# Patient Record
Sex: FEMALE | Race: WHITE | NOT HISPANIC OR LATINO | Employment: OTHER | URBAN - METROPOLITAN AREA
[De-identification: names, ages, dates, MRNs, and addresses within clinical notes are randomized per-mention and may not be internally consistent; named-entity substitution may affect disease eponyms.]

---

## 2017-01-05 ENCOUNTER — OFFICE VISIT - HEALTHEAST (OUTPATIENT)
Dept: INTERNAL MEDICINE | Facility: CLINIC | Age: 82
End: 2017-01-05

## 2017-01-05 DIAGNOSIS — G20.A1 PARKINSON DISEASE (H): ICD-10-CM
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
## 2017-02-02 ENCOUNTER — RECORDS - HEALTHEAST (OUTPATIENT)
Dept: ADMINISTRATIVE | Facility: OTHER | Age: 82
End: 2017-02-02

## 2017-03-22 ENCOUNTER — RECORDS - HEALTHEAST (OUTPATIENT)
Dept: ADMINISTRATIVE | Facility: OTHER | Age: 82
End: 2017-03-22

## 2017-03-29 ENCOUNTER — HOSPITAL ENCOUNTER (OUTPATIENT)
Dept: MAMMOGRAPHY | Facility: CLINIC | Age: 82
Discharge: HOME | End: 2017-03-29
Attending: INTERNAL MEDICINE

## 2017-03-29 DIAGNOSIS — Z12.31 VISIT FOR SCREENING MAMMOGRAM: ICD-10-CM

## 2017-06-09 ENCOUNTER — COMMUNICATION - HEALTHEAST (OUTPATIENT)
Dept: SCHEDULING | Facility: CLINIC | Age: 82
End: 2017-06-09

## 2017-07-24 ENCOUNTER — RECORDS - HEALTHEAST (OUTPATIENT)
Dept: ADMINISTRATIVE | Facility: OTHER | Age: 82
End: 2017-07-24

## 2017-07-24 ENCOUNTER — TRANSFERRED RECORDS (OUTPATIENT)
Dept: HEALTH INFORMATION MANAGEMENT | Facility: CLINIC | Age: 82
End: 2017-07-24

## 2017-08-07 ENCOUNTER — COMMUNICATION - HEALTHEAST (OUTPATIENT)
Dept: INTERNAL MEDICINE | Facility: CLINIC | Age: 82
End: 2017-08-07

## 2017-08-07 DIAGNOSIS — M85.88 OSTEOPENIA OF SPINE: ICD-10-CM

## 2017-08-07 DIAGNOSIS — M85.80 OSTEOPENIA: ICD-10-CM

## 2017-08-10 ENCOUNTER — RECORDS - HEALTHEAST (OUTPATIENT)
Dept: BONE DENSITY | Facility: CLINIC | Age: 82
End: 2017-08-10

## 2017-08-10 ENCOUNTER — RECORDS - HEALTHEAST (OUTPATIENT)
Dept: ADMINISTRATIVE | Facility: OTHER | Age: 82
End: 2017-08-10

## 2017-08-10 DIAGNOSIS — M85.88 OTHER SPECIFIED DISORDERS OF BONE DENSITY AND STRUCTURE, OTHER SITE: ICD-10-CM

## 2017-08-10 DIAGNOSIS — M85.80 OTHER SPECIFIED DISORDERS OF BONE DENSITY AND STRUCTURE, UNSPECIFIED SITE: ICD-10-CM

## 2017-08-15 ENCOUNTER — COMMUNICATION - HEALTHEAST (OUTPATIENT)
Dept: INTERNAL MEDICINE | Facility: CLINIC | Age: 82
End: 2017-08-15

## 2017-10-11 ENCOUNTER — RECORDS - HEALTHEAST (OUTPATIENT)
Dept: ADMINISTRATIVE | Facility: OTHER | Age: 82
End: 2017-10-11

## 2017-11-07 ENCOUNTER — OFFICE VISIT - HEALTHEAST (OUTPATIENT)
Dept: INTERNAL MEDICINE | Facility: CLINIC | Age: 82
End: 2017-11-07

## 2017-11-07 DIAGNOSIS — E78.5 HYPERLIPIDEMIA: ICD-10-CM

## 2017-11-27 ENCOUNTER — RECORDS - HEALTHEAST (OUTPATIENT)
Dept: ADMINISTRATIVE | Facility: OTHER | Age: 82
End: 2017-11-27

## 2018-01-17 ENCOUNTER — OFFICE VISIT - HEALTHEAST (OUTPATIENT)
Dept: INTERNAL MEDICINE | Facility: CLINIC | Age: 83
End: 2018-01-17

## 2018-01-17 DIAGNOSIS — R10.30 GROIN PAIN: ICD-10-CM

## 2018-01-17 DIAGNOSIS — Q18.1 CYST ON EAR: ICD-10-CM

## 2018-03-07 ENCOUNTER — RECORDS - HEALTHEAST (OUTPATIENT)
Dept: ADMINISTRATIVE | Facility: OTHER | Age: 83
End: 2018-03-07

## 2018-06-11 ENCOUNTER — RECORDS - HEALTHEAST (OUTPATIENT)
Dept: ADMINISTRATIVE | Facility: OTHER | Age: 83
End: 2018-06-11

## 2018-06-25 ENCOUNTER — RECORDS - HEALTHEAST (OUTPATIENT)
Dept: ADMINISTRATIVE | Facility: OTHER | Age: 83
End: 2018-06-25

## 2018-07-02 ENCOUNTER — OFFICE VISIT - HEALTHEAST (OUTPATIENT)
Dept: FAMILY MEDICINE | Facility: CLINIC | Age: 83
End: 2018-07-02

## 2018-07-02 DIAGNOSIS — Z48.02 ENCOUNTER FOR STAPLE REMOVAL: ICD-10-CM

## 2018-08-08 ENCOUNTER — TRANSFERRED RECORDS (OUTPATIENT)
Dept: HEALTH INFORMATION MANAGEMENT | Facility: CLINIC | Age: 83
End: 2018-08-08

## 2018-08-08 ENCOUNTER — RECORDS - HEALTHEAST (OUTPATIENT)
Dept: ADMINISTRATIVE | Facility: OTHER | Age: 83
End: 2018-08-08

## 2018-08-10 ENCOUNTER — RECORDS - HEALTHEAST (OUTPATIENT)
Dept: ADMINISTRATIVE | Facility: OTHER | Age: 83
End: 2018-08-10

## 2018-08-20 ENCOUNTER — OFFICE VISIT - HEALTHEAST (OUTPATIENT)
Dept: FAMILY MEDICINE | Facility: CLINIC | Age: 83
End: 2018-08-20

## 2018-08-20 DIAGNOSIS — M25.862 CYST OF LEFT KNEE JOINT: ICD-10-CM

## 2018-08-20 DIAGNOSIS — H61.22 IMPACTED CERUMEN OF LEFT EAR: ICD-10-CM

## 2018-09-24 ENCOUNTER — RECORDS - HEALTHEAST (OUTPATIENT)
Dept: ADMINISTRATIVE | Facility: OTHER | Age: 83
End: 2018-09-24

## 2018-10-10 ENCOUNTER — OFFICE VISIT - HEALTHEAST (OUTPATIENT)
Dept: INTERNAL MEDICINE | Facility: CLINIC | Age: 83
End: 2018-10-10

## 2018-10-10 DIAGNOSIS — M94.9 DISORDER OF BONE AND CARTILAGE: ICD-10-CM

## 2018-10-10 DIAGNOSIS — M89.9 DISORDER OF BONE AND CARTILAGE: ICD-10-CM

## 2018-10-10 DIAGNOSIS — Z00.00 HEALTHCARE MAINTENANCE: ICD-10-CM

## 2018-10-10 DIAGNOSIS — E78.2 MIXED HYPERLIPIDEMIA: ICD-10-CM

## 2018-10-10 DIAGNOSIS — N81.10 PROLAPSE OF VAGINAL WALL: ICD-10-CM

## 2018-10-10 DIAGNOSIS — G20.A1 PARKINSON DISEASE (H): ICD-10-CM

## 2018-10-10 DIAGNOSIS — Z00.00 ROUTINE GENERAL MEDICAL EXAMINATION AT A HEALTH CARE FACILITY: ICD-10-CM

## 2018-10-10 DIAGNOSIS — K63.5 COLON POLYP: ICD-10-CM

## 2018-10-10 DIAGNOSIS — N81.10 VAGINAL WALL PROLAPSE: ICD-10-CM

## 2018-10-10 DIAGNOSIS — I51.7 CARDIOMEGALY: ICD-10-CM

## 2018-10-10 LAB
ALBUMIN SERPL-MCNC: 3.8 G/DL (ref 3.5–5)
ALBUMIN UR-MCNC: NEGATIVE MG/DL
ALP SERPL-CCNC: 46 U/L (ref 45–120)
ALT SERPL W P-5'-P-CCNC: <9 U/L (ref 0–45)
ANION GAP SERPL CALCULATED.3IONS-SCNC: 8 MMOL/L (ref 5–18)
APPEARANCE UR: CLEAR
AST SERPL W P-5'-P-CCNC: 15 U/L (ref 0–40)
BACTERIA #/AREA URNS HPF: ABNORMAL HPF
BILIRUB SERPL-MCNC: 0.4 MG/DL (ref 0–1)
BILIRUB UR QL STRIP: NEGATIVE
BUN SERPL-MCNC: 36 MG/DL (ref 8–28)
CALCIUM SERPL-MCNC: 9.8 MG/DL (ref 8.5–10.5)
CHLORIDE BLD-SCNC: 103 MMOL/L (ref 98–107)
CHOLEST SERPL-MCNC: 170 MG/DL
CO2 SERPL-SCNC: 29 MMOL/L (ref 22–31)
COLOR UR AUTO: YELLOW
CREAT SERPL-MCNC: 0.8 MG/DL (ref 0.6–1.1)
ERYTHROCYTE [DISTWIDTH] IN BLOOD BY AUTOMATED COUNT: 11.8 % (ref 11–14.5)
FASTING STATUS PATIENT QL REPORTED: NORMAL
GFR SERPL CREATININE-BSD FRML MDRD: >60 ML/MIN/1.73M2
GLUCOSE BLD-MCNC: 90 MG/DL (ref 70–125)
GLUCOSE UR STRIP-MCNC: NEGATIVE MG/DL
HCT VFR BLD AUTO: 35.4 % (ref 35–47)
HDLC SERPL-MCNC: 53 MG/DL
HGB BLD-MCNC: 11.8 G/DL (ref 12–16)
HGB UR QL STRIP: NEGATIVE
KETONES UR STRIP-MCNC: NEGATIVE MG/DL
LDLC SERPL CALC-MCNC: 97 MG/DL
LEUKOCYTE ESTERASE UR QL STRIP: ABNORMAL
MCH RBC QN AUTO: 31.4 PG (ref 27–34)
MCHC RBC AUTO-ENTMCNC: 33.4 G/DL (ref 32–36)
MCV RBC AUTO: 94 FL (ref 80–100)
NITRATE UR QL: POSITIVE
PH UR STRIP: 5 [PH] (ref 5–8)
PLATELET # BLD AUTO: 262 THOU/UL (ref 140–440)
PMV BLD AUTO: 7.8 FL (ref 7–10)
POTASSIUM BLD-SCNC: 4.4 MMOL/L (ref 3.5–5)
PROT SERPL-MCNC: 6.5 G/DL (ref 6–8)
RBC # BLD AUTO: 3.77 MILL/UL (ref 3.8–5.4)
RBC #/AREA URNS AUTO: ABNORMAL HPF
SODIUM SERPL-SCNC: 140 MMOL/L (ref 136–145)
SP GR UR STRIP: 1.02 (ref 1–1.03)
SQUAMOUS #/AREA URNS AUTO: ABNORMAL LPF
TRIGL SERPL-MCNC: 101 MG/DL
UROBILINOGEN UR STRIP-ACNC: ABNORMAL
WBC #/AREA URNS AUTO: ABNORMAL HPF
WBC: 7.5 THOU/UL (ref 4–11)

## 2018-10-12 ENCOUNTER — COMMUNICATION - HEALTHEAST (OUTPATIENT)
Dept: INTERNAL MEDICINE | Facility: CLINIC | Age: 83
End: 2018-10-12

## 2018-10-12 ENCOUNTER — AMBULATORY - HEALTHEAST (OUTPATIENT)
Dept: INTERNAL MEDICINE | Facility: CLINIC | Age: 83
End: 2018-10-12

## 2018-10-12 LAB
25(OH)D3 SERPL-MCNC: 38.8 NG/ML (ref 30–80)
25(OH)D3 SERPL-MCNC: 38.8 NG/ML (ref 30–80)
BACTERIA SPEC CULT: ABNORMAL

## 2018-11-08 ENCOUNTER — RECORDS - HEALTHEAST (OUTPATIENT)
Dept: ADMINISTRATIVE | Facility: OTHER | Age: 83
End: 2018-11-08

## 2018-12-14 ENCOUNTER — COMMUNICATION - HEALTHEAST (OUTPATIENT)
Dept: INTERNAL MEDICINE | Facility: CLINIC | Age: 83
End: 2018-12-14

## 2018-12-14 DIAGNOSIS — E78.5 HYPERLIPIDEMIA: ICD-10-CM

## 2018-12-18 ENCOUNTER — RECORDS - HEALTHEAST (OUTPATIENT)
Dept: ADMINISTRATIVE | Facility: OTHER | Age: 83
End: 2018-12-18

## 2018-12-31 NOTE — TELEPHONE ENCOUNTER
FUTURE VISIT INFORMATION      FUTURE VISIT INFORMATION:    Date: 1/17/19    Time: 11.10a    [place] [facility] [place]:    Referring provider:  Dr Hannah Harmon (Physical Therapist)    Referring providers clinic:  [facility])    Reason for visit/diagnosis  Parkinson's    RECORDS REQUESTED FROM:       Clinic name Comments Records Status Imaging Status   [facility] ([facility] Dr. Harmon Received    [facility]ena Adame  Received Requested   [facility] Dr. Tarun Her  MRI C Spine 8/17/15  MRA Head/Neck 8/17/15  MRI Head 7/17/15 Received PACS                         12/31/18: External referral from Dr. Harmon at [facility]. Sent request for records and imaging.    [facility]. Sent requests for records and imaging at each location.

## 2019-01-02 NOTE — TELEPHONE ENCOUNTER
Records received from [facility]. Imaging reports included, imaging was not.     Imaging was done at [facility].

## 2019-01-08 NOTE — PROGRESS NOTES
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 Carmen Luu  Qualifies for and will benefit from the following product: walker due to significant impairment in gait that is able to improve his mobility and reduce the risk of falling. Their mobility issue can be resolved with the use of this medical device.     Ed Mendoza MD  2019          Ed Mendoza MD  _____________________________________________________________________  PATIENT: Carmen Luu  86 year old female   : 1932  RAMOS: 2019    Consult requested by - inserted.         Outside records reviewed and revealed  - inserted.       History obtained from patient      History of Present Illness  85 yo woman with parkinsonism diagnosed .     Backward falls - began before she was diagnosed.   Fell in bedroom.   Diagnosed in   Not sensitive to light.     Neck fracture  - 2018  2 weeks before that fell and tore her right rotator cuff     She can walk 2 or 3 miles - and can walk a straight line.   She has balance issues.   [facility]  She has done big and loud.   She has done this initially  Seen by Dr. Her of [facility]  She has done pool therapy and then land therapy.   She had done boxing and had done title boxing and did not do the boxing at the other place due to the cement floors  Had been doing silver sneakers 3/week  She lives by herself and her daughter is nearby  She is in a twin home - single level    Wears glasses  Had cataract surgery  Has prisms for her double vision  No choking but rare.   She does not have constipation.   Bladder - has a pesary   Has nocturia  3/night  Cognitive abilities are pretty good  Motivation has been good  She has been feeling handicapped   She denies depression or anxious  She does not have apathy  Denies headaches  No cancer  No lung problems  No heart problems  But has low bood pressure except may get light headed or weak in the summer when exercising.   Cholesterol on medication =-   No thyroid or diabetes  No anemia  Infections - denies  Hearing - is okay  Smell perception - is good  She snores per her report  Denies rbd  No recent brain mri  Has not had a neuropsychological evaluation.     She has not used a u-step walker  She has some freezing of gait.   She has to talk to herself when she is moving.     Fall backwards or to the left.   She thinks the sinemet has helped.   She first had problems with the right hand when playing piano.       Mariel daughter      Answers for HPI/ROS submitted by the patient on 1/17/2019   General Symptoms: No  Skin Symptoms: No  HENT Symptoms: No  EYE SYMPTOMS: No  HEART SYMPTOMS: No  LUNG SYMPTOMS: Yes  INTESTINAL SYMPTOMS: No  URINARY SYMPTOMS: No  GYNECOLOGIC SYMPTOMS: No  BREAST SYMPTOMS: No  SKELETAL SYMPTOMS: Yes  BLOOD SYMPTOMS: No  NERVOUS SYSTEM SYMPTOMS: Yes  MENTAL HEALTH SYMPTOMS: No  Cough: No  Sputum or phlegm: No  Coughing up blood: No  Difficulty breating or shortness of breath: No  Snoring: Yes  Difficulty breathing on exertion: No  Nighttime Cough: No  Difficulty breathing when lying flat: Yes  Back pain: Yes  Muscle aches: No  Neck pain: Yes  Swollen joints: No  Joint pain: No  Bone pain: No  Muscle cramps: Yes  Muscle weakness: Yes  Joint stiffness: Yes  Bone fracture: No  Trouble with coordination: Yes  Dizziness or trouble with balance: Yes  Fainting or black-out spells: No  Memory loss: No  Headache: No  Seizures: No  Speech problems: No  Tingling: No  Tremor: No  Weakness: Yes  Difficulty walking: Yes  Paralysis: No  Numbness: No      Medications     8am 2pm 8pm     Aspirin 81         Aspirin-calcium carbonate 81-77         Calcium carbonate 500mg        Calcium carbonate vit D        Calcium carbonate vit D        Carbidopa/levodopa Sinemet 25/100 1.5 1.5 1.5     Cholecalciferol vitamin D3 1000        Docosahexanoic acid 1gm        Fish oil-omega 3 fatty acids 1000mg        Hydrocodone acetaminophen norco        Ibuprofen advil/motrin 200mg        Ibuprofen advil/motrin 400mg        Omega-3 acid ethyl esters lovaza 1g        Pravastatin pravachol 10mg        Vitamin D3 cholecalciferol 2000 units.                                                                                                                               14 Review of systems  are negative except for   Patient Active Problem List   Diagnosis     Parkinson's disease (H)        Allergies   Allergen Reactions     Acetaminophen-Codeine Other (See Comments)     Codeine Other (See Comments)     Morphine Other (See Comments)     Made me feel really wierd     Penicillins      Tongue and throat tingling     No past surgical history on file.  No past medical history on file.  Social History     Socioeconomic History     Marital status:      Spouse name: Not on file     Number of children: Not on file     Years of education: Not on file     Highest education level: Not on file   Social Needs     Financial resource strain: Not on file     Food insecurity - worry: Not on file     Food insecurity - inability: Not on file     Transportation needs - medical: Not on file     Transportation needs - non-medical: Not on file   Occupational History     Not on file   Tobacco Use     Smoking status: Not on file   Substance and Sexual Activity     Alcohol use: Not on file     Drug use: Not on file     Sexual activity: Not on file   Other Topics Concern     Not on file   Social History Narrative    . lives in [place].     Anisha Little daughter     No family history on file.  Current Outpatient Medications   Medication Sig Dispense Refill      carbidopa-levodopa (SINEMET)  MG tablet Take 1.5 tablets by mouth 1.5 tabs by mouth 3/day @@ 8am, 2pm and 8pm       Examination  B/P: Data Unavailable, T: Data Unavailable, P: Data Unavailable, R: Data Unavailable 0 lbs 0 oz  There were no vitals taken for this visit., There is no height or weight on file to calculate BMI.    Vitals signs were added and reviewed if not above. Please refer to the chart from this visit.    General examination: well developed, nourished and normal affect  Carotid: No bruits. Chest CTA, Heart regular without gallops or murmurs. Abdomen soft nontender, no masses, bowel sounds intact. Periphery: normal pulses without edema. No skin lesions. MENTAL STATUS:  Alert, oriented x3.  Speech fluent with normal naming, repetition, comprehension.  Good right-left orientation, Can remember 2/3 objects. 5/5 on spelling world backwards. CRANIAL NERVES:  Disks flat. Pupils are equal, round, reactive to light.  Normal vascularity and fields. Extraocular movements - slight changes vertically. She has a startled facial appearance.  Facial sensation and movement normal.  Hearing intact. Palate moves symmetrically.  Tongue midline.  Sternocleidomastoid and trapezius strength intact.  Neck strength was normal.  NEUROLOGIC:  Tone: slight increase tone and moderate bradykinesias on the right and less on the left. Motor in upper and lower extremities. 5/5.  Reflexes 2/4.  Toe signs downgoing.  Good finger-nose-finger, fine finger movement, heel-shin maneuver, sensation to light touch, position sense and vibration and temperature was normal. Gait - very unsteady widened with good arm swing. Romberg and postural stability impaired - falls if not caught Tremor  - absent      Patient Demographics  - 86 y.o. Female; born Jun. 19, 1932     Patient Address Communication Language Race / Ethnicity   [address] [place] [address] [phone] (Mobile)  [phone] (Home)  [email or web address]  "(Preferred) Unknown / Unknown     Source Comments  - [facility]    You are receiving this document as you are listed as the primary care provider,follow-up provider, or the patient has been referred to you for consultation.This is in compliance with the Medicare and Medicaid EHR Incentive Program,which states \"Providers who transition their patient to another setting of careor provider of care or refers their patient to another provider of care shouldprovide summary care record for each transition of care or referral.\"    Allergies  Reconcile with Patient's Chart    Active Allergy Reactions Severity Noted Date Comments   Acetaminophen-Codeine Other, see comments   12/02/2010     Codeine Other, see comments   12/02/2010     Morphine Other, see comments   04/11/2016 Made me feel really wierd     Penicillins     02/02/2017 Tongue and throat tingling       Medications  Reconcile with Patient's Chart    Medication Sig Dispensed Refills Start Date End Date Status   pravastatin (PRAVACHOL) 10 MG tablet   Take 10 mg by mouth daily at bedtime.   0     Active   carbidopa-levodopa (SINEMET)  MG tablet   Take 1.5 Tabs by mouth three times a day. Takes at 8a-2p-8p   0     Active   calcium carbonate-vitamin D 600-400 MG-UNIT tablet   Take 1 Tab by mouth two times a day.   0     Active   omega-3 fatty acids (MAXEPA,FISHOIL) 1000 MG capsule   Take 1,200 g by mouth daily.   0     Active   aspirin 81 MG tablet   Take 81 mg by mouth daily.   0     Active   fluorometholone (FML) 0.1 % eye drop suspension    Indications: Closed nondisplaced fracture of second cervical vertebra, unspecified fracture morphology, initial encounter ([facility]) Place 1 Drop into eye(s).   0 01/19/2018   Active   HYDROcodone-acetaminophen (NORCO) 5-325 MG tablet    Indications: Closed nondisplaced fracture of second cervical vertebra, unspecified fracture morphology, initial encounter ([facility]) Take 1-2 Tabs by mouth.   0 06/25/2018   Active   DOCOSAHEXAENOIC " "ACID OR    Indications: Closed nondisplaced fracture of second cervical vertebra, unspecified fracture morphology, initial encounter ([facility]) Take 2 g by mouth.   0     Active   Cholecalciferol (VITAMIN D3) 1000 units    Indications: Closed nondisplaced fracture of second cervical vertebra, unspecified fracture morphology, initial encounter ([facility]) Take 1 Cap by mouth.   0 01/02/2012   Active   ibuprofen (MOTRIN) 200 MG tablet    Indications: Closed nondisplaced fracture of second cervical vertebra, unspecified fracture morphology, initial encounter ([facility]) Take 200-400 mg by mouth.   0     Active     Active Problems  Reconcile with Patient's Chart    Problem Noted Date   Parkinson's disease 02/02/2017     Encounters  - from Last 3 Months    Date Type Specialty Care Team Description   12/17/2018 Ancillary Procedure Radiology Neymar Pham MD   Closed displaced fracture of second cervical vertebra, unspecified fracture morphology, initial encounter ([facility]);   History of recent fall   12/17/2018 Telephone Neurosurgery Neymar Pham MD   QUESTIONS, GENERAL   12/10/2018 Telephone Neurosurgery Neymar Pham MD   Physical Therapy     Social History      Tobacco Use Types Packs/Day Years Used Date   Never Smoker           Smokeless Tobacco: Never Used           Sex Assigned at Birth Date Recorded   Not on file       Job Start Date Occupation Industry   Not on file Not on file Not on file     Travel History Travel Start Travel End   No recent travel history available.       Last Filed Vital Signs      Vital Sign Reading Time Taken   Blood Pressure 135/71 10/03/2018 11:01 AM CDT   Pulse 77 10/03/2018 11:01 AM CDT   Temperature 36.2  C (97.2  F) 10/03/2018 11:01 AM CDT   Respiratory Rate 16 10/03/2018 11:01 AM CDT   Oxygen Saturation - -   Inhaled Oxygen Concentration - -   Weight 55.8 kg (123 lb) 08/10/2018 3:27 PM CDT   Height 162.6 cm (5' 4\") 08/10/2018 3:27 PM CDT   Body Mass Index 21.11 08/10/2018 3:27 PM CDT     Plan of " Treatment      Health Maintenance Due Date Last Done Comments   Medicare Annual Wellness Visit 06/19/1932       Advanced Directive 06/19/1997       Dexa 06/19/1997       Zoster (2 of 3) 06/06/2014 04/11/2014     Influenza (#1) 09/01/2018 01/08/2013, 12/02/2010, 11/14/2007     DTaP/Tdap/Td (2 - Td) 02/25/2026 02/25/2016, 12/01/2009     Pneumococcal Completed 11/18/2016, 04/01/2003     HepA Aged Out   No longer eligible based on patient's age to complete this topic   HepB Aged Out   No longer eligible based on patient's age to complete this topic   Hib Aged Out   No longer eligible based on patient's age to complete this topic   MCV4 Aged Out   No longer eligible based on patient's age to complete this topic     Procedures  - from Last 3 Months    Procedure Name Priority Date/Time Associated Diagnosis Comments   XR CERVICAL SPINE AP/LAT UPRIGHT Routine 12/17/2018 3:07 PM CST Closed displaced fracture of second cervical vertebra, unspecified fracture morphology, initial encounter ([facility])    History of recent fall   Results for this procedure are in the results section.       Results  - from Last 3 Months      XR Cervical Spine AP/Lat Upright (12/17/2018 3:07 PM CST)  XR Cervical Spine AP/Lat Upright (12/17/2018 3:07 PM CST)   Narrative Performed At   [facility] RADIOLOGY        EXAM: XR CERVICAL SPINE AP/LAT UPRIGHT    LOCATION: [facility]    DATE/TIME: 12/17/2018 3:07 PM        INDICATION: Past c2 fracture; recent fall    COMPARISON: Cervical spine CT 10/3/2018        FINDINGS: Patient's head is slightly tilted to the right. Mild straightening of the normal cervical lordosis. 1 mm degenerative spondylolisthesis of C7 on T1. Vertebral body heights are maintained. Moderate intervertebral disc height loss and endplate spurring of C5-C6 and C6-C7. Mild intervertebral disc height loss in the rest of the cervical spine. Redemonstration of the fracture involving the base of the dens. The extension into the left lateral  mass of C2 is better seen on the previous cervical spine CT. No prevertebral soft tissue edema. No suspicious lesions in the lung apices.        RADIOLOGY       XR Cervical Spine AP/Lat Upright (12/17/2018 3:07 PM CST)   Procedure Note   Interface, In Rad Results - 12/17/2018 6:31 PM CST    [facility]    EXAM: XR CERVICAL SPINE AP/LAT UPRIGHT  LOCATION: [facility]  DATE/TIME: 12/17/2018 3:07 PM    INDICATION: Past c2 fracture; recent fall  COMPARISON: Cervical spine CT 10/3/2018    FINDINGS: Patient's head is slightly tilted to the right. Mild straightening of the normal cervical lordosis. 1 mm degenerative spondylolisthesis of C7 on T1. Vertebral body heights are maintained. Moderate intervertebral disc height loss and endplate spurring of C5-C6 and C6-C7. Mild intervertebral disc height loss in the rest of the cervical spine. Redemonstration of the fracture involving the base of the dens. The extension into the left lateral mass of C2 is better seen on the previous cervical spine CT. No prevertebral soft tissue edema. No suspicious lesions in the lung apices.         XR Cervical Spine AP/Lat Upright (12/17/2018 3:07 PM CST)   Performing Organization Address City/State/Zipcode Phone Number    RADIOLOGY              Maida Abad MD - 02/02/2017 1:25 PM CST    To manage constipation:  - Try to increase your daily fluids. Work toward 64 ounces/day 8-eight ounce glasses)  - Increase fiber by eating whole grains, fruits and vegetables  - Prunes, prune juice or yogurt may help  - Exercise daily  - Follow recommendations as discussed. Refer to constipation handout provided today.    Refer to handout provided regarding adding foods or supplements for a healthy brain.  - Add fruits and vegetables that are bright and dark in color. They are high in antioxidants and are healthy for your brain.  - Try to eat fish high in omega-3 fatty acids. such as salmon, tuna, herring 2-4 times weekly. Talk to your  primary doctor about adding fish oil supplement.     Follow and refer to recommendations as discussed for taking pills on time.   - Use a system to ensure you take your pills on time.  - Make sure to take your PD pills with you when you leave home.  -There are applications for smart-phones and tablets that can assist with med reminders, such as MediSafe, that allows you to program times and different reminders.    Please call the [facility] nurse line for any questions, concerns or updates, [phone].  Darlene Mathur RN    Thank you for your interest in enrolling in [facility]. Please follow the instructions below to securely access your online medical record. [facility] allows you to send messages to your doctor, view your test results, renew your prescriptions, schedule appointments, and more.    How Do I Sign Up?  1. In your Internet browser, go to [email or web address]  2. Click on the Enter activation code link under the New User? section. You will see the Activate your account! page.  3. Enter your activation code exactly as it appears below. You will not need to use this code after you ve completed the sign-up process. If you do not sign up before the expiration date, you must request a new code.  Activation Code: [email or web address]  Expires: 3/4/2017 2:05 PM    4. Enter your last name and date of birth (mm/dd/yyyy) as indicated, then click Continue. You will be taken to the Let's set up your account page.   5. Create a username. This will be your [facility] login ID and cannot be changed, so think of one that is secure and easy to remember.  6. Create a password. You can change your password at any time.  7. Enter your e-mail address. You will receive e-mail notification when new information is available in [facility].   8. Select your Security Questions and enter your answers. These can be used at a later time if you forget your password.   9. Check the box to accept the terms and  conditions. Click Create your account. You can now view your medical record.    Additional Information  If you have questions, you can call [phone] to talk to our [employer] staff. Remember, [employer] is NOT to be used for urgent needs. For medical emergencies, dial 911.    Move sinemet to a 5 hour interval, at something like 8-1-6    Pay attention to what you are doing before you fall!    Return in 4-6 months      Electronically Signed by Maida Abad MD on 02/02/2017 1:25 PM CST       Progress Notes  - in this encounter  Table of Contents for Progress Notes   Darlene Mathur RN - 02/02/2017 2:02 PM CST   Maida Abad MD - 02/02/2017 7:02 AM CST      Darlene Mathur RN - 02/02/2017 2:02 PM CST  Patient, son and MT seen for a 50 minute nurse assessment as part of the all day team assessment clinic at the [facility].   Nursing assessment and education completed regarding knowledge of Parkinson's disease, medications, bowel management, and nutrition.     Patient has a good understanding of Parkinson's disease and treatment options. Discussed the role of dopamine in movement disorders and medication strategies to replace dopamine. Patient has a reliable system for achieving pills on time. Rationale for pills on time and strategies to achieve on time pills discussed. She has an alarm that will go off when it is time to take her meds    Discussion regarding pill timing away from foods, potential for protein interfering with levodopa absorption. Discussed avoidance of protein-containing foods at same time as carbidopa/levodopa. Talked about taking meds 1/2 hour before meals.     Patient reports no concerns regarding constipation. Discussed optimal bowel management. Dietary and medication recommendations provided.     Patient reports maintaining a healthy diet. Education regarding foods high in antioxidants, and the potential value of foods high in omega-3 fatty acids or supplements discussed.  "    Patient reports no problems with achieving a good nights sleep. Pill and non-pill sleep strategies discussed.    Problems with non-motor symptoms of low blood pressure and skin discussed.     Resources provided regarding concerns discussed above, including constipation, pills on time, and National Parkinson Foundation manual \"What You and Your Family Should Know,\" \"Medications,\" and an \"Aware in Care\" kit.    Patient/family concerns to be addressed by physician added to problem list.     See patient instructions in Plan of Care for specific patient instructions.     Patient/family exhibits adequate understanding of instructions.    Supervising physician in building: MD Darlene Selby, RN      Electronically Signed by Darlene Mathur, RN on 02/02/2017 2:02 PM CST    Back to top of Progress Notes  Maida Abad MD - 02/02/2017 7:02 AM CST  Formatting of this note might be different from the original.  [facility] Initial Consult    [facility]  [address] [place] [address] [phone]  Fax [phone]    Chief Complaint  Chief Complaint   Patient presents with     MEDICATION CHECK   carb/levo dosing and timing     History of Present Illness  This pt is seen for a second opinion about the management of Parkinson's disease. SHe presented in 2015 with a tendency to fall backwards, and a general stiffness and slowing of movement, and was started on levodopa by her PCP, which was dramatically helpful. She saw a neurologist, who reviewed the brain MRI (showed some lacunar infarcts on both sides), MRA (unremarkable) and Cspine MRI (foraminal narrowing at multiple levels, but no cord compression). Sinemet 25/100 was increased to 1.5 pills tid at 8-2-8, which was not as incrementally helpful. She is usually up from 8am to midnight. She has fallen 15 times in the last year. She occasionally uses a walking stick, but usually not. She is more " "likely to fall if she is trying to multitask, or occasionally if she is just standing there she will find herself suddenly on the floor. She lives independently in a twin home.     Previous evaluations and treatments  See above  Past Medical History  No past medical history on file.    Social History  Social History     Social History     Marital Status: Single   Spouse Name: N/A     Number of Children: N/A     Years of Education: N/A     Occupational History     Not on file.     Social History Main Topics     Smoking status: Never Smoker     Smokeless tobacco: Not on file     Alcohol Use: Not on file     Drug Use: Not on file     Sexual Activity: Not on file     Other Topics Concern     Not on file     Social History Narrative     No narrative on file     Family History  No family history on file.    Medications  No outpatient prescriptions prior to visit.     No facility-administered medications prior to visit.     Allergies  Allergies   Allergen Reactions     Penicillins   Tongue and throat tingling     Review of systems  Frequent voiding, falls and near falls, muscle aches and cramps    Physical Examination  /64 mmHg  Pulse 80  Ht 1.66 m (5' 5.35\")  Wt 59.603 kg (131 lb 6.4 oz)  BMI 21.63 kg/m2  UPDRS ADL score: 11  General: she appears younger than her stated age, chatty, looks generally healthy  Cardiac: The heart showed a regular rhythm without murmurs, rubs, or gallops.   Carotids: There were no carotid bruits  Abdomen: was benign and nontender  Extremities: There was no peripheral edema. There was no atrophy of the distal muscles. Pulses were intact and the extremities were warm.  Neurological:  The mental status showed normal attention, concentration, memory, fund of knowledge, and language.  MMSE/MOCA: 23/30  BDI: 2  The cranial nerve exam showed:   vision was acceptable, attending to both visual fields well.  eye movements were full horizontally and vertically, without nystagmus  facial " movements, including CN 5 and 7-innervated, were symmetric and normal  hearing was grossly normal  tongue movements were normal, and there was no dysarthria  sternocleidomastoid and trapezius movements were normal  pupils were symmetric and reactive to light  Motor exam: normal strength, bulk, and tone proximally and distally in all four extremities.  Sensory exam was intact to vibration at the ankles.   Reflexes were symmetric and 2+, including biceps, brachioradialis, knee jerks, and ankle jerks.  Tremor, coordination, gait: there is no tremor, no dyskinesia. Her speech and affect are normal  She gestures more with the L hand than the R, but then moves both equally when doing fine finger movements or rapid alternating hand movements  Heel taps are slightly low on both sides  Tone is mildly increased throughout  She rises from the chair without using her hands, but wobbled and nearly fell backwards     Impression  Parkinsonism, apparently modestly levodopa responsive. She has the atypical feature of early falls, but does seem to have had a dramatic improvement with levodopa, suggesting that the diagnosis of Parkinson's disease is correct. We reviewed the idea that levodopa does not improve balance, other than possibly indirectly by improving mobility. So the most important thing that she could do is acquire insight as to what kind of activities are likely to lead to falls. We reviewed a number of possibilities. She should take her sinemet at 5 hour intervals, as she has a little wearing off towards the end of a dose. She will see PT again. I think she enjoyed meeting PT, OT, speech, and music therapy, nursing and  as part of her Team Assessment clinic, so that she was able to learn a little more about her disease. I will see her back in the summer.     Plan  See above    Total time 45 minutes, counseling time 30 minutes, about the items mentioned in the Impression    Send copy to   Dr. Bertrand  Markus, [address] [facility], [id number], [place] [address]        Electronically Signed by Maida Abad MD on 02/02/2017 7:02 AM CST          Allergies      Active Allergy Reactions Severity Noted Date Comments   Penicillins Paresthesias   01/02/2012 Tongue and throat tingling    Tingling on lips       Current Medications      Prescription Sig. Disp. Refills Start Date End Date Status   pravastatin (PRAVACHOL) 10 mg tablet   Take 1 tablet by mouth at bedtime.   0 01/02/2012   Active   calcium carbonate-vit D3, 600 mg-400 units, (CALCIUM-VITAMIN D) tablet   Take 1 tablet by mouth 2 times daily with meals.   0 01/02/2012   Active   omega-3 fatty acids-vitamin E (FISH OIL) 1,000 mg Cap   Take by mouth.   0 01/02/2012   Active   cholecalciferol (VITAMIN D) 1,000 unit capsule   Take 1 capsule by mouth once daily.   0 01/02/2012   Active   carbidopa-levodopa,  mg, (SINEMET )  mg tablet       2 11/24/2015   Active   pravastatin (PRAVACHOL) 10 mg tablet   Take 10 mg by mouth.         Active   Cholecalciferol, Vitamin D3, 2,000 unit tablet   Take 2,000 Units by mouth.         Active   carbidopa-levodopa,  mg, (SINEMET )  mg tablet   Take by mouth.         Active   calcium carbonate-vit D3, 600 mg-400 units, (CALTRATE PLUS 600 MG-400 UNIT TABLET) tablet   Take by mouth.         Active   omega-3 acid ethyl esters (LOVAZA;OMACOR) 1 gram capsule   Take 1,200 g by mouth.         Active   ibuprofen (ADVIL; MOTRIN) 200 mg tablet   Take 200-400 mg by mouth.         Active   aspirin-calcium carbonate 81 mg-300 mg calcium(777 mg) tab   Take 81 mg by mouth.         Active   NORCO per tablet    Indications: Closed nondisplaced fracture of second cervical vertebra, unspecified fracture morphology, initial encounter (HC) Take 1-2 tablets by mouth every 4 hours if needed for Pain Max acetaminophen dose: 4000 mg in 24 hrs. 10 tablet   0 06/25/2018   Active   cycloSPORINE (RESTASIS MULTIDOSE)  0.05 % drop    Indications: Keratoconjunctivitis sicca of both eyes (HC) Place 1 Drop into both eyes 2 times daily. 3 Bottle   3 10/08/2018 01/08/2019 Discontinued     Active Problems      Problem Noted Date   Posterior capsular opacification 01/02/2012     Encounters  - from Last 3 Months    Date Type Specialty Care Team Description   01/08/2019 Office Visit Optometry/Ophthalmology Ptia White, OD   Eye Exam ([facility])   12/31/2018 Hospital Encounter [facility], Hannah Adorno, PT       12/28/2018 Hospital Encounter [facility], NP    Theresa Cabrera, PTA       12/20/2018 Hospital Encounter [facility]on[facility], Hannah Adorno, PT       12/13/2018 Hospital Encounter [facility]son[facility], Hannah Adorno, PT       12/11/2018 Hospital Encounter [facility], NP    Juhi Siddiqi, PT       12/05/2018 Hospital Encounter [facility], Tayler Mars, PT       12/03/2018 Hospital Encounter [facility]on[facility], Hannah Adorno, PT       11/30/2018 Hospital Encounter [facility] RolandsonLata, Tayler Mars, PT       11/12/2018 Hospital Encounter [facility], Hannah Adorno, PT       11/09/2018 Hospital Encounter [facility]sonLata, Hannah Adorno, PT   Canceled (Late Cancellation)   11/07/2018 Hospital Encounter [facility]sonLata, Tayler Mars, PT       11/05/2018 Hospital Encounter [facility] RolandsonLata, Hannah Adorno, PT       10/31/2018 Hospital Encounter [facility] RolandsonLata, Tayler Mars, PT       10/29/2018 Hospital Encounter [facility]sonLata, Hannah Adorno, PT       10/25/2018 Hospital Encounter [facility] Lata Carmen, Hannah Adorno, PT        10/19/2018 Hospital Encounter [facility] Lata Carmen, NP    Tayler Winters, PT         Surgical History      Surgery Date Laterality Comments   CATARACT REMOVAL    Dr. Zarina CURRY AND BSO         right knee surgery         left ankle surgery         HYSTERECTOMY           Medical History      Medical History Date Comments   high cholesterol       Parkinson's disease (HC)         Family History      Medical History Relation Name Comments   Dementia Father       Heart Disease Mother       Stroke Mother         Relation Name Status Comments   Father        Mother          Social History      Tobacco Use Types Packs/Day Years Used Date   Never Smoker           Smokeless Tobacco: Never Used           Alcohol Use Drinks/Week oz/Week Comments   No 0 Standard drinks or equivalent   0.0        Sex Assigned at Birth Date Recorded   Not on file           Patient Demographics  - 86 y.o. Female; born 1932     Patient Address Communication Language Race / Ethnicity   [address] [place] [address] [phone] (Mobile)  [phone] (Home)  [email or web address] English - Written (Preferred) White / Not  or      Allergies      Active Allergy Reactions Severity Noted Date Comments   Acetaminophen-Codeine     2010     Codeine     2010     Morphine Other (See Comments)   2016 Made me feel really wierd     Penicillins     2010       Medications      Medication Sig Dispensed Refills Start Date End Date Status   OMEGA-3/DHA/EPA/FISH OIL (FISH OIL-OMEGA-3 FATTY ACIDS) 300-1,000 mg capsule   Take 2 g by mouth daily.   0     Active   CALCIUM CARBONATE (CALCIUM 500 ORAL)   Take by mouth.   0     Active   ibuprofen (ADVIL,MOTRIN) 400 MG tablet   Take 400 mg by mouth every 6 (six) hours as needed for pain.   0     Active   cholecalciferol, vitamin D3, (VITAMIN D3) 2,000 unit Tab   Take by mouth.   0     Active   aspirin 81 MG EC tablet   Take 81 mg by  mouth daily.   0     Active   carbidopa-levodopa (SINEMET)  mg per tablet   Take 1 tablet by mouth 3 (three) times a day.    0     Active   pravastatin (PRAVACHOL) 10 MG tablet    Indications: Hyperlipidemia Take 1 tablet (10 mg total) by mouth daily. 90 tablet   3 12/16/2018 12/16/2019 Active     Active Problems      Problem Noted Date   Parkinson disease 01/20/2016   Vaginal wall prolapse 08/18/2015   Hemorrhoids     Overview:     Created by Conversion    Replacement Utility updated for latest IMO load     Venous Insufficiency     Overview:     Created by Conversion    Replacement Utility updated for latest IMO load     Cystocele     Overview:     Created by Conversion    Replacement Utility updated for latest IMO load     Mixed hyperlipidemia     Overview:     Created by Conversion       Appendiceal Mucocele     Overview:     Created by Conversion       Osteopenia     Overview:     Created by Conversion    Replacement Utility updated for latest IMO load     Left Ventricular Hypertrophy     Overview:     Created by Conversion         Resolved Problems      Problem Noted Date Resolved Date   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/2015   Overview:     Created by Conversion       Cyst On The Right Ovary   07/17/2015   Overview:     Created by Conversion       Menopause Has Occurred   04/10/2015   Overview:     Created by Conversion    Replacement Utility updated for latest IMO load     Cervicalgia   01/20/2016   Overview:     Created by Conversion       Walk Is Wobbly Or Unsteady (Ataxia)   01/20/2016   Overview:     Created by Conversion         Immunizations  Reconcile with Patient's Chart    Name Dates Previously Given Next Due   DTaP, historic 11/01/2009     Influenza, Seasonal, Inj PF IIV3 01/08/2013, 01/08/2013     Influenza, inj, historic,unspecified 12/02/2010, 11/14/2007     Pneumo Conj 13-V (2010&after) 11/18/2016     Pneumo Polysac 23-V 04/01/2003     Td,adult,historic,unspecified 12/01/2009     Tdap  "02/25/2016     ZOSTER, LIVE 04/11/2014       Surgical History      Surgery Date Laterality Comments   HI TOTAL ABDOM HYSTERECTOMY     Description: Hysterectomy; Proc Date: 01/01/1988; Comments: couldn't find her ovaries   HYSTERECTOMY 1982       BREAST CYST ASPIRATION           Medical History      Medical History Date Comments   Parkinson disease (H) 1/20/2016     Breast cyst         Family History      Medical History Relation Name Comments   Breast cancer Maternal Aunt         Relation Name Status Comments   Maternal Aunt           Social History      Tobacco Use Types Packs/Day Years Used Date   Never Smoker           Smokeless Tobacco: Never Used           Tobacco Cessation: Counseling Given: No     Sex Assigned at Birth Date Recorded   Not on file       Job Start Date Occupation Industry   Not on file Not on file Not on file     Travel History Travel Start Travel End   No recent travel history available.       Obstetrics History      Grav Para Term Pre Abrt (TAB) (SAB) (Ect) Mult Lvng Comments   4 4 4                     Date Out GA Labor/2nd Wt Sex Deliv Anes Pre Estela A1 A5 Name Loc Clin     Term                               Term                               Term                               Term                               Last Filed Vital Signs      Vital Sign Reading Time Taken   Blood Pressure 122/60 10/10/2018 2:10 PM CDT   Pulse 75 10/10/2018 2:10 PM CDT   Temperature 36.7  C (98.1  F) 08/20/2018 2:29 PM CDT   Respiratory Rate 14 08/20/2018 2:29 PM CDT   Oxygen Saturation 96% 10/10/2018 2:10 PM CDT   Inhaled Oxygen Concentration - -   Weight 58.1 kg (128 lb) 10/10/2018 2:10 PM CDT   Height 165.1 cm (5' 5\") 01/01/2017 9:24 AM CST   Body Mass Index 21.3 10/10/2018 2:10 PM CDT     Plan of Treatment      Health Maintenance Due Date Last Done Comments   ZOSTER VACCINES (2 of 3) 06/06/2014 04/11/2014     INFLUENZA VACCINE RULE BASED (#1) 08/01/2018 11/18/2016 (Declined), 01/08/2013, 12/02/2010, Additional " history exists     DXA SCAN 08/10/2019 08/10/2017     FALL RISK ASSESSMENT 10/10/2019 10/10/2018, 01/17/2018, 11/18/2016, Additional history exists     ADVANCE DIRECTIVES DISCUSSED WITH PATIENT 10/10/2023 10/10/2018, 01/20/2016     TD 18+ HE 02/25/2026 02/25/2016, 12/01/2009     PNEUMOCOCCAL POLYSACCHARIDE VACCINE AGE 65 AND OVER Completed 04/01/2003     PNEUMOCOCCAL CONJUGATE VACCINE FOR ADULTS (PCV13 OR PREVNAR) Completed 11/18/2016, 11/18/2016       Procedures  - from Last 3 Months    Procedure Name Priority Date/Time Associated Diagnosis Comments   PROCEDURE NOTE EXTERNAL   01/11/2019         Results  - from Last 3 Months      PROCEDURE NOTE EXTERNAL (01/11/2019)  PROCEDURE NOTE EXTERNAL (01/11/2019)   Narrative Performed At   This result has an attachment that is not available.                Advance Directives      Patient has advance care planning documents, and health care agents on file. For more information, please contact:    [facility]    [address] [place] [phone]        Healthcare Agents on File  Healthcare Agents on File   Name Relationship Healthcare Agent Relationship Phone   Anisha Wiggins Child Healthcare agent [phone]   MeenakshiMariel cavazos Child Healthcare agent [phone]     Images  Document Information    Primary Care Provider Other Service Providers Document Coverage Dates   Jerzy Aparicio MD (Jul. 15, 2015 - Present)  [phone] (Work)  [phone] (Fax)  [address] [place] [address]   Jun. 19, 1932 - Jan. 17, 2019      Organization   [facility]  [address] [place] [phone]

## 2019-01-11 ENCOUNTER — RECORDS - HEALTHEAST (OUTPATIENT)
Dept: ADMINISTRATIVE | Facility: OTHER | Age: 84
End: 2019-01-11

## 2019-01-13 PROBLEM — G20.A1 PARKINSON'S DISEASE (H): Status: ACTIVE | Noted: 2017-02-02

## 2019-01-13 RX ORDER — CARBIDOPA AND LEVODOPA 25; 100 MG/1; MG/1
1.5 TABLET ORAL
COMMUNITY
End: 2019-01-17

## 2019-01-14 PROBLEM — M89.9 DISORDER OF BONE AND CARTILAGE: Status: ACTIVE | Noted: 2019-01-14

## 2019-01-14 PROBLEM — I51.7 LEFT VENTRICULAR HYPERTROPHY: Status: ACTIVE | Noted: 2019-01-14

## 2019-01-14 PROBLEM — I87.2 VENOUS INSUFFICIENCY: Status: ACTIVE | Noted: 2019-01-14

## 2019-01-14 PROBLEM — E78.2 MIXED HYPERLIPIDEMIA: Status: ACTIVE | Noted: 2019-01-14

## 2019-01-14 PROBLEM — K64.9 HEMORRHOIDS: Status: ACTIVE | Noted: 2019-01-14

## 2019-01-14 PROBLEM — M94.9 DISORDER OF BONE AND CARTILAGE: Status: ACTIVE | Noted: 2019-01-14

## 2019-01-14 PROBLEM — K38.9 OTHER AND UNSPECIFIED DISEASES OF APPENDIX: Status: ACTIVE | Noted: 2019-01-14

## 2019-01-14 RX ORDER — IBUPROFEN 200 MG
200-400 TABLET ORAL
COMMUNITY
End: 2019-01-17

## 2019-01-14 RX ORDER — HYDROCODONE BITARTRATE AND ACETAMINOPHEN 5; 325 MG/1; MG/1
1-2 TABLET ORAL
COMMUNITY
Start: 2018-06-25 | End: 2019-01-17

## 2019-01-14 RX ORDER — CHLORAL HYDRATE 500 MG
CAPSULE ORAL
COMMUNITY
Start: 2012-01-02 | End: 2019-01-17

## 2019-01-14 RX ORDER — OMEGA-3-ACID ETHYL ESTERS 1 G/1
1200 CAPSULE, LIQUID FILLED ORAL
COMMUNITY
End: 2019-01-17

## 2019-01-14 RX ORDER — LYSINE HCL 500 MG
1 TABLET ORAL
COMMUNITY
Start: 2012-01-02 | End: 2019-01-17

## 2019-01-14 RX ORDER — CHOLECALCIFEROL (VITAMIN D3) 50 MCG
2000 TABLET ORAL
COMMUNITY
End: 2019-01-17

## 2019-01-14 RX ORDER — PRAVASTATIN SODIUM 10 MG
10 TABLET ORAL
COMMUNITY
Start: 2012-01-02 | End: 2019-01-17

## 2019-01-14 RX ORDER — IBUPROFEN 400 MG/1
400 TABLET, FILM COATED ORAL
COMMUNITY
End: 2019-01-17

## 2019-01-14 RX ORDER — LYSINE HCL 500 MG
TABLET ORAL
COMMUNITY
End: 2019-01-17

## 2019-01-14 RX ORDER — ASPIRIN 81 MG/1
81 TABLET ORAL
COMMUNITY
End: 2019-01-17

## 2019-01-14 RX ORDER — IBUPROFEN 200 MG
CAPSULE ORAL
COMMUNITY
End: 2019-01-17

## 2019-01-14 SDOH — HEALTH STABILITY: MENTAL HEALTH: HOW OFTEN DO YOU HAVE A DRINK CONTAINING ALCOHOL?: NEVER

## 2019-01-17 ENCOUNTER — RECORDS - HEALTHEAST (OUTPATIENT)
Dept: ADMINISTRATIVE | Facility: OTHER | Age: 84
End: 2019-01-17

## 2019-01-17 ENCOUNTER — OFFICE VISIT (OUTPATIENT)
Dept: NEUROLOGY | Facility: CLINIC | Age: 84
End: 2019-01-17
Payer: MEDICARE

## 2019-01-17 ENCOUNTER — PRE VISIT (OUTPATIENT)
Dept: NEUROLOGY | Facility: CLINIC | Age: 84
End: 2019-01-17

## 2019-01-17 VITALS
SYSTOLIC BLOOD PRESSURE: 124 MMHG | BODY MASS INDEX: 20.91 KG/M2 | DIASTOLIC BLOOD PRESSURE: 68 MMHG | HEIGHT: 65 IN | HEART RATE: 79 BPM | OXYGEN SATURATION: 98 % | WEIGHT: 125.5 LBS

## 2019-01-17 DIAGNOSIS — G20.C ATYPICAL PARKINSONISM (H): ICD-10-CM

## 2019-01-17 DIAGNOSIS — G20.A1 PARKINSON'S DISEASE (H): Primary | ICD-10-CM

## 2019-01-17 RX ORDER — CHLORAL HYDRATE 500 MG
1 CAPSULE ORAL DAILY
COMMUNITY
Start: 2019-01-17 | End: 2019-11-22

## 2019-01-17 RX ORDER — CHOLECALCIFEROL (VITAMIN D3) 50 MCG
2000 TABLET ORAL DAILY
COMMUNITY
Start: 2019-01-17 | End: 2019-04-16

## 2019-01-17 RX ORDER — CARBIDOPA AND LEVODOPA 25; 100 MG/1; MG/1
TABLET ORAL
COMMUNITY
Start: 2019-01-17 | End: 2019-11-22

## 2019-01-17 RX ORDER — PRAVASTATIN SODIUM 10 MG
10 TABLET ORAL DAILY
Status: ON HOLD | COMMUNITY
Start: 2019-01-17 | End: 2019-10-15

## 2019-01-17 ASSESSMENT — ENCOUNTER SYMPTOMS
COUGH DISTURBING SLEEP: 0
SPEECH CHANGE: 0
COUGH: 0
LOSS OF CONSCIOUSNESS: 0
NUMBNESS: 0
SNORES LOUDLY: 1
STIFFNESS: 1
HEMOPTYSIS: 0
POSTURAL DYSPNEA: 1
PARALYSIS: 0
SPUTUM PRODUCTION: 0
HEADACHES: 0
ARTHRALGIAS: 0
DISTURBANCES IN COORDINATION: 1
DYSPNEA ON EXERTION: 0
MYALGIAS: 0
MUSCLE CRAMPS: 1
WEAKNESS: 1
SHORTNESS OF BREATH: 0
JOINT SWELLING: 0
TREMORS: 0
MUSCLE WEAKNESS: 1
BACK PAIN: 1
DIZZINESS: 1
SEIZURES: 0
TINGLING: 0
MEMORY LOSS: 0
NECK PAIN: 1

## 2019-01-17 ASSESSMENT — PAIN SCALES - GENERAL: PAINLEVEL: MILD PAIN (2)

## 2019-01-17 ASSESSMENT — MIFFLIN-ST. JEOR: SCORE: 1002.2

## 2019-01-17 NOTE — Clinical Note
"1/17/2019       RE: Carmen Luu  [address] [facility] [address]     Dear Colleague,    Thank you for referring your patient, Carmen Luu, to the [facility] NEUROLOGY at [facility]. Please see a copy of my visit note below.          Patient Demographics  - 86 y.o. Female; born Jun. 19, 1932     Patient Address Communication Language Race / Ethnicity   [address] [place] [address] [phone] (Mobile)  [phone] (Home)  [email or web address] English (Preferred) Unknown / Unknown     Source Comments  - [facility]    You are receiving this document as you are listed as the primary care provider,follow-up provider, or the patient has been referred to you for consultation.This is in compliance with the Medicare and Medicaid EHR Incentive Program,which states \"Providers who transition their patient to another setting of careor provider of care or refers their patient to another provider of care shouldprovide summary care record for each transition of care or referral.\"    Allergies  Reconcile with Patient's Chart    Active Allergy Reactions Severity Noted Date Comments   Acetaminophen-Codeine Other, see comments   12/02/2010     Codeine Other, see comments   12/02/2010     Morphine Other, see comments   04/11/2016 Made me feel really wierd     Penicillins     02/02/2017 Tongue and throat tingling       Medications  Reconcile with Patient's Chart    Medication Sig Dispensed Refills Start Date End Date Status   pravastatin (PRAVACHOL) 10 MG tablet   Take 10 mg by mouth daily at bedtime.   0     Active   carbidopa-levodopa (SINEMET)  MG tablet   Take 1.5 Tabs by mouth three times a day. Takes at 8a-2p-8p   0     Active   calcium carbonate-vitamin D 600-400 MG-UNIT tablet   Take 1 Tab by mouth two times a day.   0     Active   omega-3 fatty acids (MAXEPA,FISHOIL) 1000 MG capsule   Take 1,200 g by mouth daily.   0     Active   aspirin 81 MG " tablet   Take 81 mg by mouth daily.   0     Active   fluorometholone (FML) 0.1 % eye drop suspension    Indications: Closed nondisplaced fracture of second cervical vertebra, unspecified fracture morphology, initial encounter (HRC) Place 1 Drop into eye(s).   0 01/19/2018   Active   HYDROcodone-acetaminophen (NORCO) 5-325 MG tablet    Indications: Closed nondisplaced fracture of second cervical vertebra, unspecified fracture morphology, initial encounter (HRC) Take 1-2 Tabs by mouth.   0 06/25/2018   Active   DOCOSAHEXAENOIC ACID OR    Indications: Closed nondisplaced fracture of second cervical vertebra, unspecified fracture morphology, initial encounter (HRC) Take 2 g by mouth.   0     Active   Cholecalciferol (VITAMIN D3) 1000 units    Indications: Closed nondisplaced fracture of second cervical vertebra, unspecified fracture morphology, initial encounter (HRC) Take 1 Cap by mouth.   0 01/02/2012   Active   ibuprofen (MOTRIN) 200 MG tablet    Indications: Closed nondisplaced fracture of second cervical vertebra, unspecified fracture morphology, initial encounter (HRC) Take 200-400 mg by mouth.   0     Active     Active Problems  Reconcile with Patient's Chart    Problem Noted Date   Parkinson's disease 02/02/2017     Encounters  - from Last 3 Months    Date Type Specialty Care Team Description   12/17/2018 Ancillary Procedure Radiology Neymar Pham MD   Closed displaced fracture of second cervical vertebra, unspecified fracture morphology, initial encounter (HRC);   History of recent fall   12/17/2018 Telephone Neurosurgery Neymar Pham MD   QUESTIONS, GENERAL   12/10/2018 Telephone Neurosurgery Neymar Pham MD   Physical Therapy     Social History      Tobacco Use Types Packs/Day Years Used Date   Never Smoker           Smokeless Tobacco: Never Used           Sex Assigned at Birth Date Recorded   Not on file       Job Start Date Occupation Industry   Not on file Not on file Not on file     Travel  "History Travel Start Travel End   No recent travel history available.       Last Filed Vital Signs      Vital Sign Reading Time Taken   Blood Pressure 135/71 10/03/2018 11:01 AM CDT   Pulse 77 10/03/2018 11:01 AM CDT   Temperature 36.2  C (97.2  F) 10/03/2018 11:01 AM CDT   Respiratory Rate 16 10/03/2018 11:01 AM CDT   Oxygen Saturation - -   Inhaled Oxygen Concentration - -   Weight 55.8 kg (123 lb) 08/10/2018 3:27 PM CDT   Height 162.6 cm (5' 4\") 08/10/2018 3:27 PM CDT   Body Mass Index 21.11 08/10/2018 3:27 PM CDT     Plan of Treatment      Health Maintenance Due Date Last Done Comments   Medicare Annual Wellness Visit 06/19/1932       Advanced Directive 06/19/1997       Dexa 06/19/1997       Zoster (2 of 3) 06/06/2014 04/11/2014     Influenza (#1) 09/01/2018 01/08/2013, 12/02/2010, 11/14/2007     DTaP/Tdap/Td (2 - Td) 02/25/2026 02/25/2016, 12/01/2009     Pneumococcal Completed 11/18/2016, 04/01/2003     HepA Aged Out   No longer eligible based on patient's age to complete this topic   HepB Aged Out   No longer eligible based on patient's age to complete this topic   Hib Aged Out   No longer eligible based on patient's age to complete this topic   MCV4 Aged Out   No longer eligible based on patient's age to complete this topic     Procedures  - from Last 3 Months    Procedure Name Priority Date/Time Associated Diagnosis Comments   XR CERVICAL SPINE AP/LAT UPRIGHT Routine 12/17/2018 3:07 PM CST Closed displaced fracture of second cervical vertebra, unspecified fracture morphology, initial encounter (HRC)    History of recent fall   Results for this procedure are in the results section.       Results  - from Last 3 Months      XR Cervical Spine AP/Lat Upright (12/17/2018 3:07 PM CST)  XR Cervical Spine AP/Lat Upright (12/17/2018 3:07 PM CST)   Narrative Performed At   [facility] RADIOLOGY        EXAM: XR CERVICAL SPINE AP/LAT UPRIGHT    LOCATION: [facility]    DATE/TIME: 12/17/2018 3:07 PM        INDICATION: " Past c2 fracture; recent fall    COMPARISON: Cervical spine CT 10/3/2018        FINDINGS: Patient's head is slightly tilted to the right. Mild straightening of the normal cervical lordosis. 1 mm degenerative spondylolisthesis of C7 on T1. Vertebral body heights are maintained. Moderate intervertebral disc height loss and endplate spurring of C5-C6 and C6-C7. Mild intervertebral disc height loss in the rest of the cervical spine. Redemonstration of the fracture involving the base of the dens. The extension into the left lateral mass of C2 is better seen on the previous cervical spine CT. No prevertebral soft tissue edema. No suspicious lesions in the lung apices.        RADIOLOGY       XR Cervical Spine AP/Lat Upright (12/17/2018 3:07 PM CST)   Procedure Note   Interface, In Rad Results - 12/17/2018 6:31 PM CST     [facility]    EXAM: XR CERVICAL SPINE AP/LAT UPRIGHT  LOCATION: [facility]  DATE/TIME: 12/17/2018 3:07 PM    INDICATION: Past c2 fracture; recent fall  COMPARISON: Cervical spine CT 10/3/2018    FINDINGS: Patient's head is slightly tilted to the right. Mild straightening of the normal cervical lordosis. 1 mm degenerative spondylolisthesis of C7 on T1. Vertebral body heights are maintained. Moderate intervertebral disc height loss and endplate spurring of C5-C6 and C6-C7. Mild intervertebral disc height loss in the rest of the cervical spine. Redemonstration of the fracture involving the base of the dens. The extension into the left lateral mass of C2 is better seen on the previous cervical spine CT. No prevertebral soft tissue edema. No suspicious lesions in the lung apices.         XR Cervical Spine AP/Lat Upright (12/17/2018 3:07 PM CST)   Performing Organization Address City/State/Zipcode Phone Number    RADIOLOGY                    Summary and Recommendations:         Ed Mendoza MD  _____________________________________________________________________  PATIENT: Carmen Luis  Inocencio  86 year old female   : 1932  RAMOS: 2019    Consult requested by ***        Outside records reviewed and revealed ***      History obtained from patient      History of Present Illness  *** yo         Medications                                                                                                                                                                                                                  14 Review of systems  are negative except for   Patient Active Problem List   Diagnosis     Parkinson's disease (H)        Allergies   Allergen Reactions     Acetaminophen-Codeine Other (See Comments)     Codeine Other (See Comments)     Morphine Other (See Comments)     Made me feel really wierd     Penicillins      Tongue and throat tingling     No past surgical history on file.  No past medical history on file.  Social History     Socioeconomic History     Marital status:      Spouse name: Not on file     Number of children: Not on file     Years of education: Not on file     Highest education level: Not on file   Social Needs     Financial resource strain: Not on file     Food insecurity - worry: Not on file     Food insecurity - inability: Not on file     Transportation needs - medical: Not on file     Transportation needs - non-medical: Not on file   Occupational History     Not on file   Tobacco Use     Smoking status: Not on file   Substance and Sexual Activity     Alcohol use: Not on file     Drug use: Not on file     Sexual activity: Not on file   Other Topics Concern     Not on file   Social History Narrative    . lives in [place].     Anisha Little daughter     No family history on file.  Current Outpatient Medications   Medication Sig Dispense Refill     carbidopa-levodopa (SINEMET)  MG tablet Take 1.5 tablets by mouth 1.5 tabs by mouth 3/day @@ 8am, 2pm and 8pm       Examination  B/P: Data Unavailable, T: Data Unavailable, P: Data Unavailable,  "R: Data Unavailable 0 lbs 0 oz  There were no vitals taken for this visit., There is no height or weight on file to calculate BMI.    Vitals signs were added and reviewed if not above. Please refer to the chart from this visit.    General examination: well developed, nourished and normal affect  Carotid: No bruits. Chest CTA, Heart regular without gallops or murmurs. Abdomen soft nontender, no masses, bowel sounds intact. Periphery: normal pulses without edema. No skin lesions. MENTAL STATUS:  Alert, oriented x3.  Speech fluent with normal naming, repetition, comprehension.  Good right-left orientation, Can remember ***/3 objects.   CRANIAL NERVES:  Disks flat. Pupils are equal, round, reactive to light.  Normal vascularity and fields. Extraocular movements full.  Facial sensation and movement normal.  Hearing intact. Palate moves symmetrically.  Tongue midline.  Sternocleidomastoid and trapezius strength intact.  Neck strength was normal.  NEUROLOGIC:  Tone: ***. Motor in upper and lower extremities. 5/5.  Reflexes 2/4.  Toe signs downgoing.  Good finger-nose-finger, fine finger movement, heel-shin maneuver, sensation to light touch, position sense and vibration and temperature was normal. Gait normal except for ***. Romberg and postural stability *** Tremor ***            Patient Demographics  - 86 y.o. Female; born Jun. 19, 1932     Patient Address Communication Language Race / Ethnicity   [address] [place] [address] [phone] (Mobile)  [phone] (Home)  [email or web address] English (Preferred) Unknown / Unknown     Source Comments  - [facility]    You are receiving this document as you are listed as the primary care provider,follow-up provider, or the patient has been referred to you for consultation.This is in compliance with the Medicare and Medicaid EHR Incentive Program,which states \"Providers who transition their patient to another setting of careor provider of care or refers their " "patient to another provider of care shouldprovide summary care record for each transition of care or referral.\"    Allergies  Reconcile with Patient's Chart    Active Allergy Reactions Severity Noted Date Comments   Acetaminophen-Codeine Other, see comments   12/02/2010     Codeine Other, see comments   12/02/2010     Morphine Other, see comments   04/11/2016 Made me feel really wierd     Penicillins     02/02/2017 Tongue and throat tingling       Medications  Reconcile with Patient's Chart    Medication Sig Dispensed Refills Start Date End Date Status   pravastatin (PRAVACHOL) 10 MG tablet   Take 10 mg by mouth daily at bedtime.   0     Active   carbidopa-levodopa (SINEMET)  MG tablet   Take 1.5 Tabs by mouth three times a day. Takes at 8a-2p-8p   0     Active   calcium carbonate-vitamin D 600-400 MG-UNIT tablet   Take 1 Tab by mouth two times a day.   0     Active   omega-3 fatty acids (MAXEPA,FISHOIL) 1000 MG capsule   Take 1,200 g by mouth daily.   0     Active   aspirin 81 MG tablet   Take 81 mg by mouth daily.   0     Active   fluorometholone (FML) 0.1 % eye drop suspension    Indications: Closed nondisplaced fracture of second cervical vertebra, unspecified fracture morphology, initial encounter (HRC) Place 1 Drop into eye(s).   0 01/19/2018   Active   HYDROcodone-acetaminophen (NORCO) 5-325 MG tablet    Indications: Closed nondisplaced fracture of second cervical vertebra, unspecified fracture morphology, initial encounter (HRC) Take 1-2 Tabs by mouth.   0 06/25/2018   Active   DOCOSAHEXAENOIC ACID OR    Indications: Closed nondisplaced fracture of second cervical vertebra, unspecified fracture morphology, initial encounter (HRC) Take 2 g by mouth.   0     Active   Cholecalciferol (VITAMIN D3) 1000 units    Indications: Closed nondisplaced fracture of second cervical vertebra, unspecified fracture morphology, initial encounter (HRC) Take 1 Cap by mouth.   0 01/02/2012   Active   ibuprofen (MOTRIN) 200 " "MG tablet    Indications: Closed nondisplaced fracture of second cervical vertebra, unspecified fracture morphology, initial encounter (HRC) Take 200-400 mg by mouth.   0     Active     Active Problems  Reconcile with Patient's Chart    Problem Noted Date   Parkinson's disease 02/02/2017     Encounters  - from Last 3 Months    Date Type Specialty Care Team Description   12/17/2018 Ancillary Procedure Radiology Neymar Pham MD   Closed displaced fracture of second cervical vertebra, unspecified fracture morphology, initial encounter (HRC);   History of recent fall   12/17/2018 Telephone Neurosurgery Neymar Pham MD   QUESTIONS, GENERAL   12/10/2018 Telephone Neurosurgery Neymar Pham MD   Physical Therapy     Social History      Tobacco Use Types Packs/Day Years Used Date   Never Smoker           Smokeless Tobacco: Never Used           Sex Assigned at Birth Date Recorded   Not on file       Job Start Date Occupation Industry   Not on file Not on file Not on file     Travel History Travel Start Travel End   No recent travel history available.       Last Filed Vital Signs      Vital Sign Reading Time Taken   Blood Pressure 135/71 10/03/2018 11:01 AM CDT   Pulse 77 10/03/2018 11:01 AM CDT   Temperature 36.2  C (97.2  F) 10/03/2018 11:01 AM CDT   Respiratory Rate 16 10/03/2018 11:01 AM CDT   Oxygen Saturation - -   Inhaled Oxygen Concentration - -   Weight 55.8 kg (123 lb) 08/10/2018 3:27 PM CDT   Height 162.6 cm (5' 4\") 08/10/2018 3:27 PM CDT   Body Mass Index 21.11 08/10/2018 3:27 PM CDT     Plan of Treatment      Health Maintenance Due Date Last Done Comments   Medicare Annual Wellness Visit 06[birth year]19/1932       Advanced Directive 06/19/1997       Dexa 06/19/1997       Zoster (2 of 3) 06/06/2014 04/11/2014     Influenza (#1) 09/01/2018 01/08/2013, 12/02/2010, 11/14/2007     DTaP/Tdap/Td (2 - Td) 02/25/2026 02/25/2016, 12/01/2009     Pneumococcal Completed 11/18/2016, 04/01/2003     HepA Aged Out   No longer " eligible based on patient's age to complete this topic   HepB Aged Out   No longer eligible based on patient's age to complete this topic   Hib Aged Out   No longer eligible based on patient's age to complete this topic   MCV4 Aged Out   No longer eligible based on patient's age to complete this topic     Procedures  - from Last 3 Months    Procedure Name Priority Date/Time Associated Diagnosis Comments   XR CERVICAL SPINE AP/LAT UPRIGHT Routine 12/17/2018 3:07 PM CST Closed displaced fracture of second cervical vertebra, unspecified fracture morphology, initial encounter (HRC)    History of recent fall   Results for this procedure are in the results section.       Results  - from Last 3 Months      XR Cervical Spine AP/Lat Upright (12/17/2018 3:07 PM CST)  XR Cervical Spine AP/Lat Upright (12/17/2018 3:07 PM CST)   Narrative Performed At   [facility] RADIOLOGY        EXAM: XR CERVICAL SPINE AP/LAT UPRIGHT    LOCATION: [facility]    DATE/TIME: 12/17/2018 3:07 PM        INDICATION: Past c2 fracture; recent fall    COMPARISON: Cervical spine CT 10/3/2018        FINDINGS: Patient's head is slightly tilted to the right. Mild straightening of the normal cervical lordosis. 1 mm degenerative spondylolisthesis of C7 on T1. Vertebral body heights are maintained. Moderate intervertebral disc height loss and endplate spurring of C5-C6 and C6-C7. Mild intervertebral disc height loss in the rest of the cervical spine. Redemonstration of the fracture involving the base of the dens. The extension into the left lateral mass of C2 is better seen on the previous cervical spine CT. No prevertebral soft tissue edema. No suspicious lesions in the lung apices.        RADIOLOGY       XR Cervical Spine AP/Lat Upright (12/17/2018 3:07 PM CST)   Procedure Note   Interface, In Rad Results - 12/17/2018 6:31 PM CST    [facility] RADIOLOGY    EXAM: XR CERVICAL SPINE AP/LAT UPRIGHT  LOCATION: [facility]  DATE/TIME: 12/17/2018 3:07  PM    INDICATION: Past c2 fracture; recent fall  COMPARISON: Cervical spine CT 10/3/2018    FINDINGS: Patient's head is slightly tilted to the right. Mild straightening of the normal cervical lordosis. 1 mm degenerative spondylolisthesis of C7 on T1. Vertebral body heights are maintained. Moderate intervertebral disc height loss and endplate spurring of C5-C6 and C6-C7. Mild intervertebral disc height loss in the rest of the cervical spine. Redemonstration of the fracture involving the base of the dens. The extension into the left lateral mass of C2 is better seen on the previous cervical spine CT. No prevertebral soft tissue edema. No suspicious lesions in the lung apices.         XR Cervical Spine AP/Lat Upright (12/17/2018 3:07 PM CST)   Performing Organization Address City/State/Zipcode Phone Number    RADIOLOGY                  Again, thank you for allowing me to participate in the care of your patient.      Sincerely,    Ed Mendoza MD

## 2019-01-17 NOTE — LETTER
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 Carmen Luu  Qualifies for and will benefit from the following product: walker due to significant impairment in gait that is able to improve his mobility and reduce the risk of falling. Their mobility issue can be resolved with the use of this medical device.     Ed Mendoza MD  2019          Ed Mendoza MD  _____________________________________________________________________  PATIENT: Carmen Luu  86 year old female   : 1932  RAMOS: 2019    Consult requested by - inserted.         Outside records reviewed and revealed  - inserted.       History obtained from patient      History of Present Illness  87 yo woman with parkinsonism diagnosed .     Backward falls - began before she was diagnosed.   Fell in bedroom.   Diagnosed in   Not sensitive to light.     Neck fracture  - 2018  2 weeks before that fell and tore her right rotator cuff     She can walk 2 or 3 miles - and can walk a straight line.   She has balance issues.   [facility]  She has done big and loud.   She has done this initially  Seen by Dr. Her [facility] [place] [facility]  She has done pool therapy and then land therapy.   She had done boxing and had done title boxing and did not do the boxing at the other place due to the cement floors  Had been doing silver sneakers 3/week  She lives by herself and her daughter is nearby  She is in a twin home - single level    Wears glasses  Had cataract surgery  Has prisms for her double vision  No  choking but rare.   She does not have constipation.   Bladder - has a pesary   Has nocturia 3/night  Cognitive abilities are pretty good  Motivation has been good  She has been feeling handicapped   She denies depression or anxious  She does not have apathy  Denies headaches  No cancer  No lung problems  No heart problems  But has low bood pressure except may get light headed or weak in the summer when exercising.   Cholesterol on medication =-   No thyroid or diabetes  No anemia  Infections - denies  Hearing - is okay  Smell perception - is good  She snores per her report  Denies rbd  No recent brain mri  Has not had a neuropsychological evaluation.     She has not used a u-step walker  She has some freezing of gait.   She has to talk to herself when she is moving.     Fall backwards or to the left.   She thinks the sinemet has helped.   She first had problems with the right hand when playing piano.       Mariel daughter      Answers for HPI/ROS submitted by the patient on 1/17/2019   General Symptoms: No  Skin Symptoms: No  HENT Symptoms: No  EYE SYMPTOMS: No  HEART SYMPTOMS: No  LUNG SYMPTOMS: Yes  INTESTINAL SYMPTOMS: No  URINARY SYMPTOMS: No  GYNECOLOGIC SYMPTOMS: No  BREAST SYMPTOMS: No  SKELETAL SYMPTOMS: Yes  BLOOD SYMPTOMS: No  NERVOUS SYSTEM SYMPTOMS: Yes  MENTAL HEALTH SYMPTOMS: No  Cough: No  Sputum or phlegm: No  Coughing up blood: No  Difficulty breating or shortness of breath: No  Snoring: Yes  Difficulty breathing on exertion: No  Nighttime Cough: No  Difficulty breathing when lying flat: Yes  Back pain: Yes  Muscle aches: No  Neck pain: Yes  Swollen joints: No  Joint pain: No  Bone pain: No  Muscle cramps: Yes  Muscle weakness: Yes  Joint stiffness: Yes  Bone fracture: No  Trouble with coordination: Yes  Dizziness or trouble with balance: Yes  Fainting or black-out spells: No  Memory loss: No  Headache: No  Seizures: No  Speech problems: No  Tingling: No  Tremor: No  Weakness: Yes  Difficulty  walking: Yes  Paralysis: No  Numbness: No      Medications     8am 2pm 8pm     Aspirin 81        Aspirin-calcium carbonate 81-77         Calcium carbonate 500mg        Calcium carbonate vit D        Calcium carbonate vit D        Carbidopa/levodopa Sinemet 25/100 1.5 1.5 1.5     Cholecalciferol vitamin D3 1000        Docosahexanoic acid 1gm        Fish oil-omega 3 fatty acids 1000mg        Hydrocodone acetaminophen norco        Ibuprofen advil/motrin 200mg        Ibuprofen advil/motrin 400mg        Omega-3 acid ethyl esters lovaza 1g        Pravastatin pravachol 10mg        Vitamin D3 cholecalciferol 2000 units.                                                                                                                               14 Review of systems  are negative except for   Patient Active Problem List   Diagnosis     Parkinson's disease (H)        Allergies   Allergen Reactions     Acetaminophen-Codeine Other (See Comments)     Codeine Other (See Comments)     Morphine Other (See Comments)     Made me feel really wierd     Penicillins      Tongue and throat tingling     No past surgical history on file.  No past medical history on file.  Social History     Socioeconomic History     Marital status:      Spouse name: Not on file     Number of children: Not on file     Years of education: Not on file     Highest education level: Not on file   Social Needs     Financial resource strain: Not on file     Food insecurity - worry: Not on file     Food insecurity - inability: Not on file     Transportation needs - medical: Not on file     Transportation needs - non-medical: Not on file   Occupational History     Not on file   Tobacco Use     Smoking status: Not on file   Substance and Sexual Activity     Alcohol use: Not on file     Drug use: Not on file     Sexual activity: Not on file   Other Topics Concern     Not on file   Social History Narrative    . lives in [place].     Anisha Little daughter      No family history on file.  Current Outpatient Medications   Medication Sig Dispense Refill     carbidopa-levodopa (SINEMET)  MG tablet Take 1.5 tablets by mouth 1.5 tabs by mouth 3/day @@ 8am, 2pm and 8pm       Examination  B/P: Data Unavailable, T: Data Unavailable, P: Data Unavailable, R: Data Unavailable 0 lbs 0 oz  There were no vitals taken for this visit., There is no height or weight on file to calculate BMI.    Vitals signs were added and reviewed if not above. Please refer to the chart from this visit.    General examination: well developed, nourished and normal affect  Carotid: No bruits. Chest CTA, Heart regular without gallops or murmurs. Abdomen soft nontender, no masses, bowel sounds intact. Periphery: normal pulses without edema. No skin lesions. MENTAL STATUS:  Alert, oriented x3.  Speech fluent with normal naming, repetition, comprehension.  Good right-left orientation, Can remember 2/3 objects. 5/5 on spelling world backwards. CRANIAL NERVES:  Disks flat. Pupils are equal, round, reactive to light.  Normal vascularity and fields. Extraocular movements - slight changes vertically. She has a startled facial appearance.  Facial sensation and movement normal.  Hearing intact. Palate moves symmetrically.  Tongue midline.  Sternocleidomastoid and trapezius strength intact.  Neck strength was normal.  NEUROLOGIC:  Tone: slight increase tone and moderate bradykinesias on the right and less on the left. Motor in upper and lower extremities. 5/5.  Reflexes 2/4.  Toe signs downgoing.  Good finger-nose-finger, fine finger movement, heel-shin maneuver, sensation to light touch, position sense and vibration and temperature was normal. Gait - very unsteady widened with good arm swing. Romberg and postural stability impaired - falls if not caught Tremor  - absent      Patient Demographics  - 86 y.o. Female; born Jun. 19, 1932     Patient Address Communication Language Race / Ethnicity   [address]  "[place] [address] [phone] (Mobile)  [phone] (Home)  [email or web address] English (Preferred) Unknown / Unknown     Source Comments  - [facility]    You are receiving this document as you are listed as the primary care provider,follow-up provider, or the patient has been referred to you for consultation.This is in compliance with the Medicare and Medicaid EHR Incentive Program,which states \"Providers who transition their patient to another setting of careor provider of care or refers their patient to another provider of care shouldprovide summary care record for each transition of care or referral.\"    Allergies  Reconcile with Patient's Chart    Active Allergy Reactions Severity Noted Date Comments   Acetaminophen-Codeine Other, see comments   12/02/2010     Codeine Other, see comments   12/02/2010     Morphine Other, see comments   04/11/2016 Made me feel really wierd     Penicillins     02/02/2017 Tongue and throat tingling       Medications  Reconcile with Patient's Chart    Medication Sig Dispensed Refills Start Date End Date Status   pravastatin (PRAVACHOL) 10 MG tablet   Take 10 mg by mouth daily at bedtime.   0     Active   carbidopa-levodopa (SINEMET)  MG tablet   Take 1.5 Tabs by mouth three times a day. Takes at 8a-2p-8p   0     Active   calcium carbonate-vitamin D 600-400 MG-UNIT tablet   Take 1 Tab by mouth two times a day.   0     Active   omega-3 fatty acids (MAXEPA,FISHOIL) 1000 MG capsule   Take 1,200 g by mouth daily.   0     Active   aspirin 81 MG tablet   Take 81 mg by mouth daily.   0     Active   fluorometholone (FML) 0.1 % eye drop suspension    Indications: Closed nondisplaced fracture of second cervical vertebra, unspecified fracture morphology, initial encounter ([facility]) Place 1 Drop into eye(s).   0 01/19/2018   Active   HYDROcodone-acetaminophen (NORCO) 5-325 MG tablet    Indications: Closed nondisplaced fracture of second cervical vertebra, unspecified fracture " morphology, initial encounter ([facility]) Take 1-2 Tabs by mouth.   0 06/25/2018   Active   DOCOSAHEXAENOIC ACID OR    Indications: Closed nondisplaced fracture of second cervical vertebra, unspecified fracture morphology, initial encounter ([facility]) Take 2 g by mouth.   0     Active   Cholecalciferol (VITAMIN D3) 1000 units    Indications: Closed nondisplaced fracture of second cervical vertebra, unspecified fracture morphology, initial encounter ([facility]) Take 1 Cap by mouth.   0 01/02/2012   Active   ibuprofen (MOTRIN) 200 MG tablet    Indications: Closed nondisplaced fracture of second cervical vertebra, unspecified fracture morphology, initial encounter ([facility]) Take 200-400 mg by mouth.   0     Active     Active Problems  Reconcile with Patient's Chart    Problem Noted Date   Parkinson's disease 02/02/2017     Encounters  - from Last 3 Months    Date Type Specialty Care Team Description   12/17/2018 Ancillary Procedure Radiology Neymar Pham MD   Closed displaced fracture of second cervical vertebra, unspecified fracture morphology, initial encounter ([facility]);   History of recent fall   12/17/2018 Telephone Neurosurgery Neymar Pham MD   QUESTIONS, GENERAL   12/10/2018 Telephone Neurosurgery Neymar Pham MD   Physical Therapy     Social History      Tobacco Use Types Packs/Day Years Used Date   Never Smoker           Smokeless Tobacco: Never Used           Sex Assigned at Birth Date Recorded   Not on file       Job Start Date Occupation Industry   Not on file Not on file Not on file     Travel History Travel Start Travel End   No recent travel history available.       Last Filed Vital Signs      Vital Sign Reading Time Taken   Blood Pressure 135/71 10/03/2018 11:01 AM CDT   Pulse 77 10/03/2018 11:01 AM CDT   Temperature 36.2  C (97.2  F) 10/03/2018 11:01 AM CDT   Respiratory Rate 16 10/03/2018 11:01 AM CDT   Oxygen Saturation - -   Inhaled Oxygen Concentration - -   Weight 55.8 kg (123 lb) 08/10/2018 3:27 PM CDT  "  Height 162.6 cm (5' 4\") 08/10/2018 3:27 PM CDT   Body Mass Index 21.11 08/10/2018 3:27 PM CDT     Plan of Treatment      Health Maintenance Due Date Last Done Comments   Medicare Annual Wellness Visit 06/19/1932       Advanced Directive 06/19/1997       Dexa 06/19/1997       Zoster (2 of 3) 06/06/2014 04/11/2014     Influenza (#1) 09/01/2018 01/08/2013, 12/02/2010, 11/14/2007     DTaP/Tdap/Td (2 - Td) 02/25/2026 02/25/2016, 12/01/2009     Pneumococcal Completed 11/18/2016, 04/01/2003     HepA Aged Out   No longer eligible based on patient's age to complete this topic   HepB Aged Out   No longer eligible based on patient's age to complete this topic   Hib Aged Out   No longer eligible based on patient's age to complete this topic   MCV4 Aged Out   No longer eligible based on patient's age to complete this topic     Procedures  - from Last 3 Months    Procedure Name Priority Date/Time Associated Diagnosis Comments   XR CERVICAL SPINE AP/LAT UPRIGHT Routine 12/17/2018 3:07 PM CST Closed displaced fracture of second cervical vertebra, unspecified fracture morphology, initial encounter ([facility])    History of recent fall   Results for this procedure are in the results section.       Results  - from Last 3 Months      XR Cervical Spine AP/Lat Upright (12/17/2018 3:07 PM CST)  XR Cervical Spine AP/Lat Upright (12/17/2018 3:07 PM CST)   Narrative Performed At   [facility] RADIOLOGY        EXAM: XR CERVICAL SPINE AP/LAT UPRIGHT    LOCATION: [facility]    DATE/TIME: 12/17/2018 3:07 PM        INDICATION: Past c2 fracture; recent fall    COMPARISON: Cervical spine CT 10/3/2018        FINDINGS: Patient's head is slightly tilted to the right. Mild straightening of the normal cervical lordosis. 1 mm degenerative spondylolisthesis of C7 on T1. Vertebral body heights are maintained. Moderate intervertebral disc height loss and endplate spurring of C5-C6 and C6-C7. Mild intervertebral disc height loss in the rest of the cervical " spine. Redemonstration of the fracture involving the base of the dens. The extension into the left lateral mass of C2 is better seen on the previous cervical spine CT. No prevertebral soft tissue edema. No suspicious lesions in the lung apices.        RADIOLOGY       XR Cervical Spine AP/Lat Upright (12/17/2018 3:07 PM CST)   Procedure Note   Interface, In Rad Results - 12/17/2018 6:31 PM CST    [facility] RADIOLOGY    EXAM: XR CERVICAL SPINE AP/LAT UPRIGHT  LOCATION: [facility]  DATE/TIME: 12/17/2018 3:07 PM    INDICATION: Past c2 fracture; recent fall  COMPARISON: Cervical spine CT 10/3/2018    FINDINGS: Patient's head is slightly tilted to the right. Mild straightening of the normal cervical lordosis. 1 mm degenerative spondylolisthesis of C7 on T1. Vertebral body heights are maintained. Moderate intervertebral disc height loss and endplate spurring of C5-C6 and C6-C7. Mild intervertebral disc height loss in the rest of the cervical spine. Redemonstration of the fracture involving the base of the dens. The extension into the left lateral mass of C2 is better seen on the previous cervical spine CT. No prevertebral soft tissue edema. No suspicious lesions in the lung apices.         XR Cervical Spine AP/Lat Upright (12/17/2018 3:07 PM CST)   Performing Organization Address City/State/Zipcode Phone Number    RADIOLOGY              Maida Abad MD - 02/02/2017 1:25 PM CST    To manage constipation:  - Try to increase your daily fluids. Work toward 64 ounces/day 8-eight ounce glasses)  - Increase fiber by eating whole grains, fruits and vegetables  - Prunes, prune juice or yogurt may help  - Exercise daily  - Follow recommendations as discussed. Refer to constipation handout provided today.    Refer to handout provided regarding adding foods or supplements for a healthy brain.  - Add fruits and vegetables that are bright and dark in color. They are high in antioxidants and are healthy for your brain.  -  Try to eat fish high in omega-3 fatty acids. such as salmon, tuna, herring 2-4 times weekly. Talk to your primary doctor about adding fish oil supplement.     Follow and refer to recommendations as discussed for taking pills on time.   - Use a system to ensure you take your pills on time.  - Make sure to take your PD pills with you when you leave home.  -There are applications for smart-phones and tablets that can assist with med reminders, such as MediSafe, that allows you to program times and different reminders.    Please call the [facility] nurse line for any questions, concerns or updates, [phone].  Darlene Mathur RN    Thank you for your interest in enrolling in [facility]. Please follow the instructions below to securely access your online medical record. [facility] allows you to send messages to your doctor, view your test results, renew your prescriptions, schedule appointments, and more.    How Do I Sign Up?  1. In your Internet browser, go to [email or web address]  2. Click on the Enter activation code link under the New User? section. You will see the Activate your account! page.  3. Enter your activation code exactly as it appears below. You will not need to use this code after you ve completed the sign-up process. If you do not sign up before the expiration date, you must request a new code.  Activation Code: [email or web address]  Expires: 3/4/2017 2:05 PM    4. Enter your last name and date of birth (mm/dd/yyyy) as indicated, then click Continue. You will be taken to the Let's set up your account page.   5. Create a username. This will be your [facility] login ID and cannot be changed, so think of one that is secure and easy to remember.  6. Create a password. You can change your password at any time.  7. Enter your e-mail address. You will receive e-mail notification when new information is available in [employer].   8. Select your Security Questions and enter your answers. These can  be used at a later time if you forget your password.   9. Check the box to accept the terms and conditions. Click Create your account. You can now view your medical record.    Additional Information  If you have questions, you can call [phone] to talk to our MyChart staff. Remember, [facility] is NOT to be used for urgent needs. For medical emergencies, dial 911.    Move sinemet to a 5 hour interval, at something like 8-1-6    Pay attention to what you are doing before you fall!    Return in 4-6 months      Electronically Signed by Maida Abad MD on 02/02/2017 1:25 PM CST       Progress Notes  - in this encounter  Table of Contents for Progress Notes   Darlene Mathur RN - 02/02/2017 2:02 PM CST   Maida Abad MD - 02/02/2017 7:02 AM CST      Darlene Mathur RN - 02/02/2017 2:02 PM CST  Patient, son and MT seen for a 50 minute nurse assessment as part of the all day team assessment clinic at the [facility]'s [place].   Nursing assessment and education completed regarding knowledge of Parkinson's disease, medications, bowel management, and nutrition.     Patient has a good understanding of Parkinson's disease and treatment options. Discussed the role of dopamine in movement disorders and medication strategies to replace dopamine. Patient has a reliable system for achieving pills on time. Rationale for pills on time and strategies to achieve on time pills discussed. She has an alarm that will go off when it is time to take her meds    Discussion regarding pill timing away from foods, potential for protein interfering with levodopa absorption. Discussed avoidance of protein-containing foods at same time as carbidopa/levodopa. Talked about taking meds 1/2 hour before meals.     Patient reports no concerns regarding constipation. Discussed optimal bowel management. Dietary and medication recommendations provided.     Patient reports maintaining a healthy diet. Education regarding foods high in  "antioxidants, and the potential value of foods high in omega-3 fatty acids or supplements discussed.     Patient reports no problems with achieving a good nights sleep. Pill and non-pill sleep strategies discussed.    Problems with non-motor symptoms of low blood pressure and skin discussed.     Resources provided regarding concerns discussed above, including constipation, pills on time, and National Parkinson Foundation manual \"What You and Your Family Should Know,\" \"Medications,\" and an \"Aware in Care\" kit.    Patient/family concerns to be addressed by physician added to problem list.     See patient instructions in Plan of Care for specific patient instructions.     Patient/family exhibits adequate understanding of instructions.    Supervising physician in building: MD Darlene Selby, RN      Electronically Signed by Darlene Mathur RN on 02/02/2017 2:02 PM CST    Back to top of Progress Notes  Maida Abad MD - 02/02/2017 7:02 AM CST  Formatting of this note might be different from the original.  [facility] Initial Consult    [facility]  [address] [place] [id number] [phone]  Fax [phone]    Chief Complaint  Chief Complaint   Patient presents with     MEDICATION CHECK   carb/levo dosing and timing     History of Present Illness  This pt is seen for a second opinion about the management of Parkinson's disease. SHe presented in 2015 with a tendency to fall backwards, and a general stiffness and slowing of movement, and was started on levodopa by her PCP, which was dramatically helpful. She saw a neurologist, who reviewed the brain MRI (showed some lacunar infarcts on both sides), MRA (unremarkable) and Cspine MRI (foraminal narrowing at multiple levels, but no cord compression). Sinemet 25/100 was increased to 1.5 pills tid at 8-2-8, which was not as incrementally helpful. She is usually up from 8am to midnight. She has " "fallen 15 times in the last year. She occasionally uses a walking stick, but usually not. She is more likely to fall if she is trying to multitask, or occasionally if she is just standing there she will find herself suddenly on the floor. She lives independently in a twin home.     Previous evaluations and treatments  See above  Past Medical History  No past medical history on file.    Social History  Social History     Social History     Marital Status: Single   Spouse Name: N/A     Number of Children: N/A     Years of Education: N/A     Occupational History     Not on file.     Social History Main Topics     Smoking status: Never Smoker     Smokeless tobacco: Not on file     Alcohol Use: Not on file     Drug Use: Not on file     Sexual Activity: Not on file     Other Topics Concern     Not on file     Social History Narrative     No narrative on file     Family History  No family history on file.    Medications  No outpatient prescriptions prior to visit.     No facility-administered medications prior to visit.     Allergies  Allergies   Allergen Reactions     Penicillins   Tongue and throat tingling     Review of systems  Frequent voiding, falls and near falls, muscle aches and cramps    Physical Examination  /64 mmHg  Pulse 80  Ht 1.66 m (5' 5.35\")  Wt 59.603 kg (131 lb 6.4 oz)  BMI 21.63 kg/m2  UPDRS ADL score: 11  General: she appears younger than her stated age, chatty, looks generally healthy  Cardiac: The heart showed a regular rhythm without murmurs, rubs, or gallops.   Carotids: There were no carotid bruits  Abdomen: was benign and nontender  Extremities: There was no peripheral edema. There was no atrophy of the distal muscles. Pulses were intact and the extremities were warm.  Neurological:  The mental status showed normal attention, concentration, memory, fund of knowledge, and language.  MMSE/MOCA: 23/30  BDI: 2  The cranial nerve exam showed:   vision was acceptable, attending to both " visual fields well.  eye movements were full horizontally and vertically, without nystagmus  facial movements, including CN 5 and 7-innervated, were symmetric and normal  hearing was grossly normal  tongue movements were normal, and there was no dysarthria  sternocleidomastoid and trapezius movements were normal  pupils were symmetric and reactive to light  Motor exam: normal strength, bulk, and tone proximally and distally in all four extremities.  Sensory exam was intact to vibration at the ankles.   Reflexes were symmetric and 2+, including biceps, brachioradialis, knee jerks, and ankle jerks.  Tremor, coordination, gait: there is no tremor, no dyskinesia. Her speech and affect are normal  She gestures more with the L hand than the R, but then moves both equally when doing fine finger movements or rapid alternating hand movements  Heel taps are slightly low on both sides  Tone is mildly increased throughout  She rises from the chair without using her hands, but wobbled and nearly fell backwards     Impression  Parkinsonism, apparently modestly levodopa responsive. She has the atypical feature of early falls, but does seem to have had a dramatic improvement with levodopa, suggesting that the diagnosis of Parkinson's disease is correct. We reviewed the idea that levodopa does not improve balance, other than possibly indirectly by improving mobility. So the most important thing that she could do is acquire insight as to what kind of activities are likely to lead to falls. We reviewed a number of possibilities. She should take her sinemet at 5 hour intervals, as she has a little wearing off towards the end of a dose. She will see PT again. I think she enjoyed meeting PT, OT, speech, and music therapy, nursing and  as part of her Team Assessment clinic, so that she was able to learn a little more about her disease. I will see her back in the summer.     Plan  See above    Total time 45 minutes,  counseling time 30 minutes, about the items mentioned in the Impression    Send copy to   Dr. Jerzy Aparicio, [address], [address], [place] [address]        Electronically Signed by Maida Abad MD on 02/02/2017 7:02 AM CST          Allergies      Active Allergy Reactions Severity Noted Date Comments   Penicillins Paresthesias   01/02/2012 Tongue and throat tingling    Tingling on lips       Current Medications      Prescription Sig. Disp. Refills Start Date End Date Status   pravastatin (PRAVACHOL) 10 mg tablet   Take 1 tablet by mouth at bedtime.   0 01/02/2012   Active   calcium carbonate-vit D3, 600 mg-400 units, (CALCIUM-VITAMIN D) tablet   Take 1 tablet by mouth 2 times daily with meals.   0 01/02/2012   Active   omega-3 fatty acids-vitamin E (FISH OIL) 1,000 mg Cap   Take by mouth.   0 01/02/2012   Active   cholecalciferol (VITAMIN D) 1,000 unit capsule   Take 1 capsule by mouth once daily.   0 01/02/2012   Active   carbidopa-levodopa,  mg, (SINEMET )  mg tablet       2 11/24/2015   Active   pravastatin (PRAVACHOL) 10 mg tablet   Take 10 mg by mouth.         Active   Cholecalciferol, Vitamin D3, 2,000 unit tablet   Take 2,000 Units by mouth.         Active   carbidopa-levodopa,  mg, (SINEMET )  mg tablet   Take by mouth.         Active   calcium carbonate-vit D3, 600 mg-400 units, (CALTRATE PLUS 600 MG-400 UNIT TABLET) tablet   Take by mouth.         Active   omega-3 acid ethyl esters (LOVAZA;OMACOR) 1 gram capsule   Take 1,200 g by mouth.         Active   ibuprofen (ADVIL; MOTRIN) 200 mg tablet   Take 200-400 mg by mouth.         Active   aspirin-calcium carbonate 81 mg-300 mg calcium(777 mg) tab   Take 81 mg by mouth.         Active   NORCO per tablet    Indications: Closed nondisplaced fracture of second cervical vertebra, unspecified fracture morphology, initial encounter (HC) Take 1-2 tablets by mouth every 4 hours if needed for Pain Max  acetaminophen dose: 4000 mg in 24 hrs. 10 tablet   0 06/25/2018   Active   cycloSPORINE (RESTASIS MULTIDOSE) 0.05 % drop    Indications: Keratoconjunctivitis sicca of both eyes (HC) Place 1 Drop into both eyes 2 times daily. 3 Bottle   3 10/08/2018 01/08/2019 Discontinued     Active Problems      Problem Noted Date   Posterior capsular opacification 01/02/2012     Encounters  - from Last 3 Months    Date Type Specialty Care Team Description   01/08/2019 Office Visit Optometry/Ophthalmology Pita White, OD   Eye Exam (CEE)   12/31/2018 Hospital Encounter [facility] KermitLata, Hannah Adorno, PT       12/28/2018 Hospital Encounter [facility] KermitLata, NP    Theresa Cabrera, PTA       12/20/2018 Hospital Encounter [facility] RolblayneonLata, NP    Hannah Harmon, PT       12/13/2018 Hospital Encounter [facility] KermitLata, Hannah Adorno, PT       12/11/2018 Hospital Encounter [facility] RolandsonLata, NP    Juhi Siddiqi, PT       12/05/2018 Hospital Encounter [facility] KremitLata, Talyer Mars, PT       12/03/2018 Hospital Encounter [facility] AllisononLata, NP    Hannah Harmon, PT       11/30/2018 Hospital Encounter [facility] AllisononLata, Tayler Mars, PT       11/12/2018 Hospital Encounter [facility] RolblayneonLata, Hannah Adorno, PT       11/09/2018 Hospital Encounter [facility] RolandsonLata, Hannah Adorno, PT   Canceled (Late Cancellation)   11/07/2018 Hospital Encounter [facility] BouchrablayneonLata, Tayler Mars, PT       11/05/2018 Hospital Encounter [facility] RolandsonLata, Hannah Adorno, PT       10/31/2018 Hospital Encounter [facility] RolblayneonLata, Tayler Mars, PT       10/29/2018 Hospital Encounter [facility] Lata Carmen, Hannah Adorno, PT        10/25/2018 Hospital Encounter [facility] Lata Carmen, NP    Hannah Harmon PT       10/19/2018 Hospital Encounter [facility] Lata Carmen, NP    Tayler Winters, MURRAY         Surgical History      Surgery Date Laterality Comments   CATARACT REMOVAL    Dr. Zraina CURRY AND BSO         right knee surgery         left ankle surgery         HYSTERECTOMY           Medical History      Medical History Date Comments   high cholesterol       Parkinson's disease (HC)         Family History      Medical History Relation Name Comments   Dementia Father       Heart Disease Mother       Stroke Mother         Relation Name Status Comments   Father        Mother          Social History      Tobacco Use Types Packs/Day Years Used Date   Never Smoker           Smokeless Tobacco: Never Used           Alcohol Use Drinks/Week oz/Week Comments   No 0 Standard drinks or equivalent   0.0        Sex Assigned at Birth Date Recorded   Not on file           Patient Demographics  - 86 y.o. Female; born 1932     Patient Address Communication Language Race / Ethnicity   [address] [place] [address] [phone] (Mobile)  [phone] (Home)  [email or web address] English - Written (Preferred) White / Not  or      Allergies      Active Allergy Reactions Severity Noted Date Comments   Acetaminophen-Codeine     2010     Codeine     2010     Morphine Other (See Comments)   2016 Made me feel really wierd     Penicillins     2010       Medications      Medication Sig Dispensed Refills Start Date End Date Status   OMEGA-3/DHA/EPA/FISH OIL (FISH OIL-OMEGA-3 FATTY ACIDS) 300-1,000 mg capsule   Take 2 g by mouth daily.   0     Active   CALCIUM CARBONATE (CALCIUM 500 ORAL)   Take by mouth.   0     Active   ibuprofen (ADVIL,MOTRIN) 400 MG tablet   Take 400 mg by mouth every 6 (six) hours as needed for pain.   0     Active   cholecalciferol, vitamin D3,  (VITAMIN D3) 2,000 unit Tab   Take by mouth.   0     Active   aspirin 81 MG EC tablet   Take 81 mg by mouth daily.   0     Active   carbidopa-levodopa (SINEMET)  mg per tablet   Take 1 tablet by mouth 3 (three) times a day.    0     Active   pravastatin (PRAVACHOL) 10 MG tablet    Indications: Hyperlipidemia Take 1 tablet (10 mg total) by mouth daily. 90 tablet   3 12/16/2018 12/16/2019 Active     Active Problems      Problem Noted Date   Parkinson disease 01/20/2016   Vaginal wall prolapse 08/18/2015   Hemorrhoids     Overview:     Created by Conversion    Replacement Utility updated for latest IMO load     Venous Insufficiency     Overview:     Created by Conversion    Replacement Utility updated for latest IMO load     Cystocele     Overview:     Created by Conversion    Replacement Utility updated for latest IMO load     Mixed hyperlipidemia     Overview:     Created by Conversion       Appendiceal Mucocele     Overview:     Created by Conversion       Osteopenia     Overview:     Created by Conversion    Replacement Utility updated for latest IMO load     Left Ventricular Hypertrophy     Overview:     Created by Conversion         Resolved Problems      Problem Noted Date Resolved Date   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/2015   Overview:     Created by Conversion       Cyst On The Right Ovary   07/17/2015   Overview:     Created by Conversion       Menopause Has Occurred   04/10/2015   Overview:     Created by Conversion    Replacement Utility updated for latest IMO load     Cervicalgia   01/20/2016   Overview:     Created by Conversion       Walk Is Wobbly Or Unsteady (Ataxia)   01/20/2016   Overview:     Created by Conversion         Immunizations  Reconcile with Patient's Chart    Name Dates Previously Given Next Due   DTaP, historic 11/01/2009     Influenza, Seasonal, Inj PF IIV3 01/08/2013, 01/08/2013     Influenza, inj, historic,unspecified 12/02/2010, 11/14/2007     Pneumo Conj 13-V (2010&after)  "11/18/2016     Pneumo Polysac 23-V 04/01/2003     Td,adult,historic,unspecified 12/01/2009     Tdap 02/25/2016     ZOSTER, LIVE 04/11/2014       Surgical History      Surgery Date Laterality Comments   LA TOTAL ABDOM HYSTERECTOMY     Description: Hysterectomy; Proc Date: 01/01/1988; Comments: couldn't find her ovaries   HYSTERECTOMY 1982       BREAST CYST ASPIRATION           Medical History      Medical History Date Comments   Parkinson disease (H) 1/20/2016     Breast cyst         Family History      Medical History Relation Name Comments   Breast cancer Maternal Aunt         Relation Name Status Comments   Maternal Aunt           Social History      Tobacco Use Types Packs/Day Years Used Date   Never Smoker           Smokeless Tobacco: Never Used           Tobacco Cessation: Counseling Given: No     Sex Assigned at Birth Date Recorded   Not on file       Job Start Date Occupation Industry   Not on file Not on file Not on file     Travel History Travel Start Travel End   No recent travel history available.       Obstetrics History      Grav Para Term Pre Abrt (TAB) (SAB) (Ect) Mult Lvng Comments   4 4 4                     Date Out GA Labor/2nd Wt Sex Deliv Anes Pre Estela A1 A5 Name Loc Clin     Term                               Term                               Term                               Term                               Last Filed Vital Signs      Vital Sign Reading Time Taken   Blood Pressure 122/60 10/10/2018 2:10 PM CDT   Pulse 75 10/10/2018 2:10 PM CDT   Temperature 36.7  C (98.1  F) 08/20/2018 2:29 PM CDT   Respiratory Rate 14 08/20/2018 2:29 PM CDT   Oxygen Saturation 96% 10/10/2018 2:10 PM CDT   Inhaled Oxygen Concentration - -   Weight 58.1 kg (128 lb) 10/10/2018 2:10 PM CDT   Height 165.1 cm (5' 5\") 01/01/2017 9:24 AM CST   Body Mass Index 21.3 10/10/2018 2:10 PM CDT     Plan of Treatment      Health Maintenance Due Date Last Done Comments   ZOSTER VACCINES (2 of 3) 06/06/2014 04/11/2014   "   INFLUENZA VACCINE RULE BASED (#1) 08/01/2018 11/18/2016 (Declined), 01/08/2013, 12/02/2010, Additional history exists     DXA SCAN 08/10/2019 08/10/2017     FALL RISK ASSESSMENT 10/10/2019 10/10/2018, 01/17/2018, 11/18/2016, Additional history exists     ADVANCE DIRECTIVES DISCUSSED WITH PATIENT 10/10/2023 10/10/2018, 01/20/2016     TD 18+ HE 02/25/2026 02/25/2016, 12/01/2009     PNEUMOCOCCAL POLYSACCHARIDE VACCINE AGE 65 AND OVER Completed 04/01/2003     PNEUMOCOCCAL CONJUGATE VACCINE FOR ADULTS (PCV13 OR PREVNAR) Completed 11/18/2016, 11/18/2016       Procedures  - from Last 3 Months    Procedure Name Priority Date/Time Associated Diagnosis Comments   PROCEDURE NOTE EXTERNAL   01/11/2019         Results  - from Last 3 Months      PROCEDURE NOTE EXTERNAL (01/11/2019)  PROCEDURE NOTE EXTERNAL (01/11/2019)   Narrative Performed At   This result has an attachment that is not available.                Advance Directives      Patient has advance care planning documents, and health care agents on file. For more information, please contact:    [employer]    [address] [place] [phone]        Healthcare Agents on File  Healthcare Agents on File   Name Relationship Healthcare Agent Relationship Phone   Anisha Wiggins Child Healthcare agent [phone]   Mariel Arrieta Child Healthcare agent [phone]     Images  Document Information    Primary Care Provider Other Service Providers Document Coverage Dates   Jerzy Aparicio MD (Jul. 15, 2015 - Present)  [phone] (Work)  [phone] (Fax)  [address] [place] [address]   Jun. 19, 1932 - Jan. 17, 2019      Organization   [facility] [address] [address]   Ed Mendoza MD

## 2019-01-17 NOTE — NURSING NOTE
Chief Complaint   Patient presents with     Consult     [facility] NEW - PARKINSONS, BALANCE ISSUES       Antony Colon, EMT

## 2019-01-17 NOTE — PATIENT INSTRUCTIONS
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 Carmen Luu  Qualifies for and will benefit from the following product: walker due to significant impairment in gait that is able to improve his mobility and reduce the risk of falling. Their mobility issue can be resolved with the use of this medical device.     Ed Mendoza MD  January 17, 2019

## 2019-02-06 ENCOUNTER — HOSPITAL ENCOUNTER (OUTPATIENT)
Dept: PHYSICAL THERAPY | Facility: CLINIC | Age: 84
Setting detail: THERAPIES SERIES
End: 2019-02-06
Attending: PSYCHIATRY & NEUROLOGY
Payer: MEDICARE

## 2019-02-06 DIAGNOSIS — G20.C ATYPICAL PARKINSONISM (H): ICD-10-CM

## 2019-02-06 PROCEDURE — 97530 THERAPEUTIC ACTIVITIES: CPT | Mod: GP | Performed by: PHYSICAL THERAPIST

## 2019-02-06 PROCEDURE — 97116 GAIT TRAINING THERAPY: CPT | Mod: GP,XU | Performed by: PHYSICAL THERAPIST

## 2019-02-06 PROCEDURE — 97162 PT EVAL MOD COMPLEX 30 MIN: CPT | Mod: GP | Performed by: PHYSICAL THERAPIST

## 2019-02-06 NOTE — IP AVS SNAPSHOT
MRN:[id number]                      After Visit Summary   2/6/2019    [facility]    MRN: [id number]           Visit Information        Provider Department      2/6/2019  2:30 PM Kitty Chandler PT [facility] [place], Physical Therapy - Outpatient        Your next 10 appointments already scheduled    Feb 12, 2019 11:00 AM CST  MR BRAIN W/O CONTRAST with UUMR2  [facility] [place], MRI ([facility], [facility]) [address] [facility] [address] [phone]   How do I prepare for my exam? (Food and drink instructions) **If you will be receiving sedation or general anesthesia, please see special notes below.**  How do I prepare for my exam? (Other instructions) Take your medicines as usual, unless your doctor tells you not to. Please remove any body piercings and hair extensions before you arrive. Follow your doctor s orders. If you do not, we may have to postpone your exam. You may or may not receive IV contrast for this exam pending the discretion of the Radiologist.  You do not need to do anything special to prepare. **If you will be receiving sedation or general anesthesia, please see special notes below.**  What should I wear:  The MRI machine uses a strong magnet. Please wear clothes without metal (snaps, zippers). A sweatsuit works well, or we may give you a hospital gown.  How long does the exam take: Most tests take 30 to 60 minutes.  HOWEVER, IF YOUR DOCTOR PRESCRIBES ANESTHESIA please plan on spending four to five hours in the recovery room.  What should I bring: Bring a list of your current medicines to your exam (including vitamins, minerals and over-the-counter drugs). Also bring the results of similar scans you may have had.  Do I need a : **If you will be receiving sedation or general anesthesia, please see special notes below.**  What should I do after the exam? No Restrictions, You may resume normal activities.  What is  this test: MRI (magnetic resonance imaging) uses a strong magnet and radio waves to look inside the body. An MRA (magnetic resonance angiogram) does the same thing, but it lets us look at your blood vessels. A computer turns the radio waves into pictures showing cross sections of the body, much like slices of bread. This helps us see any problems more clearly.  Who should I call with questions: Please call the Imaging Department at your exam site with any questions. Directions, parking instructions, and other information is available on our website, [facility].  How do I prepare if I m having sedation or anesthesia? **IMPORTANT** THE INSTRUCTIONS BELOW ARE ONLY FOR THOSE PATIENTS WHO HAVE BEEN TOLD THEY WILL RECEIVE SEDATION OR GENERAL ANESTHESIA DURING THEIR MRI PROCEDURE:  IF YOU WILL RECEIVE SEDATION (take medicine to help you relax during your exam): You must get the medicine from your doctor before you arrive. Bring the medicine to the exam. Do not take it at home. Arrive one hour early. Bring someone who can take you home after the test. Your medicine will make you sleepy. After the exam, you may not drive, take a bus or take a taxi by yourself. No eating 8 hours before your exam. You may have clear liquids up until 4 hours before your exam. (Clear liquids include water, clear tea, black coffee and fruit juice without pulp.)  IF YOU WILL RECEIVE ANESTHESIA (be asleep for your exam): Arrive 1 1/2 hours early. Bring someone who can take you home after the test. You may not drive, take a bus or take a taxi by yourself. No eating 8 hours before your exam. You may have clear liquids up until 4 hours before your exam. (Clear liquids include water, clear tea, black coffee and fruit juice without pulp.) You will spend four to five hours in the recovery room.   Feb 12, 2019 12:30 PM CST  NEW NEURO with Jack Duke MD  Eye Clinic ([facility]) [address]  "[address] 9th Fl Clin [address]A  [facility] [address]  [phone]   Mar 15, 2019  9:30 AM CDT  (Arrive by 9:15 AM)  Neuropsych Eval with Charley Zepeda, PhD [facility] Neuropsychology ([facility]) [address] [place] [id number] [phone]   Mar 18, 2019 11:40 AM CDT  (Arrive by 11:25 AM)  New Movement Disorder with CRYSTAL Novoa [facility] Neurology ([facility]) [address] [id number] [phone]        Further instructions from your care team       2/6/19    Ask family to look with you at couch- would a piece of plywood under the cushions help?     OR \"bed risers\" under the legs of chair or couch. Look at your phone pics - 2 heights - you buy a set of 4.      We raised your walker one notch to its highest setting.  See if you like this.     See attached info on the UStep walker.  I will be in touch with Alvin from [employer] (in [place]) about this.     Come back to see me.    Also - for \"Sit to stand\" motion- lean forward.   For stand to sit motion- reverse, reach back, bend.  \"Nose over toes\".     Kitty Chandler PT  [email or web address]  [phone]    Kitty  :  [phone]    [employer] Information    [employer] gives you secure access to your electronic health record. If you see a primary care provider, you can also send messages to your care team and make appointments. If you have questions, please call your primary care clinic.  If you do not have a primary care provider, please call [phone] and they will assist you.       Care EveryWhere ID    This is your Care EveryWhere ID. This could be used by other organizations to access your [facility] medical records  [id number]       Equal Access to Services    RAEANN HAN AH: malachi Collier, blake alanisaan ah. So [facility] " [phone].    ATENCIÓN: Si habla español, tiene a emanuel disposición servicios gratuitos de asistencia lingüística. Llame al [phone].    We comply with applicable federal civil rights laws and [place] laws. We do not discriminate on the basis of race, color, national origin, age, disability, sex, sexual orientation, or gender identity.

## 2019-02-06 NOTE — DISCHARGE INSTRUCTIONS
"2/6/19    Ask family to look with you at couch- would a piece of plywood under the cushions help?     OR \"bed risers\" under the legs of chair or couch. Look at your phone pics - 2 heights - you buy a set of 4.      We raised your walker one notch to its highest setting.  See if you like this.     See attached info on the UStep walker.  I will be in touch with Alvin from [employer] (in [place]) about this.     Come back to see me.    Also - for \"Sit to stand\" motion- lean forward.   For stand to sit motion- reverse, reach back, bend.  \"Nose over toes\".     Kitty [email or web address]  [phone]    Kitty  :  [phone]  "

## 2019-02-09 ENCOUNTER — MYC MEDICAL ADVICE (OUTPATIENT)
Dept: NEUROLOGY | Facility: CLINIC | Age: 84
End: 2019-02-09

## 2019-02-12 NOTE — PROGRESS NOTES
19 1400   General Information   Start of Care Date 19   Referring Physician Ed Mendoza MD   Orders Evaluate and Treat as Indicated   Additional Orders Ustep walker   Order Date 19   Medical Diagnosis possible PD   Surgical/Medical history reviewed Yes   Pertinent history of current problem (include personal factors and/or comorbidities that impact the POC) i am falling backwards all the time.  broke neck  last year c2 to 7 /in brace, stable break,  got walker then.  grandson brought me and metro will pickme up.  i did pool PT , boxing but i still fall.  falls prev, i feel shaky.  i go to bed sometimes.  i use the walker.   i freeze.  dog .  i usually fall back.  have brain MRI.  i have a shower w/ grab bar/ stall.  i am home alone.  dtr lives 5 min away.  dtr cleans and i do too.  two dtrs help.  i go w/ them for groceries.  i make soup.  i am careful.  i use walker in the kitchen.     Pertinent Visual History  glasses   Prior level of functional mobility Ambulation   Ambulation w/ walker at times    Current Community Support Family/friend caregiver   Patient role/Employment history Retired   Living environment [place]   Current Assistive Devices Four Wheeled Walker   Patient/Family Goals Statement stop falling, walk better,safer   General Information Comments pt is here alone   Fall Risk Screen   Fall screen completed by PT   Have you fallen 2 or more times in the past year? Yes   Have you fallen and had an injury in the past year? Yes   Is patient a fall risk? Yes;Department fall risk interventions implemented   Pain   Patient currently in pain No   Vitals Signs   Blood Pressure 141/69   Vital Signs Comments 157/80 later in session.   Cognitive Status Examination   Orientation orientation to person, place and time   Level of Consciousness alert   Follows Commands and Answers Questions 100% of the time   Personal Safety and Judgment intact   Integumentary   Integumentary No  deficits were identified   Posture   Posture Forward head position   Range of Motion (ROM)   ROM Comment wfls   Strength   Strength Comments wfls   Bed Mobility   Bed Mobility Comments not tested   Transfer Skills   Transfer Comments poor sit/stand, tends to fall posteriorly   Gait Special Tests   Gait Special Tests 25 FOOT TIMED WALK   Gait Special Tests 25 Foot Timed Walk   Seconds 10   Steps 17 Steps   Comments w/ 4ww   Balance   Balance Comments cannot stand safely w/out AD   Sensory Examination   Sensory Perception no deficits were identified   Muscle Tone   Muscle Tone no deficits were identified   Planned Therapy Interventions   Planned Therapy Interventions balance training;gait training;neuromuscular re-education;strengthening;transfer training   Clinical Impression   Criteria for Skilled Therapeutic Interventions Met yes, treatment indicated   PT Diagnosis gait difficulty   Influenced by the following impairments imbalance, poor motor control   Functional limitations due to impairments falls. gait difficulty   Clinical Presentation Evolving/Changing   Clinical Presentation Rationale more falls, lives alone, age   Clinical Decision Making (Complexity) Moderate complexity   Therapy Frequency other (see comments)   Predicted Duration of Therapy Intervention (days/wks) up to 8x in 90 days   Risk & Benefits of therapy have been explained Yes   Patient, Family & other staff in agreement with plan of care Yes   Clinical Impression Comments 87 yo female w/ probably PD(?) and many falls - needs skilled PT -agree w/ UStep walker   Education Assessment   Preferred Learning Style Demonstration   Barriers to Learning Physical   GOALS   PT Eval Goals 1;2;3   Goal 1   Goal Identifier falls    Goal Description pt to verbalize 3+ falls prev ideas   Target Date 05/05/19   Goal 2   Goal Identifier floor transfer    Goal Description pt to do a floor tx using furniture indep   Target Date 05/05/19   Goal 3   Goal Identifier  gait   Goal Description pt to walk w/ UStep walker indep 750ft + for community distance safely   Target Date 05/05/19   Total Evaluation Time   PT Eval, Moderate Complexity Minutes (88331) 20

## 2019-02-12 NOTE — PROGRESS NOTES
[facility]        OUTPATIENT PHYSICAL THERAPY FUNCTIONAL EVALUATION  PLAN OF TREATMENT FOR OUTPATIENT REHABILITATION  (COMPLETE FOR INITIAL CLAIMS ONLY)  Patient's Last Name, First Name, M.I.  YOB: 1932  ALEXA Miller     Provider's Name   [facility]   Medical Record No.  [id number]     Start of Care Date:  02/06/19   Onset Date:   2/6/19   Type:     _X__PT   ____OT  ____SLP Medical Diagnosis:     Probable PD   PT Diagnosis:  gait difficulty Visits from SOC:  1                              __________________________________________________________________________________  Plan of Treatment/Functional Goals:  balance training, gait training, neuromuscular re-education, strengthening, transfer training        GOALS  falls   pt to verbalize 3+ falls prev ideas  05/05/19    floor transfer   pt to do a floor tx using furniture indep  05/05/19    gait  pt to walk w/ UStep walker indep 750ft + for community distance safely  05/05/19       Therapy Frequency:  other (see comments)   Predicted Duration of Therapy Intervention:  up to 8x in 90 days    Kitty Chandler, PT                                    I CERTIFY THE NEED FOR THESE SERVICES FURNISHED UNDER        THIS PLAN OF TREATMENT AND WHILE UNDER MY CARE     (Physician co-signature of this document indicates review and certification of the therapy plan).                Certification Date From:    2/6/19  Certification Date To:   5/5/19    Referring Provider:  Ed Medeiros MD    Initial Assessment  See Epic Evaluation- Start of Care Date: 02/06/19     Agree with plan  Ed medeiros md  February 12, 2019

## 2019-02-12 NOTE — ADDENDUM NOTE
Encounter addended by: Kitty Chandler, PT on: 2/12/2019 4:14 PM   Actions taken: Episode edited, Sign clinical note, Flowsheet accepted

## 2019-02-13 DIAGNOSIS — G20.C ATYPICAL PARKINSONISM (H): Primary | ICD-10-CM

## 2019-02-18 ENCOUNTER — TELEPHONE (OUTPATIENT)
Dept: NEUROLOGY | Facility: CLINIC | Age: 84
End: 2019-02-18

## 2019-02-18 NOTE — TELEPHONE ENCOUNTER
[facility] Call Center    Phone Message    May a detailed message be left on voicemail: yes    Reason for Call: Order(s): Other:   Reason for requested: MRI  Date needed: As soon as possible  Provider name: Dr. Mendoza    Patient called in with her daughter, they would like MRI orders sent to [facility]. They did not have any more contact information, just that they didn't want to come all the way to the [facility] for an MRI. Please call pt's daughter for more information/if orders are sent.      Action Taken: Message routed to:  [facility] Neurology

## 2019-02-19 ENCOUNTER — HOSPITAL ENCOUNTER (OUTPATIENT)
Dept: PHYSICAL THERAPY | Facility: CLINIC | Age: 84
Setting detail: THERAPIES SERIES
End: 2019-02-19
Attending: PSYCHIATRY & NEUROLOGY
Payer: MEDICARE

## 2019-02-19 PROCEDURE — 97530 THERAPEUTIC ACTIVITIES: CPT | Mod: GP | Performed by: PHYSICAL THERAPIST

## 2019-02-19 NOTE — DISCHARGE INSTRUCTIONS
"2/19/19    When looking at clothes - In walk in closet - put walker on side or behind you so you can SIT if needed to look at something.     Try your meds / timing so you take the last one is a little later.   Go to bed a little earlier.      PUT A 5# weight in walker basket for stability    Use yellow band on walker as a VISUAL so feet can \"cross\" it.     [email or web address] [email or web address].  "

## 2019-02-19 NOTE — IP AVS SNAPSHOT
MRN:[id number]                      After Visit Summary   2/19/2019    ALEXA Luis [facility]    MRN: [id number]           Visit Information        Provider Department      2/19/2019  2:00 PM Kitty Chandler, PT [facility], [place], Physical Therapy - Outpatient        Your next 10 appointments already scheduled    Mar 05, 2019 11:45 AM CST  Neuro Treatment with Kitty Chandler PT  [facility], [place], Physical Therapy - Outpatient ([facility]) [address], [address]  [place] [address]  [phone]   Mar 06, 2019  1:00 PM CST  (Arrive by 12:45 PM)  MR BRAIN W/O CONTRAST with UUMR1  [facility], [place], MRI ([facility]) [address] [address]  [phone]   How do I prepare for my exam? (Food and drink instructions) **If you will be receiving sedation or general anesthesia, please see special notes below.**  How do I prepare for my exam? (Other instructions) Take your medicines as usual, unless your doctor tells you not to. Please remove any body piercings and hair extensions before you arrive. Follow your doctor s orders. If you do not, we may have to postpone your exam. You may or may not receive IV contrast for this exam pending the discretion of the Radiologist.  You do not need to do anything special to prepare. **If you will be receiving sedation or general anesthesia, please see special notes below.**  What should I wear:  The MRI machine uses a strong magnet. Please wear clothes without metal (snaps, zippers). A sweatsuit works well, or we may give you a hospital gown.  How long does the exam take: Most tests take 30 to 60 minutes.  HOWEVER, IF YOUR DOCTOR PRESCRIBES ANESTHESIA please plan on spending four to five hours in the recovery room.  What should I bring: Bring a list of your current medicines to your exam (including vitamins, minerals and over-the-counter drugs). Also  bring the results of similar scans you may have had.  Do I need a : **If you will be receiving sedation or general anesthesia, please see special notes below.**  What should I do after the exam? No restrictions, you may resume normal activities.  What is this test: MRI (magnetic resonance imaging) uses a strong magnet and radio waves to look inside the body. An MRA (magnetic resonance angiogram) does the same thing, but it lets us look at your blood vessels. A computer turns the radio waves into pictures showing cross sections of the body, much like slices of bread. This helps us see any problems more clearly.  Who should I call with questions: Please call the Imaging Department at your exam site with any questions. Directions, parking instructions, and other information are available on our website, [employer]/imaging.  How do I prepare if I m having sedation or anesthesia? **IMPORTANT** THE INSTRUCTIONS BELOW ARE ONLY FOR THOSE PATIENTS WHO HAVE BEEN TOLD THEY WILL RECEIVE SEDATION OR GENERAL ANESTHESIA DURING THEIR MRI PROCEDURE:  IF YOU WILL RECEIVE SEDATION (take medicine to help you relax during your exam): You must get the medicine from your doctor before you arrive. Bring the medicine to the exam. Do not take it at home. Arrive one hour early. Bring someone who can take you home after the test. Your medicine will make you sleepy. After the exam, you may not drive, take a bus or take a taxi by yourself.  IF YOU WILL RECEIVE ANESTHESIA (be asleep for your exam): Arrive 1 1/2 hours early. Bring someone who can take you home after the test. You may not drive, take a bus or take a taxi by yourself. No eating 8 hours before your exam. You may have clear liquids up until 4 hours before your exam. (Clear liquids include water, clear tea, black coffee and fruit juice without pulp.) You will spend four to five hours in the recovery room.   Mar 08, 2019 10:30 AM AARON MAR with Jack Duek,  "MD  Eye Clinic ([facility]) [facility]  [address] [place] [address] SE  9th Fl Clin 9A  [facility] [address] [phone]   Mar 15, 2019  9:30 AM CDT  (Arrive by 9:15 AM)  Neuropsych Eval with Charley Zepeda, PhD [facility] Neuropsychology ([facility] [place]) [address]  [address] [facility] [id number] [phone]   Mar 22, 2019  3:45 PM CDT  (Arrive by 3:30 PM)  Return Movement Disorder with Ed Mendoza MD  [facility] Neurology ([facility]) [address]  [address]rd [facility] [id number] [phone]        Further instructions from your care team       2/19/19    When looking at clothes - In walk in closet - put walker on side or behind you so you can SIT if needed to look at something.     Try your meds / timing so you take the last one is a little later.   Go to bed a little earlier.      PUT A 5# weight in walker basket for stability    Use yellow band on walker as a VISUAL so feet can \"cross\" it.     Kitty  [email or web address].    [employer] Information    [employer] gives you secure access to your electronic health record. If you see a primary care provider, you can also send messages to your care team and make appointments. If you have questions, please call your primary care clinic.  If you do not have a primary care provider, please call [phone] and they will assist you.       Care EveryWhere ID    This is your Care EveryWhere ID. This could be used by other organizations to access your [facility] medical records  [id number]       Equal Access to Services    RAEANN HAN AH: Hadii joann Willingham, waaxda luqadaha, qaybta kaalmablake aleman. So [facility] [phone].    ATENCIÓN: Si habla español, tiene a emanuel disposición servicios gratuitos de asistencia lingüística. Naldo al [phone].    We comply with applicable federal civil rights laws and [place] " laws. We do not discriminate on the basis of race, color, national origin, age, disability, sex, sexual orientation, or gender identity.

## 2019-03-04 ENCOUNTER — RECORDS - HEALTHEAST (OUTPATIENT)
Dept: ADMINISTRATIVE | Facility: OTHER | Age: 84
End: 2019-03-04

## 2019-03-04 ENCOUNTER — TELEPHONE (OUTPATIENT)
Dept: NEUROLOGY | Facility: CLINIC | Age: 84
End: 2019-03-04

## 2019-03-04 NOTE — TELEPHONE ENCOUNTER
ALEXA [facility] Call Center    Phone Message    May a detailed message be left on voicemail: no    Reason for Call: Other: Pt's daughter says the MRI scheduled today at [facility] was unable to be completed due the heat malfunctioning at their radiology lab. The Pt would prefer to go to [facility] at fax # [phone]. Please call her back to confirm completion.     Action Taken: Message routed to:  [facility]

## 2019-03-04 NOTE — TELEPHONE ENCOUNTER
Faxed order along with demographic sheet to [employer] [phone] per patient's request.          Called patient's daughter, Anisha and let her know this was done. Gave her the number to call later in the day to schedule that [phone].

## 2019-03-06 ENCOUNTER — OFFICE VISIT - HEALTHEAST (OUTPATIENT)
Dept: INTERNAL MEDICINE | Facility: CLINIC | Age: 84
End: 2019-03-06

## 2019-03-06 ENCOUNTER — HOME CARE/HOSPICE - HEALTHEAST (OUTPATIENT)
Dept: HOME HEALTH SERVICES | Facility: HOME HEALTH | Age: 84
End: 2019-03-06

## 2019-03-06 DIAGNOSIS — R29.6 FALLS FREQUENTLY: ICD-10-CM

## 2019-03-06 DIAGNOSIS — S22.42XD CLOSED FRACTURE OF MULTIPLE RIBS OF LEFT SIDE WITH ROUTINE HEALING, SUBSEQUENT ENCOUNTER: ICD-10-CM

## 2019-03-07 ENCOUNTER — TRANSFERRED RECORDS (OUTPATIENT)
Dept: HEALTH INFORMATION MANAGEMENT | Facility: CLINIC | Age: 84
End: 2019-03-07

## 2019-03-08 ENCOUNTER — OFFICE VISIT (OUTPATIENT)
Dept: OPHTHALMOLOGY | Facility: CLINIC | Age: 84
End: 2019-03-08
Attending: OPHTHALMOLOGY
Payer: MEDICARE

## 2019-03-08 ENCOUNTER — DOCUMENTATION ONLY (OUTPATIENT)
Dept: NEUROLOGY | Facility: CLINIC | Age: 84
End: 2019-03-08

## 2019-03-08 ENCOUNTER — COMMUNICATION - HEALTHEAST (OUTPATIENT)
Dept: HOME HEALTH SERVICES | Facility: HOME HEALTH | Age: 84
End: 2019-03-08

## 2019-03-08 DIAGNOSIS — H50.05 ESOTROPIA, ALTERNATING: Primary | ICD-10-CM

## 2019-03-08 DIAGNOSIS — H53.10 SUBJECTIVE VISUAL DISTURBANCE: ICD-10-CM

## 2019-03-08 DIAGNOSIS — G20.A1 PARKINSON'S DISEASE (H): ICD-10-CM

## 2019-03-08 PROCEDURE — G0463 HOSPITAL OUTPT CLINIC VISIT: HCPCS | Mod: ZF | Performed by: TECHNICIAN/TECHNOLOGIST

## 2019-03-08 PROCEDURE — 92060 SENSORIMOTOR EXAMINATION: CPT | Mod: ZF | Performed by: OPHTHALMOLOGY

## 2019-03-08 ASSESSMENT — REFRACTION_WEARINGRX
OD_CYLINDER: +0.25
OD_SPHERE: -1.00
OS_ADD: +2.50
OS_AXIS: 085
OD_HPRISM: 2 BO
SPECS_TYPE: PAL
OD_ADD: +2.50
OS_CYLINDER: +1.00
OS_HPRISM: 2 BO
OD_AXIS: 125
OS_SPHERE: -1.50

## 2019-03-08 ASSESSMENT — EXTERNAL EXAM - RIGHT EYE: OD_EXAM: NORMAL

## 2019-03-08 ASSESSMENT — TONOMETRY
OD_IOP_MMHG: 10
IOP_METHOD: ICARE
OS_IOP_MMHG: 11

## 2019-03-08 ASSESSMENT — SLIT LAMP EXAM - LIDS
COMMENTS: 2+ BLEPHARITIS
COMMENTS: 2+ BLEPHARITIS

## 2019-03-08 ASSESSMENT — CUP TO DISC RATIO
OS_RATIO: 0.1
OD_RATIO: 0.2

## 2019-03-08 ASSESSMENT — VISUAL ACUITY
OD_CC: 20/20
OS_CC: 20/20
METHOD: SNELLEN - LINEAR

## 2019-03-08 ASSESSMENT — EXTERNAL EXAM - LEFT EYE: OS_EXAM: NORMAL

## 2019-03-08 NOTE — PROGRESS NOTES
1. 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  Exam was compatible with Parkinsonism including square wave jerks and mildly saccadic smooth pursuit and decreased blink rate.    2. Dry eye syndrome and blepharitis- discussed using a nighttime viscous gel in addition to warm compresses twice a day and artificial tears throughout the day.  Patient may consider referral to a dry eye specialist if she continues to be bothered.  She has multiple issues affecting her ocular surface including Parkinson's disease decreased blink, dry eye, and blepharitis.    3. 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  No change indicated.    4. 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  No exudative changes.      ALEXA Luu is a 86 year old female who presents for an ophthalmic evaluation. Referred by Dr. Mendoza for evaluation of signs of progressive supranuclear palsy.  Patient was diagnosed with Parkinson in August of 2015. She has been on Carbidopa-Levodopa. In December of 2015, patient noticed diplopia and was given prism glasses. Patient denies undergoing any imaging testing during the time of diplopia. She denies any history of face, head, or eye trauma. Patient has been wearing prism lenses that improve the double vision. Reports her vision has been overall stable. . Patient denies any changes to their vision, flashes, floaters, pain, redness, discharge, diplopia, or photophobia. Denies any episodes of oscillopsia. Denies any difficulty with eye movements but reports her Parkinson symptoms have been slowly worsening. She has a fall with a head injury in June of 2018 and had a fall with broken ribs one week ago.    Underwent MRI Brain 3/7/19    Past medical history: Diagnosed with Parkinson August of 2015.    Past ocular  history: Cataract extraction over 10 years ago; Diplopia (wearing 2 MINESH prisms both eyes)    Family history: Negative    Medications: Carbidopa-Levodopa, Pravastatin, Vitamins    On exam she is 20/20 in both eyes. Pupil exam is normal and without afferent pupillary defect. Extraocular moments showeed full motility and normal vertical saccades.  There were frequent square wave jerks and mildly saccadic horizontal smooth pursuit. She has 2 prism diopter esophoria in primary gaze wearing her prism glasses. Anterior segment exam is reveals a well centered intraocular lens. There is 1+ posterior capsular opacification in the right eye. 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    No signs of progressive supranuclear palsy on exam. Followup with Dr. Mendoza as scheduled. Follow up with me as needed.  Discussed dry eye symptoms and advised using gel drops and follow up with dry eye specialist.       Complete documentation of historical and exam elements from today's encounter can be found in the full encounter summary report (not reduplicated in this progress note).  I personally obtained the chief complaint(s) and history of present illness.  I confirmed and edited as necessary the review of systems, past medical/surgical history, family history, social history, and examination findings as documented by others; and I examined the patient myself.  I personally reviewed the relevant tests, images, and reports as documented above.  I formulated and edited as necessary the assessment and plan and discussed the findings and management plan with the patient and family.  I personally reviewed the ophthalmic test(s) associated with this encounter, agree with the interpretation(s) as documented by the resident/fellow, and have edited the corresponding report(s) as necessary.     MD Errol Read MD  PGY-3 Ophthalmology Resident  [phone]

## 2019-03-08 NOTE — LETTER
March 10, 2019    RE: ALEXA Luu  : 1932  MRN: [id number]    Dear Dr. Mendoza,    Thank you for referring your patient, ALEXA Luu, to my neuro-ophthalmology clinic recently.  After a thorough neuro-ophthalmic history and examination, I came to the following conclusions:      1. 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  Exam was compatible with Parkinsonism including square wave jerks and mildly saccadic smooth pursuit and decreased blink rate.    2. Dry eye syndrome and blepharitis- discussed using a nighttime viscous gel in addition to warm compresses twice a day and artificial tears throughout the day.  Patient may consider referral to a dry eye specialist if she continues to be bothered.  She has multiple issues affecting her ocular surface including Parkinson's disease decreased blink, dry eye, and blepharitis.    3. 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  No change indicated.    4. 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  No exudative changes.      ALEXA Luu is a 86 year old female who presents for an ophthalmic evaluation. Referred by Dr. Mendoza for evaluation of signs of progressive supranuclear palsy.  Patient was diagnosed with Parkinson in 2015. She has been on Carbidopa-Levodopa. In 2015, patient noticed diplopia and was given prism glasses. Patient denies undergoing any imaging testing during the time of diplopia. She denies any history of face, head, or eye trauma. Patient has been wearing prism lenses that improve the double vision. Reports her vision has been overall stable. . Patient denies any changes to their vision, flashes, floaters, pain, redness, discharge, diplopia, or photophobia. Denies any episodes of oscillopsia. Denies any  difficulty with eye movements but reports her Parkinson symptoms have been slowly worsening. She has a fall with a head injury in June of 2018 and had a fall with broken ribs one week ago.    Underwent MRI Brain 3/7/19    Past medical history: Diagnosed with Parkinson August of 2015.    Past ocular history: Cataract extraction over 10 years ago; Diplopia (wearing 2 MINESH prisms both eyes)    Family history: Negative    Medications: Carbidopa-Levodopa, Pravastatin, Vitamins    On exam she is 20/20 in both eyes. Pupil exam is normal and without afferent pupillary defect. Extraocular moments showeed full motility and normal vertical saccades.  There were frequent square wave jerks and mildly saccadic horizontal smooth pursuit. She has 2 prism diopter esophoria in primary gaze wearing her prism glasses. Anterior segment exam is reveals a well centered intraocular lens. There is 1+ posterior capsular opacification in the right eye. 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    No signs of progressive supranuclear palsy on exam. Followup with Dr. Mendoza as scheduled. Follow up with me as needed.  Discussed dry eye symptoms and advised using gel drops and follow up with dry eye specialist.      Again, thank you for trusting me with the care of your patient.  For further exam details, please feel free to contact our office for additional records.  If you wish to contact me regarding this patient please email me at [email or web address] or give my clinic a call to arrange a phone conversation.    Sincerely,    Jack Duke MD  , Neuro-Ophthalmology and Adult Strabismus Surgery  The Carla PARKER and Marisa Timmons Chair in Neuro-Ophthalmology  Department of Ophthalmology and Visual Neurosciences  [facility]    DX: Parkinson's disease, age related distance esotropia

## 2019-03-08 NOTE — NURSING NOTE
Chief Complaint(s) and History of Present Illness(es)     Diplopia Evaluation     In both eyes.  Characterized as horizontal.  Associated symptoms include imbalance and abnormal gait.  Negative for difficulty swallowing, blurred vision and abnormal speech.  Treatments tried include glasses (Prism. History of prism glasses for 2 years).  Response to treatment was issue resolved.              Comments     Parkinsons diagnosed August 2015.  +loss balance, recent fall was last night - did not hit head per patient.   Denies difficulty speaking or swallowing.   No tremors per patient.     ++dry eyes each eye. Patient wants some resolutions. Currently doing warm packs which helps minimally.     Haley Vela CO 3/8/2019 10:25 AM

## 2019-03-08 NOTE — PROGRESS NOTES
Received imaging report MR of the Brain [facility], 3/8/19 report was sent to be scanned 3/8/19    Sahra Bundy CMA

## 2019-03-09 ENCOUNTER — COMMUNICATION - HEALTHEAST (OUTPATIENT)
Dept: HOME HEALTH SERVICES | Facility: HOME HEALTH | Age: 84
End: 2019-03-09

## 2019-03-10 ENCOUNTER — RECORDS - HEALTHEAST (OUTPATIENT)
Dept: ADMINISTRATIVE | Facility: OTHER | Age: 84
End: 2019-03-10

## 2019-03-11 ENCOUNTER — RECORDS - HEALTHEAST (OUTPATIENT)
Dept: ADMINISTRATIVE | Facility: OTHER | Age: 84
End: 2019-03-11

## 2019-03-11 PROBLEM — R90.89 ABNORMAL BRAIN MRI: Status: ACTIVE | Noted: 2019-03-11
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
## 2019-03-11 PROBLEM — S22.43XA CLOSED FRACTURE OF MULTIPLE RIBS OF BOTH SIDES, INITIAL ENCOUNTER: Status: ACTIVE | Noted: 2019-02-27

## 2019-03-11 RX ORDER — POLYVINYL ALCOHOL 14 MG/ML
1 SOLUTION/ DROPS OPHTHALMIC
COMMUNITY
End: 2019-04-16

## 2019-03-11 RX ORDER — OXYCODONE HYDROCHLORIDE 5 MG/1
2.5 TABLET ORAL
COMMUNITY
Start: 2019-02-28 | End: 2019-04-16

## 2019-03-11 RX ORDER — ACETAMINOPHEN 500 MG
500-1000 TABLET ORAL 2 TIMES DAILY PRN
COMMUNITY
End: 2021-11-23

## 2019-03-11 RX ORDER — MULTIVIT WITH MINERALS/LUTEIN
TABLET ORAL
Status: ON HOLD | COMMUNITY
End: 2019-10-15

## 2019-03-11 RX ORDER — OXYCODONE HYDROCHLORIDE 5 MG/1
TABLET ORAL
Refills: 0 | COMMUNITY
Start: 2019-02-28 | End: 2019-04-16

## 2019-03-11 RX ORDER — POLYETHYLENE GLYCOL 3350 17 G/17G
17 POWDER, FOR SOLUTION ORAL
COMMUNITY
Start: 2019-02-28 | End: 2019-04-16

## 2019-03-11 RX ORDER — IBUPROFEN 200 MG
200 TABLET ORAL
Status: ON HOLD | COMMUNITY
End: 2019-10-15

## 2019-03-12 ENCOUNTER — HOME CARE/HOSPICE - HEALTHEAST (OUTPATIENT)
Dept: HOME HEALTH SERVICES | Facility: HOME HEALTH | Age: 84
End: 2019-03-12

## 2019-03-12 ENCOUNTER — COMMUNICATION - HEALTHEAST (OUTPATIENT)
Dept: HOME HEALTH SERVICES | Facility: HOME HEALTH | Age: 84
End: 2019-03-12

## 2019-03-13 ENCOUNTER — HOME CARE/HOSPICE - HEALTHEAST (OUTPATIENT)
Dept: HOME HEALTH SERVICES | Facility: HOME HEALTH | Age: 84
End: 2019-03-13

## 2019-03-13 ENCOUNTER — TELEPHONE (OUTPATIENT)
Dept: NEUROLOGY | Facility: CLINIC | Age: 84
End: 2019-03-13

## 2019-03-15 ENCOUNTER — OFFICE VISIT (OUTPATIENT)
Dept: NEUROPSYCHOLOGY | Facility: CLINIC | Age: 84
End: 2019-03-15
Payer: MEDICARE

## 2019-03-15 ENCOUNTER — RECORDS - HEALTHEAST (OUTPATIENT)
Dept: ADMINISTRATIVE | Facility: OTHER | Age: 84
End: 2019-03-15

## 2019-03-15 DIAGNOSIS — G20.C PARKINSONISM, UNSPECIFIED PARKINSONISM TYPE (H): Primary | ICD-10-CM

## 2019-03-15 DIAGNOSIS — F09 MENTAL DISORDER DUE TO GENERAL MEDICAL CONDITION: ICD-10-CM

## 2019-03-15 NOTE — NURSING NOTE
The patient was seen for neuropsychological evaluation at the request of Dr. Ed Mendoza, for the purposes of diagnostic clarification and treatment planning. 210 minutes of test administration and scoring were provided by this writer, aCmden Stokes. Please see Dr. Charley Zepeda's report for a full interpretation of the findings.

## 2019-03-22 NOTE — PROGRESS NOTES
Name: ALEXA Miller MRN: [id number]  : 1932  DANG: 3/15/2019  Staff: VANESSA Tech: JEAN CLAUDE Age: 86  Sex: Female Hand: Right   Educ: 16  Occupation: retired nurse    WAIS-IV     Raw SSa     Vocabulary  39 11     Block Design  16 8     Coding   42 12     Digit Span  29 14     Visual Puzzles  9 11    WRAT4   SS %ile Grade Equiv.     Word Reading  145 99.99 >12.9    WMS-Revised  Raw    MAS     Info & Orientation 13       LM I   22 12     LM II   13 10     VR I   15 6     VR II   14 10     VR Recognition  4    Derik AVLT   (30 item recognition)     Trial 1 2 3 4 5 B (6) 7              5 6 9 11 13 7 12       Raw MAS     30 minute recall   6 11     30 min Recognition  13 11       Intrusions   1     Learning Efficiency  113      % retention 46          MAS  8    DERIK-OSTERRIETH COMPLEX FIGURE    Raw    T %ile     Copy    31     >16    BOSTON NAMING TEST   Score: 54 MAS: 11  63%ile                    [ 54   w/o cues        5   w/phonemic cues]     COWAT (CFL)   Score: 51  MAS: 13  84%ile    SEMANTIC FLUENCY   Score: 39  MAS: 10  50%ile    CLOCK DRAWING     Command   2/3             Copy     3/3    TRAIL MAKING TEST    Raw         Err  MAS  %ile   A 44            0               10             50   B 110        1               10             50    STROOP   Raw + Judy  =   Total          MAS %ile    Word 98 +     --  = 98 12 75   Color  57 +     --= 57 11 63       Color/Word  33 +    -- = 33 14 91     FAUSTIN JUDGMENT OF LINE ORIENTATION Form H   Raw: 30  MAS: 17      DEMENTIA RATING SCALE - 2 Standard       Raw       MAS            Raw      MAS  Attention  36  12        Concept   37           11  Init/Psv  37  13  Memory   23       10  Construct 6  10   Total     139/144     13     GDS (30-item)  Raw:  2     MAS = [place] Older Adult Normative Study Age & Education Adj. Scaled Score

## 2019-03-22 NOTE — PROGRESS NOTES
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 Information was obtained via interview with the patient and review of her medical records. 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 The parkinsonism was diagnosed in 2015, after she noticed problems with her right hand when playing piano. She had also been falling backwards, which began before her diagnosis. She has had significant injuries from the falls, including a neck fracture in June 2018. Given questions regarding diagnosis, she was referred for neuropsychological evaluation by Ed Mendoza M.D., for further characterization any cognitive difficulties and to evaluate mood.    CLINICAL INTERVIEW FINDINGS    Upon interview, Ms. Janelle Luu stated that she understands that a diagnosis of PSP is being considered, because she has been falling backwards over the last 3   years. A few years ago, she noticed that the fingers on her right hand were not working correctly when she was playing the piano. Now, she moves slowly. She has not had any tremor. Balance continues to be poor.    Ms. Janelle Luu has not noticed any difficulty with cognition, including memory, word-finding, attention or concentration. She stated that she has always struggled with decision making, and she feels less organized because she cannot move as quickly due to her falls. She asked her children whether they have noticed problems with cognition, and they have not. She lives by herself in a one-level twin home with no steps. She  has been using a walker since June when she fell and broke her neck. She manages her own finances, apparently without difficulty. She manages her own medications, also without difficulty. She cannot always tell if she is late with her carbidopa-levodopa. Sometimes she has more freezing at night if she is late with her medications. She never takes an extra dose, as she is scared of dyskinesias. She stopped driving when she fractured her neck and started using a walker. Her family helps by taking her shopping and going out to eat. Her children do a lot of her cooking, but she also does some, apparently without difficulty. Her daughter visits daily and sweeps the floor and does other tasks around the house. She handles her personal cares independently.     Ms. Janelle Luu denied any history of psychiatric illness. She has not worked with a psychologist. She described her mood currently as good. She does not feel depressed or anxious. She is no more irritable than normal. She does not typically cry, although recently she was praying for her children and grandchildren, and realized that she had to  pray ahead  for them because she might not be there longer, and she became tearful. She denied suicidal ideation or any history of attempts to commit suicide. At night, she tries to sleep for 7 hours. She sometimes sleeps in her chair. She naps for 30 minutes, but not every day. Her appetite is lower than normal, and she has lost 7 pounds without trying. Her energy level depends on her sleep, and she tends to get tired. Her interest level is good if she slept well. She denied visual or auditory hallucinations. She denied alcohol, tobacco, or illicit drug use.     Ms. Janelle Luu completed a Bachelor s degree at the [employer]. She worked as a nurse in hospitals and school systems, retiring in 1994. She was  twice, after 20 and 21 years of marriage. She has four children, and is close with her family.      Ms. Janelle Luu denied any history of seizure, stroke, or head injury resulting in loss of consciousness. About two years ago, she reportedly had a concussion, after falling and hitting her head on cement. She got a stomach ache and drove herself to the emergency department. She denied unilateral weakness or numbness. Her right foot seems to not be working right, and it is hard to lift it. She has not been bothered by headaches. She has had ongoing pain in her groin, to the point that she does not always know if she can get up . She has tried injections and physical therapy, but nothing has been particularly helpful.     PAST MEDICAL HISTORY: Medical records indicate a history of hyperlipidemia, venous insufficiency, atypical parkinsonism.    CURRENT MEDICATIONS:  Include acetaminophen, calcium carb-cholecalciferol, calcium carbonate-vitamin D, carbidopa-levodopa (Sinemet), cholecalciferol, docosahexaenoic acid, fish oil-omega-3 fatty acids, ibuprofen, magnesium citrate, oxycodone, polyethylene glycol, polyvinyl alcohol, pravastatin, vitamin C, vitamin D3.     FAMILY MEDICAL HISTORY:  Significant for a mother with cerebrovascular disease, a father with dementia including paranoid behavior, paternal aunts and uncles with dementia, and two children with essential tremor. She noted that her first , their father, had essential tremor.     BEHAVIORAL OBSERVATIONS    During the evaluation, Ms. Janelle Luu was pleasant, cooperative, and seemed to understand the instructions. She was alert and oriented to person, place, and time. She was able to name the current , but stated that Costa was the president before him. No tremors were observed clinically. Gait was slow and unsteady, and she used a wheeled walker. Mood was euthymic. Speech was fluent, with normal articulation, volume, and rate. Spontaneous conversation was present and appropriate. Internal performance validity measures  fell within normal limits. The results are believed to accurately reflect her current level of functioning.     MEASURES ADMINISTERED    The following measures were administered by a trained psychometrist, under the direct supervision of a licensed psychologist:    Subtests of the Wechsler Adult Intelligence Scale - 4; Reading subtest of the Wide Range Achievement Test - 4; subtests of the Wechsler Memory Scale - Revised; Derik Auditory Verbal Learning Test; Derik-Osterrieth Complex Figure; [place] Naming Test; Controlled Oral Word Association Test; Semantic Fluency; Clock Drawing; Trail Making Test; Stroop; Nettles Judgment of Line Orientation; Dementia Rating Scale - 2 (DRS-2); Geriatric Depression Scale (GDS).    RESULTS AND INTERPRETATION    Overall intellectual functioning was estimated to fall in the above average range, somewhat below premorbid estimates of superior based on single word reading abilities. Performance on a screening measure of dementia was high average (DRS-2 Total Score = 139/144).    Confrontation naming was average for her age and level of education. Expressive vocabulary was average for her age. Letter fluency was high average for her age and level of education, and generative naming to category was average.     Attention span was above average for her age. Divided attention was average for her age and level of education. Performance on a measure of distractibility was above average. Psychomotor processing speed was high average.     Basic visual perception, including matching lines and angles, was above average for her age and level of education. Construction of a clock was notable for difficulty accurately spacing the numbers, but otherwise fell within normal limits. Construction of a complex design fell below expected, and was primarily notable in that she emmie over most of the lines several times. Assembly of visual material was low average. Visual problem solving was average.    Immediate  recall of verbal narrative material was high average, with average recall following a 30 minute delay. On a multiple trial list learning task, learning efficiency was high average, with average retention following a 30 minute delay. Immediate recall of visual material was mildly impaired, with average recall following a 30 minute delay.     On the GDS, a self-report questionnaire, Ms. Janelle Luu denied experiencing depressive symptomatology.     IMPRESSIONS AND RECOMMENDATIONS    Current results indicate performance that falls within normal limits across cognitive domains. She demonstrated a relative weakness in visual construction and assembly of visual material, but visual spatial abilities with no motor component reflected a significant strength. Learning and memory, language, and complex attentional processing fell in the average to above average range. She denied experiencing significant depressive symptomatology.     This pattern of performance does not reflect dementia at this time. Her relative weakness on some tasks of visual processing seems to reflect primarily difficulty with motor functioning/drawing, given her significant strength on visuospatial tasks with no motor component. Additionally, it is notable that there is no clear indication of executive dysfunction. She has at least a four year history of parkinsonism. This pattern of performance, with cognitive functioning falling almost entirely within normal limits, would be atypical for PSP, although PSP cannot be ruled out based on neuropsychological evaluation alone.     In terms of daily functioning, Ms. Janelle Luu is not experiencing cognitive difficulties that might interfere with her ability to actively participate in treatment, or to manage her instrumental activities of daily living independently. She stopped driving after fracturing her neck last year, in part because she now requires a walker which is difficult for her to manage on her  own. Her family is involved and has been able to assist. Results may serve as a baseline of her neurocognitive functioning, and the evaluation may be repeated in the future for comparison should a change in mental status occur.     Charley Zepeda, Ph.D., ABPP  Licensed Psychologist, LP [id number]  Board Certified in Clinical Neuropsychology    Time spent: 65 minutes neurobehavioral status exam including interview, clinical assessment by licensed and board-certified neuropsychologist (CPT 07902). 60 minutes (1 unit) neuropsychological testing evaluation by licensed and board-certified neuropsychologist, including integration of patient data, interpretation of standardized test results and clinical data, clinical decision-making, treatment planning, report, and interactive feedback to the patient, first hour (CPT 01060). 95 minutes (2 units) of neuropsychological testing evaluation by licensed and board-certified neuropsychologist, including integration of patient data, interpretation of standardized test results and clinical data, clinical decision-making, treatment planning, report, and interactive feedback to the patient, subsequent hours (CPT 00991). 30 minutes of neuropsychological test administration and scoring by technician, first 30 minutes (CPT 84393). 180 additional minutes (6 units) neuropsychological test administration and scoring by technician, subsequent 30 minutes (CPT 47248). ICD-10 Diagnoses: G20; F06.8.

## 2019-03-27 ENCOUNTER — RECORDS - HEALTHEAST (OUTPATIENT)
Dept: ADMINISTRATIVE | Facility: OTHER | Age: 84
End: 2019-03-27

## 2019-04-04 ENCOUNTER — HOSPITAL ENCOUNTER (OUTPATIENT)
Dept: PHYSICAL THERAPY | Facility: CLINIC | Age: 84
Setting detail: THERAPIES SERIES
End: 2019-04-04
Attending: PSYCHIATRY & NEUROLOGY
Payer: MEDICARE

## 2019-04-04 PROCEDURE — 97530 THERAPEUTIC ACTIVITIES: CPT | Mod: GP | Performed by: PHYSICAL THERAPIST

## 2019-04-04 NOTE — DISCHARGE INSTRUCTIONS
4/3/19    Falls prevention    1.  Ask YAZMIN to help you with the Tape on the floor - between the bathroom and the bedroom.  For visual input for walking to the bed.      2.  Count out loud or say Left Right OR click tongue when starting to freeze or when going into a small area.      3.  Try to sing marching music  76 Trombones - when walking and if you feel a freeze.      4.  Sway side to side if you freeze.     You are doing a lot of good things.  No throw rugs.  Using good shoes.     email me and let me know about the tape and if it works.    Take care  Kitty Chandler PT  [email or web address]  [phone]      [phone]

## 2019-04-04 NOTE — IP AVS SNAPSHOT
MRN:[id number]                      After Visit Summary   4/4/2019    ALEXA [facility]    MRN: [id number]           Visit Information        Provider Department      4/4/2019  3:30 PM Kitty Chandler, PT [facility], [place], Physical Therapy - Outpatient        Your next 10 appointments already scheduled    Apr 16, 2019  3:10 PM CDT  (Arrive by 2:55 PM)  Return Movement Disorder with Ed Mendoza MD  [facility] Neurology ([facility] [place]) [address] [place] [id number] [phone]        Further instructions from your care team       4/3/19    Falls prevention    1.  Ask YAZMIN to help you with the Tape on the floor - between the bathroom and the bedroom.  For visual input for walking to the bed.      2.  Count out loud or say Left Right OR click tongue when starting to freeze or when going into a small area.      3.  Try to sing marching music  76 Trombones - when walking and if you feel a freeze.      4.  Sway side to side if you freeze.     You are doing a lot of good things.  No throw rugs.  Using good shoes.     email me and let me know about the tape and if it works.    Take care  Kitty Chandler PT  [email or web address]  [phone]      [phone]    MyChart Information    [facility] gives you secure access to your electronic health record. If you see a primary care provider, you can also send messages to your care team and make appointments. If you have questions, please call your primary care clinic.  If you do not have a primary care provider, please call [phone] and they will assist you.       Care EveryWhere ID    This is your Care EveryWhere ID. This could be used by other organizations to access your [place] medical records  [id number]       Equal Access to Services    RAEANN HAN AH: Lionel Willingham, malachi alejandre, blake alanis. So [facility]  [phone].    ATENCIÓN: Si habla español, tiene a emanuel disposición servicios gratuitos de asistencia lingüística. Llame al [phone].    We comply with applicable federal civil rights laws and [place] laws. We do not discriminate on the basis of race, color, national origin, age, disability, sex, sexual orientation, or gender identity.

## 2019-04-10 NOTE — PROGRESS NOTES
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 Since last being seen she had an MRI which revealed midbrain atrophy. She had neuropsych testing which was normal. She had neuro-ophthalmology exam which revealed SWJs but no clear supranuclear palsy.    She started using a U Step. She was still falling frequently, but then over the past month hasn't had any falls. She has good and bad times but does have fluctuations clearly related to the timing of her levodopa. She also is having freezing. Put lines on the floor in her house and uses the laser on the U Step.        PD Medications                   8 130 7   Sinemet 25/100                                 1 1 1     Medications:  Current Outpatient Medications   Medication     acetaminophen (TYLENOL) 500 MG tablet     calcium carbonate 600 mg-vitamin D 400 units (CALTRATE) 600-400 MG-UNIT per tablet     carbidopa-levodopa (SINEMET)  MG tablet     cholecalciferol (VITAMIN D-3 SUPER STRENGTH) 2000 units tablet     fish oil-omega-3 fatty acids 1000 MG capsule     ibuprofen (ADVIL/MOTRIN) 200 MG tablet     Magnesium Citrate 200 MG TABS     order for DME     pravastatin (PRAVACHOL) 10 MG tablet     vitamin C (ASCORBIC ACID) 1000 MG TABS     No current facility-administered medications for this visit.        Past Medical History:   Diagnosis Date     Abnormal brain MRI 3/11/2019    [facility] Result Information This result has an attachment that is not available. 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 lives in [place].     Anisha Little daughter    Retired nurse.     Various hospital    Gyn, intensive care,     School system    [place], [place]    Moved here in      x 2    First    = was 43 yrs old and had melanoma    Second    had a stroke and  Was 87 yrs old.        ROS:  A complete ROS was otherwise noncontributory except as noted above in HPI    Exam:  /69   Pulse 89   Temp 97.8  F (36.6  C) (Oral)   Resp 15   Ht 1.638 m (5' 4.5\")   Wt 57.2 kg (126 lb)   SpO2 95%   BMI 21.29 kg/m    NAD  Breathing comfortably  No peripheral edema    Neurological Examination (R/L):  Mental Status: Awake, alert. Fluent. Affect normal.  Cranial Nerves: Versions full. Question slightly slow vertical saccades. No hypomimia. No hypophonia. No torticollis. Tongue protrudes midline  Motor: Slight right hand bradykinesia and bilateral toe tapping bradykinesia. Tone was normal. No resting tremor.   Coordination: Finger to nose without dysmetria.  Gait: Good stride length and narrow-based gait with U Step. Does almost fall back after rising from chair.     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 Seems to still have levodopa response and no diagnostic supranuclear ophthalmoplegia or cognitive findings however there is some midbrain atrophy. Will need to follow over time to get a more clear diagnosis. She is also being seen at [facility] to see if she would be a candidate for any research there.    Plan:   Follow-up 6 months    Skinny Sexton MD  Movement Disorders Fellow    Patient seen and examined by me today.   I agree with details above.   Ed Mendoza md  2019          Diagnosis/Summary/Recommendations:    PATIENT: ALEXA Luis [facility]  86 year old female     : 1932    RAMOS: 2019       Atypical parkinsonism     neuropsych results pending     Eye evaluation note below      1. 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  Exam was compatible with Parkinsonism including square wave jerks and mildly saccadic smooth pursuit and decreased blink rate.     2. Dry eye syndrome and blepharitis- discussed using a nighttime viscous gel in addition to warm compresses twice a day and artificial tears throughout the day.  Patient may consider referral to a dry eye specialist if she continues to be bothered.  She has multiple issues affecting her ocular surface including Parkinson's disease decreased blink, dry eye, and blepharitis.     3. 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  No change indicated.     4. 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  No exudative changes.        Medications     8am 2pm 8pm       Acetaminophen tylenol              Aspirin 81             Aspirin-calcium carbonate 81-77              Calcium carbonate 500mg             Calcium carbonate vit D             Calcium carbonate vit D             Carbidopa/levodopa Sinemet 25/100 1 1 1       Cholecalciferol vitamin D3 1000             Docosahexanoic acid 1gm             Fish oil-omega 3 fatty acids 1000mg             Hydrocodone acetaminophen norco             Ibuprofen advil/motrin 200mg             Ibuprofen advil/motrin 400mg             Magnesium citrate 200mg             Magnesium citrate             Omega-3 acid ethyl esters lovaza 1g             Oxycodone roxicodone 5mg             Oxycodone roxicodone             Polyethylene glycol miralax             Polyvinyl alcohol liquifilm tears 1.4% ophthalmic solution             Pravastatin pravachol 10mg             Vitamin C ascorbic acid 1000mg tabs             Vitamin D3 cholecalciferol 2000 units.                                                                      History obtained from  patient      Coding statement:   Duration of  Services: patient care and care coordination was 25 minutes  Greater than 50% of this visit was spent in counseling and coordination of care.     Ed Mendoza MD     ______________________________________    Last visit date and details:     Last visit date and details:       Atypical parkinsonism - most likely this is psp - progressive supranuclear palsy     PLAN  Brain mri to look for hummingbird   Neuro-ophthalmology consultation  Pt for possible USTEP walker  Continue sinemet  Neuropsychological evaluation     Return back to see Analisa Bolaños NP or myself     I hereby certify that Mr. Carmen Luu  Qualifies for and will benefit from the following product: walker due to significant impairment in gait that is able to improve his mobility and reduce the risk of falling. Their mobility issue can be resolved with the use of this medical device.      Ed Mendoza MD  January 17, 2019           ______________________________________      Patient was asked about 14 Review of systems including changes in vision (dry eyes, double vision), hearing, heart, lungs, musculoskeletal, depression, anxiety, snoring, RBD, insomnia, urinary frequency, urinary urgency, constipation, swallowing problems, hematological, ID, allergies, skin problems: seborrhea, endocrinological: thyroid, diabetes, cholesterol; balance, weight changes, and other neurological problems and these were not significant at this time except for   Patient Active Problem List   Diagnosis     Atypical Parkinsonism (H)     Disorder of bone and cartilage     Left ventricular hypertrophy     Hemorrhoids     Mixed hyperlipidemia     Other and unspecified diseases of appendix     Posterior capsular opacification     Prolapse of vaginal wall     Venous insufficiency     Abnormal brain MRI     Closed fracture of multiple ribs of both sides, initial encounter     Fall          Allergies   Allergen Reactions      "Acetaminophen-Codeine Other (See Comments) and Unknown     Codeine Other (See Comments)     Morphine Other (See Comments)     Made me feel really wierd     Penicillins Other (See Comments)     Tongue and throat tingling  Other reaction(s): Paresthesias  Tongue and throat tingling  Tingling on lips  Other reaction(s): Paresthesias  Tongue and throat tingling  Tingling on lips  Tongue and throat \"tingling\" when in 20s       Past Surgical History:   Procedure Laterality Date     ANKLE SURGERY Left     fracture     APPENDECTOMY       EYE SURGERY Bilateral     cataract surgery 2000     HYSTERECTOMY      tahbso     KNEE SURGERY Right     staph infection     TONSILLECTOMY       Past Medical History:   Diagnosis Date     Abnormal brain MRI 3/11/2019    [facility] Other Result Information This result has an attachment that is not available. 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     Social History     Socioeconomic History     Marital status:      Spouse name: Not on file     Number of children: Not on file     Years of education: Not on file     Highest education level: Not on file   Occupational History     Not on file   Social Needs     Financial resource strain: Not on file     Food insecurity:     Worry: Not on file     Inability: Not on file     Transportation needs:     Medical: Not on file     Non-medical: Not on file   Tobacco Use     Smoking status: Never Smoker     Smokeless tobacco: Never Used   Substance and Sexual Activity     Alcohol use: No     Frequency: Never     Drug use: Not on file     Sexual activity: Not on file   Lifestyle     Physical activity:     Days per week: Not on file     Minutes " "per session: Not on file     Stress: Not on file   Relationships     Social connections:     Talks on phone: Not on file     Gets together: Not on file     Attends Druze service: Not on file     Active member of club or organization: Not on file     Attends meetings of clubs or organizations: Not on file     Relationship status: Not on file     Intimate partner violence:     Fear of current or ex partner: Not on file     Emotionally abused: Not on file     Physically abused: Not on file     Forced sexual activity: Not on file   Other Topics Concern     Not on file   Social History Narrative    . lives in [place].     Anisha Little daughter    Retired nurse.     Various hospital    Gyn, intensive care,     School system    [place], [place]    Moved here in      x 2    First    = was 43 yrs old and had melanoma    Second    had a stroke and  Was 87 yrs old.        Drug and lactation database from the [place] National Library of Medicine:  http://toxnet.nlm.nih.gov/cgi-bin/sis/htmlgen?LACT      B/P: Data Unavailable, T: Data Unavailable, P: Data Unavailable, R: Data Unavailable 126 lbs 0 oz  Blood pressure 115/69, pulse 89, temperature 97.8  F (36.6  C), temperature source Oral, resp. rate 15, height 1.638 m (5' 4.5\"), weight 57.2 kg (126 lb), SpO2 95 %., Body mass index is 21.29 kg/m .  Medications and Vitals not listed above were documented in the cart and reviewed by me.     Current Outpatient Medications   Medication Sig Dispense Refill     acetaminophen (TYLENOL) 500 MG tablet Take 500 mg by mouth       calcium carbonate 600 mg-vitamin D 400 units (CALTRATE) 600-400 MG-UNIT per tablet        carbidopa-levodopa (SINEMET)  MG tablet 1 tabs by mouth 3/day @ 7am, 1pm and 7pm       cholecalciferol (VITAMIN D-3 SUPER STRENGTH) 2000 units tablet Take 1 tablet by mouth        fish oil-omega-3 fatty acids 1000 MG capsule Take 1 capsule (1 g) by mouth daily   "     ibuprofen (ADVIL/MOTRIN) 200 MG tablet Take 200 mg by mouth       Magnesium Citrate 200 MG TABS 200mg tab by mouth once or twice daily       order for DME Equipment being ordered:  UStep walker with laser and audio cueing 1 Device 1     pravastatin (PRAVACHOL) 10 MG tablet Take 1 tablet (10 mg) by mouth daily       vitamin C (ASCORBIC ACID) 1000 MG TABS            Ed Mendoza MD

## 2019-04-12 NOTE — PROGRESS NOTES
04/04/19 1500   Signing Clinician's Name / Credentials   Signing clinician's name / credentials Kitty Smithharesh PT   Session Number   Session Number 3 medicare/ bcbs   Goal 1   Goal Identifier falls    Goal Description pt to verbalize 3+ falls prev ideas   Target Date 05/05/19   Date Met 04/04/19   Goal 2   Goal Identifier floor transfer    Goal Description pt to do a floor tx using furniture indep   Target Date 05/05/19   Date Met 04/04/19   Goal 3   Goal Identifier gait   Goal Description pt to walk w/ UStep walker indep 750ft + for community distance safely   Target Date 05/05/19   Date Met 04/04/19   Subjective Report   Subjective Report Carmen here w/ her UStep and neck collar w/ her.  the home PT said i was going too fast.  this walker is better as it locks all the time when it is alone.  dr Mendoza is checking me for PSP.  eye doc said no.  had neuropsych.  am going to [place] may 16.     Therapeutic Activity   Therapeutic Activities: dynamic activities to improve functional performance minutes ([id number]) 48   Skilled Intervention education/practice for falls prevention/ floor tx and ways to get out of freeze   Patient Response omar well   Treatment Detail floor tx.  falls prevention - pt reports she slows down, she uses her walker more.  educ/practice re; how to get out of a freeze, incl humming marching music, practiced this w/ PT singing.  also used blue striped abel to 'step over'.  it worked well so educ pt to have dtr assist her in putting masking tape down on floor for step over mov't - she agrees.  falls prev ideas incl:  walk all the way to chair before sitting.  STS with cues/demo for lean forw, nose over toes movt.  omar well; issued these ideas in writing.  see pt instructions.    Progress above   Education   Learner Patient   Readiness Eager;Acceptance   Method Booklet/handout;Explanation;Demonstration   Response Verbalizes Understanding;Demonstrates Understanding   Education Comments pt instructions    Plan   Homework above   Updates to plan of care goals meet   Plan for next session d/c   Total Session Time   Timed Code Treatment Minutes 48   Total Treatment Time (sum of timed and untimed services) 48

## 2019-04-12 NOTE — ADDENDUM NOTE
Encounter addended by: Kitty Chandler, PT on: 4/12/2019 2:19 PM   Actions taken: Flowsheet data copied forward, Sign clinical note, Flowsheet accepted, Episode resolved

## 2019-04-15 ENCOUNTER — HOSPITAL ENCOUNTER (OUTPATIENT)
Dept: MAMMOGRAPHY | Facility: CLINIC | Age: 84
Discharge: HOME | End: 2019-04-15
Attending: INTERNAL MEDICINE

## 2019-04-15 DIAGNOSIS — Z12.31 VISIT FOR SCREENING MAMMOGRAM: ICD-10-CM

## 2019-04-16 ENCOUNTER — RECORDS - HEALTHEAST (OUTPATIENT)
Dept: ADMINISTRATIVE | Facility: OTHER | Age: 84
End: 2019-04-16

## 2019-04-16 ENCOUNTER — OFFICE VISIT (OUTPATIENT)
Dept: NEUROLOGY | Facility: CLINIC | Age: 84
End: 2019-04-16
Payer: MEDICARE

## 2019-04-16 VITALS
RESPIRATION RATE: 15 BRPM | WEIGHT: 126 LBS | HEART RATE: 89 BPM | SYSTOLIC BLOOD PRESSURE: 115 MMHG | DIASTOLIC BLOOD PRESSURE: 69 MMHG | BODY MASS INDEX: 20.99 KG/M2 | HEIGHT: 65 IN | TEMPERATURE: 97.8 F | OXYGEN SATURATION: 95 %

## 2019-04-16 DIAGNOSIS — G20.A1 PARKINSON'S DISEASE (H): Primary | ICD-10-CM

## 2019-04-16 ASSESSMENT — ENCOUNTER SYMPTOMS
HEMATURIA: 0
DIFFICULTY URINATING: 1
DYSURIA: 0
FLANK PAIN: 0

## 2019-04-16 ASSESSMENT — PAIN SCALES - GENERAL: PAINLEVEL: NO PAIN (0)

## 2019-04-16 ASSESSMENT — MIFFLIN-ST. JEOR: SCORE: 1004.47

## 2019-04-16 NOTE — NURSING NOTE
Chief Complaint   Patient presents with     Parkinson     [facility] RETURN MOVEMENT DISORDER- PARKINSON       Bernardo Aaron, EMT

## 2019-04-16 NOTE — Clinical Note
2019       RE: ALEXA Luu  [address] [facility] [address]     Dear Colleague,    Thank you for referring your patient, ALEXA Luu, to the [facility] NEUROLOGY at [facility]. Please see a copy of my visit note below.    Diagnosis/Summary/Recommendations:    PATIENT: ALEXA Luu  86 year old female     : 1932    RAMOS: April 10, 2019    ***    Medications                                                                                                                                                History obtained from patient      Coding statement:   Duration of  Services: patient care and care coordination was 25 minutes  Greater than 50% of this visit was spent in counseling and coordination of care.     Ed Mendoza MD     ______________________________________    Last visit date and details:      ***        ______________________________________      Patient was asked about 14 Review of systems including changes in vision (dry eyes, double vision), hearing, heart, lungs, musculoskeletal, depression, anxiety, snoring, RBD, insomnia, urinary frequency, urinary urgency, constipation, swallowing problems, hematological, ID, allergies, skin problems: seborrhea, endocrinological: thyroid, diabetes, cholesterol; balance, weight changes, and other neurological problems and these were not significant at this time except for   Patient Active Problem List   Diagnosis     Atypical Parkinsonism (H)     Disorder of bone and cartilage     Left ventricular hypertrophy     Hemorrhoids     Mixed hyperlipidemia     Other and unspecified diseases of appendix     Posterior capsular opacification     Prolapse of vaginal wall     Venous insufficiency     Abnormal brain MRI     Closed fracture of multiple ribs of both sides, initial encounter     Fall          Allergies   Allergen Reactions     Acetaminophen-Codeine Other (See Comments) and Unknown      "Codeine Other (See Comments)     Morphine Other (See Comments)     Made me feel really wierd     Penicillins Other (See Comments)     Tongue and throat tingling  Other reaction(s): Paresthesias  Tongue and throat tingling  Tingling on lips  Other reaction(s): Paresthesias  Tongue and throat tingling  Tingling on lips  Tongue and throat \"tingling\" when in 20s       Past Surgical History:   Procedure Laterality Date     ANKLE SURGERY Left     fracture     APPENDECTOMY       EYE SURGERY Bilateral     cataract surgery 2000     HYSTERECTOMY      tahbso     KNEE SURGERY Right     staph infection     TONSILLECTOMY       Past Medical History:   Diagnosis Date     Abnormal brain MRI 3/11/2019    MR HEAD BRAIN WO3/8/2019 [employer] [facility] Other Result Information This result has an attachment that is not available. 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     Social History     Socioeconomic History     Marital status:      Spouse name: Not on file     Number of children: Not on file     Years of education: Not on file     Highest education level: Not on file   Occupational History     Not on file   Social Needs     Financial resource strain: Not on file     Food insecurity:     Worry: Not on file     Inability: Not on file     Transportation needs:     Medical: Not on file     Non-medical: Not on file   Tobacco Use     Smoking status: Never Smoker     Smokeless tobacco: Never Used   Substance and Sexual Activity     Alcohol use: No     Frequency: Never     Drug use: Not on file     Sexual activity: Not on file   Lifestyle     Physical activity:     Days per week: Not on file     Minutes per session: Not on file     Stress: Not on file "   Relationships     Social connections:     Talks on phone: Not on file     Gets together: Not on file     Attends Mandaen service: Not on file     Active member of club or organization: Not on file     Attends meetings of clubs or organizations: Not on file     Relationship status: Not on file     Intimate partner violence:     Fear of current or ex partner: Not on file     Emotionally abused: Not on file     Physically abused: Not on file     Forced sexual activity: Not on file   Other Topics Concern     Not on file   Social History Narrative    . lives in [place].     Anisha Little daughter    Retired nurse.     Various hospital    Gyn, intensive care,     School system    [place], [place]    Moved here in      x 2    First    = was 43 yrs old and had melanoma    Second    had a stroke and  Was 87 yrs old.        Drug and lactation database from the [place] National Library of Medicine:  http://toxnet.nlm.nih.gov/cgi-bin/sis/htmlgen?LACT      B/P: Data Unavailable, T: Data Unavailable, P: Data Unavailable, R: Data Unavailable 0 lbs 0 oz  There were no vitals taken for this visit., There is no height or weight on file to calculate BMI.  Medications and Vitals not listed above were documented in the cart and reviewed by me.     Current Outpatient Medications   Medication Sig Dispense Refill     acetaminophen (TYLENOL) 500 MG tablet Take 500 mg by mouth       Calcium Carb-Cholecalciferol (CALCIUM-VITAMIN D) 500-400 MG-UNIT TABS 1 tab by mouth twice daily       calcium carbonate 600 mg-vitamin D 400 units (CALTRATE) 600-400 MG-UNIT per tablet        calcium carbonate-vitamin D (OSCAL W/D) 500-200 MG-UNIT tablet Take 1 tablet by mouth       carbidopa-levodopa (SINEMET)  MG tablet 1 tabs by mouth 3/day @ 8am, 2pm and 8pm       cholecalciferol (VITAMIN D-3 SUPER STRENGTH) 2000 units tablet Take 1 tablet by mouth       DOCOSAHEXAENOIC ACID PO Take 1 g by mouth        fish oil-omega-3 fatty acids 1000 MG capsule Take 1 capsule (1 g) by mouth daily       ibuprofen (ADVIL/MOTRIN) 200 MG tablet Take 200 mg by mouth       Magnesium Citrate 200 MG TABS 200mg tab by mouth once or twice daily       MAGNESIUM CITRATE PO Take 1 tablet by mouth       order for DME Equipment being ordered:  UStep walker with laser and audio cueing 1 Device 1     oxyCODONE (ROXICODONE) 5 MG tablet Take 2.5 mg by mouth       oxyCODONE (ROXICODONE) 5 MG tablet TK SS T PO EVERY 3 HOURS AS NEEDED FOR PAIN  0     polyvinyl alcohol (LIQUIFILM TEARS) 1.4 % ophthalmic solution 1 drop       pravastatin (PRAVACHOL) 10 MG tablet Take 1 tablet (10 mg) by mouth daily       vitamin C (ASCORBIC ACID) 1000 MG TABS        vitamin D3 (CHOLECALCIFEROL) 2000 units tablet Take 1 tablet by mouth daily           Ed Mendoza MD    Again, thank you for allowing me to participate in the care of your patient.      Sincerely,    Ed Mendoza MD

## 2019-04-16 NOTE — LETTER
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 Since last being seen she had an MRI which revealed midbrain atrophy. She had neuropsych testing which was normal. She had neuro-ophthalmology exam which revealed SWJs but no clear supranuclear palsy.    She started using a U Step. She was still falling frequently, but then over the past month hasn't had any falls. She has good and bad times but does have fluctuations clearly related to the timing of her levodopa. She also is having freezing. Put lines on the floor in her house and uses the laser on the U Step.        PD Medications                   8 130 7   Sinemet 25/100                                 1 1 1     Medications:  Current Outpatient Medications   Medication     acetaminophen (TYLENOL) 500 MG tablet     calcium carbonate 600 mg-vitamin D 400 units (CALTRATE) 600-400 MG-UNIT per tablet     carbidopa-levodopa (SINEMET)  MG tablet     cholecalciferol (VITAMIN D-3 SUPER STRENGTH) 2000 units tablet     fish oil-omega-3 fatty acids 1000 MG capsule     ibuprofen (ADVIL/MOTRIN) 200 MG tablet     Magnesium Citrate 200 MG TABS     order for DME     pravastatin (PRAVACHOL) 10 MG tablet     vitamin C (ASCORBIC ACID) 1000 MG TABS     No current facility-administered medications for this visit.        Past Medical History:   Diagnosis Date     Abnormal brain MRI 3/11/2019    [facility] Other Result Information This result has an attachment that is not available. Result Narrative [facility] RADIOLOGY  EXAM: MR HEAD BRAIN WO LOCATION: [facility] DATE/TIME: 3/7/2019 5:18 PM  INDICATION: Atypical Parkinsonism (hc) COMPARISON: CT 06/25/2018 TECHNIQUE: Routine multiplanar  "multisequence head MRI without intravenou     Atypical Parkinsonism (H) 2017     Social History     Social History Narrative    . lives in [place].     Anisha Little daughter    Retired nurse.     Various hospital    Gyn, intensive care,     School system    [place], [place]    Moved here in      x 2    First    = was 43 yrs old and had melanoma    Second   2013 had a stroke and  Was 87 yrs old.        ROS:  A complete ROS was otherwise noncontributory except as noted above in HPI    Exam:  /69   Pulse 89   Temp 97.8  F (36.6  C) (Oral)   Resp 15   Ht 1.638 m (5' 4.5\")   Wt 57.2 kg (126 lb)   SpO2 95%   BMI 21.29 kg/m     NAD  Breathing comfortably  No peripheral edema    Neurological Examination (R/L):  Mental Status: Awake, alert. Fluent. Affect normal.  Cranial Nerves: Versions full. Question slightly slow vertical saccades. No hypomimia. No hypophonia. No torticollis. Tongue protrudes midline  Motor: Slight right hand bradykinesia and bilateral toe tapping bradykinesia. Tone was normal. No resting tremor.   Coordination: Finger to nose without dysmetria.  Gait: Good stride length and narrow-based gait with U Step. Does almost fall back after rising from chair.     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 Seems to still have levodopa response and no diagnostic supranuclear ophthalmoplegia or cognitive findings however there is some midbrain atrophy. Will need to follow over time to get a more clear diagnosis. She is also being seen at [facility] to see if she would be a candidate for any research there.    Plan:   Follow-up 6 months    Skinny Sexton MD  Movement Disorders Fellow    Patient seen and examined by me today.   I agree with details above.   Ed Mendoza md  2019          Diagnosis/Summary/Recommendations:    PATIENT: ALEXA Luis [facility]  86 year old female     : 1932    RAMOS: 2019       Atypical " parkinsonism     neuropsych results pending     Eye evaluation note below      1. 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  Exam was compatible with Parkinsonism including square wave jerks and mildly saccadic smooth pursuit and decreased blink rate.     2. Dry eye syndrome and blepharitis- discussed using a nighttime viscous gel in addition to warm compresses twice a day and artificial tears throughout the day.  Patient may consider referral to a dry eye specialist if she continues to be bothered.  She has multiple issues affecting her ocular surface including Parkinson's disease decreased blink, dry eye, and blepharitis.     3. 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  No change indicated.     4. 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  No exudative changes.        Medications     8am 2pm 8pm       Acetaminophen tylenol              Aspirin 81             Aspirin-calcium carbonate 81-77              Calcium carbonate 500mg             Calcium carbonate vit D             Calcium carbonate vit D             Carbidopa/levodopa Sinemet 25/100 1 1 1       Cholecalciferol vitamin D3 1000             Docosahexanoic acid 1gm             Fish oil-omega 3 fatty acids 1000mg             Hydrocodone acetaminophen norco             Ibuprofen advil/motrin 200mg             Ibuprofen advil/motrin 400mg             Magnesium citrate 200mg             Magnesium citrate             Omega-3 acid ethyl esters lovaza 1g             Oxycodone roxicodone 5mg             Oxycodone roxicodone             Polyethylene glycol miralax             Polyvinyl alcohol liquifilm tears 1.4% ophthalmic solution             Pravastatin pravachol 10mg             Vitamin C ascorbic acid 1000mg tabs             Vitamin D3 cholecalciferol 2000 units.                                                                       History obtained from patient      Coding statement:   Duration of  Services: patient care and care coordination was 25 minutes  Greater than 50% of this visit was spent in counseling and coordination of care.     Ed Mendoza MD     ______________________________________    Last visit date and details:     Last visit date and details:       Atypical parkinsonism - most likely this is psp - progressive supranuclear palsy     PLAN  Brain mri to look for hummingbird   Neuro-ophthalmology consultation  Pt for possible USTEP walker  Continue sinemet  Neuropsychological evaluation     Return back to see Analisa Bolaños NP or myself     I hereby certify that Mr. Carmen Luu  Qualifies for and will benefit from the following product: walker due to significant impairment in gait that is able to improve his mobility and reduce the risk of falling. Their mobility issue can be resolved with the use of this medical device.      Ed Mendoza MD  January 17, 2019           ______________________________________      Patient was asked about 14 Review of systems including changes in vision (dry eyes, double vision), hearing, heart, lungs, musculoskeletal, depression, anxiety, snoring, RBD, insomnia, urinary frequency, urinary urgency, constipation, swallowing problems, hematological, ID, allergies, skin problems: seborrhea, endocrinological: thyroid, diabetes, cholesterol; balance, weight changes, and other neurological problems and these were not significant at this time except for   Patient Active Problem List   Diagnosis     Atypical Parkinsonism (H)     Disorder of bone and cartilage     Left ventricular hypertrophy     Hemorrhoids     Mixed hyperlipidemia     Other and unspecified diseases of appendix     Posterior capsular opacification     Prolapse of vaginal wall     Venous insufficiency     Abnormal brain MRI     Closed fracture of multiple ribs of  "both sides, initial encounter     Fall          Allergies   Allergen Reactions     Acetaminophen-Codeine Other (See Comments) and Unknown     Codeine Other (See Comments)     Morphine Other (See Comments)     Made me feel really wierd     Penicillins Other (See Comments)     Tongue and throat tingling  Other reaction(s): Paresthesias  Tongue and throat tingling  Tingling on lips  Other reaction(s): Paresthesias  Tongue and throat tingling  Tingling on lips  Tongue and throat \"tingling\" when in 20s       Past Surgical History:   Procedure Laterality Date     ANKLE SURGERY Left     fracture     APPENDECTOMY       EYE SURGERY Bilateral     cataract surgery 2000     HYSTERECTOMY      tahbso     KNEE SURGERY Right     staph infection     TONSILLECTOMY       Past Medical History:   Diagnosis Date     Abnormal brain MRI 3/11/2019    MR HEAD BRAIN WO3/8/2019 [facility] Other Result Information This result has an attachment that is not available. Result Narrative [facility] RADIOLOGY  EXAM: MR HEAD BRAIN WO LOCATION: [facility] DATE/TIME: 3/7/2019 5:18 PM  INDICATION: Atypical Parkinsonism (hc) COMPARISON: CT 06/25/2018 TECHNIQUE: Routine multiplanar multisequence head MRI without intravenou     Atypical Parkinsonism (H) 2/2/2017     Social History     Socioeconomic History     Marital status:      Spouse name: Not on file     Number of children: Not on file     Years of education: Not on file     Highest education level: Not on file   Occupational History     Not on file   Social Needs     Financial resource strain: Not on file     Food insecurity:     Worry: Not on file     Inability: Not on file     Transportation needs:     Medical: Not on file     Non-medical: Not on file   Tobacco Use     Smoking status: Never Smoker     Smokeless tobacco: Never Used   Substance and Sexual Activity     Alcohol use: No     Frequency: Never     Drug use: Not on file     Sexual activity: Not on " "file   Lifestyle     Physical activity:     Days per week: Not on file     Minutes per session: Not on file     Stress: Not on file   Relationships     Social connections:     Talks on phone: Not on file     Gets together: Not on file     Attends [employer] service: Not on file     Active member of club or organization: Not on file     Attends meetings of clubs or organizations: Not on file     Relationship status: Not on file     Intimate partner violence:     Fear of current or ex partner: Not on file     Emotionally abused: Not on file     Physically abused: Not on file     Forced sexual activity: Not on file   Other Topics Concern     Not on file   Social History Narrative    . lives in [place].     Anisha iLttle daughter    Retired nurse.     Various hospital    Gyn, intensive care,     School system    [place], [place]    Moved here in      x 2    First    = was 43 yrs old and had melanoma    Second    had a stroke and  Was 87 yrs old.        Drug and lactation database from the [place] [employer]:  http://toxnet.nlm.nih.gov/cgi-bin/sis/htmlgen?LACT      B/P: Data Unavailable, T: Data Unavailable, P: Data Unavailable, R: Data Unavailable 126 lbs 0 oz  Blood pressure 115/69, pulse 89, temperature 97.8  F (36.6  C), temperature source Oral, resp. rate 15, height 1.638 m (5' 4.5\"), weight 57.2 kg (126 lb), SpO2 95 %., Body mass index is 21.29 kg/m .  Medications and Vitals not listed above were documented in the cart and reviewed by me.     Current Outpatient Medications   Medication Sig Dispense Refill     acetaminophen (TYLENOL) 500 MG tablet Take 500 mg by mouth       calcium carbonate 600 mg-vitamin D 400 units (CALTRATE) 600-400 MG-UNIT per tablet        carbidopa-levodopa (SINEMET)  MG tablet 1 tabs by mouth 3/day @ 7am, 1pm and 7pm       cholecalciferol (VITAMIN D-3 SUPER STRENGTH) 2000 units tablet Take 1 tablet by mouth        " fish oil-omega-3 fatty acids 1000 MG capsule Take 1 capsule (1 g) by mouth daily       ibuprofen (ADVIL/MOTRIN) 200 MG tablet Take 200 mg by mouth       Magnesium Citrate 200 MG TABS 200mg tab by mouth once or twice daily       order for DME Equipment being ordered:  UStep walker with laser and audio cueing 1 Device 1     pravastatin (PRAVACHOL) 10 MG tablet Take 1 tablet (10 mg) by mouth daily       vitamin C (ASCORBIC ACID) 1000 MG TABS            Ed Mendoza MD

## 2019-04-18 ENCOUNTER — RECORDS - HEALTHEAST (OUTPATIENT)
Dept: ADMINISTRATIVE | Facility: OTHER | Age: 84
End: 2019-04-18

## 2019-04-18 ENCOUNTER — COMMUNICATION - HEALTHEAST (OUTPATIENT)
Dept: FAMILY MEDICINE | Facility: CLINIC | Age: 84
End: 2019-04-18

## 2019-04-18 ENCOUNTER — HOSPITAL ENCOUNTER (OUTPATIENT)
Dept: CT IMAGING | Facility: CLINIC | Age: 84
Discharge: HOME | End: 2019-04-18
Attending: FAMILY MEDICINE

## 2019-04-18 ENCOUNTER — OFFICE VISIT - HEALTHEAST (OUTPATIENT)
Dept: FAMILY MEDICINE | Facility: CLINIC | Age: 84
End: 2019-04-18

## 2019-04-18 DIAGNOSIS — S22.32XA CLOSED FRACTURE OF ONE RIB OF LEFT SIDE, INITIAL ENCOUNTER: ICD-10-CM

## 2019-04-18 DIAGNOSIS — S22.42XD CLOSED FRACTURE OF MULTIPLE RIBS OF LEFT SIDE WITH ROUTINE HEALING, SUBSEQUENT ENCOUNTER: ICD-10-CM

## 2019-04-18 DIAGNOSIS — S29.9XXA INJURY OF CHEST WALL, INITIAL ENCOUNTER: ICD-10-CM

## 2019-04-18 DIAGNOSIS — R93.89 ABNORMAL CHEST X-RAY: ICD-10-CM

## 2019-04-18 LAB
CREAT BLD-MCNC: 0.9 MG/DL (ref 0.6–1.1)
GFR SERPL CREATININE-BSD FRML MDRD: 59 ML/MIN/1.73M2

## 2019-05-03 ENCOUNTER — RECORDS - HEALTHEAST (OUTPATIENT)
Dept: ADMINISTRATIVE | Facility: OTHER | Age: 84
End: 2019-05-03

## 2019-05-06 ENCOUNTER — RECORDS - HEALTHEAST (OUTPATIENT)
Dept: ADMINISTRATIVE | Facility: OTHER | Age: 84
End: 2019-05-06

## 2019-06-18 ENCOUNTER — RECORDS - HEALTHEAST (OUTPATIENT)
Dept: ADMINISTRATIVE | Facility: OTHER | Age: 84
End: 2019-06-18

## 2019-06-26 ENCOUNTER — RECORDS - HEALTHEAST (OUTPATIENT)
Dept: GENERAL RADIOLOGY | Facility: CLINIC | Age: 84
End: 2019-06-26

## 2019-06-26 ENCOUNTER — OFFICE VISIT - HEALTHEAST (OUTPATIENT)
Dept: INTERNAL MEDICINE | Facility: CLINIC | Age: 84
End: 2019-06-26

## 2019-06-26 DIAGNOSIS — J90 PLEURAL EFFUSION, NOT ELSEWHERE CLASSIFIED: ICD-10-CM

## 2019-06-26 DIAGNOSIS — J90 BILATERAL PLEURAL EFFUSION: ICD-10-CM

## 2019-06-28 ENCOUNTER — COMMUNICATION - HEALTHEAST (OUTPATIENT)
Dept: INTERNAL MEDICINE | Facility: CLINIC | Age: 84
End: 2019-06-28

## 2019-08-22 ENCOUNTER — HOSPITAL ENCOUNTER (OUTPATIENT)
Dept: CT IMAGING | Facility: CLINIC | Age: 84
Discharge: HOME | End: 2019-08-22
Attending: FAMILY MEDICINE

## 2019-08-22 ENCOUNTER — COMMUNICATION - HEALTHEAST (OUTPATIENT)
Dept: FAMILY MEDICINE | Facility: CLINIC | Age: 84
End: 2019-08-22

## 2019-08-22 ENCOUNTER — OFFICE VISIT - HEALTHEAST (OUTPATIENT)
Dept: FAMILY MEDICINE | Facility: CLINIC | Age: 84
End: 2019-08-22

## 2019-08-22 DIAGNOSIS — S09.90XA CLOSED HEAD INJURY, INITIAL ENCOUNTER: ICD-10-CM

## 2019-08-22 DIAGNOSIS — G20.A1 PARKINSON DISEASE (H): ICD-10-CM

## 2019-09-01 ENCOUNTER — OFFICE VISIT - HEALTHEAST (OUTPATIENT)
Dept: FAMILY MEDICINE | Facility: CLINIC | Age: 84
End: 2019-09-01
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
## 2019-09-01 DIAGNOSIS — S06.0X0A CONCUSSION WITHOUT LOSS OF CONSCIOUSNESS, INITIAL ENCOUNTER: ICD-10-CM

## 2019-09-01 DIAGNOSIS — G20.A1 PARKINSON DISEASE (H): ICD-10-CM

## 2019-09-01 DIAGNOSIS — S09.90XA CLOSED HEAD INJURY, INITIAL ENCOUNTER: ICD-10-CM

## 2019-09-06 ENCOUNTER — OFFICE VISIT - HEALTHEAST (OUTPATIENT)
Dept: INTERNAL MEDICINE | Facility: CLINIC | Age: 84
End: 2019-09-06

## 2019-09-06 DIAGNOSIS — H61.21 IMPACTED CERUMEN OF RIGHT EAR: ICD-10-CM

## 2019-09-06 DIAGNOSIS — R29.6 FALLS FREQUENTLY: ICD-10-CM

## 2019-09-09 ENCOUNTER — HOSPITAL ENCOUNTER (OUTPATIENT)
Dept: NEUROLOGY | Facility: CLINIC | Age: 84
Setting detail: THERAPIES SERIES
Discharge: STILL ADMITTED OR ADMINISTRATIVE | End: 2019-09-09

## 2019-09-09 ENCOUNTER — HOSPITAL ENCOUNTER (OUTPATIENT)
Dept: NEUROLOGY | Facility: CLINIC | Age: 84
Setting detail: THERAPIES SERIES
Discharge: STILL ADMITTED OR ADMINISTRATIVE | End: 2019-09-09
Attending: NURSE PRACTITIONER

## 2019-09-09 DIAGNOSIS — S22.43XA CLOSED FRACTURE OF MULTIPLE RIBS OF BOTH SIDES, INITIAL ENCOUNTER: ICD-10-CM

## 2019-09-09 DIAGNOSIS — G20.A1 PARKINSON DISEASE (H): ICD-10-CM

## 2019-09-09 DIAGNOSIS — Z79.899 MEDICATION MANAGEMENT: ICD-10-CM
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
## 2019-09-09 DIAGNOSIS — E55.9 VITAMIN D DEFICIENCY: ICD-10-CM

## 2019-09-09 LAB
ALBUMIN SERPL-MCNC: 3.9 G/DL (ref 3.5–5)
ALP SERPL-CCNC: 58 U/L (ref 45–120)
ALT SERPL W P-5'-P-CCNC: <9 U/L (ref 0–45)
ANION GAP SERPL CALCULATED.3IONS-SCNC: 7 MMOL/L (ref 5–18)
AST SERPL W P-5'-P-CCNC: 15 U/L (ref 0–40)
BASOPHILS # BLD AUTO: 0 THOU/UL (ref 0–0.2)
BASOPHILS NFR BLD AUTO: 0 % (ref 0–2)
BILIRUB SERPL-MCNC: 0.4 MG/DL (ref 0–1)
BUN SERPL-MCNC: 22 MG/DL (ref 8–28)
CALCIUM SERPL-MCNC: 9.7 MG/DL (ref 8.5–10.5)
CHLORIDE BLD-SCNC: 102 MMOL/L (ref 98–107)
CO2 SERPL-SCNC: 28 MMOL/L (ref 22–31)
CREAT SERPL-MCNC: 0.78 MG/DL (ref 0.6–1.1)
CRP SERPL HS-MCNC: 0.5 MG/L (ref 0–3)
EOSINOPHIL # BLD AUTO: 0.2 THOU/UL (ref 0–0.4)
EOSINOPHIL NFR BLD AUTO: 2 % (ref 0–6)
ERYTHROCYTE [DISTWIDTH] IN BLOOD BY AUTOMATED COUNT: 13.2 % (ref 11–14.5)
ERYTHROCYTE [SEDIMENTATION RATE] IN BLOOD BY WESTERGREN METHOD: 6 MM/HR (ref 0–20)
FOLATE SERPL-MCNC: 15.4 NG/ML
GFR SERPL CREATININE-BSD FRML MDRD: >60 ML/MIN/1.73M2
GLUCOSE BLD-MCNC: 85 MG/DL (ref 70–125)
HCT VFR BLD AUTO: 41.3 % (ref 35–47)
HGB BLD-MCNC: 13.1 G/DL (ref 12–16)
LYMPHOCYTES # BLD AUTO: 2.6 THOU/UL (ref 0.8–4.4)
LYMPHOCYTES NFR BLD AUTO: 29 % (ref 20–40)
MAGNESIUM SERPL-MCNC: 2.2 MG/DL (ref 1.8–2.6)
MCH RBC QN AUTO: 30.8 PG (ref 27–34)
MCHC RBC AUTO-ENTMCNC: 31.7 G/DL (ref 32–36)
MCV RBC AUTO: 97 FL (ref 80–100)
MONOCYTES # BLD AUTO: 0.5 THOU/UL (ref 0–0.9)
MONOCYTES NFR BLD AUTO: 6 % (ref 2–10)
NEUTROPHILS # BLD AUTO: 5.6 THOU/UL (ref 2–7.7)
NEUTROPHILS NFR BLD AUTO: 63 % (ref 50–70)
PLATELET # BLD AUTO: 302 THOU/UL (ref 140–440)
PMV BLD AUTO: 11.5 FL (ref 8.5–12.5)
POTASSIUM BLD-SCNC: 4.7 MMOL/L (ref 3.5–5)
PROT SERPL-MCNC: 6.9 G/DL (ref 6–8)
RBC # BLD AUTO: 4.26 MILL/UL (ref 3.8–5.4)
SODIUM SERPL-SCNC: 137 MMOL/L (ref 136–145)
VIT B12 SERPL-MCNC: 272 PG/ML (ref 213–816)
WBC: 8.9 THOU/UL (ref 4–11)

## 2019-09-10 LAB — 25(OH)D3 SERPL-MCNC: 35.5 NG/ML (ref 30–80)

## 2019-09-23 ENCOUNTER — HOSPITAL ENCOUNTER (OUTPATIENT)
Dept: OCCUPATIONAL THERAPY | Age: 84
Setting detail: THERAPIES SERIES
Discharge: STILL ADMITTED OR ADMINISTRATIVE | End: 2019-09-23
Attending: NURSE PRACTITIONER

## 2019-09-23 ENCOUNTER — HOSPITAL ENCOUNTER (OUTPATIENT)
Dept: PHYSICAL THERAPY | Age: 84
Setting detail: THERAPIES SERIES
Discharge: STILL ADMITTED OR ADMINISTRATIVE | End: 2019-09-23
Attending: NURSE PRACTITIONER

## 2019-09-23 ENCOUNTER — HOSPITAL ENCOUNTER (OUTPATIENT)
Dept: SPEECH THERAPY | Age: 84
Setting detail: THERAPIES SERIES
Discharge: STILL ADMITTED OR ADMINISTRATIVE | End: 2019-09-23
Attending: NURSE PRACTITIONER

## 2019-09-23 DIAGNOSIS — G20.A1 PARKINSON DISEASE (H): ICD-10-CM

## 2019-09-23 DIAGNOSIS — R26.81 GAIT INSTABILITY: ICD-10-CM

## 2019-09-23 DIAGNOSIS — R29.898 WEAKNESS OF BOTH LOWER EXTREMITIES: ICD-10-CM

## 2019-09-23 DIAGNOSIS — R49.9 VOICE QUALITY DISORDER: ICD-10-CM

## 2019-09-23 DIAGNOSIS — R27.9 LACK OF COORDINATION: ICD-10-CM

## 2019-09-23 DIAGNOSIS — R25.8 BRADYKINESIA: ICD-10-CM

## 2019-09-23 DIAGNOSIS — R29.3 POSTURAL INSTABILITY: ICD-10-CM

## 2019-09-23 DIAGNOSIS — R26.89 IMPAIRMENT OF BALANCE: ICD-10-CM

## 2019-09-23 DIAGNOSIS — R47.1 DYSARTHRIA: ICD-10-CM

## 2019-10-04 ENCOUNTER — HEALTH MAINTENANCE LETTER (OUTPATIENT)
Age: 84
End: 2019-10-04

## 2019-10-08 ENCOUNTER — SURGERY - HEALTHEAST (OUTPATIENT)
Dept: CARDIOLOGY | Facility: CLINIC | Age: 84
End: 2019-10-08

## 2019-10-08 ENCOUNTER — RECORDS - HEALTHEAST (OUTPATIENT)
Dept: ADMINISTRATIVE | Facility: OTHER | Age: 84
End: 2019-10-08

## 2019-10-08 ASSESSMENT — MIFFLIN-ST. JEOR: SCORE: 955.69

## 2019-10-09 ENCOUNTER — HOSPITAL ENCOUNTER (INPATIENT)
Facility: CLINIC | Age: 84
LOS: 7 days | Discharge: HOME HEALTH | DRG: 057 | End: 2019-10-16
Attending: PHYSICAL MEDICINE & REHABILITATION | Admitting: PHYSICAL MEDICINE & REHABILITATION
Payer: MEDICARE

## 2019-10-09 ENCOUNTER — RECORDS - HEALTHEAST (OUTPATIENT)
Dept: ADMINISTRATIVE | Facility: OTHER | Age: 84
End: 2019-10-09

## 2019-10-09 DIAGNOSIS — E78.5 HYPERLIPIDEMIA, UNSPECIFIED HYPERLIPIDEMIA TYPE: ICD-10-CM

## 2019-10-09 DIAGNOSIS — R29.6 FALLS FREQUENTLY: ICD-10-CM

## 2019-10-09 DIAGNOSIS — I63.441 CEREBROVASCULAR ACCIDENT (CVA) DUE TO EMBOLISM OF RIGHT CEREBELLAR ARTERY (H): Primary | ICD-10-CM

## 2019-10-09 PROBLEM — I63.9 STROKE (H): Status: ACTIVE | Noted: 2019-10-09

## 2019-10-09 PROCEDURE — 25000132 ZZH RX MED GY IP 250 OP 250 PS 637: Mod: GY | Performed by: NURSE PRACTITIONER

## 2019-10-09 PROCEDURE — 12800006 ZZH R&B REHAB

## 2019-10-09 RX ORDER — AMOXICILLIN 250 MG
1-2 CAPSULE ORAL 2 TIMES DAILY PRN
Status: DISCONTINUED | OUTPATIENT
Start: 2019-10-09 | End: 2019-10-16 | Stop reason: HOSPADM

## 2019-10-09 RX ORDER — POLYETHYLENE GLYCOL 3350 17 G/17G
17 POWDER, FOR SOLUTION ORAL DAILY PRN
Status: DISCONTINUED | OUTPATIENT
Start: 2019-10-09 | End: 2019-10-16 | Stop reason: HOSPADM

## 2019-10-09 RX ORDER — ASPIRIN 81 MG/1
81 TABLET ORAL DAILY
Status: DISCONTINUED | OUTPATIENT
Start: 2019-10-10 | End: 2019-10-16 | Stop reason: HOSPADM

## 2019-10-09 RX ORDER — PRAVASTATIN SODIUM 20 MG
20 TABLET ORAL DAILY
Status: DISCONTINUED | OUTPATIENT
Start: 2019-10-09 | End: 2019-10-16 | Stop reason: HOSPADM

## 2019-10-09 RX ORDER — CARBIDOPA AND LEVODOPA 25; 100 MG/1; MG/1
1 TABLET ORAL 3 TIMES DAILY
Status: DISCONTINUED | OUTPATIENT
Start: 2019-10-09 | End: 2019-10-16 | Stop reason: HOSPADM

## 2019-10-09 RX ORDER — CLOPIDOGREL BISULFATE 75 MG/1
75 TABLET ORAL DAILY
Status: DISCONTINUED | OUTPATIENT
Start: 2019-10-10 | End: 2019-10-16 | Stop reason: HOSPADM

## 2019-10-09 RX ORDER — ACETAMINOPHEN 500 MG
500 TABLET ORAL 2 TIMES DAILY
Status: DISCONTINUED | OUTPATIENT
Start: 2019-10-09 | End: 2019-10-16 | Stop reason: HOSPADM

## 2019-10-09 RX ADMIN — ACETAMINOPHEN 500 MG: 500 TABLET ORAL at 19:58

## 2019-10-09 RX ADMIN — PRAVASTATIN SODIUM 20 MG: 20 TABLET ORAL at 19:58

## 2019-10-09 RX ADMIN — CARBIDOPA AND LEVODOPA 1 TABLET: 25; 100 TABLET ORAL at 16:13

## 2019-10-09 RX ADMIN — CALCIUM CARBONATE 600 MG (1,500 MG)-VITAMIN D3 400 UNIT TABLET 1 TABLET: at 16:13

## 2019-10-09 ASSESSMENT — MIFFLIN-ST. JEOR
SCORE: 965.21
SCORE: 1011.94

## 2019-10-09 NOTE — PLAN OF CARE
Patient admitted to the ARU at about 1400, denies any pain, white board orientation completed, skin check completed, depression scale questions completed. Will continue POC

## 2019-10-09 NOTE — H&P
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 She presented to [facility] ED by personal vehicle with her daughter for evaluation of numbness and weakness of her LUE. She was found to have right precentral gyrus and right cerebellar infarct, outside TPA window on 10/5/19.    The patient reports sudden onset left arm tingling and numbness at 0630. She states her arm was \"flopping around\" and notes that she is having trouble \"finding things\" with her left hand. She states that she tried to rest her arm on the arm rest of her chair but notes that she didn't realize her left arm was already on the arm rest. She notes that she had difficulty finding her walker and states \"it felt like it [her left arm] was falling\". She describes her numbness \"as if it fell asleep\" and notes it radiates to her left shoulder but otherwise does not move. She states she took ibuprofen and acetaminophen at the time of onset. She notes that her numbness ebbs and flows but is otherwise constant. She reports, diagnosed with Parkinson's disease 2 years ago and often falls secondary to her Parkinson's symptoms. She states she fell 1 week ago and notes head trauma. She reports a history of neck injury. She denies any weakness, right arm pain, nausea, right leg pain, leg numbness, fever, chills, abdominal pain, chest pain, neck pain, shortness of breath, and any other concerns at this time      During his acute hospitalization, patient was seen and evaluated by  PT, OT, and SLP.  All specialties " collectively recommended that patient would benefit from ongoing therapies in the acute inpatient rehabilitation setting.       Functionally, the patient is using a FWW, ambulating 455 ft with CGA. transferring with SBA/CGA along with dressing and grooming.    Currently, the patient is medically appropriate and is assessed to have needs and will benefit from an inpatient acute rehabilitation comprehensive program working with PT, OT and SLP, and will also benefit from supervision and management of Rehab Nursing and Rehab MD.       PAST MEDICAL HISTORY  Past Medical History:   Diagnosis Date     Abnormal brain MRI 3/11/2019    [facility] Other Result Information This result has an attachment that is not available. 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  Tobacco use: never  Alcohol use: none  Illicit drug use: none      FAMILY HISTORY  Family History   Problem Relation Age of Onset     Cerebrovascular Disease Mother      Heart Disease Mother      Other - See Comments Mother         richie quezada      Dementia Father         10 yrs.     Other - See Comments Father         M[place]ian     Paranoid behavior Father      Other - See Comments Sister sean     Other - See Comments Daughter         kala     Other - See Comments Daughter         [place]     Other - See Comments Son         [place]     Other - See Comments Naveen [place], [place]     Dementia Paternal Aunt      Dementia Paternal Aunt      Dementia Paternal Uncle          PRIOR FUNCTIONAL HISTORY   She has been using a U-step walker in her house for the past year. Also uses a regular 4WW in the community. Mod I with mobility and ADLs but has had frequent falls recently. Manages her own meds and finances.     Her daughters have cameras in her house and also she has a life alert at home.      MEDICATIONS  Medications Prior to Admission   Medication Sig Dispense Refill Last Dose     carbidopa-levodopa (SINEMET)  MG tablet 1 tabs by mouth 3/day @ 7am, 1pm and 7pm   10/9/2019 at 1210     pravastatin (PRAVACHOL) 10 MG tablet Take 1 tablet (10 mg) by mouth daily   10/8/2019 at 0835     acetaminophen (TYLENOL) 500 MG tablet Take 500 mg by mouth   10/9/2019 at 0931     calcium carbonate 600 mg-vitamin D 400 units (CALTRATE) 600-400 MG-UNIT per tablet    Unknown at Unknown time     cholecalciferol (VITAMIN D-3 SUPER STRENGTH) 2000 units tablet Take 1 tablet by mouth    Unknown at Unknown time     fish oil-omega-3 fatty acids 1000 MG capsule Take 1 capsule (1 g) by mouth daily   Unknown at Unknown time     ibuprofen (ADVIL/MOTRIN) 200 MG tablet Take 200 mg by mouth   Unknown at Unknown time     Magnesium Citrate 200 MG TABS 200mg tab by mouth once or twice daily   Unknown at Unknown time     order for DME Equipment being ordered:  UStep walker with laser and audio cueing 1 Device 1 Unknown at Unknown time     vitamin C (ASCORBIC ACID) 1000 MG TABS    Unknown at Unknown time       ALLERGIES     Allergies  "  Allergen Reactions     Acetaminophen-Codeine Other (See Comments) and Unknown     Codeine Other (See Comments)     Morphine Other (See Comments)     Made me feel really wierd     Penicillins Other (See Comments)     Tongue and throat tingling  Other reaction(s): Paresthesias  Tongue and throat tingling  Tingling on lips  Other reaction(s): Paresthesias  Tongue and throat tingling  Tingling on lips  Tongue and throat \"tingling\" when in 20s           REVIEW OF SYSTEMS  A 10 point ROS was performed and negative unless otherwise noted in HPI.       PHYSICAL EXAM  VITAL SIGNS:  /61 (BP Location: Right arm)   Pulse 79   Temp 96.8  F (36  C) (Oral)   Resp 16   Ht 1.676 m (5' 6\")   Wt 56 kg (123 lb 8 oz)   SpO2 95%   BMI 19.93 kg/m    BMI:  Estimated body mass index is 19.93 kg/m  as calculated from the following:    Height as of this encounter: 1.676 m (5' 6\").    Weight as of this encounter: 56 kg (123 lb 8 oz).     General: NAD, pleasant and cooperative   HEENT: ATNC, oropharynx clear with MMM, EOMI, PERRL    Pulmonary: clear breath sounds b/l, no rales/wheezing    Cardiovascular: RRR, no murmur , implanted loop recorder in place  Abdominal: soft, non-tender, non-distended active bowel sounds x4  Extremities: warm, well perfused, no edema in bilateral lower extremities, no tenderness in calves -Hammer toes b/l chronic. L elbow with some fluid collection at olecranon   MSK/neuro:   Mental Status:  alert and oriented x3    Cranial Nerves: grossly normal   1. 2nd CN: Pupils equal, round, reactive to light and accomodation. and visual fields intact to confrontation.   2. 3rd,4th,6th CN:  EOMI, appropriate pupillary responses  3. 5th CN: facial sensation intact   4. 7th CN: face symmetrical   5. 8th CN: functional hearing bilaterally  6. 9th, 10th CN: palate elevates symmetrically   7. 11th CN: sternocleidomastoids and trapezii strong   8. 12th CN: tongue midline and without fasciculations     Sensory: " diminished to LT at LUE   Strength:     SF  EF  EE  WE  G  I  HF  KE  DF  EHL  PF   R  4+/5 4+/5 4+/5 4+/5 4+/5 4+/5 4+/5 4+/5 4+/5 4+/5 4+/5  L  4/5 4/5 5/5 5/5 4/5 4/5 4/5 4/5  4/5 4/5 4/5   Reflexes: Present and symmetrical    Babinski reflex: downgoing bilaterally    Abnormal movements: None    Coordination: No dysmetria on finger to nose b/l slow on the left side    Speech: dysphonic - clear, articulating needs   Cognition: following commands and answering questions appropritely         LABS  Pertinent labs reviewed      ASSESSMENT/PLAN:  ALEXA Luu is a 87 year old rigth hand dominant female with a pertinent history of parkinson's disease and frequent falls, left ventricular hypertrophy, dyslipidemia, HTN and chronic double vision who was admitted to [facility] on 10/5 with 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     Admission to acute inpatient rehab 10/9/2019.    Impairment group code: 01.1      1. PT, OT and SLP 60 minutes of each on a daily basis, in addition to rehab nursing and close management of physiatrist.      2. Impairment of ADL's:  OT for 60 min daily to work on upper and lower body self care, dressing, toileting, bathing, energy conservation techniques with use of ADs as needed.     3. Impairment of mobility:   PT for 60 min daily to work on balance, gait exercises, strengthening, and safety.     4. Impairment of cognition/language/swallow:   SLP for 60 min daily for cognitive evaluation and treatment strategies for higher level cognitive deficits and memory impairment. On regular diet.     5. Rehab RN to administer medication, patient education on medication taking, VS monitoring, bowel regimen, and patient education.      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 TTE didn't show any source. . HgbA1c 5.8%  --status post insertion of loop recorder 10/8. Per chart  "review, \"-She was not good candidate for anticoagulation due to underlying Parkinson disease and if A. fib is detected on loop recorder and she requires anticoagulation, we can consider watchman device in that scenario.\"   --dual antiplatelet therapy with aspirin and Plavix for 21 days. After 21 days we can switch her to aspirin monotherapy additionally  --continue pravastatin.      H/o Parkinson's disease  Possible Progressive supranuclear palsy (PSP)   She was seen and evaluated by several providers and neurologists; most recently saw Dr. Mendoza.   --continue Sinemet 25/100 mg 1 tablet 3 times daily      -H/o frequent falls- serious injury including neck fracture, rib fracture, shoulder fracture.   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 Images were reviewed by neurosurgery team after admission and no new recommendations.        Hyperlipidemia,   - continue  pravastatin (outpatient medication regimen)    Essential hypertension: per EMR but BP has been well controlled off any medications.       Malnutrition, protein/calorie,  Reports reduced appetite, weight loss, and muscle wasting. On regular diet. Will consult dietitian.       H/o chronic urinary incontinence   Was followed at [facility] urology. Pessary was recommended which was helpful. Will check PVRs      Chronic diplopia: well corrected with prism glasses. Was seen by ophthalmology years ago; knows that she needs a f/u.        1. Adjustment to disability:  Clinical psychology to eval and treat  2. FEN: Regular with thin liquid  3. Bowel: PRN miralax  4. Bladder: PVR, per unit protocol  5. DVT Prophylaxis: mechanical, [id number] Ft CGA, FWW. Negative US for DVT in LLE 10/6  6. GI Prophylaxis: PRN senokot   7. Code: DNI/DNR confrimed  8. Disposition: home  9. ELOS:  6 days.  10. Rehab prognosis:  good  11. Follow up Appointments on " Discharge:  --implantable loop recorder: return to device clinic in 1 week for removal of sutures ( 10/15/19 )   Patient Instructions  -You will receive a device identification (ID) card in the mail from the device  within 6 weeks to replace the temporary ID card you were given in the hospital.     You may travel by any mode of transportation; just show your device ID card.      For any surgery, let your doctor know you have an implantable loop recorder.     Your device is MRI safe      Please call the Device Clinic after each time you use your Patient Symptom Activator.     Device Clinic Contact Information  Device Nurses: [phone], Option #3.  Device Remote Specialists: [phone], Option #2. For questions about your Remote Transmission or Transmission Schedule  Device Schedulers: [phone], Option #1    -f/u with cardiology as outpatient.    -neurologist at [facility]    Indira Hendrickson, [facility]  Physical Medicine & Rehabilitation          Physician Attestation   I, Alisia Escamilla, saw and evaluated ALEXA Luu as part of a shared visit.  I have reviewed and discussed with the advanced practice provider their history, physical and plan.    I personally reviewed the vital signs, medications, labs and imaging.    My key history or physical exam findings:   Key management decisions made by me:   Post Admission Physician Evaluation:    I have compared Carmen Luu's condition on admission to acute rehabilitation to that outlined in the preadmission screen. History and physical exam performed by me. 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    Comorbid medical conditions being managed: HTN, HLD, h/o Parkinson Disease with frequent falls prior  to admission      Prior functional level: mod I with U-step Walker at home and a 4WW in the community.     Present function: ambulating with her walker and SBA to CGA.     Anticipated rehabilitation course: improvement to prior level of function. However, she might be safer with WC based mobility given her frequent falls prior to admission.     Will benefit from intensive rehabilitation includin minutes each of PT, OT and SLP  Rehabilitation nursing  Close management by physiatry    Prognosis: good medical and rehab prognosis. Very high risk for falls    Estimated length of stay: 6 days      Alisia [facility]  Date of Service (when I saw the patient): 10/09/19

## 2019-10-09 NOTE — PHARMACY-MEDICATION REGIMEN REVIEW
Pharmacy Medication Regimen Review  ALEXA Luis [facility] is a 87 year old female who is currently in the Acute Rehab Unit.    Assessment: All medications have an appropriate indications, durations and no unnecessary use was found.    Plan:   Continue current medication regimen.    Attending provider will be sent this note for review.  If there are any emergent issues noted above, pharmacist will contact provider directly by phone.      Pharmacy will periodically review the resident's medication regimen for any PRN medications not administered in > 72 hours and discontinue them. The pharmacist will discuss gradual dose reductions of psychopharmacologic medications with interdisciplinary team on a regular basis.    Please contact pharmacy if the above does not answer specific medication questions/concerns.    Background:  A pharmacist has reviewed all medications and pertinent medical history today.  Medications were reviewed for appropriate use and any irregularities found are listed with recommendations.    Colleen Crum, Pharm.D.    Current Facility-Administered Medications:      acetaminophen (TYLENOL) tablet 500 mg, 500 mg, Oral, BID, Gbarbea, Demenia B, CNP     [START ON 10/10/2019] aspirin EC tablet 81 mg, 81 mg, Oral, Daily, Gbarbea, Demenia B, CNP     calcium carbonate 600 mg-vitamin D 400 units (CALTRATE) per tablet 1 tablet, 1 tablet, Oral, Daily, Gbarbea, Demenia B, CNP     carbidopa-levodopa (SINEMET)  MG per tablet 1 tablet, 1 tablet, Oral, TID, Gbarbea, Demenia B, CNP     [START ON 10/10/2019] clopidogrel (PLAVIX) tablet 75 mg, 75 mg, Oral, Daily, Gbarbea, Demenia B, CNP     polyethylene glycol (MIRALAX/GLYCOLAX) Packet 17 g, 17 g, Oral, Daily PRN, Gbarbea, Demenia B, CNP     pravastatin (PRAVACHOL) tablet 20 mg, 20 mg, Oral, Daily, Gbarbea, Demenia B, CNP     senna-docusate (SENOKOT-S/PERICOLACE) 8.6-50 MG per tablet 1-2 tablet, 1-2 tablet, Oral, BID PRN, Gbarbea, Demenia B, CNP

## 2019-10-09 NOTE — LETTER
To whom it may concern,      I am writing this letter on behalf of Carmen Luu who I have been looking after in the [place] acute inpatient rehabilitation unit.  She unfortunately has a neurological disorder which has lead to multiple and frequent falls.  She has had major injuries as a result of these falls, and therefore it is my recommendation that she not ambulate or transfer independently.  It would be my opinion that it is medically necessary that she have close assistance at all times while performing any activities of daily living including toilet transfers, bathing, or any other activity which would require any standing, walking, or transferring for safety.        Sincerely,           Homero Saldivar MD  10/14/2019

## 2019-10-10 PROCEDURE — 97535 SELF CARE MNGMENT TRAINING: CPT | Mod: GO | Performed by: OCCUPATIONAL THERAPIST

## 2019-10-10 PROCEDURE — 97166 OT EVAL MOD COMPLEX 45 MIN: CPT | Mod: GO | Performed by: OCCUPATIONAL THERAPIST

## 2019-10-10 PROCEDURE — 12800006 ZZH R&B REHAB

## 2019-10-10 PROCEDURE — 97116 GAIT TRAINING THERAPY: CPT | Mod: GP | Performed by: STUDENT IN AN ORGANIZED HEALTH CARE EDUCATION/TRAINING PROGRAM

## 2019-10-10 PROCEDURE — 97112 NEUROMUSCULAR REEDUCATION: CPT | Mod: GO | Performed by: OCCUPATIONAL THERAPIST

## 2019-10-10 PROCEDURE — 97530 THERAPEUTIC ACTIVITIES: CPT | Mod: GP | Performed by: STUDENT IN AN ORGANIZED HEALTH CARE EDUCATION/TRAINING PROGRAM

## 2019-10-10 PROCEDURE — 92523 SPEECH SOUND LANG COMPREHEN: CPT | Mod: GN | Performed by: SPEECH-LANGUAGE PATHOLOGIST

## 2019-10-10 PROCEDURE — 97162 PT EVAL MOD COMPLEX 30 MIN: CPT | Mod: GP | Performed by: STUDENT IN AN ORGANIZED HEALTH CARE EDUCATION/TRAINING PROGRAM

## 2019-10-10 PROCEDURE — 97127 ZZHC SP THERAPEUTIC INTERVENTION W/FOCUS ON COGNITIVE FUNCTION,EA 15 MIN: CPT | Mod: GN | Performed by: SPEECH-LANGUAGE PATHOLOGIST

## 2019-10-10 PROCEDURE — 25000132 ZZH RX MED GY IP 250 OP 250 PS 637: Mod: GY | Performed by: NURSE PRACTITIONER

## 2019-10-10 RX ORDER — PANTOPRAZOLE SODIUM 40 MG/1
40 TABLET, DELAYED RELEASE ORAL
Status: DISCONTINUED | OUTPATIENT
Start: 2019-10-11 | End: 2019-10-16 | Stop reason: HOSPADM

## 2019-10-10 RX ADMIN — PRAVASTATIN SODIUM 20 MG: 20 TABLET ORAL at 20:19

## 2019-10-10 RX ADMIN — CARBIDOPA AND LEVODOPA 1 TABLET: 25; 100 TABLET ORAL at 07:06

## 2019-10-10 RX ADMIN — ACETAMINOPHEN 500 MG: 500 TABLET ORAL at 20:19

## 2019-10-10 RX ADMIN — CARBIDOPA AND LEVODOPA 1 TABLET: 25; 100 TABLET ORAL at 11:38

## 2019-10-10 RX ADMIN — CLOPIDOGREL BISULFATE 75 MG: 75 TABLET, FILM COATED ORAL at 07:52

## 2019-10-10 RX ADMIN — CALCIUM CARBONATE 600 MG (1,500 MG)-VITAMIN D3 400 UNIT TABLET 1 TABLET: at 07:52

## 2019-10-10 RX ADMIN — ASPIRIN 81 MG: 81 TABLET ORAL at 07:52

## 2019-10-10 RX ADMIN — ACETAMINOPHEN 500 MG: 500 TABLET ORAL at 07:52

## 2019-10-10 RX ADMIN — CARBIDOPA AND LEVODOPA 1 TABLET: 25; 100 TABLET ORAL at 15:57

## 2019-10-10 ASSESSMENT — ACTIVITIES OF DAILY LIVING (ADL): PREVIOUS_RESPONSIBILITIES: MEAL PREP;HOUSEKEEPING;LAUNDRY;MEDICATION MANAGEMENT;FINANCES

## 2019-10-10 NOTE — PLAN OF CARE
Denies pain, continent  of bladder on the toilet, SBA with romi-cares, need close CGA with clothing management, particular with timing of sinemet administration, had stroke class today, will continue POC

## 2019-10-10 NOTE — CONSULTS
"Social Work: Initial Assessment with Discharge Plan     10/10/19 1400   Living Arrangements   Lives With alone   Living Arrangements   (Twin Home. Ramp to enter, everything on 1 level. )   Primary Caregiver(s)   (Daughter, Sheila. Lives 5 min away, visits everyday.Planning to stay with patient first couple weeks post ARU.)   Able to Return to Prior Arrangements yes   Living Arrangement Comments   (Pt hoping to start using wheelchair in home for safety.)   Discharge Needs Assessment   Transportation Anticipated family or friend will provide     Patient Name: ALEXA Luis [facility]  : 1932  Age: 87 year old  MRN: [id number]  Completed assessment with: Patient  Admitted to ARU: 10/09/2019    Presenting Information   Date of SW assessment: October 10, 2019  Health Care Directive: Declined completing  Primary Health Care Agent: Patient  Secondary Health Care Agent: Defer to [facility] policy.  Living Situation: Lives alone in twin home in [place], [place]. Ramp to enter home, no stairs within home, everything on 1 level. Was using U step walker around home, states she will now use wheelchair due to severe falls (currently does not have wheelchair). Bedroom is next to bathroom (walk in shower with grab bar, 2 grab bars next to toilet). 2 daughters, Sheila and Mariel, able to provide care when patient returns home, but patient plans to move into a Senior Independent Living Apartment in [place] during spring time.   Previous Functional Status: Used U step walker in house, 4ww in community. Independently manages medications and finances.   DME available: See therapy notes  Patient and family understanding of hospitalization: \"Get stronger and improve my balance.\"  Cultural/Language/Spiritual Considerations: English speaking.      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 Son-in-law, Rex, is a  and has been helpful for patient in coping with Parkinson's diagnosis. Attends Parkinson's Support Group in [place] on weekly basis.  Services Needed/Recommended:Patient aware of health psychology and spiritual health, but states she has good support from her family. Patient planning to continue attending Parkinson's Support Group.    Support System  Marital Status: .   Family support: 4 children - Mariel Sosa, Ramo and Josias. Daughter, Sheila, lives 5 minutes from patient and visits everyday in the morning and night. Will stay with patient for first couple weeks post ARU to assist with adjustment back home. Daughter, Mariel,lives in [place] and also involved. Sons, Ramo and Josias, live in [place]. 11 grandchildren whom patient is very close to.     Community Resources  Current in home services: Life Alert Button and cameras in home installed by children, grab bars. Daughters currently getting measurements of home for wheelchair.    Current Community Resources: [employer] set up for transportation. Prior to hospitalization, patient was doing OT, PT and SLP on a weekly basis at [facility] at [facility] in [place]. Patient would like to continue doing therapy at [facility] with transportation from her daughter, Sheila.    Previous services: See above.    Financial/Employment/Education  Employment Status: Retired school nurse and health teacher  Income Source: [employer], Social Security Income ($1,300), $750/month from teaching.  Education: 5 year nursing degree, 1 year teaching/education vocational program.  Financial Concerns:  None reported  Insurance: Medicare and BCBS Supplement      Discharge Plan   Patient and family discharge goal:  Return home with recommended services.  Provided Education on discharge plan: YES  Patient agreeable to discharge plan:  YES  Provided education and attained signature for Medicare IM and IRF Patient Rights and Privacy Information provided to patient : Yes  Provided patient with [place] [facility] Resources: Yes  Barriers to discharge: Medical clearance, therapy goals met, safe discharge plan.    Discharge Recommendations   Disposition: Home  Transportation Needs: Family to provide  Name of Transportation Company and Phone: NA    Additional comments   SW introduced self to patient and role of SW in ARU. Patient has good support system (daughter, Sheila, lives 5 minutes away, already visits patient everyday and able to stay with patient post ARU for a couple weeks to assist in transition home). Patient's family currently getting measurements of patient's home for wheelchair. Patient scored 15/15 on BIMS assessment. SW will continue to follow and assist as needed.     CHIKIS Fisher,[facility]  TCU    Phone: [phone]  Pager: [phone]

## 2019-10-10 NOTE — PROGRESS NOTES
OT abdirashid was completed and treatment initiated. Spoke to pt this morning after viewing videos on her Ipad of two of her falls, about becoming w/c or scooter based when leaving ARU. Pt is SBA for STS transfers and uses U Step for functional transfers, dressing, showering/bathing, and toileting at baseline with mod IND. Currently, pt requires CGA with toilet transfers with U Step walker or from W/C level. Pt requires CGA with LBD tasks with U step walker.  Anticipating pt will be discharging home with assist vs home with home therapy.  Anticipating 6-7 day stay for pt to become mod I W/C based.   Anticipating pt will require shower/tub bench, w/c, and potentially dressing equipment upon return home. Pt will needed assistance with IADLs d/t high falls risk.    Alarms on, do not leave pt alone on toilet at this time. Provide CGA with all mobility.   Pt is a very high falls risk!

## 2019-10-10 NOTE — PLAN OF CARE
Pt is alert and oriented x4. Very pleasant and cooperative. VSS  Denies pain, chest pain or SOB.   C/o left arm numbness.   Has impaired balance and scissor gait, needing verbal cues, CGA, gait belt and 4WW for functional mobility in room and hallway.   Supervision for grooming and toileting tasks.  Continent of bowel and bladder. LBM x2 today, no issues per pt.  PVR 17cc and 20cc.  Dressing CDI to incision site of implanted loop recorder.   Pt reports having falls almost every week.   Bed alarm on for safety concern. Pt used call light appropriately.

## 2019-10-10 NOTE — PROGRESS NOTES
Health Care Directive given to HUC. Copy in pt paper chart and will be sent to be scanned into pt EMR.     Please see SW consult from today with SW assessment.     Planing for discharge home with [facility] services. SW will provide resources to pt and pt dtr RE: ILF/custodial options and assistance. Will follow up.     DERIK Nowak, [facility] Acute Rehab Unit   Phone: [phone]  I   Pager: [phone]

## 2019-10-10 NOTE — PLAN OF CARE
Orders received, chart reviewed, eval completed and treatment initiated.  PLOF pt was mod I using U-step walker in home, 4WW in community though with extensive h/o falls with resulting significant injuries. Videos of falls indicate pt lacks balance reaction. Pt is open to being wc based for mobility. Currently is moving at CLOSE SBA level for transfers and gait using U-step walker. Nsg to please continue walking with patient at CLOSE SBA-CGA level as her falls can come suddenly and hard.  Anticipate 6 days to meet POC goals, f/u with OP PT as planned, likely increased support in home.

## 2019-10-10 NOTE — PROGRESS NOTES
"  [facility]   Acute Rehabilitation Unit  Daily progress note    INTERVAL HISTORY  Meeting patient for the first time today, notes reviewed.  The patient was admitted to the acute rehab unit yesterday and says her first night was good and was able to sleep well.  She denies any chest pain, shortness of breath, nausea, problems with bowel or bladder, and appetite has been good.  From a stroke standpoint the strength is quite good, however continues to have some numbness in the left side and decreased coordination and control.  She has a history of frequent falls, and we explored this a little bit further.  She tells me she tends to fall about 3 times per week and has had some significant injuries in the past including broken vertebral bones.  Thankfully has not had any catastrophic injury such as spinal cord or brain injuries, however she is at high risk.  She showed me some videos from her home surveillance cameras of her falls and they are quite significant.  She tends to ambulate and can suddenly fall without tripping or any preceding event other than freezing.  In one video she fell directly posteriorly and another video fell directly to the side and she is unable to cushion herself.  She has her phone on her at all times as well as life alert.  This has been a long-standing issue which will not be \"cured\" during her short stay here at rehab.  It would be difficult to say what her final recommendation for ambulation is, however given the videos and the chronicity it would seem to make the most sense to have her at a wheelchair based level if alone for safety, and ambulating with assistance only.        MEDICATIONS  Scheduled:    acetaminophen  500 mg Oral BID     aspirin  81 mg Oral Daily     calcium carbonate 600 mg-vitamin D 400 units  1 tablet Oral Daily     carbidopa-levodopa  1 tablet Oral TID     clopidogrel  75 mg Oral Daily     pravastatin  20 mg Oral Daily    " "    PRN:  polyethylene glycol, senna-docusate       PHYSICAL EXAM  Patient Vitals for the past 24 hrs:   BP Temp Temp src Pulse Resp SpO2 Height Weight   10/10/19 0912 129/72 -- -- 84 -- -- -- --   10/10/19 0749 (!) 153/83 96.2  F (35.7  C) Oral 82 19 96 % -- --   10/10/19 0018 (!) 143/73 96  F (35.6  C) Oral 72 20 95 % -- --   10/09/19 2017 129/61 96.8  F (36  C) Oral 79 16 95 % -- --   10/09/19 1520 123/65 97.3  F (36.3  C) Oral 72 16 97 % -- --   10/09/19 1351 120/60 95.8  F (35.4  C) Oral 82 14 97 % 1.676 m (5' 6\") 56 kg (123 lb 8 oz)       GEN: NAD, pleasant and cooperative  HEENT: NC/AT.  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      Admission to acute inpatient rehab 10/9/2019.    Impairment group code: 01.1        1. PT, OT and SLP 60 minutes of each on a daily basis, in addition to rehab nursing and close management of physiatrist.       2. Impairment of ADL's:  OT for 60 min daily to work on upper and lower body self care, dressing, toileting, bathing, energy conservation techniques with use of ADs as needed.      3. Impairment of mobility:   PT for 60 min daily to work on balance, gait exercises, strengthening, and safety.      4. Impairment of cognition/language/swallow:   SLP for 60 min daily for cognitive evaluation and treatment strategies for higher level cognitive deficits and memory impairment. On regular diet.      5. Rehab RN to administer " "medication, patient education on medication taking, VS monitoring, bowel regimen, and patient education.        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 TTE didn't show any source. . HgbA1c 5.8%  --status post insertion of loop recorder 10/8. Per chart review, \"-She was not good candidate for anticoagulation due to underlying Parkinson disease and if A. fib is detected on loop recorder and she requires anticoagulation, we can consider watchman device in that scenario.\"   --dual antiplatelet therapy with aspirin and Plavix for 21 days. After 21 days we can switch her to aspirin monotherapy additionally  --continue pravastatin.      H/o Parkinson's disease  Possible Progressive supranuclear palsy (PSP)   She was seen and evaluated by several providers and neurologists; most recently saw Dr. Mendoza.   --continue Sinemet 25/100 mg 1 tablet 3 times daily        -H/o frequent falls- serious injury including neck fracture, rib fracture, shoulder fracture.   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 Images were reviewed by neurosurgery team after admission and no new recommendations.         Hyperlipidemia,   - continue  pravastatin (outpatient medication regimen)    Essential hypertension: per EMR but BP has been well controlled off any medications.        Malnutrition, protein/calorie,  Reports reduced appetite, weight loss, and muscle wasting. On regular diet. Will consult dietitian.         H/o chronic urinary incontinence   Was followed at [facility] urology. Pessary was recommended which was helpful. 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 Was seen by ophthalmology years ago; knows that she needs a f/u.         1. Adjustment to disability:  Clinical psychology to eval and treat  2. FEN: " Regular with thin liquid  3. Bowel: PRN miralax  4. Bladder: PVR, per unit protocol  5. DVT Prophylaxis: mechanical, ambulation  6. GI Prophylaxis: Add protonix daily given DAPT  7. Code: DNI/DNR confrimed  8. Disposition: home  9. ELOS:  6 days.  10. Rehab prognosis:  good  11. Follow up Appointments on Discharge:  --implantable loop recorder: return to device clinic in 1 week for removal of sutures ( 10/15/19 )   Patient Instructions  -You will receive a device identification (ID) card in the mail from the device  within 6 weeks to replace the temporary ID card you were given in the hospital.     You may travel by any mode of transportation; just show your device ID card.      For any surgery, let your doctor know you have an implantable loop recorder.     Your device is MRI safe      Please call the Device Clinic after each time you use your Patient Symptom Activator.     Device Clinic Contact Information  Device Nurses: [phone], Option #3.  Device Remote Specialists: [phone], Option #2. For questions about your Remote Transmission or Transmission Schedule  Device Schedulers: [phone], Option #1     -f/u with cardiology as outpatient.     -neurologist at [facility]      Homero Saldivar MD  Department of Rehabilitation Medicine  Pager: [phone]    Time Spent on this Encounter   I, Homero Saldivar, spent a total of 35 minutes face-to-face or managing the care of ALEXA Luu. Over 50% of my time on the unit was spent counseling the patient and coordinating care including formal team rounds. See note for details.

## 2019-10-10 NOTE — PROGRESS NOTES
10/10/19 1500   General Information, SLP   Type of Evaluation Speech and Language;Cognitive-Linguistic   Type of Visit Initial   Start of Care Date 10/10/19   Onset of Illness/Injury or Date of Surgery - Date 10/05/19   Referring Physician Dr Francine MD   Patient/Family Goals Statement not stated   Pertinent History of Current Problem ALEXA Luu is a 87 year old right hand dominant female with a pertinent history of parkinson's disease and frequent falls, left ventricular hypertrophy, dyslipidemia, HTN and chronic double vision which is corrected with prism eyeglasses. She presented to [facility] ED by personal vehicle with her daughter for evaluation of numbness and weakness of her LUE. She was found to have right precentral gyrus and right cerebellar infarct, outside TPA window on 10/5/19.   Precautions/Limitations fall precautions   General Observations pleasant and cooperated   Oral Motor Sensory Function   Completed on Swallow Evaluation   (toelrates a regular diet)   Speech   Deficits in Phonation Hoarse quality   Speech Comments mild baseline hoarseness   Language: Auditory Comprehension (understanding of spoken language)   Tests were administered at the following levels Complex (vocation/community/social activities)   Paragraph; Discourse Comprehension Test (out of 8 total; less than 7 is below mean) 6   Functional Assessment Scale (Auditory Comprehension) Minimal Impairment   Comments (Auditory Comprehension) minmal impairement for more complex level info   Language: Verbal Expression (use of spoken language to express information)   Tests were administered at the following levels Complex (vocation/community/social activities)   Define Words; [place] Test for Differential Diagnosis Of Aphasia (out of 10 total) 10   Conversation; [place] Diagnostic Aphasia Exam rating (out of 5 total) 5   Functional Assessment Scale (Verbal Expression) No Impairment   Reading Comprehension (understanding of  written language)   Tests were administered at the following levels Complex (vocation/community/social activities)   Sentences and Paragraphs; [place] Diagnostic Aphasia Exam (out of 10 total) 10   Functional Assessment Scale (Reading Comprehension) No Impairment   Written Expression (use of writing to express information)   Tests were administered at the following levels Moderate (routine daily activities)   Generate Sentences; [place] Test for Differential Diagnosis Of Aphasia (out of 6 total) 6   Functional Assessment Scale (Written Expression) Minimal Impairment   Comments (Written Expression) some mild decreases in legibility with printing but sentences compelte   Pragmatics (the social or functional use of a language)   Deficits noted in Nonverbal None   Deficits noted in Conversational Skills None   Deficits noted in the Use of Linguistic Context None   Deficits noted in the Organization of Narrative; RICE None   Functional Assessment Scale  (Pragmatics) No Impairment   Cognitive Status Examination   Attention intact  (no errors sustained, alternating; 1 error flexible)   Behavioral Observations WFL   Orientation intact  (5/5 general orientation)   Visual Impairments Include visual acuity  (wears prism glasses for double vision)   Short Term Memory unable to assess  (2/3 delayed recall; 12/15 paragraph recall)   Long Term Memory intact   Reasoning impaired  (3/4 verbal p.s; 5/6 verbal reason; 1/2 numerics; 10/10 seque)   Executive Function Deficits Noted mental flexibility   Additional cognitive-linguistic evaluation indicated  recommend   Standardized cognitive-linguistic assessment completed to be completed during future session   Cognitive Status Exam Comments Pt with minimal to mild deficits in ST recall for lengthier infor or when there is a distraction or delay, min-mild deficits with higher level more complex reasoning and mental flexibility.; all levels of attention however are intact   General  Therapy Interventions   Planned Therapy Interventions Cognitive Treatment   Cognitive treatment Internal memory strategy training;External memory strategy training   Clinical Impression, SLP Eval   Criteria for Skilled Therapeutic Interventions Met Yes;Treatment indicated   SLP Diagnosis Minimal to mild cognitive impairements   Influenced by the following factors/impairments baseline level of functioning   Functional limitations due to impairments LUE weakness and numbness   Rehab Potential Good, to achieve stated therapy goals   Therapy Frequency Daily   Predicted Duration of Therapy Intervention (days/wks) 2 weeks   Anticipated Discharge Disposition Home with Outpatient Therapy   Risks and Benefits of Treatment have been explained. Yes   Patient, Family & other staff in agreement with plan of care Yes   Clinical Impression Comments Completed formal speech-langauge eval and informal cognitive assessment. Pt's langauge skills are WFL for pt's needs- pt did have some minimal difficulty with lengthier more complex auditory comprehension task but appeared to be related more to memory difficulty. Pt also has a baseline mild gravelly, hoarse vocal quality. With cognition: all levels of attention are intact. Pt demosntrates mild impairements in ST recall and with higher level compelx reasoning and problem solving. Pt's current level of cogniton appears to be below baseline and pt will benefit from skilled SLP intervention to address functional cognition for improved safety and independence in ADL's   Total Evaluation Time      Total Evaluation Time (Minutes) 60

## 2019-10-10 NOTE — PLAN OF CARE
Completed formal speech-langauge eval and informal cognitive assessment. Pt's langauge skills are WFL for pt's needs- pt did have some minimal difficulty with lengthier more complex auditory comprehension task but appeared to be related more to memory difficulty. Pt also has a baseline mild gravelly, hoarse vocal quality. With cognition: all levels of attention are intact. Pt demosntrates mild impairements in ST recall and with higher level compelx reasoning and problem solving. Pt's current level of cogniton appears to be below baseline and pt will benefit from skilled SLP intervention to address functional cognition for improved safety and independence in ADL's

## 2019-10-10 NOTE — H&P
Post Admission Physician Evaluation:    I have compared Carmen Luu's condition on admission to acute rehabilitation to that outlined in the preadmission screen. History and physical exam performed by me. 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    Comorbid medical conditions being managed: HTN, HLD, h/o Parkinson Disease with frequent falls prior to admission      Prior functional level: mod I with U-step Walker at home and a 4WW in the community.     Present function: ambulating with her walker and SBA to CGA.     Anticipated rehabilitation course: improvement to prior level of function. However, she might be safer with WC based mobility given her frequent falls prior to admission.     Will benefit from intensive rehabilitation includin minutes each of PT, OT and SLP  Rehabilitation nursing  Close management by physiatry    Prognosis: good medical and rehab prognosis. Very high risk for falls    Estimated length of stay: 6 days    Alisai Escamilla MD  Physical Medicine & Rehabilitation

## 2019-10-10 NOTE — PROGRESS NOTES
10/10/19 1016   Quick Adds   Type of Visit Initial Occupational Therapy Evaluation   Living Environment   Lives With alone   Living Arrangements house   Home Accessibility other (see comments)  (ramp to enter home from garage and front door )   Transportation Anticipated family or friend will provide   Living Environment Comment OT: Pt lives in a twin home in [place] with a ramp to enter home as pt uses U Step walker around her home. Pt reporting no stairs within the home and everything is on one level. Pt reporting consistent falls within the home due to Parkinson's. Pt shared video of two falls within home during session on Ipad. Pt reports her bathroom is right next to bedroom and that she has a walk in shower with grab bars in shower. Pt reports she also has grab bars next to toilet. Pt has raised toilet in bathroom. Pt has a queen sized bed and gets out of the bed on the right side.    Self-Care   Usual Activity Tolerance good   Current Activity Tolerance good   Regular Exercise Yes   Activity/Exercise Type strength training;other (see comments)   Exercise Amount/Frequency 1 hr   Equipment Currently Used at Home grab bar, toilet;grab bar, tub/shower;raised toilet;tub bench;walker, rolling   Activity/Exercise/Self-Care Comment OT: Pt was very active at [facility] 3x/week prior to admission completing [employer] classes Sai [employer], pool, and strength training.    Functional Level   Ambulation 1-->assistive equipment   Transferring 1-->assistive equipment   Toileting 1-->assistive equipment   Bathing 1-->assistive equipment   Dressing 1-->assistive equipment   Eating 0-->independent   Communication 0-->understands/communicates without difficulty   Swallowing 0-->swallows foods/liquids without difficulty   Cognition 0 - no cognition issues reported   Fall history within last six months yes   Number of times patient has fallen within last six months 24  (once a week for past 6 months )   Which of the above  "functional risks had a recent onset or change? ambulation;transferring;toileting;bathing;dressing;fall history   Prior Functional Level Comment OT: Pt was mod IND with ADLs/IADLs and functional mobility. pt uses U-step walker in home, 4WW in community   General Information   Onset of Illness/Injury or Date of Surgery - Date 10/05/19   Referring Physician Dr. Escamilla   Patient/Family Goals Statement Pt reports \"I would like to be more balanced at home\".    Additional Occupational Profile Info/Pertinent History of Current Problem Per pt report, \"87 year old right hand dominant female with a pertinent history of parkinson's disease and frequent falls, left ventricular hypertrophy, dyslipidemia, HTN and chronic double vision which is corrected with prism eyeglasses. She presented to [facility] ED by personal vehicle with her daughter for evaluation of numbness and weakness of her LUE. 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 She states her arm was \"flopping around\" and notes that she is having trouble \"finding things\" with her left hand. She states that she tried to rest her arm on the arm rest of her chair but notes that she didn't realize her left arm was already on the arm rest. She notes that she had difficulty finding her walker and states \"it felt like it her left arm was falling\". She describes her numbness \"as if it fell asleep\" and notes it radiates to her left shoulder but otherwise does not move. She states she took ibuprofen and acetaminophen at the time of onset. She notes that her numbness ebbs and flows but is otherwise constant. She reports, diagnosed with Parkinson's disease 2 years ago and often falls secondary to her Parkinson's symptoms. She states she fell 1 week ago and notes head trauma. She reports a history of neck injury. She denies any weakness, right arm " pain, nausea, right leg pain, leg numbness, fever, chills, abdominal pain, chest pain, neck pain, shortness of breath, and any other concerns at this time. During his acute hospitalization, patient was seen and evaluated by  PT, OT, and SLP.  All specialties collectively recommended that patient would benefit from ongoing therapies in the acute inpatient rehabilitation setting.   Precautions/Limitations fall precautions   Weight-Bearing Status - LUE full weight-bearing   Weight-Bearing Status - RUE full weight-bearing   Weight-Bearing Status - LLE full weight-bearing   Weight-Bearing Status - RLE full weight-bearing   Heart Disease Risk Factors Family history;Age   General Observations OT: pt is positive for ulnar nerve testing completed at lateral epicondyle; positive for drop arm test for R shoulder    General Info Comments OT: pt very alert and pleasant, shared video on Ipad of recent falls with writer and helper. Pt reports that she experienced a R RTC tear about 30 years ago that was not repaired.    Cognitive Status Examination   Orientation orientation to person, place and time   Level of Consciousness alert   Follows Commands (Cognition) WFL   Memory intact   Attention No deficits were identified   Organization/Problem Solving No deficits were identified   Executive Function No deficits were identified   Visual Perception   Visual Perception Wears glasses   Visual Perception Comments OT: pt has previous hx of diplopia and wears prism glasses. Visual tracking and peripheral vision is intact.    Sensory Examination   Sensory Comments OT: Light touch is intact for BUE's.    Pain Assessment   Patient Currently in Pain No   Integumentary/Edema   Integumentary/Edema Comments OT: no edema noted in BLE's    Posture   Posture forward head position;kyphosis   Range of Motion (ROM)   ROM Comment OT: R shoulder previous RTC tear, see above notes for details. R sh flexion WFL; sh abd approx 80 degrees. R elbow  flexion/ext, sup/pro, wrist flex/ext, and thumb opposition/grasp WFL. L sh flexion/abd, elbow flexion/ext, sup/pro, wrist flex/ext, and thumb opposition/grasp WFL.    Strength   Strength Comments OT: R sh flexion/abd 3+/5; elbow flexion/ext, wrist flexion/ext and hand grasp 4+/5. L sh flexion/extension, elbow flexion/extension, wrist flexion/extension, and hand grasp 4+/5.    Hand Strength   Left hand  (pounds) 29 pounds   Right hand  (pounds) 41 pounds   Hand Strength Comments OT: noted decreased strength in L hand vs R. Pt reports she has noticed a decrease in her hand strength when completing functional tasks such as opening jars, dressing, etc.    Muscle Tone Assessment   Muscle Tone Comments OT: no noted tone in BUE's.    Coordination   Fine Motor Coordination 38 seconds    Left hand, nine hole peg test (seconds) 43 seconds   Coordination Comments OT: pt reports that her FMC has decreased within the past couple years; she reports she has had difficulty zippering zippers and buttoning buttons on her clothing.    ARC Assessment Only   Acute Rehab FIM See FIM scores for Mobility/ADL Assessment   Instrumental Activities of Daily Living (IADL)   Previous Responsibilities meal prep;housekeeping;laundry;medication management;finances   IADL Comments OT: Daughter assists with grocery shopping. Pt stopped driving 1 year ago. Has a service to help with yardwork.    Activities of Daily Living Analysis   Impairments Contributing to Impaired Activities of Daily Living balance impaired;coordination impaired;fear and anxiety;motor control impaired;pain;postural control impaired;ROM decreased;sensation decreased;strength decreased   General Therapy Interventions   Planned Therapy Interventions ADL retraining;IADL retraining;balance training;bed mobility training;cognition;fine motor coordination training;groups;motor coordination training;neuromuscular re-education;ROM;strengthening;transfer training;visual  perception;home program guidelines;progressive activity/exercise;risk factor education   Clinical Impression   Criteria for Skilled Therapeutic Interventions Met yes, treatment indicated   OT Diagnosis OT: Decreased safety and independence with ADL and IADLs.    Influenced by the following impairments OT: fall risk, decreased strength, decreased sensation, decreased balance, decreased coordination   Assessment of Occupational Performance 3-5 Performance Deficits   Identified Performance Deficits OT: Performance deficits include: dressing, toileting, G/H tasks, functional transfers, showering/bathing   Clinical Decision Making (Complexity) Moderate complexity   Therapy Frequency Daily   Predicted Duration of Therapy Intervention (days/wks) 6-7 days    Anticipated Equipment Needs at Discharge bathing equipment;dressing equipment;shower chair;wheelchair;wheelchair cushion   Anticipated Discharge Disposition Home with Assist;Home with Home Therapy   Risks and Benefits of Treatment have been explained. Yes   Patient, Family & other staff in agreement with plan of care Yes   Clinical Impression Comments OT:Pt would benefit from skilled OT services d/t recent decline in safety and IND with ADLs/IADLs.    Total Evaluation Time   Total Evaluation Time (Minutes) 35

## 2019-10-11 PROCEDURE — 97110 THERAPEUTIC EXERCISES: CPT | Mod: GP | Performed by: REHABILITATION PRACTITIONER

## 2019-10-11 PROCEDURE — 97127 ZZHC SP THERAPEUTIC INTERVENTION W/FOCUS ON COGNITIVE FUNCTION,EA 15 MIN: CPT | Mod: GN

## 2019-10-11 PROCEDURE — 25000132 ZZH RX MED GY IP 250 OP 250 PS 637: Mod: GY | Performed by: NURSE PRACTITIONER

## 2019-10-11 PROCEDURE — 97116 GAIT TRAINING THERAPY: CPT | Mod: GP | Performed by: REHABILITATION PRACTITIONER

## 2019-10-11 PROCEDURE — 97530 THERAPEUTIC ACTIVITIES: CPT | Mod: GP

## 2019-10-11 PROCEDURE — 25000132 ZZH RX MED GY IP 250 OP 250 PS 637: Mod: GY | Performed by: PHYSICAL MEDICINE & REHABILITATION

## 2019-10-11 PROCEDURE — 97535 SELF CARE MNGMENT TRAINING: CPT | Mod: GO

## 2019-10-11 PROCEDURE — 97530 THERAPEUTIC ACTIVITIES: CPT | Mod: GP | Performed by: REHABILITATION PRACTITIONER

## 2019-10-11 PROCEDURE — 12800006 ZZH R&B REHAB

## 2019-10-11 PROCEDURE — 97127 ZZHC SP THERAPEUTIC INTERVENTION W/FOCUS ON COGNITIVE FUNCTION,EA 15 MIN: CPT | Mod: GN | Performed by: SPEECH-LANGUAGE PATHOLOGIST

## 2019-10-11 RX ADMIN — CARBIDOPA AND LEVODOPA 1 TABLET: 25; 100 TABLET ORAL at 16:02

## 2019-10-11 RX ADMIN — CARBIDOPA AND LEVODOPA 1 TABLET: 25; 100 TABLET ORAL at 06:33

## 2019-10-11 RX ADMIN — PRAVASTATIN SODIUM 20 MG: 20 TABLET ORAL at 19:52

## 2019-10-11 RX ADMIN — CARBIDOPA AND LEVODOPA 1 TABLET: 25; 100 TABLET ORAL at 11:39

## 2019-10-11 RX ADMIN — ACETAMINOPHEN 500 MG: 500 TABLET ORAL at 19:52

## 2019-10-11 RX ADMIN — CALCIUM CARBONATE 600 MG (1,500 MG)-VITAMIN D3 400 UNIT TABLET 1 TABLET: at 09:04

## 2019-10-11 RX ADMIN — CLOPIDOGREL BISULFATE 75 MG: 75 TABLET, FILM COATED ORAL at 09:04

## 2019-10-11 RX ADMIN — ACETAMINOPHEN 500 MG: 500 TABLET ORAL at 09:03

## 2019-10-11 RX ADMIN — ASPIRIN 81 MG: 81 TABLET ORAL at 09:04

## 2019-10-11 RX ADMIN — PANTOPRAZOLE SODIUM 40 MG: 40 TABLET, DELAYED RELEASE ORAL at 06:33

## 2019-10-11 NOTE — PLAN OF CARE
Pt alert and oriented, cooperative with calling for help before moving. Left elbow pain and numbness continue- memory foam brace applied by orthotics today. Pt knows how to apply and can use PRN. Denies pain. Will continue to monitor.

## 2019-10-11 NOTE — PROGRESS NOTES
OT: Pt completed full shower during OT session completing functional transfers w/c based at this time due to pt's anticipated method of mobility when return to home and high fall risk.     Pt is SBA for G/H tasks while seated EOS in w/c. Pt is SBA for UB dressing while seated on shower bench. Pt is CGA for LB dressing, shower transfer, toilet hygiene/toilet transfer, and functional transfers. Pt is CLOSE SBA for showering/bathing and was able to wash/rinse/dry 10/10 body parts while seated on shower bench and using grab bars for assistance as needed. Due to pt's high fall risk, continue to use CGA when performing ADL's/functional transfers.

## 2019-10-11 NOTE — PLAN OF CARE
Discharge Planner PT   Patient plan for discharge: discharge to home in senior living facility on 10/15 - w/c based.  Current status:working on education and safety strategies for w/c based mobility. Pt has extensive fall history (~70 falls at high velocity) and safest discharge plan is for her to remain w/c mobile. Had previously believed she could use a power scooter that her family member was no longer using - but this is not functioning at this time. Family working on solution. Renting a manual chair for safe transition home, but will need follow-up with [facility] in [place] after discharge if she chooses to pursue power w/c for long term.         Entered by: Sylvia Bonds 10/11/2019 4:12 PM

## 2019-10-11 NOTE — PROGRESS NOTES
10/11/19 1634   Signing Clinician's Name / Credentials   Signing clinician's name / credentials Sumit Cr MS CCC SLP   Quick Adds   Rehab Discipline SLP   Additional Documentation   SLP Plan SLP: Discharge from speech therapy   Total Session Time   Total Session Time (minutes) 35 minutes   SLP - Acute Rehab Center Time   Individual Time (minutes) - enter zero if not applicable - SLP 35   Group Time (minutes) - enter zero if not applicable  - SLP 0   Concurrent Time (minutes) - enter zero if not applicable  - SLP 0   Co-Treatment Time (minutes) - enter zero if not applicable  - SLP 0   ARC Total Session Time (minutes) - SLP 35   Problem Solving   Problem Solving Comment Analysis-synthesis subtest of Kvng-Darshan completed with a standard score of 109 which correlates to the 73rd percentile.

## 2019-10-11 NOTE — PROGRESS NOTES
Met with pt to talk about discharge plans and resources. Dicussed plans to discharge home on Tuesday 10/15 with HHC. Pt expressed that she understands that 'ongoing therapy will help from her stroke but that therapist can't do anything to help with balance and falls'. Pt stated that her family has installed cameras and pt wears a life alert.     Pt reported that she feels that she may not be homebound and would like to see how the weekend goes and find out if HHC or outpt is more appropriate. SWer in agreement. Pt has had HHC in the past and does prefer HHC if able. Discussed HHC options, pt agreeable to [facility]. Will follow up with therapy and assist with coordinating.     Discussed resources for future planning. Provided and discussed resources for senior linkage line, a place for mom and care patrol. Pt expressed appreciation. Pt expressed interest in house keeping resource. Will plan on providing pt with resource when following up on final recs and plans on Monday. Pt aware. Pt denied additional needs at this time. SWer will follow.     DERIK Nowak, [facility] Acute Rehab Unit   Phone: [phone]  I   Pager: [phone]

## 2019-10-11 NOTE — PLAN OF CARE
FOCUS/GOAL  Bowel management, Bladder management, and Safety management    ASSESSMENT, INTERVENTIONS AND CONTINUING PLAN FOR GOAL:  Pt denies pain. VSS. Pt reports left arm is strong but still numb.   Used call light appropriately, provided close CGA and walker for mobility in room due to impaired gait and high fall risk.   Chest incision had scant amount of serosanguinous drainage, no s/s of infection, new dressing applied. Educated pt how to change dressing and pt verbalized understanding.  Continent of bowel and bladder, used toilet, needing assist to manage pants. LBM today.   Safety alarms on always and pt used call light appropriately.

## 2019-10-11 NOTE — PROGRESS NOTES
"Rehabilitation Medicine Wheelchair Face to Face     Diagnosis: Progressive supranuclear palsy vs. Parkinson's disease  Currently has limited mobility due to Neurological disease, poor balance, motor freezing  Current transfer status: With walker  Distance patient currently able to walk: 400 ft, but has frequent and sudden high impact falls.  Therapy team has ruled out the following less costly options:   Cane due to frequent falls   Walker due to frequent calls  Patient will use wheelchair to complete Mobility Related ADL's in the home including toileting, dressing, self cares, meal prep  Without a wheelchair patient will be unable to safely move about their home.  This equipment will allow them to be out of bed, participate in home activities with their family, and it will be part of a fall prevention plan for safe mobility.   Patient's home will accommodate use of wheelchair.  Patient has a family member willing and able to assist as necessary with wheelchair.    Arm and leg strength:               SF        EF        EE       WE      G         I           HF       KE       DF       EHL     PF   R          4+/5     4+/5     4+/5     4+/5     4+/5     4+/5     4+/5     4+/5     4+/5     4+/5     4+/5  L          4/5       4/5       5/5       5/5       4/5       4/5       4/5       4/5       4/5       4/5       4/5    Gait/balance/coordination:Poor balance    Length of need: 99    Current height:5' 6\"  Current weight:123 lbs 8 oz  : 1932          Toni Saldivar MD on 10/11/2019 at 3:56 PM  NPI: [id number]    10 minutes spent face to face      "

## 2019-10-11 NOTE — PLAN OF CARE
FOCUS/GOAL  Medical management    ASSESSMENT, INTERVENTIONS AND CONTINUING PLAN FOR GOAL:  Pt is alert and oriented. No complaints of pain. CG assist of 1 with transfers, ambulation, and toileting. Pt called 2x for assistance to bathroom and voied 2x. Cannot leave patient on toilet alone due to impulsivity. Pt slept well overnight. Pt wants to be involved in sinemet scheduling and when she gets it. Passed info on to next RN. Will continue to monitor and support POC.

## 2019-10-11 NOTE — PROGRESS NOTES
S: Order received to fit patient with elbow pad as ordered by Shanell Guzman.  O/G: protection of elbow  A:  Patient was fit with a stefan and cesar Talley padded elbow .  The fit of the elbow pad is adequate.  P: contact orthotics if any issues arise  OLEGARIO Monreal.

## 2019-10-11 NOTE — PROGRESS NOTES
"  [facility]   Acute Rehabilitation Unit  Daily progress note    INTERVAL HISTORY  ALEXA Luu, no acute event over night was seen and examined at bedside sitting up in the chair. Discussed the rational for Protonix.  She denies SOB, H/A, chest pain, or other pain.      Functionally, she is CGA  for LB and SBA for UB. She uses a  u-step laser walker with close SBA. Also, close SBA with showering/bathing. She is walking up to 30'x 2 and up to 80'x 1 with Drt giving assist.  Per SLP, Carmen is WNL on the cognitive test, her raw score of 15 and rank 99th percentile on the new learning and ST recall., no has ahoears gravelly voice- but  This is baseline.      MEDICATIONS  Scheduled meds    acetaminophen  500 mg Oral BID     aspirin  81 mg Oral Daily     calcium carbonate 600 mg-vitamin D 400 units  1 tablet Oral Daily     carbidopa-levodopa  1 tablet Oral TID     clopidogrel  75 mg Oral Daily     pantoprazole  40 mg Oral QAM AC     pravastatin  20 mg Oral Daily       PRN meds:  polyethylene glycol, senna-docusate      PHYSICAL EXAM  /72 (BP Location: Right arm)   Pulse 76   Temp 95.5  F (35.3  C) (Oral)   Resp 16   Ht 1.676 m (5' 6\")   Wt 56 kg (123 lb 8 oz)   SpO2 95%   BMI 19.93 kg/m    Gen: NAD  HEENT: ATNC, bilateral eyes unable to look completely upwards   Cardio: Has implanted oop recorder, S1 and S2 present, no murmur  Pulm: non-labored, breath sounds clear in all lobes  Abd: bowel sounds active x4, non-tender  Ext: warm, no edema, no calf tenderness B/I  Neuro/MSK: Alert and following commands    LABS  prior lab reviewed, no new lab today    ASSESSMENT AND PLAN    Ms. Carmen Luu,  87 year old rigth hand dominant female with a pertinent history of parkinson's disease and frequent falls, left ventricular hypertrophy, dyslipidemia, HTN, and chronic double vision who was admitted to [facility] on 10/5/19 with acute right precentral gyrus " "and right cerebellar infarcts.      Admission to acute inpatient rehab 10/9/2019.    Impairment group code: 01.1        1. PT, OT and SLP 60 minutes of each on a daily basis, in addition to rehab nursing and close management of physiatrist.       2. Impairment of ADL's:  OT for 60 min daily to work on upper and lower body self care, dressing, toileting, bathing, energy conservation techniques with use of ADs as needed.      3. Impairment of mobility:   PT for 60 min daily to work on balance, gait exercises, strengthening, and safety.      4. Impairment of cognition/language/swallow:   SLP for 60 min daily for cognitive evaluation and treatment strategies for higher level cognitive deficits and memory impairment. On regular diet.      5. Rehab RN to administer medication, patient education on medication taking, VS monitoring, bowel regimen, and patient education.        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 TTE didn't show any source. . HgbA1c 5.8%  --status post insertion of loop recorder 10/8. Per chart review, \"-She was not good candidate for anticoagulation due to underlying Parkinson disease and if A. fib is detected on loop recorder and she requires anticoagulation, we can consider watchman device in that scenario.\"   --dual antiplatelet therapy with aspirin and Plavix for 21 days. After 21 days we can switch her to aspirin monotherapy additionally  --continue pravastatin.    H/o Parkinson's disease  Possible Progressive supranuclear palsy (PSP)   She was seen and evaluated by several providers and neurologists; most recently saw Dr. Mendoza.   --continue Sinemet 25/100 mg 1 tablet 3 times daily      -H/o frequent falls- serious injury including neck fracture, rib fracture, shoulder fracture.   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 Images were reviewed by neurosurgery team after admission and no new recommendations.   -U-step laser walker      Hyperlipidemia,   - continue  pravastatin (outpatient medication regimen)    Essential hypertension: per EMR but BP has been well controlled off any medications.      Malnutrition, protein/calorie,  Reports reduced appetite, weight loss, and muscle wasting. On regular diet. Will consult dietitian.       H/o chronic urinary incontinence   Was followed at [facility] urology. Pessary was recommended and currently using which was helpful.  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 Was seen by ophthalmology years ago; knows that she needs a f/u.       1. Adjustment to disability:  Clinical psychology to eval and treat  2. FEN: Regular with thin liquid  3. Bowel: PRN miralax  4. Bladder: PVR, per unit protocol  5. DVT Prophylaxis: mechanical, ambulation  6. GI Prophylaxis: Add protonix daily given DAPT  7. Code: DNI/DNR confrimed  8. Disposition: home  9. ELOS:  6 days.  10. Rehab prognosis:  good  11. Follow up Appointments on Discharge:  --implantable loop recorder: return to device clinic in 1 week for removal of sutures ( 10/15/19 )   Patient Instructions  -You will receive a device identification (ID) card in the mail from the device  within 6 weeks to replace the temporary ID card you were given in the hospital.     You may travel by any mode of transportation; just show your device ID card.      For any surgery, let your doctor know you have an implantable loop recorder.     Your device is MRI safe      Please call the Device Clinic after each time you use your Patient Symptom Activator.     Device Clinic Contact Information  Device Nurses: [phone], Option #3.  Device Remote Specialists: [phone], Option #2. For questions about your Remote Transmission or Transmission Schedule  Device Schedulers:  [phone], Option #1     -f/u with cardiology as outpatient.     -[facility]rologist at [facility]  -follow with [facility] as recommanded    Indira Hendrickson, ZACH  Physical Medicine & Rehabilitation

## 2019-10-11 NOTE — PLAN OF CARE
Speech Language Therapy Discharge Summary    Reason for therapy discharge:    All goals and outcomes met, no further needs identified.    Progress towards therapy goal(s). See goals on Care Plan in [facility] electronic health record for goal details.  Goals met    Therapy recommendation(s):    No further therapy is recommended.  Several formal cognitive assessments completed with all scores being within functional limits.  Patient and family both feel that cognitively she is at or near her baseline level of function.

## 2019-10-11 NOTE — PLAN OF CARE
PT: up in chair at start of PT session. Willing to work with PT needing min to mod A for all standing activities due to LOB. Pt demo stand pivo transfer x 10 with LOB 8-10 trail with some trails having multi mild LOB. Pt demo standing balance and stability tasks needing CGA to min for all. Pt demo amb with walker up to 30'x 2 and up to 80'x 1 with Drt giving assist. Pt demo one LOB needing increased support. Pt ed on  with WW. Pt demo multi times of stepping outside of walkers LATOYA or leaving it behind.

## 2019-10-11 NOTE — PROGRESS NOTES
10/11/19 0925   Signing Clinician's Name / Credentials   Signing clinician's name / credentials Radha Rice MS/CCC-SLP   Quick Adds   Rehab Discipline SLP   Additional Documentation   SLP Plan SLP: pt scored very high on complex memory test- WJ-R 99th percentile - she also scored WNL on neuropsych testing for memory and all cognitive domains when assessed last March- this SLP read report- so will d/c memory goal; assess higher level reasoning- WJ-R- if testing out high then likely d/c from sptx; pt does have ahoears gravelly voice- but is baseline for pt   Total Session Time   Total Session Time (minutes) 30 minutes   ARC or TCU Only   What unit is patient on? Acute Rehab   SLP - Acute Rehab Center Time   Individual Time (minutes) - enter zero if not applicable - SLP 30  (30 cognitive skills)   Group Time (minutes) - enter zero if not applicable  - SLP 0   Concurrent Time (minutes) - enter zero if not applicable  - SLP 0   Co-Treatment Time (minutes) - enter zero if not applicable  - SLP 0   ARC Total Session Time (minutes) - SLP 30   Comprehension   Functional Performance Complete independence comprehending complex/abstract ideas   Comprehension Comment Following complex directions without difficulty; processing time improved overall   Expression   Functional Performance Complete independence expressing complex/abstract ideas   Expression Comment expresses self independently; hoarse gravelly voice is baseline   Social Interaction   Functional Performance Complete independence-socially appropriate in all situations   Memory   Functional Performance Recalls all aspects of daily routine, remembers people and requests   Memory Comment Compelted complex assessment of new learning and ST recall: WJ-R Visual auditory learning- pt scored a raw score of 15 and a percentile rank of 99th- this is a very high score on this test. Pt and family are not noticing any changes in cognition since stroke. Also [facility]-- per this eval- all domains of cognition were testing out WNL or within average range. No further tx for memory- will d/c this goal

## 2019-10-12 PROCEDURE — 25000132 ZZH RX MED GY IP 250 OP 250 PS 637: Mod: GY | Performed by: PHYSICAL MEDICINE & REHABILITATION

## 2019-10-12 PROCEDURE — 25000132 ZZH RX MED GY IP 250 OP 250 PS 637: Mod: GY | Performed by: NURSE PRACTITIONER

## 2019-10-12 PROCEDURE — 12800006 ZZH R&B REHAB

## 2019-10-12 PROCEDURE — 97110 THERAPEUTIC EXERCISES: CPT | Mod: GP

## 2019-10-12 PROCEDURE — 97150 GROUP THERAPEUTIC PROCEDURES: CPT | Mod: GP

## 2019-10-12 PROCEDURE — 97535 SELF CARE MNGMENT TRAINING: CPT | Mod: GO

## 2019-10-12 PROCEDURE — 97530 THERAPEUTIC ACTIVITIES: CPT | Mod: GP

## 2019-10-12 RX ADMIN — CARBIDOPA AND LEVODOPA 1 TABLET: 25; 100 TABLET ORAL at 11:27

## 2019-10-12 RX ADMIN — PANTOPRAZOLE SODIUM 40 MG: 40 TABLET, DELAYED RELEASE ORAL at 06:41

## 2019-10-12 RX ADMIN — CALCIUM CARBONATE 600 MG (1,500 MG)-VITAMIN D3 400 UNIT TABLET 1 TABLET: at 07:59

## 2019-10-12 RX ADMIN — PRAVASTATIN SODIUM 20 MG: 20 TABLET ORAL at 19:27

## 2019-10-12 RX ADMIN — CARBIDOPA AND LEVODOPA 1 TABLET: 25; 100 TABLET ORAL at 16:01

## 2019-10-12 RX ADMIN — CARBIDOPA AND LEVODOPA 1 TABLET: 25; 100 TABLET ORAL at 06:41

## 2019-10-12 RX ADMIN — ACETAMINOPHEN 500 MG: 500 TABLET ORAL at 19:27

## 2019-10-12 RX ADMIN — ASPIRIN 81 MG: 81 TABLET ORAL at 07:59

## 2019-10-12 RX ADMIN — CLOPIDOGREL BISULFATE 75 MG: 75 TABLET, FILM COATED ORAL at 07:58

## 2019-10-12 RX ADMIN — ACETAMINOPHEN 500 MG: 500 TABLET ORAL at 07:58

## 2019-10-12 NOTE — PLAN OF CARE
FOCUS/GOAL  Medication management and Safety management    ASSESSMENT, INTERVENTIONS AND CONTINUING PLAN FOR GOAL:  Pt is alert and oriented x4. VSS. Denied pain. Reported numbness on LUE.Continent of bowel and bladder, no BM this shift. Pt uses call light to make needs known. Bed alarm on for safety and call light within reach. Will continue to monitor.

## 2019-10-12 NOTE — PLAN OF CARE
Pt attended Falls Prevention class today with group of 4 patients.  Pt selected for class due to documented gait deficit and falls risk.  Class includes education in falls risks, how to decrease that risk through behavior and home modifications and energy conservation; and instruction in available equipment designed to increase home safety. Pt was able to verbalize understanding of materials and participated appropriately in the discussion and problem-solving segments of the class.

## 2019-10-12 NOTE — PLAN OF CARE
FOCUS/GOAL  Bowel management, Bladder management, and Pain management    ASSESSMENT, INTERVENTIONS AND CONTINUING PLAN FOR GOAL:  Pt slept well on the overnight, using call light appropriately. Pt is alert and orient x4, CGA with walker and gait belt. Continent of bowel and bladder. Continue with plan of care.

## 2019-10-12 NOTE — PROGRESS NOTES
Patient is A&Ox4, able to make needs known. Denies pain. Good PO appetite. Continent of B&B. Continue with POC.

## 2019-10-13 PROCEDURE — 97116 GAIT TRAINING THERAPY: CPT | Mod: GP

## 2019-10-13 PROCEDURE — 25000132 ZZH RX MED GY IP 250 OP 250 PS 637: Mod: GY | Performed by: NURSE PRACTITIONER

## 2019-10-13 PROCEDURE — 97530 THERAPEUTIC ACTIVITIES: CPT | Mod: GP

## 2019-10-13 PROCEDURE — 12800006 ZZH R&B REHAB

## 2019-10-13 PROCEDURE — 25000132 ZZH RX MED GY IP 250 OP 250 PS 637: Mod: GY | Performed by: PHYSICAL MEDICINE & REHABILITATION

## 2019-10-13 PROCEDURE — 97535 SELF CARE MNGMENT TRAINING: CPT | Mod: GO

## 2019-10-13 PROCEDURE — 97110 THERAPEUTIC EXERCISES: CPT | Mod: GP

## 2019-10-13 RX ADMIN — CALCIUM CARBONATE 600 MG (1,500 MG)-VITAMIN D3 400 UNIT TABLET 1 TABLET: at 08:21

## 2019-10-13 RX ADMIN — CARBIDOPA AND LEVODOPA 1 TABLET: 25; 100 TABLET ORAL at 16:08

## 2019-10-13 RX ADMIN — ACETAMINOPHEN 500 MG: 500 TABLET ORAL at 08:21

## 2019-10-13 RX ADMIN — CARBIDOPA AND LEVODOPA 1 TABLET: 25; 100 TABLET ORAL at 06:59

## 2019-10-13 RX ADMIN — ACETAMINOPHEN 500 MG: 500 TABLET ORAL at 19:33

## 2019-10-13 RX ADMIN — CLOPIDOGREL BISULFATE 75 MG: 75 TABLET, FILM COATED ORAL at 08:21

## 2019-10-13 RX ADMIN — PRAVASTATIN SODIUM 20 MG: 20 TABLET ORAL at 19:33

## 2019-10-13 RX ADMIN — CARBIDOPA AND LEVODOPA 1 TABLET: 25; 100 TABLET ORAL at 11:21

## 2019-10-13 RX ADMIN — PANTOPRAZOLE SODIUM 40 MG: 40 TABLET, DELAYED RELEASE ORAL at 06:59

## 2019-10-13 RX ADMIN — ASPIRIN 81 MG: 81 TABLET ORAL at 08:21

## 2019-10-13 NOTE — PLAN OF CARE
AxOx4, calls appropriately, able to make needs known. C/o pain in L calf muscle, applied aromatherapy oil with massage, reports relief. Declined pain med. L chest dressing CDI. Up with CGA, walker, gait belt x2 to bathroom. Continent of urine this shift. LBM 10/12. Bed alarm on for safety overnight. Denies CP, SOB, N/T, nausea. Continue POC.

## 2019-10-13 NOTE — PLAN OF CARE
PT: Session focused on transfer drills in which pt continues to require verbal cues for safety during w/c based transfers, extended discharge date to Thursday in order to increase safety and consistency with transfers, pt in agreement with this. Cont with POC.

## 2019-10-13 NOTE — PROGRESS NOTES
"PM&R PROGRESS NOTE     Patient Active Problem List   Diagnosis     Atypical Parkinsonism (H)     Disorder of bone and cartilage     Left ventricular hypertrophy     Hemorrhoids     Mixed hyperlipidemia     Other and unspecified diseases of appendix     Posterior capsular opacification     Prolapse of vaginal wall     Venous insufficiency     Abnormal brain MRI     Closed fracture of multiple ribs of both sides, initial encounter     Fall     Stroke (H)       CLEMENTINA Luu is a 87 year old admitted to the ARU on 10/9/2019    She has a history of history of parkinson's disease and frequent falls, left ventricular hypertrophy, dyslipidemia, HTN, and chronic double vision who was admitted to [facility] on 10/5/19 with 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      From the functional perspective,     Medically, she has a history of Parkinson's disease, seen by Dr. karen mar at baseline.  Continue Sinemet at the current dose.  She also has a history of neck fracture rib fracture and shoulder fraction is currently in a cervical collar.  She is currently undergoing remote cardiac monitoring to rule out atrial fibrillation.  Extensive education was provided regarding this.    Orders Placed This Encounter      Combination Diet Regular Diet Adult      /71 (BP Location: Right arm)   Pulse 79   Temp 95.8  F (35.4  C) (Oral)   Resp 16   Ht 1.676 m (5' 6\")   Wt 56 kg (123 lb 8 oz)   SpO2 96%   BMI 19.93 kg/m    Physical exam    Patient is lying in bed comfortable in no acute distress   HEENT NC AT PRTL EOM good   Neck supple  Heart S1S2  Lungs CTA  Abdomen  benign BS positive NT NR   LE no edema     Generalized atrophy    Current Facility-Administered Medications   Medication     - Medication Assessment Program - Rehab Services     acetaminophen (TYLENOL) tablet 500 mg     aspirin EC tablet 81 mg     calcium carbonate 600 mg-vitamin D 400 units (CALTRATE) per tablet 1 tablet     " carbidopa-levodopa (SINEMET)  MG per tablet 1 tablet     clopidogrel (PLAVIX) tablet 75 mg     pantoprazole (PROTONIX) EC tablet 40 mg     polyethylene glycol (MIRALAX/GLYCOLAX) Packet 17 g     pravastatin (PRAVACHOL) tablet 20 mg     senna-docusate (SENOKOT-S/PERICOLACE) 8.6-50 MG per tablet 1-2 tablet        Labs:  No results found for: WBC, HGB, HCT, PLT, NA, POTASSIUM, CHLORIDE, CO2, BUN, CR, GLC, SED, DD, DIMER, NTBNPI, NTBNP, TROPONIN, TROPI, TROPR, TROPN, AST, ALT, GGT, ALKPHOS, BILITOTAL, BILIDIRECT, TONIO, INR    Attestation:  This patient has been seen and evaluated by me, Angie Garcia MD.    Total time: 25 Minutes more than 50% in Care coordination / counseling time  Angie Garcia MD

## 2019-10-13 NOTE — PLAN OF CARE
FOCUS/GOAL  Medication management and Safety management    ASSESSMENT, INTERVENTIONS AND CONTINUING PLAN FOR GOAL:  Pt is alert and oriented. VSS. Denied pain. Left chest incision C/D/I, dressing changed. Continent of bowel and bladder, LBM 10/12. CGA/walker. Pt had mild nosebleed today before HS, but it stopped with applying pressure for a few minutes, will continue to monitor. Pt calls appropriately for assistance, bed alarm on for safety.

## 2019-10-13 NOTE — PLAN OF CARE
OT: simple meal prep making toast in kitchen, simulating laundry tasks completed. safe functional standing at kitchen counter tops with WC behind practiced x3, putting clothes in and out of washer, transporting laundry basket to bed to fold clothes as she will at home simulated. Required SBA for all IADLs tasks for safety cues

## 2019-10-13 NOTE — PLAN OF CARE
Patient is Alert and Oriented x4, denies pain. VSS. SBA with walker. Continent of B&B. Patient had nose bleed this AM, resolved with applied pressure. Offered vasaline to apply to nares. Declined but stated they would use at HS. Set up MAP, can start tomorrow. Continue with POC.

## 2019-10-14 PROCEDURE — 97530 THERAPEUTIC ACTIVITIES: CPT | Mod: GO

## 2019-10-14 PROCEDURE — 97530 THERAPEUTIC ACTIVITIES: CPT | Mod: GP

## 2019-10-14 PROCEDURE — 97110 THERAPEUTIC EXERCISES: CPT | Mod: GP

## 2019-10-14 PROCEDURE — 97535 SELF CARE MNGMENT TRAINING: CPT | Mod: GO

## 2019-10-14 PROCEDURE — 12800006 ZZH R&B REHAB

## 2019-10-14 PROCEDURE — 25000132 ZZH RX MED GY IP 250 OP 250 PS 637: Mod: GY | Performed by: NURSE PRACTITIONER

## 2019-10-14 PROCEDURE — 25000132 ZZH RX MED GY IP 250 OP 250 PS 637: Mod: GY | Performed by: PHYSICAL MEDICINE & REHABILITATION

## 2019-10-14 RX ADMIN — ASPIRIN 81 MG: 81 TABLET ORAL at 08:58

## 2019-10-14 RX ADMIN — PRAVASTATIN SODIUM 20 MG: 20 TABLET ORAL at 20:31

## 2019-10-14 RX ADMIN — CALCIUM CARBONATE 600 MG (1,500 MG)-VITAMIN D3 400 UNIT TABLET 1 TABLET: at 08:57

## 2019-10-14 RX ADMIN — CARBIDOPA AND LEVODOPA 1 TABLET: 25; 100 TABLET ORAL at 11:27

## 2019-10-14 RX ADMIN — CARBIDOPA AND LEVODOPA 1 TABLET: 25; 100 TABLET ORAL at 06:53

## 2019-10-14 RX ADMIN — ACETAMINOPHEN 500 MG: 500 TABLET ORAL at 08:57

## 2019-10-14 RX ADMIN — PANTOPRAZOLE SODIUM 40 MG: 40 TABLET, DELAYED RELEASE ORAL at 06:52

## 2019-10-14 RX ADMIN — CARBIDOPA AND LEVODOPA 1 TABLET: 25; 100 TABLET ORAL at 16:24

## 2019-10-14 RX ADMIN — ACETAMINOPHEN 500 MG: 500 TABLET ORAL at 20:31

## 2019-10-14 RX ADMIN — CLOPIDOGREL BISULFATE 75 MG: 75 TABLET, FILM COATED ORAL at 08:57

## 2019-10-14 NOTE — PROGRESS NOTES
Notified by OT that pt dtr Mariel at bedside this weekend, coordinating plans for pt to get on wait list for care home and requesting a letter of necessity for . Mariel number not listed in face sheet. Number located in Care Everywhere. Met with pt at bedside, received permission to contact Mariel.     Called Mariel at [phone]. Mariel emailed SWer the form that stated what kind of letter/information needed to submit. Information was forwarded to NP to complete. Mariel requesting that pt discharge with letter and that she is emailed a copy of the letter as well ([email or web address]). Will follow up with NP and email the letter. Mariel denied additional needs at this time.     Met with pt to follow up. Pt expressed appreciation. Provided pt with list of caregiver services (from previous discussion last week). Discussed C recommendations. Pt expressed understanding and agreement. Discussed HHC options, offered choice and pt gave SWer permission to send referral to [facility]. Referral sent, [facility] willing and able to accept. Pt sent up with [facility] RN, PT, OT and HHA. Pt denied additional needs at this time.     Will follow up on the letter and remain available if additional needs arise.     DERIK Nowak, [facility] Acute Rehab Unit   Phone: [phone]  I   Pager: [phone]

## 2019-10-14 NOTE — PLAN OF CARE
FOCUS/GOAL  Bowel management, Bladder management, Pain management, Skin integrity and Cognition/Memory/Judgment/Problem solving    ASSESSMENT, INTERVENTIONS AND CONTINUING PLAN FOR GOAL:  Pt is alert and oriented, but seemed to be confused about MAP program and was educated at 4am as patient had called for meds thinking that they could set their medications up for the remainder of the day. Pt denied pain, sob, or new changes in sensation, continent of bowel and bladder, no further care concerns at this time continue with POC.     Performed MAP well in am.

## 2019-10-14 NOTE — PLAN OF CARE
PT: Rental w/c order was faxed to [facility] on Friday 10/11 in preparation for expected Tuesday discharge. Discharge moved to Thursday 10/17, Perla called and updated to this change. Patient states her 's power scooter is now working, but educated to have HCPT assess safety with this.    If pt's long term goals are power mobility, will plan for OP OT followup at the [facility] for seating evaluation. Pt should remain w/c based as part of a comprehensive fall prevention and home safety plan.

## 2019-10-14 NOTE — PLAN OF CARE
FOCUS/GOAL  Medication management and Safety management    ASSESSMENT, INTERVENTIONS AND CONTINUING PLAN FOR GOAL:  Pt VSS.Denied pain or any discomfort. No nosebleed this shift, pt encouraged to apply Vaseline. Continent of bowel and bladder, LBM 10/12. She declined a shower this shift. Pt to start MAP tomorrow. Bed alarm on for safety, call light within reach, calls appropriately for assistance. Ok to continue with POC.

## 2019-10-14 NOTE — PROGRESS NOTES
"  [facility]   Acute Rehabilitation Unit  Daily progress note    INTERVAL HISTORY  Weekend notes reviewed.  No significant events and this morning Ms. Luis has no new concerns or complaints.  She is seen with daughter present.  She denies chest pain or shortness of breath.  Functionally we are awaiting a WC in order for safe discharge home.  She has a cardiology appointment on 10/16, and we anticipate she will have all discharge needs in place by that day and can make that appointment after discharge.  Letter provided today for the patient recommending assistance for standing or walking for safety.      MEDICATIONS  Scheduled meds    - Medication Assessment Program - Rehab Services   Does not apply See Admin Instructions     acetaminophen  500 mg Oral BID     aspirin  81 mg Oral Daily     calcium carbonate 600 mg-vitamin D 400 units  1 tablet Oral Daily     carbidopa-levodopa  1 tablet Oral TID     clopidogrel  75 mg Oral Daily     pantoprazole  40 mg Oral QAM AC     pravastatin  20 mg Oral Daily       PRN meds:  polyethylene glycol, senna-docusate      PHYSICAL EXAM  /69 (BP Location: Right arm)   Pulse 88   Temp 95.6  F (35.3  C) (Oral)   Resp 16   Ht 1.676 m (5' 6\")   Wt 56 kg (123 lb 8 oz)   SpO2 98%   BMI 19.93 kg/m    Gen: NAD  HEENT: NC/AT  Cardio: Has implanted loop recorder, S1 and S2 present, no murmur  Pulm: non-labored, breath sounds clear in all lobes  Abd: bowel sounds active x4, non-tender  Ext: warm, no edema, no calf tenderness B/I  Neuro/MSK: Alert and following commands    LABS  No new labs today      ASSESSMENT AND PLAN    Ms. Carmen Luis [facility],  87 year old rigth hand dominant female with a pertinent history of parkinson's disease and frequent falls, left ventricular hypertrophy, dyslipidemia, HTN, and chronic double vision who was admitted to [facility] on 10/5/19 with acute right precentral gyrus and right cerebellar " "infarcts.      Admission to acute inpatient rehab 10/9/2019.    Impairment group code: 01.1        1. PT, OT and SLP 60 minutes of each on a daily basis, in addition to rehab nursing and close management of physiatrist.       2. Impairment of ADL's:  OT for 60 min daily to work on upper and lower body self care, dressing, toileting, bathing, energy conservation techniques with use of ADs as needed.      3. Impairment of mobility:   PT for 60 min daily to work on balance, gait exercises, strengthening, and safety.      4. Impairment of cognition/language/swallow:   SLP for 60 min daily for cognitive evaluation and treatment strategies for higher level cognitive deficits and memory impairment. On regular diet.      5. Rehab RN to administer medication, patient education on medication taking, VS monitoring, bowel regimen, and patient education.        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 TTE didn't show any source. . HgbA1c 5.8%  --status post insertion of loop recorder 10/8. Per chart review, \"-She was not good candidate for anticoagulation due to underlying Parkinson disease and if A. fib is detected on loop recorder and she requires anticoagulation, we can consider watchman device in that scenario.\"   --dual antiplatelet therapy with aspirin and Plavix for 21 days (ending 10/25). After 21 days continue aspirin monotherapy  --continue pravastatin.    H/o Parkinson's disease  Possible Progressive supranuclear palsy (PSP)   She was seen and evaluated by several providers and neurologists; most recently saw Dr. Mendoza.   --continue Sinemet 25/100 mg 1 tablet 3 times daily      -H/o frequent falls- serious injury including neck fracture, rib fracture, shoulder fracture.   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 Images " were reviewed by neurosurgery team after admission and no new recommendations.   -U-step laser walker.  Recommend ambulating only with close assist of another person      Hyperlipidemia,   - continue  pravastatin (outpatient medication regimen)    Essential hypertension: per EMR but BP has been well controlled off any medications.      Malnutrition, protein/calorie,  Reports reduced appetite, weight loss, and muscle wasting. On regular diet. Will consult dietitian.       H/o chronic urinary incontinence   Was followed at [facility] urology. Pessary was recommended and currently using which was helpful.  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 Was seen by ophthalmology years ago; knows that she needs a f/u.       1. Adjustment to disability:  Clinical psychology to eval and treat  2. FEN: Regular with thin liquid  3. Bowel: PRN miralax  4. Bladder: PVR, per unit protocol  5. DVT Prophylaxis: mechanical, ambulation  6. GI Prophylaxis: Added protonix daily given DAPT  7. Code: DNI/DNR confrimed  8. Disposition: home  9. ELOS:  Tentative discharge 10/16  10. Rehab prognosis:  good  11. Follow up Appointments on Discharge:  --implantable loop recorder: return to device clinic in 1 week for removal of sutures ( 10/15/19 )   Patient Instructions  -You will receive a device identification (ID) card in the mail from the device  within 6 weeks to replace the temporary ID card you were given in the hospital.     You may travel by any mode of transportation; just show your device ID card.      For any surgery, let your doctor know you have an implantable loop recorder.     Your device is MRI safe      Please call the Device Clinic after each time you use your Patient Symptom Activator.     Device Clinic Contact Information  Device Nurses: [phone], Option #3.  Device Remote Specialists: [phone], Option #2. For questions about your Remote Transmission or  Transmission Schedule  Device Schedulers: [phone], Option #1     -f/u with cardiology as outpatient (scheduled 10/16)     -neurologist at [facility]  -[facility] as [facility]anded      Homero Saldivar MD  Department of Rehabilitation Medicine  Pager: [phone]    Time Spent on this Encounter   I, Homero Saldivar, spent a total of 25 minutes face-to-face or managing the care of ALEXA Luu. Over 50% of my time on the unit was spent counseling the patient and coordinating care. See note for details.

## 2019-10-14 NOTE — PLAN OF CARE
"  VS: Blood pressure 117/69, pulse 88, temperature 95.6  F (35.3  C), temperature source Oral, resp. rate 16, height 1.676 m (5' 6\"), weight 56 kg (123 lb 8 oz), SpO2 98 %.     O2: 98% at RA   Output: adequate   Last BM: 10/14   Activity: Up with assist of one using a w/c in room to BR, not Pt's walker   Skin: intact   Pain: Denies pain   CMS: intact   Dressing: none   Diet: Regular dit with thins, takes med's whole   LDA: None   Equipment: W/c   Plan: Continue to monitor   Additional Info: Patient started MAP, forgot to call this am but called at 1130 for her Sinemet. Patient set up the right med's and is aware of their indications.  Patient can not use her walker in room, its only with PT       "

## 2019-10-15 ENCOUNTER — HOME CARE/HOSPICE - HEALTHEAST (OUTPATIENT)
Dept: HOME HEALTH SERVICES | Facility: HOME HEALTH | Age: 84
End: 2019-10-15

## 2019-10-15 PROCEDURE — 12800006 ZZH R&B REHAB

## 2019-10-15 PROCEDURE — 97530 THERAPEUTIC ACTIVITIES: CPT | Mod: GP

## 2019-10-15 PROCEDURE — 25000132 ZZH RX MED GY IP 250 OP 250 PS 637: Mod: GY | Performed by: PHYSICAL MEDICINE & REHABILITATION

## 2019-10-15 PROCEDURE — 25000132 ZZH RX MED GY IP 250 OP 250 PS 637: Mod: GY | Performed by: NURSE PRACTITIONER

## 2019-10-15 PROCEDURE — 97535 SELF CARE MNGMENT TRAINING: CPT | Mod: GO

## 2019-10-15 PROCEDURE — 97110 THERAPEUTIC EXERCISES: CPT | Mod: GP

## 2019-10-15 PROCEDURE — 97116 GAIT TRAINING THERAPY: CPT | Mod: GP

## 2019-10-15 RX ORDER — AMOXICILLIN 250 MG
1-2 CAPSULE ORAL 2 TIMES DAILY PRN
COMMUNITY
Start: 2019-10-15 | End: 2019-11-22

## 2019-10-15 RX ORDER — PANTOPRAZOLE SODIUM 40 MG/1
40 TABLET, DELAYED RELEASE ORAL
Qty: 30 TABLET | Refills: 0 | Status: SHIPPED | OUTPATIENT
Start: 2019-10-16 | End: 2019-10-16

## 2019-10-15 RX ORDER — POLYETHYLENE GLYCOL 3350 17 G/17G
17 POWDER, FOR SOLUTION ORAL DAILY PRN
COMMUNITY
Start: 2019-10-15 | End: 2019-11-22

## 2019-10-15 RX ORDER — CLOPIDOGREL BISULFATE 75 MG/1
75 TABLET ORAL DAILY
Qty: 30 TABLET | Refills: 0 | Status: SHIPPED | OUTPATIENT
Start: 2019-10-16 | End: 2019-10-16

## 2019-10-15 RX ORDER — PRAVASTATIN SODIUM 20 MG
20 TABLET ORAL DAILY
Qty: 30 TABLET | Refills: 0 | Status: SHIPPED | OUTPATIENT
Start: 2019-10-15 | End: 2019-11-22

## 2019-10-15 RX ADMIN — PRAVASTATIN SODIUM 20 MG: 20 TABLET ORAL at 19:53

## 2019-10-15 RX ADMIN — CARBIDOPA AND LEVODOPA 1 TABLET: 25; 100 TABLET ORAL at 06:52

## 2019-10-15 RX ADMIN — ASPIRIN 81 MG: 81 TABLET ORAL at 08:05

## 2019-10-15 RX ADMIN — CARBIDOPA AND LEVODOPA 1 TABLET: 25; 100 TABLET ORAL at 11:33

## 2019-10-15 RX ADMIN — CARBIDOPA AND LEVODOPA 1 TABLET: 25; 100 TABLET ORAL at 15:59

## 2019-10-15 RX ADMIN — CLOPIDOGREL BISULFATE 75 MG: 75 TABLET, FILM COATED ORAL at 08:06

## 2019-10-15 RX ADMIN — CALCIUM CARBONATE 600 MG (1,500 MG)-VITAMIN D3 400 UNIT TABLET 1 TABLET: at 08:05

## 2019-10-15 RX ADMIN — ACETAMINOPHEN 500 MG: 500 TABLET ORAL at 19:53

## 2019-10-15 RX ADMIN — PANTOPRAZOLE SODIUM 40 MG: 40 TABLET, DELAYED RELEASE ORAL at 06:52

## 2019-10-15 RX ADMIN — ACETAMINOPHEN 500 MG: 500 TABLET ORAL at 08:06

## 2019-10-15 NOTE — PROGRESS NOTES
"  [facility]   Acute Rehabilitation Unit  Daily progress note    INTERVAL HISTORY  ALEXA Luu, no acute event overnight, was seen and examined at bedside. She reports good night rest. Functionally, Carmen is Mod I with wheel chair based for transfers, can propel W/C with LE and occasional use of UE's on unit and in room. Denies SOB, v pain or H/A.      MEDICATIONS  Scheduled meds    - Medication Assessment Program - Rehab Services   Does not apply See Admin Instructions     acetaminophen  500 mg Oral BID     aspirin  81 mg Oral Daily     calcium carbonate 600 mg-vitamin D 400 units  1 tablet Oral Daily     carbidopa-levodopa  1 tablet Oral TID     clopidogrel  75 mg Oral Daily     pantoprazole  40 mg Oral QAM AC     pravastatin  20 mg Oral Daily       PRN meds:  polyethylene glycol, senna-docusate      PHYSICAL EXAM  /73 (BP Location: Right arm)   Pulse 83   Temp 97.3  F (36.3  C) (Oral)   Resp 16   Ht 1.676 m (5' 6\")   Wt 56 kg (123 lb 8 oz)   SpO2 95%   BMI 19.93 kg/m    Gen: NAD, pleasant  HEENT: ATNC  Cardio: S1 and S2 present, no murmur  Pulm: non-labored clear in all lobes  Abd: soft, non-tender  Ext: no B/I calf tenderness  Neuro/MSK: following commands, Alert    LABS  No new lab but prior lab reviewed    ASSESSMENT AND PLAN    Ms. 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      Admission to acute inpatient rehab 10/9/2019.    Impairment group code: 01.1        1. PT, OT and SLP 60 minutes of each on a daily basis, in addition to rehab nursing and close management of physiatrist.       2. Impairment of ADL's:  OT for 60 min daily to work on upper and lower body self care, " "dressing, toileting, bathing, energy conservation techniques with use of ADs as needed.      3. Impairment of mobility:   PT for 60 min daily to work on balance, gait exercises, strengthening, and safety.      4. Impairment of cognition/language/swallow:   SLP for 60 min daily for cognitive evaluation and treatment strategies for higher level cognitive deficits and memory impairment. On regular diet.      5. Rehab RN to administer medication, patient education on medication taking, VS monitoring, bowel regimen, and patient education.        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 TTE didn't show any source. . HgbA1c 5.8%  --status post insertion of loop recorder 10/8. Per chart review, \"-She was not good candidate for anticoagulation due to underlying Parkinson disease and if A. fib is detected on loop recorder and she requires anticoagulation, we can consider watchman device in that scenario.\"   --dual antiplatelet therapy with aspirin and Plavix for 21 days (ending 10/25). After 21 days continue aspirin monotherapy  --continue pravastatin.    H/o Parkinson's disease  Possible Progressive supranuclear palsy (PSP)   She was seen and evaluated by several providers and neurologists; most recently saw Dr. Mendoza.   --continue Sinemet 25/100 mg 1 tablet 3 times daily      -H/o frequent falls- serious injury including neck fracture, rib fracture, shoulder fracture.   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 Images were reviewed by neurosurgery team after admission and no new recommendations.   -U-step laser walker.  Recommend ambulating only with close assist of another person      Hyperlipidemia,   - continue  pravastatin (outpatient medication regimen)    Essential hypertension: per EMR but BP has been well " controlled off any medications.      Malnutrition, protein/calorie,  Reports reduced appetite, weight loss, and muscle wasting. On regular diet. Will consult dietitian.       H/o chronic urinary incontinence   Was followed at [facility] urology. Pessary was recommended and currently using which was helpful.  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 Was seen by ophthalmology years ago; knows that she needs a f/u.       1. Adjustment to disability:  Clinical psychology to eval and treat  2. FEN: Regular with thin liquid  3. Bowel: PRN miralax  4. Bladder: PVR, per unit protocol  5. DVT Prophylaxis: mechanical, ambulation  6. GI Prophylaxis: Added protonix daily given DAPT  7. Code: DNI/DNR confrimed  8. Disposition: home  9. ELOS:  Tentative discharge 10/16  10. Rehab prognosis:  good  11. Follow up Appointments on Discharge:  --implantable loop recorder: return to device clinic in 1 week for removal of sutures ( 10/15/19 )   Patient Instructions  -You will receive a device identification (ID) card in the mail from the device  within 6 weeks to replace the temporary ID card you were given in the hospital.     You may travel by any mode of transportation; just show your device ID card.      For any surgery, let your doctor know you have an implantable loop recorder.     Your device is MRI safe      Please call the Device Clinic after each time you use your Patient Symptom Activator.     Device Clinic Contact Information  Device Nurses: [phone], Option #3.  Device Remote Specialists: [phone], Option #2. For questions about your Remote Transmission or Transmission Schedule  Device Schedulers: [phone], Option #1     -f/u with cardiology as outpatient (scheduled 10/16)     -neurologist at [facility]  -follow with [facility] urology as recommanded      Indira Hendrickson, CNP  Physical Medicine & Rehabilitation

## 2019-10-15 NOTE — PLAN OF CARE
FOCUS/GOAL  Medical management    ASSESSMENT, INTERVENTIONS AND CONTINUING PLAN FOR GOAL:  Patient called for assistance and communicated her needs. She needed to use the bathroom, she transferred to w/c and toilet with assistance of 1. No c/o pain. She is on MAP, she has an alarm she set up to remind her to call for her pills. She called appropriately for her 0700 medication, she was able to state which pills she was going to take appropriately. Bed alarm on.

## 2019-10-15 NOTE — PROGRESS NOTES
Patient is A&OX4, but forgetful. Denies pain. A1 stand pivot. Continent of B&B. On MAP, called and picked out medications appropriately. Would recommend to continue for one more day then discharge. Continue with POC.

## 2019-10-15 NOTE — PLAN OF CARE
Patient A&O x4 and able to make needs known using call light. VSS and lung sounds clear and equal bilaterally. Bowel sounds active and passing gas; LBM this morning. Denies chest pain, lightheadedness, dizziness, and SOB. Drinking well and tolerating regular diet with no nausea. Voiding spontaneously without difficulties. Left arm numbness/tingling much improved and strength equal. Able to transfer with assist of 1 to the wheelchair; mod I in wheelchair during the day. Patient was able to demonstrate ability to call for medications on time and pick out appropriate medications; no longer needs MAP. Will continue to follow plan of care and provide interventions as needed.

## 2019-10-15 NOTE — PLAN OF CARE
PT: pt progressed to mod I w/c based for transfers, able to propel w/c with LE's and occasional use of UE's on unit and in room, on track for discharge Wednesday home.

## 2019-10-16 ENCOUNTER — RECORDS - HEALTHEAST (OUTPATIENT)
Dept: ADMINISTRATIVE | Facility: OTHER | Age: 84
End: 2019-10-16

## 2019-10-16 ENCOUNTER — AMBULATORY - HEALTHEAST (OUTPATIENT)
Dept: CARDIOLOGY | Facility: CLINIC | Age: 84
End: 2019-10-16

## 2019-10-16 VITALS
OXYGEN SATURATION: 98 % | SYSTOLIC BLOOD PRESSURE: 141 MMHG | HEART RATE: 80 BPM | TEMPERATURE: 96.1 F | HEIGHT: 66 IN | DIASTOLIC BLOOD PRESSURE: 72 MMHG | WEIGHT: 123.5 LBS | RESPIRATION RATE: 16 BRPM | BODY MASS INDEX: 19.85 KG/M2

## 2019-10-16 DIAGNOSIS — I63.9 CRYPTOGENIC STROKE (H): ICD-10-CM

## 2019-10-16 LAB
HCC DEVICE COMMENTS: NORMAL
HCC DEVICE IMPLANTING PROVIDER: NORMAL
HCC DEVICE MANUFACTURE: NORMAL
HCC DEVICE MODEL: NORMAL
HCC DEVICE SERIAL NUMBER: NORMAL
HCC DEVICE TYPE: NORMAL

## 2019-10-16 PROCEDURE — 25000132 ZZH RX MED GY IP 250 OP 250 PS 637: Mod: GY | Performed by: NURSE PRACTITIONER

## 2019-10-16 PROCEDURE — 25000132 ZZH RX MED GY IP 250 OP 250 PS 637: Mod: GY | Performed by: PHYSICAL MEDICINE & REHABILITATION

## 2019-10-16 PROCEDURE — 97530 THERAPEUTIC ACTIVITIES: CPT | Mod: GP | Performed by: PHYSICAL THERAPIST

## 2019-10-16 PROCEDURE — 97110 THERAPEUTIC EXERCISES: CPT | Mod: GO | Performed by: OCCUPATIONAL THERAPIST

## 2019-10-16 PROCEDURE — 97535 SELF CARE MNGMENT TRAINING: CPT | Mod: GO | Performed by: OCCUPATIONAL THERAPIST

## 2019-10-16 RX ORDER — PANTOPRAZOLE SODIUM 40 MG/1
40 TABLET, DELAYED RELEASE ORAL
Qty: 9 TABLET | Refills: 0 | Status: SHIPPED | OUTPATIENT
Start: 2019-10-16 | End: 2019-10-16

## 2019-10-16 RX ORDER — CLOPIDOGREL BISULFATE 75 MG/1
75 TABLET ORAL DAILY
Qty: 9 TABLET | Refills: 0 | Status: SHIPPED | OUTPATIENT
Start: 2019-10-16 | End: 2019-10-16

## 2019-10-16 RX ORDER — PANTOPRAZOLE SODIUM 40 MG/1
40 TABLET, DELAYED RELEASE ORAL
Qty: 9 TABLET | Refills: 0 | Status: SHIPPED | OUTPATIENT
Start: 2019-10-16 | End: 2019-11-22

## 2019-10-16 RX ORDER — CLOPIDOGREL BISULFATE 75 MG/1
75 TABLET ORAL DAILY
Qty: 9 TABLET | Refills: 0 | Status: SHIPPED | OUTPATIENT
Start: 2019-10-16 | End: 2019-11-22

## 2019-10-16 RX ADMIN — ACETAMINOPHEN 500 MG: 500 TABLET ORAL at 09:12

## 2019-10-16 RX ADMIN — CARBIDOPA AND LEVODOPA 1 TABLET: 25; 100 TABLET ORAL at 06:53

## 2019-10-16 RX ADMIN — ASPIRIN 81 MG: 81 TABLET ORAL at 09:12

## 2019-10-16 RX ADMIN — CALCIUM CARBONATE 600 MG (1,500 MG)-VITAMIN D3 400 UNIT TABLET 1 TABLET: at 09:12

## 2019-10-16 RX ADMIN — CARBIDOPA AND LEVODOPA 1 TABLET: 25; 100 TABLET ORAL at 11:33

## 2019-10-16 RX ADMIN — PANTOPRAZOLE SODIUM 40 MG: 40 TABLET, DELAYED RELEASE ORAL at 06:53

## 2019-10-16 RX ADMIN — CLOPIDOGREL BISULFATE 75 MG: 75 TABLET, FILM COATED ORAL at 09:12

## 2019-10-16 ASSESSMENT — MIFFLIN-ST. JEOR: SCORE: 982

## 2019-10-16 NOTE — PROGRESS NOTES
Pt received discharge instructions and medication orders sent to outpatient pharmacy, Pt verbalized understanding. Pt discharged from unit at approx 1200 in wheelchair to go home w/ daughter.

## 2019-10-16 NOTE — PLAN OF CARE
Physical Therapy Discharge Summary    Reason for therapy discharge:    Discharged to home with home therapy.    Progress towards therapy goal(s). See goals on Care Plan in [facility] electronic health record for goal details.  Goals met    Therapy recommendation(s):    Continued therapy is recommended.  Rationale/Recommendations:  Goals met to allow for safe discharge home w/c based. Ambulation only with assist and her Uwalker. d/c'd home w/ manual w/c which she uses w/ foot propulsion due to previous RCT.  Her longer term goal is power mobility which can be pursued when her course of home care is complete.

## 2019-10-16 NOTE — PROGRESS NOTES
Met with pt, confirmed discharge plans and pt denied additional needs or concerns. Pt given IMM, signed and denied questions or concerns. Family will transport home. MD wrote and gave pt letter to submit for ILF/FCI. No additional SW needs at this time.     [facility] Ph: [phone]    DERIK Nwoak, [facility] Acute Rehab Unit   Phone: [phone]  I   Pager: [phone]

## 2019-10-16 NOTE — DISCHARGE SUMMARY
"    [facility]   Acute Rehabilitation Unit  Discharge summary     Date of Admission: 10/9/2019  Date of Discharge: 10/16/2019  Disposition: Home  Primary Care Physician: Jerzy Aparicio  Attending physician: Toni Saldivar MD  Other significant physician provider(s): Indira Hendrickson, ADAM      discharge diagnosis      CVA    HTN, HLD, Parkinson's disease vs. Progressive supranuclear palsy with frequent falls      brief summary (per hpi)  ALEXA Luu is a 87 year old right hand dominant female with a pertinent history of parkinson's disease and frequent falls, left ventricular hypertrophy, dyslipidemia, HTN and chronic double vision which is corrected with prism eyeglasses. She presented to [facility] ED by personal vehicle with her daughter for evaluation of numbness and weakness of her LUE. She was found to have right precentral gyrus and right cerebellar infarct, outside TPA window on 10/5/19.    The patient reports sudden onset left arm tingling and numbness at 0630. She states her arm was \"flopping around\" and notes that she is having trouble \"finding things\" with her left hand. She states that she tried to rest her arm on the arm rest of her chair but notes that she didn't realize her left arm was already on the arm rest. She notes that she had difficulty finding her walker and states \"it felt like it [her left arm] was falling\". She describes her numbness \"as if it fell asleep\" and notes it radiates to her left shoulder but otherwise does not move. She states she took ibuprofen and acetaminophen at the time of onset. She notes that her numbness ebbs and flows but is otherwise constant. She reports, diagnosed with Parkinson's disease 2 years ago and often falls secondary to her Parkinson's symptoms. She states she fell 1 week ago and notes head trauma. She reports a history of neck injury. She denies any weakness, right arm pain, nausea, right leg pain, leg " "numbness, fever, chills, abdominal pain, chest pain, neck pain, shortness of breath, and any other concerns at this time    REHABILITATION COURSE  ALEXA Luu was admitted to the [facility] acute inpatient rehabilitation unit on 10/9/19 where she participated in PT, OT, and speech therapies for 3 hrs/day.  Given her history of frequent and significant falls, it was recommended she discharge at a wheelchair level if moving independently, and should only ambulate with a walker if she has close supervision of another person who is able to assist in the event of a loss of balance or Parkinsonian freeze episode.  At the time of discharge, she demonstrated independence with mobility and ADLs at a wheelchair level.  She will have home health PT, OT, RN, and home health aide.     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 TTE didn't show any source. . HgbA1c 5.8%  --status post insertion of loop recorder 10/8. Per chart review, \"-She was not good candidate for anticoagulation due to underlying Parkinson disease and if A. fib is detected on loop recorder and she requires anticoagulation, we can consider watchman device in that scenario.\"   --dual antiplatelet therapy with aspirin and Plavix for 21 days (ending 10/25). After 21 days continue aspirin monotherapy  --continue pravastatin.    H/o Parkinson's disease  Possible Progressive supranuclear palsy (PSP)   She was seen and evaluated by several providers and neurologists; most recently saw Dr. Mendoza.   --continue Sinemet 25/100 mg 1 tablet 3 times daily      -H/o frequent falls- serious injury including neck fracture, rib fracture, shoulder fracture.   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 Images were reviewed by neurosurgery team after admission and no new " recommendations.   -U-step laser walker.  Recommend ambulating only with close assist of another person      Hyperlipidemia,   - continue  pravastatin (outpatient medication regimen)    Essential hypertension: per EMR but BP has been well controlled off any medications.      Malnutrition, protein/calorie,  Reports reduced appetite, weight loss, and muscle wasting. On regular diet. Will consult dietitian.       H/o chronic urinary incontinence   Was followed at [facility] urology. Pessary was recommended and currently using which was helpful.  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 Was seen by ophthalmology years ago; knows that she needs a f/u.        1. Follow up Appointments on Discharge:  --implantable loop recorder: return to device clinic in 1 week for removal of sutures    Patient Instructions  -You will receive a device identification (ID) card in the mail from the device  within 6 weeks to replace the temporary ID card you were given in the hospital.     You may travel by any mode of transportation; just show your device ID card.      For any surgery, let your doctor know you have an implantable loop recorder.     Your device is MRI safe      Please call the Device Clinic after each time you use your Patient Symptom Activator.     Device Clinic Contact Information  Device Nurses: [phone], Option #3.  Device Remote Specialists: [phone], Option #2. For questions about your Remote Transmission or Transmission Schedule  Device Schedulers: [phone], Option #1     -f/u with cardiology as outpatient (scheduled 10/16)     -neurology ([facility] or primary neurologist at [facility])  -follow with [facility] urology as recommanded      dISCHARGE MEDICATIONS  Current Discharge Medication List      START taking these medications    Details   aspirin (ASA) 81 MG EC tablet Take 1 tablet (81 mg) by mouth daily  Qty: 30 tablet, Refills: 0    Associated  Diagnoses: Cerebrovascular accident (CVA) due to embolism of right cerebellar artery (H)      clopidogrel (PLAVIX) 75 MG tablet Take 1 tablet (75 mg) by mouth daily Take until 10/25/19, then discontinue  Qty: 9 tablet, Refills: 0    Associated Diagnoses: Cerebrovascular accident (CVA) due to embolism of right cerebellar artery (H)      pantoprazole (PROTONIX) 40 MG EC tablet Take 1 tablet (40 mg) by mouth every morning (before breakfast) Take until 10/25, then discontinue  Qty: 9 tablet, Refills: 0    Associated Diagnoses: Cerebrovascular accident (CVA) due to embolism of right cerebellar artery (H)      polyethylene glycol (MIRALAX/GLYCOLAX) packet Take 17 g by mouth daily as needed for constipation    Associated Diagnoses: Cerebrovascular accident (CVA) due to embolism of right cerebellar artery (H)      senna-docusate (SENOKOT-S/PERICOLACE) 8.6-50 MG tablet Take 1-2 tablets by mouth 2 times daily as needed for constipation    Associated Diagnoses: Cerebrovascular accident (CVA) due to embolism of right cerebellar artery (H)         CONTINUE these medications which have CHANGED    Details   pravastatin (PRAVACHOL) 20 MG tablet Take 1 tablet (20 mg) by mouth daily  Qty: 30 tablet, Refills: 0    Associated Diagnoses: Hyperlipidemia, unspecified hyperlipidemia type; Cerebrovascular accident (CVA) due to embolism of right cerebellar artery (H)         CONTINUE these medications which have NOT CHANGED    Details   carbidopa-levodopa (SINEMET)  MG tablet 1 tabs by mouth 3/day @ 7am, 1pm and 7pm      acetaminophen (TYLENOL) 500 MG tablet Take 500 mg by mouth      calcium carbonate 600 mg-vitamin D 400 units (CALTRATE) 600-400 MG-UNIT per tablet       fish oil-omega-3 fatty acids 1000 MG capsule Take 1 capsule (1 g) by mouth daily      order for DME Equipment being ordered:  UStep walker with laser and audio cueing  Qty: 1 Device, Refills: 1    Associated Diagnoses: Atypical Parkinsonism (H)         STOP taking  these medications       cholecalciferol (VITAMIN D-3 SUPER STRENGTH) 2000 units tablet Comments:   Reason for Stopping:         ibuprofen (ADVIL/MOTRIN) 200 MG tablet Comments:   Reason for Stopping:         Magnesium Citrate 200 MG TABS Comments:   Reason for Stopping:         vitamin C (ASCORBIC ACID) 1000 MG TABS Comments:   Reason for Stopping:                 DISCHARGE INSTRUCTIONS AND FOLLOW UP  Discharge Procedure Orders   Home care nursing referral   Referral Priority: Routine Referral Type: Home Health Therapies & Aides   Number of Visits Requested: 1     Home Care PT Referral for Hospital Discharge   Referral Priority: Routine Referral Type: Home Health Therapies & Aides   Number of Visits Requested: 1     Home Care OT Referral for Hospital Discharge   Referral Priority: Routine   Number of Visits Requested: 1     Reason for your hospital stay   Order Comments: Rehabilitation after stroke     Adult [facility] Follow-up and recommended labs and tests   Order Comments: Follow up with primary care provider, Jerzy Aparicio, within 7-14 days for hospital follow- up and regarding new diagnosis.  The following labs/tests are recommended: CBC, BMP.      Appointments on [place] and/or [facility] (with [facility] or [facility] provider or service). Call [phone] if you haven't heard regarding these appointments within 7 days of discharge.     Activity   Order Comments: Your activity upon discharge: activity as tolerated at a wheelchair level.  Ambulation only if appropriate assistance is available at a standby level.  No driving.     Order Specific Question Answer Comments   Is discharge order? Yes      When to contact your care team   Order Comments: Call your primary doctor if you have any of the following: Chest pain, shortness of breath, fevers, chills, sudden worsening of strength especially on one side of the body, increased confusion, or any other symptom you may find concerning.     MD face to face encounter    Order Comments: Documentation of Face to Face and Certification for Home Health Services    I certify that patient: ALEXA Luu is under my care and that I, or a nurse practitioner or physician's assistant working with me, had a face-to-face encounter that meets the physician face-to-face encounter requirements with this patient on: 10/15/2019.    This encounter with the patient was in whole, or in part, for the following medical condition, which is the primary reason for home health care: CVA, parkinson's disease.    I certify that, based on my findings, the following services are medically necessary home health services: Nursing, Occupational Therapy and Physical Therapy.    My clinical findings support the need for the above services because: Nurse is needed: To assess after changes in medications or other medical regimen.., Occupational Therapy Services are needed to assess and treat cognitive ability and address ADL safety due to impairment in mobility, ambulation, ADLs. and Physical Therapy Services are needed to assess and treat the following functional impairment: Ambulation, mobility, ADLs.  Requires assistance of another person or specialized equipment to access medical services because patient: Is unable to exit home safely on own due to: high falls risk.  Ambulation, mobility, ADLs.    Further, I certify that my clinical findings support that this patient is homebound (i.e. absences from home require considerable and taxing effort and are for medical reasons or [facility] services or infrequently or of short duration when for other reasons) because: of high falls risk    Based on the above findings. I certify that this patient is confined to the home and needs intermittent skilled nursing care, physical therapy and/or speech therapy.  The patient is under my care, and I have initiated the establishment of the plan of care.  This patient will be followed by a physician who will periodically  review the plan of care.  Physician/Provider to provide follow up care: Jerzy Aparicio    Attending hospital physician (the Medicare certified [facility] provider): Toni Saldivar MD  Physician Signature: See electronic signature associated with these discharge orders.  Date: 10/15/2019     Full Code     Order Specific Question Answer Comments   Code status determined by: Discussion with patient/legal decision maker      Diet   Order Comments: Follow this diet upon discharge:  Regular Diet     Order Specific Question Answer Comments   Is discharge order? Yes           physical examination    Most recent Vital Signs:   Vitals:    10/15/19 0315 10/15/19 0734 10/15/19 1600 10/16/19 0821   BP: 131/74 131/69 111/73 (!) 141/72   BP Location: Right arm Right arm Right arm Left arm   Pulse:   83 80   Resp:  18 16 16   Temp:  97  F (36.1  C) 97.3  F (36.3  C) 96.1  F (35.6  C)   TempSrc:  Oral Oral Oral   SpO2:  95% 95% 98%   Weight:       Height:           Gen: NAD  HEENT: NC/AT  Cardio: Has implanted loop recorder, S1 and S2 present, no murmur  Pulm: non-labored, breath sounds clear in all lobes  Abd: bowel sounds active x4, non-tender  Ext: warm, no edema, no calf tenderness B/I  Neuro/MSK: Alert and following commands.  MMT 5/5 to b/l UEs and LEs      35 minutes spent in discharge, including >50% in counseling and coordination of care, medication review and plan of care recommended on follow up.     Discharge summary was forwarded to Jerzy Aparicio (PCP) at the time of discharge, so as to bridge from hospital to outpatient care.     It was our pleasure to care for ALEXA Luu during this hospitalization. Please do not hesitate to contact me should there be questions regarding the hospital course or discharge plan.      Homero Saldivar MD  Department of Rehabilitation Medicine  Pager: [phone]

## 2019-10-16 NOTE — DISCHARGE INSTRUCTIONS
Take Aspirin 81 mg daily and Plavix 75 mg daily until 10/25/19, after which you may stop Plavix and will take Aspirin 81 mg daily only.     Follow up appointments    -- Follow up with Cardiology for post-op  You are scheduled for Wednesday, October 16th at 1:20 pm.    Address  [facility]                         [address] [place] [address]  Phone   [phone]    -- Follow up with PCP 1-2 weeks  You are scheduled to see CRYSTAL Ching for Dr. Aparicio on Monday, October 28th at 11:00 AM.    Address  [facility]                        [address] [place] [address]  Phone   [phone]  Fax                [phone]    -- Follow up with Neurology as scheduled on Friday, November 22nd at 3:45 PM with Dr. Mendoza.     Implantable Loop Recorder (ILR) Post-operative Instructions  Patient Instructions    You will receive a device identification (ID) card in the mail from the device  within 6 weeks to replace the temporary ID card you were given in the hospital.      You may travel by any mode of transportation; just show your device ID card.       For any surgery, let your doctor know you have an implantable loop recorder.       Your device is MRI safe       Please call the Device Clinic after each time you use your Patient Symptom Activator.     Device Clinic Contact Information  Device Nurses: [phone], Option #3.  Device Remote Specialists: [phone], Option #2. For questions about your Remote Transmission or Transmission Schedule  Device Schedulers: [phone], Option #1    -----------------------    [facility] Ph: [phone]     -----------------------    To reduce the risk of subsequent stroke there are several important factors including optimal management of anticoagulants, blood pressure, cholesterol, diabetes and smoking  "abstinence.    Anticoagulation:  You are on Aspirin and Clopidogrel    Blood Pressure:  Keeping your blood pressures less than 130/80 has been shown to reduce risk of recurrent stroke. Recording your blood pressure and heart rate once daily in a log book can help you and your providers make decisions on optimal management. You are encouraged to bring your log book with you to your primary physician and/or cardiology doctor visits.    You are currently not on any medications to help control your blood pressure. Several lifestyle modifications have been associated with blood pressure reduction and are an important part of a comprehensive plan. These include: weight loss (if over-weight); a diet low in salt and cholesterol and rich in fruits and vegetables; regular aerobic physical activity and limited alcohol consumption.    Diabetes:  You do not have diabetes though it is important to continue monitoring for this in the future with your primary provider.    Diet:  Currently  you're on Pravastatin.  Diet can significantly affect your levels and should be part of your plan. Please see additional information from dietitian about this.    Cholesterol:  Traditional target levels for LDL cholesterol or \"bad cholesterol\" is less than 130 however once you have had a stroke, your target LDL level is now less than 70. Additional recommendations such as increasing your HDL or \"good\" cholesterol and lowering your triglyceride level can also be important.    Your most recent lipid panel was   No results found for: CHOL  No results found for: HDL  No results found for: LDL  No results found for: TRIG  No results found for: CHOLHDLRATIO    This should be followed up in 2-3 months with your primary provider.    Smoking:  Finally one of the most important modifiable risk factors is to not smoke. This includes cigarettes, pipes, cigars, chewing tobacco and second hand smoke. Support through counseling, nicotine replacement, and oral " smoking-cessation medications may all be helpful. Often people have been able to quit during their hospitalization but once returning to their familiar environment, the urges can be stronger. If this is the case, we encourage you to get support. There are numerous options, start by talking with your doctor.

## 2019-10-16 NOTE — PLAN OF CARE
FOCUS/GOAL  Medication management    ASSESSMENT, INTERVENTIONS AND CONTINUING PLAN FOR GOAL:  Patient woke up around 0150 with pain, cramp right ankle up to mid leg. She requested assistance to stand up, she moved her ankle around, had relief. She requires minimal assistance to transfer to w/c and toilet because of the ankle pain, she used the bathroom twice tonight. She is modified independent in the room only during the day, assist at night. Plan to discharge to home today.

## 2019-10-16 NOTE — PLAN OF CARE
FOCUS/GOAL  Pain management and Mobility    ASSESSMENT, INTERVENTIONS AND CONTINUING PLAN FOR GOAL:  Pt alert and orientated, but forgetful.  Continent of bowel and bladder. Last BM 10/15.  MOD I in room with wheel chair. Ax1 to call at night.  Tingling on left side-nothing new.  Minor neck pain managed with scheduled tylenol. Denies SOB or nausea.  Light in reach. Calls appropriately. Continue plan of care.

## 2019-10-16 NOTE — PROGRESS NOTES
Occupational Therapy Discharge Summary    Reason for therapy discharge:    Discharged to home with home therapy.    Progress towards therapy goal(s). See goals on Care Plan in [facility] electronic health record for goal details.  Goals met    Therapy recommendation(s):    Continued therapy is recommended.  Rationale/Recommendations:  Continue skilled OT services to increase independence with ADLs and IADLs. .  Continue home exercise program.         Pt will be discharging to home with home OT and home care. Pt is currently mod I w/c based for oral cares, G/H tasks, LB dressing, toilet transfer/hygiene, and functional transfers. Pt is I with UB dressing. Pt is SBA/set up assistance for showering/bathing and shower/tub transfer using w/c. Pt will be returning home with an UE strengthening HEP to continue progressing functional transfers and overall strength; she will require oversight/supervision when completing to make sure she is completing with correct form. Pt will require assistance when returning home with IADLs such as medication management, driving, shopping, laundry, and finances. Pt will be returning home with w/c and plans to see what home OT suggests for her shower (as pt does have bench in shower, just not sure if usable for showers). If the bench is not appropriate, pt is agreeable to ordering a shower chair.

## 2019-10-16 NOTE — PROGRESS NOTES
[facility]  Referral received via Care Transitions team for home care services. Patient's service address is in our [facility] service area. Patient will receive services from [facility]. All patient demographics and orders will be sent to [facility]. Care team and patient notified.I met with the patient and daughter and reviewed the above and updated [facility]. For any questions or concerns regarding home care services please call [phone].

## 2019-10-17 ENCOUNTER — COMMUNICATION - HEALTHEAST (OUTPATIENT)
Dept: HOME HEALTH SERVICES | Facility: HOME HEALTH | Age: 84
End: 2019-10-17

## 2019-10-18 ENCOUNTER — HOME CARE/HOSPICE - HEALTHEAST (OUTPATIENT)
Dept: HOME HEALTH SERVICES | Facility: HOME HEALTH | Age: 84
End: 2019-10-18

## 2019-10-19 ENCOUNTER — COMMUNICATION - HEALTHEAST (OUTPATIENT)
Dept: HOME HEALTH SERVICES | Facility: HOME HEALTH | Age: 84
End: 2019-10-19

## 2019-10-19 ENCOUNTER — HOME CARE/HOSPICE - HEALTHEAST (OUTPATIENT)
Dept: HOME HEALTH SERVICES | Facility: HOME HEALTH | Age: 84
End: 2019-10-19

## 2019-10-21 ENCOUNTER — AMBULATORY - HEALTHEAST (OUTPATIENT)
Dept: OTHER | Facility: CLINIC | Age: 84
End: 2019-10-21

## 2019-10-21 ENCOUNTER — COMMUNICATION - HEALTHEAST (OUTPATIENT)
Dept: CARDIOLOGY | Facility: CLINIC | Age: 84
End: 2019-10-21

## 2019-10-21 ENCOUNTER — AMBULATORY - HEALTHEAST (OUTPATIENT)
Dept: CARDIOLOGY | Facility: CLINIC | Age: 84
End: 2019-10-21

## 2019-10-21 ENCOUNTER — DOCUMENTATION ONLY (OUTPATIENT)
Dept: OTHER | Facility: CLINIC | Age: 84
End: 2019-10-21

## 2019-10-21 DIAGNOSIS — I63.9 CRYPTOGENIC STROKE (H): ICD-10-CM

## 2019-10-22 ENCOUNTER — COMMUNICATION - HEALTHEAST (OUTPATIENT)
Dept: INTERNAL MEDICINE | Facility: CLINIC | Age: 84
End: 2019-10-22

## 2019-10-22 DIAGNOSIS — E78.2 MIXED HYPERLIPIDEMIA: ICD-10-CM

## 2019-10-23 ENCOUNTER — COMMUNICATION - HEALTHEAST (OUTPATIENT)
Dept: HOME HEALTH SERVICES | Facility: HOME HEALTH | Age: 84
End: 2019-10-23

## 2019-10-23 ENCOUNTER — HOME CARE/HOSPICE - HEALTHEAST (OUTPATIENT)
Dept: HOME HEALTH SERVICES | Facility: HOME HEALTH | Age: 84
End: 2019-10-23

## 2019-10-24 ENCOUNTER — HOME CARE/HOSPICE - HEALTHEAST (OUTPATIENT)
Dept: HOME HEALTH SERVICES | Facility: HOME HEALTH | Age: 84
End: 2019-10-24

## 2019-10-24 ENCOUNTER — COMMUNICATION - HEALTHEAST (OUTPATIENT)
Dept: HOME HEALTH SERVICES | Facility: HOME HEALTH | Age: 84
End: 2019-10-24

## 2019-10-25 ENCOUNTER — HOME CARE/HOSPICE - HEALTHEAST (OUTPATIENT)
Dept: HOME HEALTH SERVICES | Facility: HOME HEALTH | Age: 84
End: 2019-10-25

## 2019-10-25 ENCOUNTER — AMBULATORY - HEALTHEAST (OUTPATIENT)
Dept: INTERNAL MEDICINE | Facility: CLINIC | Age: 84
End: 2019-10-25

## 2019-10-28 ENCOUNTER — HOME CARE/HOSPICE - HEALTHEAST (OUTPATIENT)
Dept: HOME HEALTH SERVICES | Facility: HOME HEALTH | Age: 84
End: 2019-10-28

## 2019-10-29 ENCOUNTER — HOME CARE/HOSPICE - HEALTHEAST (OUTPATIENT)
Dept: HOME HEALTH SERVICES | Facility: HOME HEALTH | Age: 84
End: 2019-10-29

## 2019-10-30 ENCOUNTER — HOME CARE/HOSPICE - HEALTHEAST (OUTPATIENT)
Dept: HOME HEALTH SERVICES | Facility: HOME HEALTH | Age: 84
End: 2019-10-30

## 2019-10-31 ENCOUNTER — OFFICE VISIT - HEALTHEAST (OUTPATIENT)
Dept: FAMILY MEDICINE | Facility: CLINIC | Age: 84
End: 2019-10-31

## 2019-10-31 ENCOUNTER — HOME CARE/HOSPICE - HEALTHEAST (OUTPATIENT)
Dept: HOME HEALTH SERVICES | Facility: HOME HEALTH | Age: 84
End: 2019-10-31

## 2019-10-31 DIAGNOSIS — R29.6 FALLS FREQUENTLY: ICD-10-CM

## 2019-10-31 DIAGNOSIS — G20.A1 PARKINSON DISEASE (H): ICD-10-CM

## 2019-10-31 DIAGNOSIS — I63.81 ACUTE LACUNAR STROKE (H): ICD-10-CM

## 2019-10-31 DIAGNOSIS — I63.9 CRYPTOGENIC STROKE (H): ICD-10-CM

## 2019-10-31 DIAGNOSIS — I48.91 ATRIAL FIBRILLATION, UNSPECIFIED TYPE (H): ICD-10-CM

## 2019-10-31 LAB
ALBUMIN SERPL-MCNC: 3.7 G/DL (ref 3.5–5)
ALP SERPL-CCNC: 49 U/L (ref 45–120)
ALT SERPL W P-5'-P-CCNC: <9 U/L (ref 0–45)
ANION GAP SERPL CALCULATED.3IONS-SCNC: 7 MMOL/L (ref 5–18)
AST SERPL W P-5'-P-CCNC: 11 U/L (ref 0–40)
BILIRUB SERPL-MCNC: 0.4 MG/DL (ref 0–1)
BUN SERPL-MCNC: 18 MG/DL (ref 8–28)
CALCIUM SERPL-MCNC: 9.4 MG/DL (ref 8.5–10.5)
CHLORIDE BLD-SCNC: 104 MMOL/L (ref 98–107)
CO2 SERPL-SCNC: 27 MMOL/L (ref 22–31)
CREAT SERPL-MCNC: 0.72 MG/DL (ref 0.6–1.1)
ERYTHROCYTE [DISTWIDTH] IN BLOOD BY AUTOMATED COUNT: 13.2 % (ref 11–14.5)
GFR SERPL CREATININE-BSD FRML MDRD: >60 ML/MIN/1.73M2
GLUCOSE BLD-MCNC: 75 MG/DL (ref 70–125)
HCT VFR BLD AUTO: 36.5 % (ref 35–47)
HGB BLD-MCNC: 12.5 G/DL (ref 12–16)
MCH RBC QN AUTO: 31.8 PG (ref 27–34)
MCHC RBC AUTO-ENTMCNC: 34.1 G/DL (ref 32–36)
MCV RBC AUTO: 93 FL (ref 80–100)
PLATELET # BLD AUTO: 275 THOU/UL (ref 140–440)
PMV BLD AUTO: 8.3 FL (ref 7–10)
POTASSIUM BLD-SCNC: 4.3 MMOL/L (ref 3.5–5)
PROT SERPL-MCNC: 6.3 G/DL (ref 6–8)
RBC # BLD AUTO: 3.92 MILL/UL (ref 3.8–5.4)
SODIUM SERPL-SCNC: 138 MMOL/L (ref 136–145)
WBC: 8 THOU/UL (ref 4–11)

## 2019-10-31 ASSESSMENT — MIFFLIN-ST. JEOR: SCORE: 972.92

## 2019-11-01 ENCOUNTER — COMMUNICATION - HEALTHEAST (OUTPATIENT)
Dept: CARDIOLOGY | Facility: CLINIC | Age: 84
End: 2019-11-01

## 2019-11-01 ENCOUNTER — HOME CARE/HOSPICE - HEALTHEAST (OUTPATIENT)
Dept: HOME HEALTH SERVICES | Facility: HOME HEALTH | Age: 84
End: 2019-11-01

## 2019-11-01 ENCOUNTER — AMBULATORY - HEALTHEAST (OUTPATIENT)
Dept: CARDIOLOGY | Facility: CLINIC | Age: 84
End: 2019-11-01

## 2019-11-01 DIAGNOSIS — I63.9 CRYPTOGENIC STROKE (H): ICD-10-CM

## 2019-11-03 ENCOUNTER — HOME CARE/HOSPICE - HEALTHEAST (OUTPATIENT)
Dept: HOME HEALTH SERVICES | Facility: HOME HEALTH | Age: 84
End: 2019-11-03

## 2019-11-04 ENCOUNTER — HOME CARE/HOSPICE - HEALTHEAST (OUTPATIENT)
Dept: HOME HEALTH SERVICES | Facility: HOME HEALTH | Age: 84
End: 2019-11-04

## 2019-11-05 ENCOUNTER — OFFICE VISIT - HEALTHEAST (OUTPATIENT)
Dept: CARDIOLOGY | Facility: CLINIC | Age: 84
End: 2019-11-05

## 2019-11-05 ENCOUNTER — HOME CARE/HOSPICE - HEALTHEAST (OUTPATIENT)
Dept: HOME HEALTH SERVICES | Facility: HOME HEALTH | Age: 84
End: 2019-11-05

## 2019-11-05 DIAGNOSIS — G20.A1 PARKINSON'S DISEASE (H): ICD-10-CM

## 2019-11-05 DIAGNOSIS — Z95.818 STATUS POST PLACEMENT OF IMPLANTABLE LOOP RECORDER: ICD-10-CM

## 2019-11-05 DIAGNOSIS — I63.9 CRYPTOGENIC STROKE (H): ICD-10-CM

## 2019-11-05 ASSESSMENT — MIFFLIN-ST. JEOR: SCORE: 977.46

## 2019-11-06 ENCOUNTER — HOME CARE/HOSPICE - HEALTHEAST (OUTPATIENT)
Dept: HOME HEALTH SERVICES | Facility: HOME HEALTH | Age: 84
End: 2019-11-06

## 2019-11-07 ENCOUNTER — HOME CARE/HOSPICE - HEALTHEAST (OUTPATIENT)
Dept: HOME HEALTH SERVICES | Facility: HOME HEALTH | Age: 84
End: 2019-11-07

## 2019-11-08 ENCOUNTER — HOME CARE/HOSPICE - HEALTHEAST (OUTPATIENT)
Dept: HOME HEALTH SERVICES | Facility: HOME HEALTH | Age: 84
End: 2019-11-08

## 2019-11-11 PROBLEM — Z87.81 HISTORY OF CERVICAL FRACTURE: Status: ACTIVE | Noted: 2019-10-05

## 2019-11-11 PROBLEM — M62.81 MUSCLE WEAKNESS OF UPPER EXTREMITY: Status: ACTIVE | Noted: 2019-10-05

## 2019-11-11 PROBLEM — I42.8 NON-ISCHEMIC CARDIOMYOPATHY (H): Status: ACTIVE | Noted: 2019-11-11

## 2019-11-11 PROBLEM — I63.81 ACUTE LACUNAR STROKE (H): Status: ACTIVE | Noted: 2019-10-05

## 2019-11-11 PROBLEM — I48.91 ATRIAL FIBRILLATION, UNSPECIFIED TYPE (H): Status: ACTIVE | Noted: 2019-11-11

## 2019-11-11 RX ORDER — OXYCODONE HYDROCHLORIDE 5 MG/1
TABLET ORAL
Refills: 0 | COMMUNITY
Start: 2019-04-18 | End: 2019-11-22

## 2019-11-11 RX ORDER — LYSINE HCL 500 MG
1 TABLET ORAL
COMMUNITY
End: 2019-11-22

## 2019-11-11 RX ORDER — CALCIUM CARBONATE 260MG(650)
200 TABLET,CHEWABLE ORAL 2 TIMES DAILY
COMMUNITY
Start: 2019-01-17 | End: 2019-11-22

## 2019-11-12 ENCOUNTER — HOME CARE/HOSPICE - HEALTHEAST (OUTPATIENT)
Dept: HOME HEALTH SERVICES | Facility: HOME HEALTH | Age: 84
End: 2019-11-12

## 2019-11-12 NOTE — PROGRESS NOTES
Diagnosis/Summary/Recommendations:    PATIENT: ALEXA Luis [facility]  87 year old female     : 1932    RAMOS: 2019    Hospitalization review - cryptogenic stroke - afib was not yet found and spine fracture    HEAD MRI from 10/5/2019  1.  Small acute cortical infarcts in the knob of the right precentral gyrus.  2.  Small acute lacunar infarct in the right cerebellar hemisphere.  3.  No mass effect or hemorrhagic transformation.  4.  Chronic cortical infarcts in the right middle frontal gyrus.  5.  Moderate burden of chronic infarcts in the cerebellar hemispheres, left greater than right.  6.  Chronic type II odontoid fracture with surrounding pannus. This fracture was acute on 2018.    Tarun Her MD - 10/05/2019 10:00 AM CDT  Neurology chart note    I was informed by the emergency room PA that this patient was in her usual state of health until 6:30 AM 10/5/19. She was at the time already awake. She did not have any acute new neurological symptoms prior to onset of left upper extremity numbness and ataxia. She has history of Parkinson's disease. She was unable to operate her walker with her left upper extremity. She came to the emergency room.   Last known well 6:30 AM  Discussion with emergency room provider 10:04 AM  Decision to give TPA: Pending CT head results. This patient is a TPA candidate if the CT head results are reassuring and no additional contraindications for TPA exist    Tarun Her MD, PhD  Neurology & Sleep Medicine    Electronically signed by Tarun Her MD at 10/05/2019 10:06 AM CDT      Medication review    Medications     8am 2pm 8pm       Acetaminophen tylenol  ?            Ascorbic acid 1000 ?       Aspirin 81  1           Aspirin-calcium carbonate 81-77   not taking           Calcium carbonate 500mg ?taking            Calcium carbonate vit D 600-400  1           Carbidopa/levodopa Sinemet 25/100 1 1 1       Cholecalciferol vitamin D3 1000  ?            Clopidogrel plavix 75mg ?       Docosahexanoic acid 1gm  ?           Fish oil-omega 3 fatty acids 1000mg  1           Hydrocodone acetaminophen norco  stopped?           Ibuprofen advil/motrin 200mg  not taking           Ibuprofen advil/motrin 400mg Not taking            Magnesium citrate 200mg  not taking           Magnesium citrate  not taking           Magnesium hydroxide milk of magnesia 400 mg/5ml suspension Not taking       Omega-3 acid ethyl esters lovaza 1g  see above           Oxycodone roxicodone 5mg  not taking           Oxycodone roxicodone  not taking           Pantoprazole protonix 40mg  Not taking       Polyethylene glycol miralax  as needed           Polyvinyl alcohol liquifilm tears 1.4% ophthalmic solution  not taking           Pravastatin pravachol 20mg   1           Senna-docusate senokot-S pericolace 8.6-50 Not taknig       Vitamin C ascorbic acid 1000mg tabs  not taking           Vitamin D3 cholecalciferol 2000 units.   ?                                                                   History obtained from patient    Clubb getting a stress test of her heart.   Has loop recorder to see if she has a fib - has seen Dr. Rolando Kothari  Neck fracture - stable   Medications reviewed and an updated table is below.   She arrives today in a wheelchair  Discussion about her stroke.   She has been using the wheelchair in home  When she was using a walker it was more unsafe as she had freezing of gait.   Has ring cameras in the home   She has life line pendant.   Mariel is here today with her  Sheila lives in [place] and is involved in her care.       Medications     7am 8am 1130am 4pm 8pm       Acetaminophen tylenol 500mg    1    1       Aspirin 81   1            Calcium carbonate 500mg tums      2       Calcium carbonate vit D 600-400   1            Carbidopa/levodopa Sinemet 25/100 1  1 1        Pravastatin pravachol 20mg        1                                                                      Plan  No change in medication regimen  Ongoing monitoring.   Return visit needs to be scheduled.   She wishes to increase her acetaminophen to 500mg 3/day and this seems reasonable  In regards to her ibuprofen this is an NSAID and there may be some risk - heart, stomach, etc. For taking this medication and she probably should talk with her pcp or/and cardiology  Walking with gait belt otherwise use of wheelchair        Coding statement:   Duration of  Services: patient care and care coordination was 25 minutes  Greater than 50% of this visit was spent in counseling and coordination of care.     Ed Mendoza MD     ______________________________________    Last visit date and details:     Active Problems  - documented as of this encounter (statuses as of 10/11/2019)  Problem Noted Date   Arm weakness 10/05/2019   Left arm weakness 10/05/2019   History of cervical fracture 10/05/2019   Acute lacunar stroke 10/05/2019   Abnormal brain MRI 03/11/2019   Overview:     Overview:   Formatting of this note might be different from the original.  MR HEAD BRAIN WO3/8/2019  [facility]  Other Result Information   This result has an attachment that is not available.   Result Narrative   [facility] RADIOLOGY    EXAM: MR HEAD BRAIN WO  LOCATION: [facility]  DATE/TIME: 3/7/2019 5:18 PM    INDICATION: Atypical Parkinsonism (hc)  COMPARISON: CT 06/25/2018  TECHNIQUE: Routine multiplanar multisequence head MRI without intravenous  contrast.    FINDINGS:  INTRACRANIAL CONTENTS: No acute or subacute infarct. Small cluster of chronic  infarcts in the left cerebellar hemisphere grossly similar to the prior study. A  few tiny chronic lacunar infarcts in the right cerebellar hemisphere were not  well visualized on the previous exam. Small midline chronic cerebellar infarcts  are stable. Mild to moderate cerebral and mild cerebellar volume loss. Small  chronic cortical infarct right superior frontal  gyrus. Tiny chronic lacunar  infarct left basal ganglia. Significant midbrain volume loss out of proportion  to the global volume loss can be seen in progressive supranuclear palsy. No  mass, acute hemorrhage, or extra-axial fluid collections. Mild burden presumed  chronic small vessel ischemia. Normal position of the cerebellar tonsils.     SELLA: No significant abnormality accounting for technique.    OSSEOUS STRUCTURES/SOFT TISSUES: Nondisplaced type II odontoid fracture with  normal alignment. Mild widening of the fracture gap compared to the previous CT  6/25/2018. No high-grade canal narrowing. The major intracranial vascular flow  voids are maintained.     ORBITS: No significant abnormality accounting for technique.     SINUSES/MASTOIDS: No significant paranasal sinus mucosal disease. Moderate right  mastoid effusion.     CONCLUSION:  1.  No acute intracranial abnormality. No restricted diffusion/acute infarct,  space-occupying hemorrhage, or mass effect.    2.  Significant midbrain volume loss is out of proportion to the degree of  global volume loss and can be seen in progressive supranuclear palsy.    3.  Age-related changes and scattered areas of chronic ischemia.    4.  Moderate right mastoid effusion.   Other Result Information   [employer], [employer] - 03/08/2019 1:53 AM [facility] RADIOLOGY    EXAM: MR HEAD BRAIN WO  LOCATION: [facility]  DATE/TIME: 3/7/2019 5:18 PM    INDICATION: Atypical Parkinsonism (hc)  COMPARISON: CT 06/25/2018  TECHNIQUE: Routine multiplanar multisequence head MRI without intravenous  contrast.    FINDINGS:  INTRACRANIAL CONTENTS: No acute or subacute infarct. Small cluster of chronic  infarcts in the left cerebellar hemisphere grossly similar to the prior study. A  few tiny chronic lacunar infarcts in the right cerebellar hemisphere were not  well visualized on the previous exam. Small midline chronic cerebellar infarcts  are stable. Mild to moderate cerebral and mild  cerebellar volume loss. Small  chronic cortical infarct right superior frontal gyrus. Tiny chronic lacunar  infarct left basal ganglia. Significant midbrain volume loss out of proportion  to the global volume loss can be seen in progressive supranuclear palsy. No  mass, acute hemorrhage, or extra-axial fluid collections. Mild burden presumed  chronic small vessel ischemia. Normal position of the cerebellar tonsils.     SELLA: No significant abnormality accounting for technique.    OSSEOUS STRUCTURES/SOFT TISSUES: Nondisplaced type II odontoid fracture with  normal alignment. Mild widening of the fracture gap compared to the previous CT  6/25/2018. No high-grade canal narrowing. The major intracranial vascular flow  voids are maintained.     ORBITS: No significant abnormality accounting for technique.     SINUSES/MASTOIDS: No significant paranasal sinus mucosal disease. Moderate right  mastoid effusion.     CONCLUSION:  1. No acute intracranial abnormality. No restricted diffusion/acute infarct,  space-occupying hemorrhage, or mass effect.    2. Significant midbrain volume loss is out of proportion to the degree of  global volume loss and can be seen in progressive supranuclear palsy.    3. Age-related changes and scattered areas of chronic ischemia.    4. Moderate right mastoid effusion.   Status      Closed fracture of multiple ribs of both sides, initial encounter        [facility] MEDICINE DISCHARGE SUMMARY     Primary Care Physician: Jerzy Aparicio MD Admission Date: 10/5/2019   Discharge Provider: Ed Andrade Discharge Date: 10/9/2019   Diet: regular diet  Code Status: DNR   Activity: activity as tolerated      Condition at Discharge: Stable     REASON FOR PRESENTATION(See Admission Note for Details)   Acute left arm/hand clumsiness and numbness    PRINCIPAL & ACTIVE DISCHARGE DIAGNOSES     Principal Problem:  Left arm weakness  Active Problems:  Venous Insufficiency  Mixed  hyperlipidemia  Osteopenia  Parkinson disease (H)  History of cervical fracture  Acute lacunar stroke (H)  Non-ischemic cardiomyopathy (H)  Atrial fibrillation, unspecified type (H)    SIGNIFICANT FINDINGS (Imaging, labs):     EXAM: CTA HEAD AND NECK  LOCATION: [facility]  DATE/TIME: 10/5/2019 10:23 AM     INDICATION: Neuro deficit, acute, stroke suspected Stroke code  COMPARISON: None.  CONTRAST: Iohexol (Omni) 100 mL  TECHNIQUE: Head and neck CT angiogram with IV contrast. Noncontrast head CT followed by axial helical CT images of the head and neck vessels obtained during the arterial phase of intravenous contrast administration. Axial 2D reconstructed images and   multiplanar 3D MIP reconstructed images of the head and neck vessels were performed by the technologist. Dose reduction techniques were used. All stenosis measurements made according to NASCET criteria unless otherwise specified.     FINDINGS:   NONCONTRAST HEAD CT:   INTRACRANIAL CONTENTS: No intracranial hemorrhage, extraaxial collection, or mass effect. No CT evidence of acute infarct. Unchanged small old infarctions in the left cerebellar hemisphere. Mild to moderate presumed chronic small vessel ischemic   changes. Mild generalized volume loss. No hydrocephalus.      VISUALIZED ORBITS/SINUSES/MASTOIDS: Prior bilateral cataract surgery. Visualized portions of the orbits are otherwise unremarkable. No paranasal sinus mucosal disease. No middle ear or mastoid effusion.     BONES/SOFT TISSUES: No acute abnormality.     HEAD CTA:  ANTERIOR CIRCULATION: No stenosis/occlusion, aneurysm, or high flow vascular malformation. Standard [place] of [place] anatomy.     POSTERIOR CIRCULATION: No stenosis/occlusion, aneurysm, or high flow vascular malformation. Dominant left and smaller right vertebral artery contribute to a normal basilar artery.      DURAL VENOUS SINUSES: Expected enhancement of the major dural venous sinuses.     NECK CTA:  RIGHT CAROTID:  Atherosclerosis of the proximal right internal carotid artery without significant stenosis based on NASCET-like criteria. No dissection or pseudoaneurysm.     LEFT CAROTID: Atherosclerosis of the proximal left internal carotid artery without significant stenosis based on NASCET-like criteria. No dissection or pseudoaneurysm.     VERTEBRAL ARTERIES: No focal stenosis or dissection. Balanced vertebral arteries.     AORTIC ARCH: Bovine origin left common carotid artery. No significant stenosis at the origin of the great vessels.     NONVASCULAR STRUCTURES: No acute soft tissue abnormality. Included lungs are normal. Nonunion of the previously seen type II dens fracture, with slightly increased displacement. The dens fracture fragment is 2 mm dorsally subluxed compared to the dens   body and the fracture lucency measures 2 to 3 mm in thickness. No new acute fracture.    EXAM: MR BRAIN COW WITHOUT CONTRAST  LOCATION: [facility]  DATE/TIME: 10/05/2019, 12:27 PM     INDICATION: Arm weakness.  COMPARISON: Head and neck CTA 10/05/2019.  TECHNIQUE:   HEAD MRI: Routine multiplanar multisequence head MRI without intravenous contrast.  HEAD MRA: 3D time-of-flight head MRA without intravenous contrast.     FINDINGS:  HEAD MRI:  INTRACRANIAL CONTENTS: Acute cortical infarcts in the knob of the right precentral gyrus correlating with the history of arm weakness. A small linear acute infarct is also noted in the right cerebellum. No mass effect or hemorrhagic transformation. No   mass lesion. Chronic cortical infarcts in the right middle frontal gyrus and moderate burden of chronic infarcts in the cerebellar hemispheres, left greater than right. Mild to moderate generalized volume loss. Normal position of the cerebellar tonsils.      SELLA: No abnormality accounting for technique.     OSSEOUS STRUCTURES/SOFT TISSUES: Chronic type II odontoid fracture with surrounding pannus. This fracture was acute on 06/25/2018. The major  "intracranial vascular flow-voids are maintained.      ORBITS: No abnormality accounting for technique.      SINUSES/MASTOIDS: No paranasal sinus mucosal disease. Mild right mastoid opacification. Clear left mastoids.      HEAD MRA:   ANTERIOR CIRCULATION: No stenosis/occlusion, aneurysm, or high flow vascular malformation. Standard [place] of [place] anatomy.     POSTERIOR CIRCULATION: No stenosis/occlusion, aneurysm, or high-flow vascular malformation. Dominant left and smaller right vertebral artery contribute to a normal basilar artery.      IMPRESSION:   HEAD MRI:   1. Small acute cortical infarcts in the knob of the right precentral gyrus.  2. Small acute lacunar infarct in the right cerebellar hemisphere.  3. No mass effect or hemorrhagic transformation.  4. Chronic cortical infarcts in the right middle frontal gyrus.  5. Moderate burden of chronic infarcts in the cerebellar hemispheres, left greater than right.  6. Chronic type II odontoid fracture with surrounding pannus. This fracture was acute on 06/25/2018.     HEAD MRA:   1. Normal MRA [place].    PENDING LABS     PROCEDURES ( this hospitalization only)     EP Loop Recorder Insertion    RECOMMENDATIONS TO OUTPATIENT PROVIDER FOR F/U VISIT     Implantable loop recorder - removal of sutures in 1 week  Consider outpatient stress test per cardiology recommendation    DISPOSITION     Acute rehab    SUMMARY OF HOSPITAL COURSE:     Summary: 87 y.o. old female with Parkinson's disease and frequent falls, dyslipidemia, admitted with acute left arm ataxia and found to have acute JACIEL stroke, outside TPA window. Chronic double vision which is corrected with prism eyeglasses.     Assessment & Plan    Acute Right cerebral CVA: acute onset left arm numbness and ataxia. Presented to  ED, outside of TPA window. MRI showed \"Acute cortical infarcts in the knob of the right precentral gyrus correlating with the history of arm weakness. A small linear acute infarct " "is also noted in the right cerebellum\".  Patient is reticent to take blood thinners due to her frequent falls, but willing to take asa/plavix for 21 days followed by lifetime ASA, follow recommended protocol. Finish 21 days of plavix, 15 days left.    High risk of occult afib with embolic episodes: cardiology has evaluated to assist in evaluation of stroke risks due to undx'd afib. Implanted loop recorder has been placed and will f/u with cardiology as outpatient.    History of Parkinson's with frequent falls: Frequent falls over the last several years with serious injury including neck fracture, rib fracture, shoulder fracture.  Most recent fall was about a week and a half ago at home when she fell onto her buttocks and then backwards onto the back of her head.  She was feeling well and chose not to go into the ER for assessment at that time.    History of cervical fracture: Noted after a fall about 15 months ago, patient placed in neck brace and followed by neurosurgery.  Reassessment this ER admission by neurosurgery who feels that the CT does not show any significant clinical change in previous fracture site at [facility].    Hyperlipidemia, mixed: continue outpatient medication regimen    Malnutrition, protein/calorie, mild: reduced appetite, weight loss, muscle wasting    Assessment of swallowing function: Speech and swallowing function are assessed and found to be functioning well.     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 Since last being seen she had an MRI which revealed midbrain atrophy. She had neuropsych testing which was normal. She had neuro-ophthalmology exam which revealed SWJs but no clear supranuclear palsy.     She started using a U Step. She was still falling frequently, but then over the past month hasn't had any falls. She has good and bad times but does have " fluctuations clearly related to the timing of her levodopa. She also is having freezing. Put lines on the floor in her house and uses the laser on the U Step.           PD Medications                   8 130 7   Sinemet 25/100                                 1 1 1      Medications:      Current Outpatient Medications   Medication     acetaminophen (TYLENOL) 500 MG tablet     calcium carbonate 600 mg-vitamin D 400 units (CALTRATE) 600-400 MG-UNIT per tablet     carbidopa-levodopa (SINEMET)  MG tablet     cholecalciferol (VITAMIN D-3 SUPER STRENGTH) 2000 units tablet     fish oil-omega-3 fatty acids 1000 MG capsule     ibuprofen (ADVIL/MOTRIN) 200 MG tablet     Magnesium Citrate 200 MG TABS     order for DME     pravastatin (PRAVACHOL) 10 MG tablet     vitamin C (ASCORBIC ACID) 1000 MG TABS      No current facility-administered medications for this visit.          Past Medical History        Past Medical History:   Diagnosis Date     Abnormal brain MRI 3/11/2019     MR HEAD BRAIN WO3/2019 [facility] Other Result Information This result has an attachment that is not available. Result Narrative [facility] RADIOLOGY  EXAM: MR HEAD BRAIN WO LOCATION: [facility] DATE/TIME: 3/7/2019 5:18 PM  INDICATION: Atypical Parkinsonism (hc) COMPARISON: CT 2018 TECHNIQUE: Routine multiplanar multisequence head MRI without intravenou     Atypical Parkinsonism (H) 2017         Social History          Social History Narrative     . lives in [place].      Anisha Little daughter     Retired nurse.      Various hospital     Gyn, intensive care,      School system     [place], [place]     Moved here in       x 2     First    = was 43 yrs old and had melanoma     Second    had a stroke and  Was 87 yrs old.          ROS:  A complete ROS was otherwise noncontributory except as noted above in HPI     Exam:  /69   Pulse 89   Temp 97.8  F  "(36.6  C) (Oral)   Resp 15   Ht 1.638 m (5' 4.5\")   Wt 57.2 kg (126 lb)   SpO2 95%   BMI 21.29 kg/m    NAD  Breathing comfortably  No peripheral edema     Neurological Examination (R/L):  Mental Status: Awake, alert. Fluent. Affect normal.  Cranial Nerves: Versions full. Question slightly slow vertical saccades. No hypomimia. No hypophonia. No torticollis. Tongue protrudes midline  Motor: Slight right hand bradykinesia and bilateral toe tapping bradykinesia. Tone was normal. No resting tremor.   Coordination: Finger to nose without dysmetria.  Gait: Good stride length and narrow-based gait with U Step. Does almost fall back after rising from chair.     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 Seems to still have levodopa response and no diagnostic supranuclear ophthalmoplegia or cognitive findings however there is some midbrain atrophy. Will need to follow over time to get a more clear diagnosis. She is also being seen at [facility] to see if she would be a candidate for any research there.     Plan:   Follow-up 6 months     Skinny Sexton MD  Movement Disorders Fellow     Patient seen and examined by me today.   I agree with details above.   Ed Mendoza md  April 16, 2019     RAMOS: April 16, 2019        Atypical parkinsonism     neuropsych results pending     Eye evaluation note below      1. 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  Exam was compatible with Parkinsonism including square wave jerks and mildly saccadic smooth pursuit and decreased blink rate.     2. Dry eye syndrome and blepharitis- discussed using a nighttime viscous gel in addition to warm compresses twice a day and artificial tears throughout the day.  Patient may consider referral to a dry eye specialist if she continues to be bothered.  She has multiple issues affecting her ocular surface including Parkinson's disease decreased blink, dry eye, and " blepharitis.     3. 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  No change indicated.     4. 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  No exudative changes.        Medications     8am 2pm 8pm       Acetaminophen tylenol              Aspirin 81             Aspirin-calcium carbonate 81-77              Calcium carbonate 500mg             Calcium carbonate vit D             Calcium carbonate vit D             Carbidopa/levodopa Sinemet 25/100 1 1 1       Cholecalciferol vitamin D3 1000             Docosahexanoic acid 1gm             Fish oil-omega 3 fatty acids 1000mg             Hydrocodone acetaminophen norco             Ibuprofen advil/motrin 200mg             Ibuprofen advil/motrin 400mg             Magnesium citrate 200mg             Magnesium citrate             Omega-3 acid ethyl esters lovaza 1g             Oxycodone roxicodone 5mg             Oxycodone roxicodone             Polyethylene glycol miralax             Polyvinyl alcohol liquifilm tears 1.4% ophthalmic solution             Pravastatin pravachol 10mg             Vitamin C ascorbic acid 1000mg tabs             Vitamin D3 cholecalciferol 2000 units.                                                                            Ct Toney FNP - 09/09/2019 11:59 PM CDT  Formatting of this note might be different from the original.  Assessment:     1. Parkinson's disease versus PSP  2. Frequent falls  3. Osteopenia  4. Left ventricular hypertrophy    Select the one statement which best describes you:    Speech: Normal    Saliva/drooling: Slight excess of saliva, sometimes drool on pillow    Swallowing: Rarely choke    Handwriting: Slightly slow or small    Cutting Food/Using Utensils: Slow/clumsy, able to feed myself without help    Dressing: slow, do not need help    Hygiene (bathing, brushing teeth, combing hair): Slow, no help  needed    Turning in bed or adjusting sheets: Clumsy/slow turning in bed, no help needed    Falling: Fall average once per day    Freezing (unable to walk for a few seconds): Frequently freeze, occasionally fall because of freezing    Walking: Severe problems, need assistance    Visible tremor anywhere in body: Slight tremor infrequently present    Numbness, tingling, discomfort, aching: No numbness, tingling, or aching    Concerns/problems/updates/changes you wish to discuss with provider today:  New Parkinson's meds    Are you experiencing any of the following symptoms:  Stiffness, Slowness of movement, Posture/balance, Painful cramps, Dyskinesia (dance-like movements), Sleep problems, Dreams/nightmares, Light headedness and Constipation    Do you use: Walker     The patient presents to the Parkinson's clinic on referral from another 1 of my patients. The patient is here to see if there are any newer meds she can use to help her Parkinson's disease. The patient has had many falls and feels very unsteady when she walks. She hopes to have better stability, be more active, continue to walk every day and get better sleep. 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 She currently lives alone and does not drive. She lives in a 1 level twin home and has 2 daughters that live within 5 minutes from her. She reports she has good support great family and friends. She reports she does not sleep well and used to be a lot more active, she used to take physical therapy, back she has taken it here. She reports that used to do dahlia chi and walk every single day. She is very worried about her falls. She denies any hallucinations. The patient did have an MR which showed difficult midbrain loss, so is questionable if she may have PSP. When I watch the patient walk her foot almost seem to catch her other foot, was almost like she was head dyskinesia when walking. She does state that she was taught to walk where  "she put one foot in front of the other, by doing this I believe that when she turns into certain things she does trip over her other foot this may be why she has some any falls.    Plan:     Plan:  Continue Parkinson medication as before    8am 12 pm 4 pm   HS            Sinemet IR   25/100 I tab 1 tab 1 tab                        2. Evaluation by physical therapy, Occupational Therapy, speech therapy and treat if needed.  3. Information given to patient on different medications  4. Patient will return and I will do on-off testing  5. labs    Subjective:       Parkinson's history: The patient was diagnosed with Parkinson's by Dr. Her in August 2015. Patient states that she was falling, had a shuffling gait, stiffness, but no tremor. She does state that her right side was worse. He prescribed carbidopa levodopa. She noticed significant improvement in her balance and gait since started the medication. She has increased the Sinemet 25/100 IR to 4 times a day, but got severe dyskinesia so it was reduced back down to 3 times a day. Patient states that she has not tried any other Parkinson's medications.    PD: Currently taking 1 tablet of Sinemet IR 5/103 times a day at any time between 7:53 AM, around 12 AM, and sometimes around 4:30 PM she admits to not taking her medication at a consistent time every day  RLS-believes she does have restless leg  ADL -she reports he is independent with all ADLs and   Driving-she does not drive  Exercise -she tries to exercise frequently but with all of her falls she has been moving less  Medication Side Effects -no side effects that she is aware of  Memory -believes that her memory is fine, she does have a neuropsychological assessment from the beginning of the year which shows no cognitive deficits  Sleep-reports that her sleep is \"horrible\" and \"really bad\".  Psych/Depression- denies any problem  Autonomic- reports mild lightheadedness  Sialorrhea  denies   Dyskinesia-no dyskinesia " noted today when sitting, when the patient walks her feet have almost a dance like movement to it, I am not quite sure if this is dyskinesia.  Tremor -no tremor noted today  Advanced Directives-discussed  Surgery -discussed   Bradykinesia and Hypokinesia -none noted today    Patient Active Problem List   Diagnosis Date Noted     Closed fracture of multiple ribs of both sides, initial encounter 02/27/2019     Fall 02/27/2019     Parkinson disease (H) 01/20/2016     Vaginal wall prolapse 08/18/2015     Left Ventricular Hypertrophy     Hemorrhoids     Venous Insufficiency     Cystocele     Mixed hyperlipidemia     Appendiceal Mucocele     Osteopenia     Past Medical History:   Diagnosis Date     Breast cyst     Finger fracture, left 02/27/2019   ring finger     Multiple rib fractures 02/27/2019     Parkinson disease (H) 1/20/2016     Past Surgical History:   Procedure Laterality Date     BREAST CYST ASPIRATION     HYSTERECTOMY 1982     NE TOTAL ABDOM HYSTERECTOMY   Description: Hysterectomy; Proc Date: 01/01/1988; Comments: couldn't find her ovaries     Family History   Problem Relation Age of Onset     Breast cancer Maternal Aunt     Current Outpatient Medications   Medication Sig Dispense Refill     acetaminophen (TYLENOL) 500 MG tablet Take 500 mg by mouth every 6 (six) hours as needed for pain.     ascorbic acid, vitamin C, (VITAMIN C) 1000 MG tablet     calcium-vitamin D (CALCIUM-VITAMIN D) 500 mg(1,250mg) -200 unit per tablet Take 1 tablet by mouth 2 (two) times a day with meals.     carbidopa-levodopa (SINEMET)  mg per tablet Take 1 tablet by mouth 3 (three) times a day.     ibuprofen (ADVIL,MOTRIN) 200 MG tablet Take 200 mg by mouth every 6 (six) hours as needed for pain.         MAGNESIUM CITRATE ORAL Take 1 tablet by mouth 2 (two) times a day.     OMEGA-3/DHA/EPA/FISH OIL (FISH OIL-OMEGA-3 FATTY ACIDS) 300-1,000 mg capsule Take 1 g by mouth daily.         polyvinyl alcohol (ARTIFICIAL TEARS, POLYVIN  "ALC,) 1.4 % ophthalmic solution Administer 1 drop to both eyes 4 (four) times a day as needed for dry eyes.     pravastatin (PRAVACHOL) 10 MG tablet Take 1 tablet (10 mg total) by mouth daily. 90 tablet 3     No current facility-administered medications for this encounter.     Allergies   Allergen Reactions     Acetaminophen-Codeine Unknown     Codeine     Morphine Other (See Comments)   Made me feel really wierd     Penicillins Other (See Comments)   Tongue and throat \"tingling\" when in 20s     Social History     Socioeconomic History     Marital status:    Spouse name: Not on file     Number of children: Not on file     Years of education: Not on file     Highest education level: Not on file   Occupational History     Not on file   Social Needs     Financial resource strain: Not on file     Food insecurity:   Worry: Not on file   Inability: Not on file     Transportation needs:   Medical: Not on file   Non-medical: Not on file   Tobacco Use     Smoking status: Never Smoker     Smokeless tobacco: Never Used   Substance and Sexual Activity     Alcohol use: No   Frequency: Never     Drug use: Not on file     Sexual activity: Not on file   Lifestyle     Physical activity:   Days per week: Not on file   Minutes per session: Not on file     Stress: Not on file   Relationships     Social connections:   Talks on phone: Not on file   Gets together: Not on file   Attends Episcopal service: Not on file   Active member of club or organization: Not on file   Attends meetings of clubs or organizations: Not on file   Relationship status: Not on file     Intimate partner violence:   Fear of current or ex partner: Not on file   Emotionally abused: Not on file   Physically abused: Not on file   Forced sexual activity: Not on file   Other Topics Concern     Not on file   Social History Narrative     Not on file     The following portions of the patient's history were reviewed and updated as appropriate: allergies, current " medications, past family history, past medical history, past social history, past surgical history and problem list.    Review of Systems      Objective:     Vitals:   09/09/19 1403   BP: 128/69   Pulse: 71     Imaging: DATE/TIME: 3/7/2019 5:18 PM    FINDINGS:  INTRACRANIAL CONTENTS: No acute or subacute infarct. Small cluster of chronic  infarcts in the left cerebellar hemisphere grossly similar to the prior study. A  few tiny chronic lacunar infarcts in the right cerebellar hemisphere were not  well visualized on the previous exam. Small midline chronic cerebellar infarcts  are stable. Mild to moderate cerebral and mild cerebellar volume loss. Small  chronic cortical infarct right superior frontal gyrus. Tiny chronic lacunar  infarct left basal ganglia. Significant midbrain volume loss out of proportion  to the global volume loss can be seen in progressive supranuclear palsy. No  mass, acute hemorrhage, or extra-axial fluid collections. Mild burden presumed  chronic small vessel ischemia. Normal position of the cerebellar tonsils.     SELLA: No significant abnormality accounting for technique.    OSSEOUS STRUCTURES/SOFT TISSUES: Nondisplaced type II odontoid fracture with  normal alignment. Mild widening of the fracture gap compared to the previous CT  6/25/2018. No high-grade canal narrowing. The major intracranial vascular flow  voids are maintained.     ORBITS: No significant abnormality accounting for technique.     SINUSES/MASTOIDS: No significant paranasal sinus mucosal disease. Moderate right  mastoid effusion.     CONCLUSION:  1.  No acute intracranial abnormality. No restricted diffusion/acute infarct,  space-occupying hemorrhage, or mass effect.    2.  Significant midbrain volume loss is out of proportion to the degree of  global volume loss and can be seen in progressive supranuclear palsy.    3.  Age-related changes and scattered areas of chronic ischemia.    4.  Moderate right mastoid  effusion.    Neuropsychological Assessment  Completed at different facility, no cognitive impairments noted    Review of Systems    Alert, cooperative, no distress Sitting, appears stated age, normocephalic, atraumatic without cyanosis, edema or skin rashes. Normal mood.      Motor strength normal  Bulk normal  Tone Normal  Rigidity mild  Balance - stumbled with turns  Tremors: none noted  Dyskinesia: very mild noted in legs  Getting up from chair.-able to do without pushing off   I asked her to call with any questions or concerns and will see her again in clinic in about 4 weeks . We discussed the risks and benefits of the medication including risk of worsening depression with medication adjustments and even the possibility of emergence of suicidality      Total time spent with the patient today was 90 minutes with greater than 50% of the time spent in counseling and care coordination. .    Electronically signed by Ct Toney FNP at 09/12/2019 3:10 PM CDT    Back to top of Progress Notes  Dian Mauricio CMA - 09/09/2019 2:04 PM CDT  How have you been doing since we last saw you? Most important neurological symptom or concern for this visit (Medication wearing off, hallucinations, freezing, pain, sleep difficulty, constipation etc.) sleeping not so well, pain in neck, intermittent freezing through out the day. Most likely after last dose of medication. Patient feels heavy sensation sometimes.     How many falls has the pt. Had over the last year?(if pt. Falls frequently, daily, weekly, monthly: 45 times in 12 month period    Best explanation of falls (poor balance, fail/recognizing/judgement, trip, dizzy, moving too quickly, carrying too much, poor lighting, any loss of consciousness, confusion, incontinence, tongue biting, or any unusual features of events)? Unknown     Sleeping not so well    Electronically signed by Dian Mauricio CMA at 09/12/2019 3:10 PM CDT   "      ______________________________________      Patient was asked about 14 Review of systems including changes in vision (dry eyes, double vision), hearing, heart, lungs, musculoskeletal, depression, anxiety, snoring, RBD, insomnia, urinary frequency, urinary urgency, constipation, swallowing problems, hematological, ID, allergies, skin problems: seborrhea, endocrinological: thyroid, diabetes, cholesterol; balance, weight changes, and other neurological problems and these were not significant at this time except for   Patient Active Problem List   Diagnosis     Atypical Parkinsonism (H)     Disorder of bone and cartilage     Left ventricular hypertrophy     Hemorrhoids     Mixed hyperlipidemia     Other and unspecified diseases of appendix     Posterior capsular opacification     Prolapse of vaginal wall     Venous insufficiency     Abnormal brain MRI     Closed fracture of multiple ribs of both sides, initial encounter     Fall     Stroke (H)     Acute lacunar stroke (H)     Atrial fibrillation, unspecified type (H)     History of cervical fracture     Muscle weakness of upper extremity     Non-ischemic cardiomyopathy (H)     Tenderness of temporomandibular joint          Allergies   Allergen Reactions     Acetaminophen-Codeine Other (See Comments) and Unknown     Codeine Other (See Comments)     Morphine Other (See Comments)     Made me feel really wierd     Penicillins Other (See Comments)     Tongue and throat tingling  Other reaction(s): Paresthesias  Tongue and throat tingling  Tingling on lips  Other reaction(s): Paresthesias  Tongue and throat tingling  Tingling on lips  Tongue and throat \"tingling\" when in 20s       Past Surgical History:   Procedure Laterality Date     ANKLE SURGERY Left     fracture     APPENDECTOMY       EYE SURGERY Bilateral     cataract surgery 2000     HYSTERECTOMY      tahbso     KNEE SURGERY Right     staph infection     TONSILLECTOMY       Past Medical History:   Diagnosis Date "     Abnormal brain MRI 3/11/2019    MR HEAD BRAIN WO3/8/2019 [facility] [employer] [facility] Other Result Information This result has an attachment that is not available. Result Narrative [facility] RADIOLOGY  EXAM: MR HEAD BRAIN WO LOCATION: [facility] DATE/TIME: 3/7/2019 5:18 PM  INDICATION: Atypical Parkinsonism (hc) COMPARISON: CT 06/25/2018 TECHNIQUE: Routine multiplanar multisequence head MRI without intravenou     Atypical Parkinsonism (H) 2/2/2017     Social History     Socioeconomic History     Marital status:      Spouse name: Not on file     Number of children: Not on file     Years of education: Not on file     Highest education level: Not on file   Occupational History     Not on file   Social Needs     Financial resource strain: Not on file     Food insecurity:     Worry: Not on file     Inability: Not on file     Transportation needs:     Medical: Not on file     Non-medical: Not on file   Tobacco Use     Smoking status: Never Smoker     Smokeless tobacco: Never Used   Substance and Sexual Activity     Alcohol use: No     Frequency: Never     Drug use: Not on file     Sexual activity: Not on file   Lifestyle     Physical activity:     Days per week: Not on file     Minutes per session: Not on file     Stress: Not on file   Relationships     Social connections:     Talks on phone: Not on file     Gets together: Not on file     Attends Christianity service: Not on file     Active member of club or organization: Not on file     Attends meetings of clubs or organizations: Not on file     Relationship status: Not on file     Intimate partner violence:     Fear of current or ex partner: Not on file     Emotionally abused: Not on file     Physically abused: Not on file     Forced sexual activity: Not on file   Other Topics Concern     Not on file   Social History Narrative    . lives in [place].     Anisha Little daughter    Retired nurse.     Various hospital    Gyn, intensive  care,     [facility]    [place], [place]    Moved here in      x 2    First    = was 43 yrs old and had melanoma    Second    had a stroke and  Was 87 yrs old.        Drug and lactation database from the [place] [employer] of Medicine:  http://toxnet.nlm.nih.gov/cgi-bin/sis/htmlgen?LACT      B/P: Data Unavailable, T: Data Unavailable, P: Data Unavailable, R: Data Unavailable 0 lbs 0 oz  There were no vitals taken for this visit., There is no height or weight on file to calculate BMI.  Medications and Vitals not listed above were documented in the cart and reviewed by me.     Current Outpatient Medications   Medication Sig Dispense Refill     Magnesium Citrate 100 MG TABS Take 200 mg by mouth 2 times daily       magnesium hydroxide (MILK OF MAGNESIA) 400 MG/5ML suspension Take 30 mLs by mouth       OMEGA-3 FATTY ACIDS-VITAMIN E PO        acetaminophen (TYLENOL) 500 MG tablet Take 500 mg by mouth       ASCORBIC ACID PO Take 1,000 mg by mouth       aspirin (ASA) 81 MG EC tablet Take 1 tablet (81 mg) by mouth daily 30 tablet 0     calcium carbonate 600 mg-vitamin D 400 units (CALTRATE) 600-400 MG-UNIT per tablet        calcium carbonate 600 mg-vitamin D 400 units (CALTRATE) 600-400 MG-UNIT per tablet        Calcium Carbonate-Vit D-Min (CALCIUM 600+D PLUS MINERALS) 600-400 MG-UNIT TABS Take 1 tablet by mouth       carbidopa-levodopa (SINEMET)  MG tablet 1 tabs by mouth 3/day @ 7am, 1pm and 7pm       clopidogrel (PLAVIX) 75 MG tablet Take 1 tablet (75 mg) by mouth daily Take until 10/25/19, then discontinue 9 tablet 0     fish oil-omega-3 fatty acids 1000 MG capsule Take 1 capsule (1 g) by mouth daily       order for DME Equipment being ordered:  [employer] walker with laser and audio cueing 1 Device 1     oxyCODONE (ROXICODONE) 5 MG tablet TK SS T PO Q 4 H PRN P. USE SPARINGLY DUE TO RISK OF SEDATION AND FALLS.  0     pantoprazole (PROTONIX) 40 MG EC tablet Take 1  tablet (40 mg) by mouth every morning (before breakfast) Take until 10/25, then discontinue 9 tablet 0     polyethylene glycol (MIRALAX/GLYCOLAX) packet Take 17 g by mouth daily as needed for constipation       pravastatin (PRAVACHOL) 20 MG tablet Take 1 tablet (20 mg) by mouth daily 30 tablet 0     senna-docusate (SENOKOT-S/PERICOLACE) 8.6-50 MG tablet Take 1-2 tablets by mouth 2 times daily as needed for constipation           Ed Mendoza MD

## 2019-11-13 ENCOUNTER — AMBULATORY - HEALTHEAST (OUTPATIENT)
Dept: CARDIOLOGY | Facility: CLINIC | Age: 84
End: 2019-11-13

## 2019-11-13 ENCOUNTER — HOME CARE/HOSPICE - HEALTHEAST (OUTPATIENT)
Dept: HOME HEALTH SERVICES | Facility: HOME HEALTH | Age: 84
End: 2019-11-13

## 2019-11-13 DIAGNOSIS — Z95.818 STATUS POST PLACEMENT OF IMPLANTABLE LOOP RECORDER: ICD-10-CM

## 2019-11-13 DIAGNOSIS — I63.9 CRYPTOGENIC STROKE (H): ICD-10-CM

## 2019-11-13 ASSESSMENT — MIFFLIN-ST. JEOR: SCORE: 972.92

## 2019-11-14 ENCOUNTER — HOME CARE/HOSPICE - HEALTHEAST (OUTPATIENT)
Dept: HOME HEALTH SERVICES | Facility: HOME HEALTH | Age: 84
End: 2019-11-14

## 2019-11-15 ENCOUNTER — HOME CARE/HOSPICE - HEALTHEAST (OUTPATIENT)
Dept: HOME HEALTH SERVICES | Facility: HOME HEALTH | Age: 84
End: 2019-11-15

## 2019-11-15 ENCOUNTER — COMMUNICATION - HEALTHEAST (OUTPATIENT)
Dept: CARDIOLOGY | Facility: CLINIC | Age: 84
End: 2019-11-15

## 2019-11-15 DIAGNOSIS — I48.91 ATRIAL FIBRILLATION, UNSPECIFIED TYPE (H): ICD-10-CM

## 2019-11-18 ENCOUNTER — HOME CARE/HOSPICE - HEALTHEAST (OUTPATIENT)
Dept: HOME HEALTH SERVICES | Facility: HOME HEALTH | Age: 84
End: 2019-11-18

## 2019-11-20 ENCOUNTER — HOME CARE/HOSPICE - HEALTHEAST (OUTPATIENT)
Dept: HOME HEALTH SERVICES | Facility: HOME HEALTH | Age: 84
End: 2019-11-20

## 2019-11-22 ENCOUNTER — RECORDS - HEALTHEAST (OUTPATIENT)
Dept: ADMINISTRATIVE | Facility: OTHER | Age: 84
End: 2019-11-22

## 2019-11-22 ENCOUNTER — HOME CARE/HOSPICE - HEALTHEAST (OUTPATIENT)
Dept: HOME HEALTH SERVICES | Facility: HOME HEALTH | Age: 84
End: 2019-11-22

## 2019-11-22 ENCOUNTER — OFFICE VISIT (OUTPATIENT)
Dept: NEUROLOGY | Facility: CLINIC | Age: 84
End: 2019-11-22
Payer: MEDICARE

## 2019-11-22 VITALS
DIASTOLIC BLOOD PRESSURE: 82 MMHG | BODY MASS INDEX: 20.39 KG/M2 | SYSTOLIC BLOOD PRESSURE: 145 MMHG | WEIGHT: 126.3 LBS | HEART RATE: 75 BPM | OXYGEN SATURATION: 98 %

## 2019-11-22 DIAGNOSIS — E78.5 HYPERLIPIDEMIA, UNSPECIFIED HYPERLIPIDEMIA TYPE: ICD-10-CM

## 2019-11-22 DIAGNOSIS — I63.441 CEREBROVASCULAR ACCIDENT (CVA) DUE TO EMBOLISM OF RIGHT CEREBELLAR ARTERY (H): ICD-10-CM

## 2019-11-22 DIAGNOSIS — G20.A1 PARKINSON'S DISEASE (H): Primary | ICD-10-CM

## 2019-11-22 PROBLEM — I63.9 CRYPTOGENIC STROKE (H): Status: ACTIVE | Noted: 2019-10-16

## 2019-11-22 PROBLEM — R29.6 FALLS FREQUENTLY: Status: ACTIVE | Noted: 2019-10-31

## 2019-11-22 RX ORDER — PRAVASTATIN SODIUM 20 MG
TABLET ORAL
Qty: 30 TABLET | Refills: 0 | COMMUNITY
Start: 2019-11-22 | End: 2021-11-23

## 2019-11-22 RX ORDER — CARBIDOPA AND LEVODOPA 25; 100 MG/1; MG/1
TABLET ORAL
Qty: 270 TABLET | Refills: 3 | COMMUNITY
Start: 2019-11-22 | End: 2020-05-27

## 2019-11-22 RX ORDER — ANTACID TABLETS 500 MG/1
TABLET, CHEWABLE ORAL
COMMUNITY
Start: 2019-11-22 | End: 2021-11-23

## 2019-11-22 ASSESSMENT — PAIN SCALES - GENERAL: PAINLEVEL: NO PAIN (0)

## 2019-11-22 NOTE — NURSING NOTE
ALEXA [facility]'s goals for this visit include:   Chief Complaint   Patient presents with     RECHECK     movement disorder        She requests these members of her care team be copied on today's visit information: yes    PCP: Jerzy Aparicio    Referring Provider:  Referred Self, MD  No address on file    BP (!) 145/82   Pulse 75   Wt 57.3 kg (126 lb 4.8 oz)   SpO2 98%   BMI 20.39 kg/m      Do you need any medication refills at today's visit? No    Guille Rodriguez CMA

## 2019-11-22 NOTE — LETTER
2019     RE: ALEXA Luu  [address] [facility] [id number]     Dear Colleague,    Thank you for referring your patient, ALEXA Luu, to the [facility] NEUROLOGY at [facility]. Please see a copy of my visit note below.    Diagnosis/Summary/Recommendations:    PATIENT: ALEXA Luu  87 year old female     : 1932    RAMOS: 2019    Hospitalization review - cryptogenic stroke - afib was not yet found and spine fracture    HEAD MRI from 10/5/2019  1.  Small acute cortical infarcts in the knob of the right precentral gyrus.  2.  Small acute lacunar infarct in the right cerebellar hemisphere.  3.  No mass effect or hemorrhagic transformation.  4.  Chronic cortical infarcts in the right middle frontal gyrus.  5.  Moderate burden of chronic infarcts in the cerebellar hemispheres, left greater than right.  6.  Chronic type II odontoid fracture with surrounding pannus. This fracture was acute on 2018.    Tarun Her MD - 10/05/2019 10:00 AM CDT  Neurology chart note    I was informed by the emergency room PA that this patient was in her usual state of health until 6:30 AM 10/5/19. She was at the time already awake. She did not have any acute new neurological symptoms prior to onset of left upper extremity numbness and ataxia. She has history of Parkinson's disease. She was unable to operate her walker with her left upper extremity. She came to the emergency room.   Last known well 6:30 AM  Discussion with emergency room provider 10:04 AM  Decision to give TPA: Pending CT head results. This patient is a TPA candidate if the CT head results are reassuring and no additional contraindications for TPA exist    Tarun Her MD, PhD  Neurology & Sleep Medicine    Electronically signed by Tarun Her MD at 10/05/2019 10:06 AM CDT      Medication review    Medications     8am 2pm 8pm       Acetaminophen tylenol  ?             Ascorbic acid 1000 ?       Aspirin 81  1           Aspirin-calcium carbonate 81-77   not taking           Calcium carbonate 500mg ?taking            Calcium carbonate vit D 600-400  1           Carbidopa/levodopa Sinemet 25/100 1 1 1       Cholecalciferol vitamin D3 1000  ?           Clopidogrel plavix 75mg ?       Docosahexanoic acid 1gm  ?           Fish oil-omega 3 fatty acids 1000mg  1           Hydrocodone acetaminophen norco  stopped?           Ibuprofen advil/motrin 200mg  not taking           Ibuprofen advil/motrin 400mg Not taking            Magnesium citrate 200mg  not taking           Magnesium citrate  not taking           Magnesium hydroxide milk of magnesia 400 mg/5ml suspension Not taking       Omega-3 acid ethyl esters lovaza 1g  see above           Oxycodone roxicodone 5mg  not taking           Oxycodone roxicodone  not taking           Pantoprazole protonix 40mg  Not taking       Polyethylene glycol miralax  as needed           Polyvinyl alcohol liquifilm tears 1.4% ophthalmic solution  not taking           Pravastatin pravachol 20mg   1           Senna-docusate senokot-S pericolace 8.6-50 Not taknig       Vitamin C ascorbic acid 1000mg tabs  not taking           Vitamin D3 cholecalciferol 2000 units.   ?                                                                   History obtained from patient    [place] getting a stress test of her heart.   Has loop recorder to see if she has a fib - has seen Dr. Rolando Kothari  Neck fracture - stable   Medications reviewed and an updated table is below.   She arrives today in a wheelchair  Discussion about her stroke.   She has been using the wheelchair in home  When she was using a walker it was more unsafe as she had freezing of gait.   Has ring cameras in the home   She has life line pendant.   Mariel is here today with her  Sheila lives in [place] and is involved in her care.       Medications     7am 8am 1130am 4pm 8pm       Acetaminophen tylenol 500mg     1    1       Aspirin 81   1            Calcium carbonate 500mg tums      2       Calcium carbonate vit D 600-400   1            Carbidopa/levodopa Sinemet 25/100 1  1 1        Pravastatin pravachol 20mg        1                                                                     Plan  No change in medication regimen  Ongoing monitoring.   Return visit needs to be scheduled.   She wishes to increase her acetaminophen to 500mg 3/day and this seems reasonable  In regards to her ibuprofen this is an NSAID and there may be some risk - heart, stomach, etc. For taking this medication and she probably should talk with her pcp or/and cardiology  Walking with gait belt otherwise use of wheelchair        Coding statement:   Duration of  Services: patient care and care coordination was 25 minutes  Greater than 50% of this visit was spent in counseling and coordination of care.     Ed Mendoza MD     ______________________________________    Last visit date and details:     Active Problems  - documented as of this encounter (statuses as of 10/11/2019)  Problem Noted Date   Arm weakness 10/05/2019   Left arm weakness 10/05/2019   History of cervical fracture 10/05/2019   Acute lacunar stroke 10/05/2019   Abnormal brain MRI 03/11/2019   Overview:     Overview:   Formatting of this note might be different from the original.  MR HEAD BRAIN WO3/8/2019  [employer] [facility]  Other Result Information   This result has an attachment that is not available.   Result Narrative   [facility] RADIOLOGY    EXAM: MR HEAD BRAIN WO  LOCATION: [facility]  DATE/TIME: 3/7/2019 5:18 PM    INDICATION: Atypical Parkinsonism (hc)  COMPARISON: CT 06/25/2018  TECHNIQUE: Routine multiplanar multisequence head MRI without intravenous  contrast.    FINDINGS:  INTRACRANIAL CONTENTS: No acute or subacute infarct. Small cluster of chronic  infarcts in the left cerebellar hemisphere grossly similar to the prior study. A  few tiny  chronic lacunar infarcts in the right cerebellar hemisphere were not  well visualized on the previous exam. Small midline chronic cerebellar infarcts  are stable. Mild to moderate cerebral and mild cerebellar volume loss. Small  chronic cortical infarct right superior frontal gyrus. Tiny chronic lacunar  infarct left basal ganglia. Significant midbrain volume loss out of proportion  to the global volume loss can be seen in progressive supranuclear palsy. No  mass, acute hemorrhage, or extra-axial fluid collections. Mild burden presumed  chronic small vessel ischemia. Normal position of the cerebellar tonsils.     SELLA: No significant abnormality accounting for technique.    OSSEOUS STRUCTURES/SOFT TISSUES: Nondisplaced type II odontoid fracture with  normal alignment. Mild widening of the fracture gap compared to the previous CT  6/25/2018. No high-grade canal narrowing. The major intracranial vascular flow  voids are maintained.     ORBITS: No significant abnormality accounting for technique.     SINUSES/MASTOIDS: No significant paranasal sinus mucosal disease. Moderate right  mastoid effusion.     CONCLUSION:  1.  No acute intracranial abnormality. No restricted diffusion/acute infarct,  space-occupying hemorrhage, or mass effect.    2.  Significant midbrain volume loss is out of proportion to the degree of  global volume loss and can be seen in progressive supranuclear palsy.    3.  Age-related changes and scattered areas of chronic ischemia.    4.  Moderate right mastoid effusion.   Other Result Information   Interface, Abinee - 03/08/2019 1:53 AM [facility] RADIOLOGY    EXAM: MR HEAD BRAIN WO  LOCATION: [facility]  DATE/TIME: 3/7/2019 5:18 PM    INDICATION: Atypical Parkinsonism (hc)  COMPARISON: CT 06/25/2018  TECHNIQUE: Routine multiplanar multisequence head MRI without intravenous  contrast.    FINDINGS:  INTRACRANIAL CONTENTS: No acute or subacute infarct. Small cluster of chronic  infarcts in  the left cerebellar hemisphere grossly similar to the prior study. A  few tiny chronic lacunar infarcts in the right cerebellar hemisphere were not  well visualized on the previous exam. Small midline chronic cerebellar infarcts  are stable. Mild to moderate cerebral and mild cerebellar volume loss. Small  chronic cortical infarct right superior frontal gyrus. Tiny chronic lacunar  infarct left basal ganglia. Significant midbrain volume loss out of proportion  to the global volume loss can be seen in progressive supranuclear palsy. No  mass, acute hemorrhage, or extra-axial fluid collections. Mild burden presumed  chronic small vessel ischemia. Normal position of the cerebellar tonsils.     SELLA: No significant abnormality accounting for technique.    OSSEOUS STRUCTURES/SOFT TISSUES: Nondisplaced type II odontoid fracture with  normal alignment. Mild widening of the fracture gap compared to the previous CT  6/25/2018. No high-grade canal narrowing. The major intracranial vascular flow  voids are maintained.     ORBITS: No significant abnormality accounting for technique.     SINUSES/MASTOIDS: No significant paranasal sinus mucosal disease. Moderate right  mastoid effusion.     CONCLUSION:  1. No acute intracranial abnormality. No restricted diffusion/acute infarct,  space-occupying hemorrhage, or mass effect.    2. Significant midbrain volume loss is out of proportion to the degree of  global volume loss and can be seen in progressive supranuclear palsy.    3. Age-related changes and scattered areas of chronic ischemia.    4. Moderate right mastoid effusion.   Status      Closed fracture of multiple ribs of both sides, initial encounter        [facility] MEDICINE DISCHARGE SUMMARY     Primary Care Physician: Jerzy Aparicio MD Admission Date: 10/5/2019   Discharge Provider: Ed Andrade Discharge Date: 10/9/2019   Diet: regular diet  Code Status: DNR   Activity: activity as tolerated      Condition at  Discharge: Stable     REASON FOR PRESENTATION(See Admission Note for Details)   Acute left arm/hand clumsiness and numbness    PRINCIPAL & ACTIVE DISCHARGE DIAGNOSES     Principal Problem:  Left arm weakness  Active Problems:  Venous Insufficiency  Mixed hyperlipidemia  Osteopenia  Parkinson disease (H)  History of cervical fracture  Acute lacunar stroke (H)  Non-ischemic cardiomyopathy (H)  Atrial fibrillation, unspecified type (H)    SIGNIFICANT FINDINGS (Imaging, labs):     EXAM: CTA HEAD AND NECK  LOCATION: [facility]  DATE/TIME: 10/5/2019 10:23 AM     INDICATION: Neuro deficit, acute, stroke suspected Stroke code  COMPARISON: None.  CONTRAST: Iohexol (Omni) 100 mL  TECHNIQUE: Head and neck CT angiogram with IV contrast. Noncontrast head CT followed by axial helical CT images of the head and neck vessels obtained during the arterial phase of intravenous contrast administration. Axial 2D reconstructed images and   multiplanar 3D MIP reconstructed images of the head and neck vessels were performed by the technologist. Dose reduction techniques were used. All stenosis measurements made according to NASCET criteria unless otherwise specified.     FINDINGS:   NONCONTRAST HEAD CT:   INTRACRANIAL CONTENTS: No intracranial hemorrhage, extraaxial collection, or mass effect. No CT evidence of acute infarct. Unchanged small old infarctions in the left cerebellar hemisphere. Mild to moderate presumed chronic small vessel ischemic   changes. Mild generalized volume loss. No hydrocephalus.      VISUALIZED ORBITS/SINUSES/MASTOIDS: Prior bilateral cataract surgery. Visualized portions of the orbits are otherwise unremarkable. No paranasal sinus mucosal disease. No middle ear or mastoid effusion.     BONES/SOFT TISSUES: No acute abnormality.     HEAD CTA:  ANTERIOR CIRCULATION: No stenosis/occlusion, aneurysm, or high flow vascular malformation. Standard [place] of [place] anatomy.     POSTERIOR CIRCULATION: No  stenosis/occlusion, aneurysm, or high flow vascular malformation. Dominant left and smaller right vertebral artery contribute to a normal basilar artery.      DURAL VENOUS SINUSES: Expected enhancement of the major dural venous sinuses.     NECK CTA:  RIGHT CAROTID: Atherosclerosis of the proximal right internal carotid artery without significant stenosis based on NASCET-like criteria. No dissection or pseudoaneurysm.     LEFT CAROTID: Atherosclerosis of the proximal left internal carotid artery without significant stenosis based on NASCET-like criteria. No dissection or pseudoaneurysm.     VERTEBRAL ARTERIES: No focal stenosis or dissection. Balanced vertebral arteries.     AORTIC ARCH: Bovine origin left common carotid artery. No significant stenosis at the origin of the great vessels.     NONVASCULAR STRUCTURES: No acute soft tissue abnormality. Included lungs are normal. Nonunion of the previously seen type II dens fracture, with slightly increased displacement. The dens fracture fragment is 2 mm dorsally subluxed compared to the dens   body and the fracture lucency measures 2 to 3 mm in thickness. No new acute fracture.    EXAM: MR BRAIN COW WITHOUT CONTRAST  LOCATION: [facility]  DATE/TIME: 10/05/2019, 12:27 PM     INDICATION: Arm weakness.  COMPARISON: Head and neck CTA 10/05/2019.  TECHNIQUE:   HEAD MRI: Routine multiplanar multisequence head MRI without intravenous contrast.  HEAD MRA: 3D time-of-flight head MRA without intravenous contrast.     FINDINGS:  HEAD MRI:  INTRACRANIAL CONTENTS: Acute cortical infarcts in the knob of the right precentral gyrus correlating with the history of arm weakness. A small linear acute infarct is also noted in the right cerebellum. No mass effect or hemorrhagic transformation. No   mass lesion. Chronic cortical infarcts in the right middle frontal gyrus and moderate burden of chronic infarcts in the cerebellar hemispheres, left greater than right. Mild to moderate  generalized volume loss. Normal position of the cerebellar tonsils.      SELLA: No abnormality accounting for technique.     OSSEOUS STRUCTURES/SOFT TISSUES: Chronic type II odontoid fracture with surrounding pannus. This fracture was acute on 06/25/2018. The major intracranial vascular flow-voids are maintained.      ORBITS: No abnormality accounting for technique.      SINUSES/MASTOIDS: No paranasal sinus mucosal disease. Mild right mastoid opacification. Clear left mastoids.      HEAD MRA:   ANTERIOR CIRCULATION: No stenosis/occlusion, aneurysm, or high flow vascular malformation. Standard [place] of [place] anatomy.     POSTERIOR CIRCULATION: No stenosis/occlusion, aneurysm, or high-flow vascular malformation. Dominant left and smaller right vertebral artery contribute to a normal basilar artery.      IMPRESSION:   HEAD MRI:   1. Small acute cortical infarcts in the knob of the right precentral gyrus.  2. Small acute lacunar infarct in the right cerebellar hemisphere.  3. No mass effect or hemorrhagic transformation.  4. Chronic cortical infarcts in the right middle frontal gyrus.  5. Moderate burden of chronic infarcts in the cerebellar hemispheres, left greater than right.  6. Chronic type II odontoid fracture with surrounding pannus. This fracture was acute on 06/25/2018.     HEAD MRA:   1. Normal MRA [place] of [place].    PENDING LABS     PROCEDURES ( this hospitalization only)     EP Loop Recorder Insertion    RECOMMENDATIONS TO OUTPATIENT PROVIDER FOR F/U VISIT     Implantable loop recorder - removal of sutures in 1 week  Consider outpatient stress test per cardiology recommendation    DISPOSITION     Acute rehab    SUMMARY OF HOSPITAL COURSE:     Summary: 87 y.o. old female with Parkinson's disease and frequent falls, dyslipidemia, admitted with acute left arm ataxia and found to have acute JACIEL stroke, outside TPA window. Chronic double vision which is corrected with prism eyeglasses.     Assessment &  "Plan    Acute Right cerebral CVA: acute onset left arm numbness and ataxia. Presented to  ED, outside of TPA window. MRI showed \"Acute cortical infarcts in the knob of the right precentral gyrus correlating with the history of arm weakness. A small linear acute infarct is also noted in the right cerebellum\".  Patient is reticent to take blood thinners due to her frequent falls, but willing to take asa/plavix for 21 days followed by lifetime ASA, follow recommended protocol. Finish 21 days of plavix, 15 days left.    High risk of occult afib with embolic episodes: cardiology has evaluated to assist in evaluation of stroke risks due to undx'd afib. Implanted loop recorder has been placed and will f/u with cardiology as outpatient.    History of Parkinson's with frequent falls: Frequent falls over the last several years with serious injury including neck fracture, rib fracture, shoulder fracture.  Most recent fall was about a week and a half ago at home when she fell onto her buttocks and then backwards onto the back of her head.  She was feeling well and chose not to go into the ER for assessment at that time.    History of cervical fracture: Noted after a fall about 15 months ago, patient placed in neck brace and followed by neurosurgery.  Reassessment this ER admission by neurosurgery who feels that the CT does not show any significant clinical change in previous fracture site at [place].    Hyperlipidemia, mixed: continue outpatient medication regimen    Malnutrition, protein/calorie, mild: reduced appetite, weight loss, muscle wasting    Assessment of swallowing function: Speech and swallowing function are assessed and found to be functioning well.     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 Since last being seen she had an MRI which revealed midbrain atrophy. She had neuropsych " testing which was normal. She had neuro-ophthalmology exam which revealed SWJs but no clear supranuclear palsy.     She started using a U Step. She was still falling frequently, but then over the past month hasn't had any falls. She has good and bad times but does have fluctuations clearly related to the timing of her levodopa. She also is having freezing. Put lines on the floor in her house and uses the laser on the U Step.           PD Medications                   8 130 7   Sinemet 25/100                                 1 1 1      Medications:      Current Outpatient Medications   Medication     acetaminophen (TYLENOL) 500 MG tablet     calcium carbonate 600 mg-vitamin D 400 units (CALTRATE) 600-400 MG-UNIT per tablet     carbidopa-levodopa (SINEMET)  MG tablet     cholecalciferol (VITAMIN D-3 SUPER STRENGTH) 2000 units tablet     fish oil-omega-3 fatty acids 1000 MG capsule     ibuprofen (ADVIL/MOTRIN) 200 MG tablet     Magnesium Citrate 200 MG TABS     order for DME     pravastatin (PRAVACHOL) 10 MG tablet     vitamin C (ASCORBIC ACID) 1000 MG TABS      No current facility-administered medications for this visit.          Past Medical History        Past Medical History:   Diagnosis Date     Abnormal brain MRI 3/11/2019     MR HEAD BRAIN WO3/8/2019 [facility] [employer] [facility] Other Result Information This result has an attachment that is not available. 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 lives in [place].      Anisha Little daughter     Retired nurse.      Various hospital     Gyn, intensive care,      School system     [place], [place]     Moved here in 2010      " x 2     First    = was 43 yrs old and had melanoma     Second   2013 had a stroke and  Was 87 yrs old.          ROS:  A complete ROS was otherwise noncontributory except as noted above in HPI     Exam:  /69   Pulse 89   Temp 97.8  F (36.6  C) (Oral)   Resp 15   Ht 1.638 m (5' 4.5\")   Wt 57.2 kg (126 lb)   SpO2 95%   BMI 21.29 kg/m    NAD  Breathing comfortably  No peripheral edema     Neurological Examination (R/L):  Mental Status: Awake, alert. Fluent. Affect normal.  Cranial Nerves: Versions full. Question slightly slow vertical saccades. No hypomimia. No hypophonia. No torticollis. Tongue protrudes midline  Motor: Slight right hand bradykinesia and bilateral toe tapping bradykinesia. Tone was normal. No resting tremor.   Coordination: Finger to nose without dysmetria.  Gait: Good stride length and narrow-based gait with U Step. Does almost fall back after rising from chair.     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 Seems to still have levodopa response and no diagnostic supranuclear ophthalmoplegia or cognitive findings however there is some midbrain atrophy. Will need to follow over time to get a more clear diagnosis. She is also being seen at [facility] to see if she would be a candidate for any research there.     Plan:   Follow-up 6 months     Skinny Sexton MD  Movement Disorders Fellow     Patient seen and examined by me today.   I agree with details above.   Ed Mendoza md  2019     RAMOS: 2019        Atypical parkinsonism     neuropsych results pending     Eye evaluation note below      1. 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  Exam was compatible with Parkinsonism including square wave jerks and mildly saccadic smooth pursuit and decreased blink rate.     2. Dry eye syndrome and blepharitis- discussed using a nighttime viscous gel in addition " to warm compresses twice a day and artificial tears throughout the day.  Patient may consider referral to a dry eye specialist if she continues to be bothered.  She has multiple issues affecting her ocular surface including Parkinson's disease decreased blink, dry eye, and blepharitis.     3. 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  No change indicated.     4. 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  No exudative changes.        Medications     8am 2pm 8pm       Acetaminophen tylenol              Aspirin 81             Aspirin-calcium carbonate 81-77              Calcium carbonate 500mg             Calcium carbonate vit D             Calcium carbonate vit D             Carbidopa/levodopa Sinemet 25/100 1 1 1       Cholecalciferol vitamin D3 1000             Docosahexanoic acid 1gm             Fish oil-omega 3 fatty acids 1000mg             Hydrocodone acetaminophen norco             Ibuprofen advil/motrin 200mg             Ibuprofen advil/motrin 400mg             Magnesium citrate 200mg             Magnesium citrate             Omega-3 acid ethyl esters lovaza 1g             Oxycodone roxicodone 5mg             Oxycodone roxicodone             Polyethylene glycol miralax             Polyvinyl alcohol liquifilm tears 1.4% ophthalmic solution             Pravastatin pravachol 10mg             Vitamin C ascorbic acid 1000mg tabs             Vitamin D3 cholecalciferol 2000 units.                                                                            Ct Toney, NONA - 09/09/2019 11:59 PM CDT  Formatting of this note might be different from the original.  Assessment:     1. Parkinson's disease versus PSP  2. Frequent falls  3. Osteopenia  4. Left ventricular hypertrophy    Select the one statement which best describes you:    Speech: Normal    Saliva/drooling: Slight excess of saliva,  sometimes drool on pillow    Swallowing: Rarely choke    Handwriting: Slightly slow or small    Cutting Food/Using Utensils: Slow/clumsy, able to feed myself without help    Dressing: slow, do not need help    Hygiene (bathing, brushing teeth, combing hair): Slow, no help needed    Turning in bed or adjusting sheets: Clumsy/slow turning in bed, no help needed    Falling: Fall average once per day    Freezing (unable to walk for a few seconds): Frequently freeze, occasionally fall because of freezing    Walking: Severe problems, need assistance    Visible tremor anywhere in body: Slight tremor infrequently present    Numbness, tingling, discomfort, aching: No numbness, tingling, or aching    Concerns/problems/updates/changes you wish to discuss with provider today:  New Parkinson's meds    Are you experiencing any of the following symptoms:  Stiffness, Slowness of movement, Posture/balance, Painful cramps, Dyskinesia (dance-like movements), Sleep problems, Dreams/nightmares, Light headedness and Constipation    Do you use: Walker     The patient presents to the Parkinson's clinic on referral from another 1 of my patients. The patient is here to see if there are any newer meds she can use to help her Parkinson's disease. The patient has had many falls and feels very unsteady when she walks. She hopes to have better stability, be more active, continue to walk every day and get better sleep. 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 She currently lives alone and does not drive. She lives in a 1 level twin home and has 2 daughters that live within 5 minutes from her. She reports she has good support great family and friends. She reports she does not sleep well and used to be a lot more active, she used to take physical therapy, back she has taken it here. She reports that used to do dahlia chi and walk every single day. She is very worried about her falls. She denies any hallucinations. The patient  did have an MR which showed difficult midbrain loss, so is questionable if she may have PSP. When I watch the patient walk her foot almost seem to catch her other foot, was almost like she was head dyskinesia when walking. She does state that she was taught to walk where she put one foot in front of the other, by doing this I believe that when she turns into certain things she does trip over her other foot this may be why she has some any falls.    Plan:     Plan:  Continue Parkinson medication as before    8am 12 pm 4 pm   HS            Sinemet IR   25/100 I tab 1 tab 1 tab                        2. Evaluation by physical therapy, Occupational Therapy, speech therapy and treat if needed.  3. Information given to patient on different medications  4. Patient will return and I will do on-off testing  5. labs    Subjective:       Parkinson's history: The patient was diagnosed with Parkinson's by Dr. Her in August 2015. Patient states that she was falling, had a shuffling gait, stiffness, but no tremor. She does state that her right side was worse. He prescribed carbidopa levodopa. She noticed significant improvement in her balance and gait since started the medication. She has increased the Sinemet 25/100 IR to 4 times a day, but got severe dyskinesia so it was reduced back down to 3 times a day. Patient states that she has not tried any other Parkinson's medications.    PD: Currently taking 1 tablet of Sinemet IR 5/103 times a day at any time between 7:53 AM, around 12 AM, and sometimes around 4:30 PM she admits to not taking her medication at a consistent time every day  RLS-believes she does have restless leg  ADL -she reports he is independent with all ADLs and   Driving-she does not drive  Exercise -she tries to exercise frequently but with all of her falls she has been moving less  Medication Side Effects -no side effects that she is aware of  Memory -believes that her memory is fine, she does have a  "neuropsychological assessment from the beginning of the year which shows no cognitive deficits  Sleep-reports that her sleep is \"horrible\" and \"really bad\".  Psych/Depression- denies any problem  Autonomic- reports mild lightheadedness  Sialorrhea  denies   Dyskinesia-no dyskinesia noted today when sitting, when the patient walks her feet have almost a dance like movement to it, I am not quite sure if this is dyskinesia.  Tremor -no tremor noted today  Advanced Directives-discussed  Surgery -discussed   Bradykinesia and Hypokinesia -none noted today    Patient Active Problem List   Diagnosis Date Noted     Closed fracture of multiple ribs of both sides, initial encounter 02/27/2019     Fall 02/27/2019     Parkinson disease (H) 01/20/2016     Vaginal wall prolapse 08/18/2015     Left Ventricular Hypertrophy     Hemorrhoids     Venous Insufficiency     Cystocele     Mixed hyperlipidemia     Appendiceal Mucocele     Osteopenia     Past Medical History:   Diagnosis Date     Breast cyst     Finger fracture, left 02/27/2019   ring finger     Multiple rib fractures 02/27/2019     Parkinson disease (H) 1/20/2016     Past Surgical History:   Procedure Laterality Date     BREAST CYST ASPIRATION     HYSTERECTOMY 1982     [place] TOTAL ABDOM HYSTERECTOMY   Description: Hysterectomy; Proc Date: 01/01/1988; Comments: couldn't find her ovaries     Family History   Problem Relation Age of Onset     Breast cancer Maternal Aunt     Current Outpatient Medications   Medication Sig Dispense Refill     acetaminophen (TYLENOL) 500 MG tablet Take 500 mg by mouth every 6 (six) hours as needed for pain.     ascorbic acid, vitamin C, (VITAMIN C) 1000 MG tablet     calcium-vitamin D (CALCIUM-VITAMIN D) 500 mg(1,250mg) -200 unit per tablet Take 1 tablet by mouth 2 (two) times a day with meals.     carbidopa-levodopa (SINEMET)  mg per tablet Take 1 tablet by mouth 3 (three) times a day.     ibuprofen (ADVIL,MOTRIN) 200 MG tablet Take 200 mg by " "mouth every 6 (six) hours as needed for pain.         MAGNESIUM CITRATE ORAL Take 1 tablet by mouth 2 (two) times a day.     OMEGA-3/DHA/EPA/FISH OIL (FISH OIL-OMEGA-3 FATTY ACIDS) 300-1,000 mg capsule Take 1 g by mouth daily.         polyvinyl alcohol (ARTIFICIAL TEARS, POLYVIN ALC,) 1.4 % ophthalmic solution Administer 1 drop to both eyes 4 (four) times a day as needed for dry eyes.     pravastatin (PRAVACHOL) 10 MG tablet Take 1 tablet (10 mg total) by mouth daily. 90 tablet 3     No current facility-administered medications for this encounter.     Allergies   Allergen Reactions     Acetaminophen-Codeine Unknown     Codeine     Morphine Other (See Comments)   Made me feel really wierd     Penicillins Other (See Comments)   Tongue and throat \"tingling\" when in 20s     Social History     Socioeconomic History     Marital status:    Spouse name: Not on file     Number of children: Not on file     Years of education: Not on file     Highest education level: Not on file   Occupational History     Not on file   Social Needs     Financial resource strain: Not on file     Food insecurity:   Worry: Not on file   Inability: Not on file     Transportation needs:   Medical: Not on file   Non-medical: Not on file   Tobacco Use     Smoking status: Never Smoker     Smokeless tobacco: Never Used   Substance and Sexual Activity     Alcohol use: No   Frequency: Never     Drug use: Not on file     Sexual activity: Not on file   Lifestyle     Physical activity:   Days per week: Not on file   Minutes per session: Not on file     Stress: Not on file   Relationships     Social connections:   Talks on phone: Not on file   Gets together: Not on file   Attends Zoroastrian service: Not on file   Active member of club or organization: Not on file   Attends meetings of clubs or organizations: Not on file   Relationship status: Not on file     Intimate partner violence:   Fear of current or ex partner: Not on file   Emotionally abused: " Not on file   Physically abused: Not on file   Forced sexual activity: Not on file   Other Topics Concern     Not on file   Social History Narrative     Not on file     The following portions of the patient's history were reviewed and updated as appropriate: allergies, current medications, past family history, past medical history, past social history, past surgical history and problem list.    Review of Systems      Objective:     Vitals:   09/09/19 1403   BP: 128/69   Pulse: 71     Imaging: DATE/TIME: 3/7/2019 5:18 PM    FINDINGS:  INTRACRANIAL CONTENTS: No acute or subacute infarct. Small cluster of chronic  infarcts in the left cerebellar hemisphere grossly similar to the prior study. A  few tiny chronic lacunar infarcts in the right cerebellar hemisphere were not  well visualized on the previous exam. Small midline chronic cerebellar infarcts  are stable. Mild to moderate cerebral and mild cerebellar volume loss. Small  chronic cortical infarct right superior frontal gyrus. Tiny chronic lacunar  infarct left basal ganglia. Significant midbrain volume loss out of proportion  to the global volume loss can be seen in progressive supranuclear palsy. No  mass, acute hemorrhage, or extra-axial fluid collections. Mild burden presumed  chronic small vessel ischemia. Normal position of the cerebellar tonsils.     SELLA: No significant abnormality accounting for technique.    OSSEOUS STRUCTURES/SOFT TISSUES: Nondisplaced type II odontoid fracture with  normal alignment. Mild widening of the fracture gap compared to the previous CT  6/25/2018. No high-grade canal narrowing. The major intracranial vascular flow  voids are maintained.     ORBITS: No significant abnormality accounting for technique.     SINUSES/MASTOIDS: No significant paranasal sinus mucosal disease. Moderate right  mastoid effusion.     CONCLUSION:  1.  No acute intracranial abnormality. No restricted diffusion/acute infarct,  space-occupying hemorrhage,  or mass effect.    2.  Significant midbrain volume loss is out of proportion to the degree of  global volume loss and can be seen in progressive supranuclear palsy.    3.  Age-related changes and scattered areas of chronic ischemia.    4.  Moderate right mastoid effusion.    Neuropsychological Assessment  Completed at different facility, no cognitive impairments noted    Review of Systems    Alert, cooperative, no distress Sitting, appears stated age, normocephalic, atraumatic without cyanosis, edema or skin rashes. Normal mood.      Motor strength normal  Bulk normal  Tone Normal  Rigidity mild  Balance - stumbled with turns  Tremors: none noted  Dyskinesia: very mild noted in legs  Getting up from chair.-able to do without pushing off   I asked her to call with any questions or concerns and will see her again in clinic in about 4 weeks . We discussed the risks and benefits of the medication including risk of worsening depression with medication adjustments and even the possibility of emergence of suicidality      Total time spent with the patient today was 90 minutes with greater than 50% of the time spent in counseling and care coordination. .    Electronically signed by Ct Toney FNP at 09/12/2019 3:10 PM CDT    Back to top of Progress Notes  Dian Mauricio CMA - 09/09/2019 2:04 PM CDT  How have you been doing since we last saw you? Most important neurological symptom or concern for this visit (Medication wearing off, hallucinations, freezing, pain, sleep difficulty, constipation etc.) sleeping not so well, pain in neck, intermittent freezing through out the day. Most likely after last dose of medication. Patient feels heavy sensation sometimes.     How many falls has the pt. Had over the last year?(if pt. Falls frequently, daily, weekly, monthly: 45 times in 12 month period    Best explanation of falls (poor balance, fail/recognizing/judgement, trip, dizzy, moving too quickly, carrying too much, poor  "lighting, any loss of consciousness, confusion, incontinence, tongue biting, or any unusual features of events)? Unknown     Sleeping not so well    Electronically signed by Dian Mauricio CMA at 09/12/2019 3:10 PM CDT        ______________________________________      Patient was asked about 14 Review of systems including changes in vision (dry eyes, double vision), hearing, heart, lungs, musculoskeletal, depression, anxiety, snoring, RBD, insomnia, urinary frequency, urinary urgency, constipation, swallowing problems, hematological, ID, allergies, skin problems: seborrhea, endocrinological: thyroid, diabetes, cholesterol; balance, weight changes, and other neurological problems and these were not significant at this time except for   Patient Active Problem List   Diagnosis     Atypical Parkinsonism (H)     Disorder of bone and cartilage     Left ventricular hypertrophy     Hemorrhoids     Mixed hyperlipidemia     Other and unspecified diseases of appendix     Posterior capsular opacification     Prolapse of vaginal wall     Venous insufficiency     Abnormal brain MRI     Closed fracture of multiple ribs of both sides, initial encounter     Fall     Stroke (H)     Acute lacunar stroke (H)     Atrial fibrillation, unspecified type (H)     History of cervical fracture     Muscle weakness of upper extremity     Non-ischemic cardiomyopathy (H)     Tenderness of temporomandibular joint          Allergies   Allergen Reactions     Acetaminophen-Codeine Other (See Comments) and Unknown     Codeine Other (See Comments)     Morphine Other (See Comments)     Made me feel really wierd     Penicillins Other (See Comments)     Tongue and throat tingling  Other reaction(s): Paresthesias  Tongue and throat tingling  Tingling on lips  Other reaction(s): Paresthesias  Tongue and throat tingling  Tingling on lips  Tongue and throat \"tingling\" when in 20s       Past Surgical History:   Procedure Laterality Date     ANKLE SURGERY " Left     fracture     APPENDECTOMY       EYE SURGERY Bilateral     cataract surgery 2000     HYSTERECTOMY      tahbso     KNEE SURGERY Right     staph infection     TONSILLECTOMY       Past Medical History:   Diagnosis Date     Abnormal brain MRI 3/11/2019    [facility] Other Result Information This result has an attachment that is not available. Result Narrative [facility] RADIOLOGY  EXAM: MR HEAD BRAIN WO LOCATION: [facility] DATE/TIME: 3/7/2019 5:18 PM  INDICATION: Atypical Parkinsonism (hc) COMPARISON: CT 06/25/2018 TECHNIQUE: Routine multiplanar multisequence head MRI without intravenou     Atypical Parkinsonism (H) 2/2/2017     Social History     Socioeconomic History     Marital status:      Spouse name: Not on file     Number of children: Not on file     Years of education: Not on file     Highest education level: Not on file   Occupational History     Not on file   Social Needs     Financial resource strain: Not on file     Food insecurity:     Worry: Not on file     Inability: Not on file     Transportation needs:     Medical: Not on file     Non-medical: Not on file   Tobacco Use     Smoking status: Never Smoker     Smokeless tobacco: Never Used   Substance and Sexual Activity     Alcohol use: No     Frequency: Never     Drug use: Not on file     Sexual activity: Not on file   Lifestyle     Physical activity:     Days per week: Not on file     Minutes per session: Not on file     Stress: Not on file   Relationships     Social connections:     Talks on phone: Not on file     Gets together: Not on file     Attends Mandaeism service: Not on file     Active member of club or organization: Not on file     Attends meetings of clubs or organizations: Not on file     Relationship status: Not on file     Intimate partner violence:     Fear of current or ex partner: Not on file     Emotionally abused: Not on file     Physically abused: Not on file      Forced sexual activity: Not on file   Other Topics Concern     Not on file   Social History Narrative    . lives in [place].     Anisha Little daughter    Retired nurse.     Various hospital    Gyn, intensive care,     School system    [place], [place]    Moved here in      x 2    First    = was 43 yrs old and had melanoma    Second   2013 had a stroke and  Was 87 yrs old.        Drug and lactation database from the [place] [employer] of Medicine:  http://toxnet.nlm.nih.gov/cgi-bin/sis/htmlgen?LACT      B/P: Data Unavailable, T: Data Unavailable, P: Data Unavailable, R: Data Unavailable 0 lbs 0 oz  There were no vitals taken for this visit., There is no height or weight on file to calculate BMI.  Medications and Vitals not listed above were documented in the cart and reviewed by me.     Current Outpatient Medications   Medication Sig Dispense Refill     Magnesium Citrate 100 MG TABS Take 200 mg by mouth 2 times daily       magnesium hydroxide (MILK OF MAGNESIA) 400 MG/5ML suspension Take 30 mLs by mouth       OMEGA-3 FATTY ACIDS-VITAMIN E PO        acetaminophen (TYLENOL) 500 MG tablet Take 500 mg by mouth       ASCORBIC ACID PO Take 1,000 mg by mouth       aspirin (ASA) 81 MG EC tablet Take 1 tablet (81 mg) by mouth daily 30 tablet 0     calcium carbonate 600 mg-vitamin D 400 units (CALTRATE) 600-400 MG-UNIT per tablet        calcium carbonate 600 mg-vitamin D 400 units (CALTRATE) 600-400 MG-UNIT per tablet        Calcium Carbonate-Vit D-Min (CALCIUM 600+D PLUS MINERALS) 600-400 MG-UNIT TABS Take 1 tablet by mouth       carbidopa-levodopa (SINEMET)  MG tablet 1 tabs by mouth 3/day @ 7am, 1pm and 7pm       clopidogrel (PLAVIX) 75 MG tablet Take 1 tablet (75 mg) by mouth daily Take until 10/25/19, then discontinue 9 tablet 0     fish oil-omega-3 fatty acids 1000 MG capsule Take 1 capsule (1 g) by mouth daily       order for DME Equipment being ordered:   Susan walker with laser and audio cueing 1 Device 1     oxyCODONE (ROXICODONE) 5 MG tablet TK SS T PO Q 4 H PRN P. USE SPARINGLY DUE TO RISK OF SEDATION AND FALLS.  0     pantoprazole (PROTONIX) 40 MG EC tablet Take 1 tablet (40 mg) by mouth every morning (before breakfast) Take until 10/25, then discontinue 9 tablet 0     polyethylene glycol (MIRALAX/GLYCOLAX) packet Take 17 g by mouth daily as needed for constipation       pravastatin (PRAVACHOL) 20 MG tablet Take 1 tablet (20 mg) by mouth daily 30 tablet 0     senna-docusate (SENOKOT-S/PERICOLACE) 8.6-50 MG tablet Take 1-2 tablets by mouth 2 times daily as needed for constipation           Ed Mendoza MD

## 2019-11-22 NOTE — PATIENT INSTRUCTIONS
: 1932    RAMOS: 2019    Hospitalization review - cryptogenic stroke - afib was not yet found and spine fracture    HEAD MRI from 10/5/2019  1.  Small acute cortical infarcts in the knob of the right precentral gyrus.  2.  Small acute lacunar infarct in the right cerebellar hemisphere.  3.  No mass effect or hemorrhagic transformation.  4.  Chronic cortical infarcts in the right middle frontal gyrus.  5.  Moderate burden of chronic infarcts in the cerebellar hemispheres, left greater than right.  6.  Chronic type II odontoid fracture with surrounding pannus. This fracture was acute on 2018.    Tarun Her MD - 10/05/2019 10:00 AM CDT  Neurology chart note    I was informed by the emergency room PA that this patient was in her usual state of health until 6:30 AM 10/5/19. She was at the time already awake. She did not have any acute new neurological symptoms prior to onset of left upper extremity numbness and ataxia. She has history of Parkinson's disease. She was unable to operate her walker with her left upper extremity. She came to the emergency room.   Last known well 6:30 AM  Discussion with emergency room provider 10:04 AM  Decision to give TPA: Pending CT head results. This patient is a TPA candidate if the CT head results are reassuring and no additional contraindications for TPA exist    Tarun Her MD, PhD  Neurology & Sleep Medicine    Electronically signed by Tarun Her MD at 10/05/2019 10:06 AM CDT      Medication review    Medications     8am 2pm 8pm       Acetaminophen tylenol  ?            Ascorbic acid 1000 ?       Aspirin 81  1           Aspirin-calcium carbonate 81-77   not taking           Calcium carbonate 500mg ?taking            Calcium carbonate vit D 600-400  1           Carbidopa/levodopa Sinemet 25/100 1 1 1       Cholecalciferol vitamin D3 1000  ?           Clopidogrel plavix 75mg ?       Docosahexanoic acid 1gm  ?           Fish oil-omega 3 fatty acids  1000mg  1           Hydrocodone acetaminophen norco  stopped?           Ibuprofen advil/motrin 200mg  not taking           Ibuprofen advil/motrin 400mg Not taking            Magnesium citrate 200mg  not taking           Magnesium citrate  not taking           Magnesium hydroxide milk of magnesia 400 mg/5ml suspension Not taking       Omega-3 acid ethyl esters lovaza 1g  see above           Oxycodone roxicodone 5mg  not taking           Oxycodone roxicodone  not taking           Pantoprazole protonix 40mg  Not taking       Polyethylene glycol miralax  as needed           Polyvinyl alcohol liquifilm tears 1.4% ophthalmic solution  not taking           Pravastatin pravachol 20mg   1           Senna-docusate senokot-S pericolace 8.6-50 Not taknig       Vitamin C ascorbic acid 1000mg tabs  not taking           Vitamin D3 cholecalciferol 2000 units.   ?                                                                   History obtained from patient    [place] getting a stress test of her heart.   Has loop recorder to see if she has a fib - has seen Dr. Rolando Kothari  Neck fracture - stable   Medications reviewed and an updated table is below.   She arrives today in a wheelchair  Discussion about her stroke.   She has been using the wheelchair in home  When she was using a walker it was more unsafe as she had freezing of gait.   Has ring cameras in the home   She has life line pendant.   Mariel is here today with her  Sheila lives in [place] and is involved in her care.       Medications     7am 8am 1130am 4pm 8pm       Acetaminophen tylenol 500mg    1    1       Aspirin 81   1            Calcium carbonate 500mg tums      2       Calcium carbonate vit D 600-400   1            Carbidopa/levodopa Sinemet 25/100 1  1 1        Pravastatin pravachol 20mg        1                                                                     Plan  No change in medication regimen  Ongoing monitoring.   Return visit needs to be scheduled.   She  wishes to increase her acetaminophen to 500mg 3/day and this seems reasonable  In regards to her ibuprofen this is an NSAID and there may be some risk - heart, stomach, etc. For taking this medication and she probably should talk with her pcp or/and cardiology  Walking with gait belt otherwise use of wheelchair

## 2019-11-26 ENCOUNTER — RECORDS - HEALTHEAST (OUTPATIENT)
Dept: ADMINISTRATIVE | Facility: OTHER | Age: 84
End: 2019-11-26

## 2019-11-26 ENCOUNTER — AMBULATORY - HEALTHEAST (OUTPATIENT)
Dept: CARDIOLOGY | Facility: CLINIC | Age: 84
End: 2019-11-26

## 2019-11-26 DIAGNOSIS — I63.9 CRYPTOGENIC STROKE (H): ICD-10-CM

## 2019-11-29 ENCOUNTER — HOSPITAL ENCOUNTER (OUTPATIENT)
Dept: NUCLEAR MEDICINE | Facility: CLINIC | Age: 84
Discharge: HOME | End: 2019-11-29
Attending: INTERNAL MEDICINE

## 2019-11-29 ENCOUNTER — HOSPITAL ENCOUNTER (OUTPATIENT)
Dept: CARDIOLOGY | Facility: CLINIC | Age: 84
Discharge: HOME | End: 2019-11-29
Attending: INTERNAL MEDICINE

## 2019-11-29 DIAGNOSIS — I48.91 ATRIAL FIBRILLATION, UNSPECIFIED TYPE (H): ICD-10-CM

## 2019-11-29 LAB
CV STRESS CURRENT BP HE: NORMAL
CV STRESS CURRENT HR HE: 101
CV STRESS CURRENT HR HE: 102
CV STRESS CURRENT HR HE: 108
CV STRESS CURRENT HR HE: 108
CV STRESS CURRENT HR HE: 111
CV STRESS CURRENT HR HE: 112
CV STRESS CURRENT HR HE: 70
CV STRESS CURRENT HR HE: 70
CV STRESS CURRENT HR HE: 76
CV STRESS CURRENT HR HE: 84
CV STRESS CURRENT HR HE: 96
CV STRESS CURRENT HR HE: 96
CV STRESS CURRENT HR HE: 97
CV STRESS CURRENT HR HE: 98
CV STRESS CURRENT HR HE: 99
CV STRESS DEVIATION TIME HE: NORMAL
CV STRESS ECHO PERCENT HR HE: NORMAL
CV STRESS EXERCISE STAGE HE: NORMAL
CV STRESS FINAL RESTING BP HE: NORMAL
CV STRESS FINAL RESTING HR HE: 96
CV STRESS MAX HR HE: 112
CV STRESS MAX TREADMILL GRADE HE: 0
CV STRESS MAX TREADMILL SPEED HE: 0
CV STRESS PEAK DIA BP HE: NORMAL
CV STRESS PEAK SYS BP HE: NORMAL
CV STRESS PHASE HE: NORMAL
CV STRESS PROTOCOL HE: NORMAL
CV STRESS RESTING PT POSITION HE: NORMAL
CV STRESS RESTING PT POSITION HE: NORMAL
CV STRESS ST DEVIATION AMOUNT HE: NORMAL
CV STRESS ST DEVIATION ELEVATION HE: NORMAL
CV STRESS ST EVELATION AMOUNT HE: NORMAL
CV STRESS TEST TYPE HE: NORMAL
CV STRESS TOTAL STAGE TIME MIN 1 HE: NORMAL
NUC STRESS EJECTION FRACTION: 66 %
RATE PRESSURE PRODUCT: NORMAL
STRESS ECHO BASELINE DIASTOLIC HE: 68
STRESS ECHO BASELINE HR: 70
STRESS ECHO BASELINE SYSTOLIC BP: 143
STRESS ECHO CALCULATED PERCENT HR: 84 %
STRESS ECHO LAST STRESS DIASTOLIC BP: 65
STRESS ECHO LAST STRESS HR: 102
STRESS ECHO LAST STRESS SYSTOLIC BP: 146
STRESS ECHO TARGET HR: 133

## 2019-12-05 ENCOUNTER — OFFICE VISIT - HEALTHEAST (OUTPATIENT)
Dept: FAMILY MEDICINE | Facility: CLINIC | Age: 84
End: 2019-12-05

## 2019-12-05 DIAGNOSIS — F51.01 PRIMARY INSOMNIA: ICD-10-CM

## 2019-12-05 DIAGNOSIS — Z12.83 SCREENING EXAM FOR SKIN CANCER: ICD-10-CM

## 2019-12-05 DIAGNOSIS — M94.9 DISORDER OF BONE AND CARTILAGE: ICD-10-CM

## 2019-12-05 DIAGNOSIS — M65.30 TRIGGER FINGER, ACQUIRED: ICD-10-CM

## 2019-12-05 DIAGNOSIS — M89.9 DISORDER OF BONE AND CARTILAGE: ICD-10-CM

## 2019-12-05 DIAGNOSIS — R29.6 FALLS FREQUENTLY: ICD-10-CM

## 2019-12-05 DIAGNOSIS — G20.A1 PARKINSON DISEASE (H): ICD-10-CM

## 2019-12-05 ASSESSMENT — MIFFLIN-ST. JEOR: SCORE: 972.92

## 2019-12-11 ENCOUNTER — AMBULATORY - HEALTHEAST (OUTPATIENT)
Dept: CARDIOLOGY | Facility: CLINIC | Age: 84
End: 2019-12-11

## 2019-12-11 DIAGNOSIS — Z95.818 STATUS POST PLACEMENT OF IMPLANTABLE LOOP RECORDER: ICD-10-CM

## 2019-12-11 DIAGNOSIS — I63.9 CRYPTOGENIC STROKE (H): ICD-10-CM

## 2019-12-11 ASSESSMENT — MIFFLIN-ST. JEOR: SCORE: 977.46

## 2019-12-16 ENCOUNTER — COMMUNICATION - HEALTHEAST (OUTPATIENT)
Dept: CARDIOLOGY | Facility: CLINIC | Age: 84
End: 2019-12-16

## 2019-12-16 ENCOUNTER — AMBULATORY - HEALTHEAST (OUTPATIENT)
Dept: CARDIOLOGY | Facility: CLINIC | Age: 84
End: 2019-12-16

## 2019-12-16 DIAGNOSIS — I63.9 CRYPTOGENIC STROKE (H): ICD-10-CM

## 2019-12-21 ENCOUNTER — AMBULATORY - HEALTHEAST (OUTPATIENT)
Dept: CARDIOLOGY | Facility: CLINIC | Age: 84
End: 2019-12-21

## 2019-12-23 ENCOUNTER — COMMUNICATION - HEALTHEAST (OUTPATIENT)
Dept: CARDIOLOGY | Facility: CLINIC | Age: 84
End: 2019-12-23

## 2019-12-27 ENCOUNTER — RECORDS - HEALTHEAST (OUTPATIENT)
Dept: ADMINISTRATIVE | Facility: OTHER | Age: 84
End: 2019-12-27

## 2019-12-27 ENCOUNTER — RECORDS - HEALTHEAST (OUTPATIENT)
Dept: BONE DENSITY | Facility: CLINIC | Age: 84
End: 2019-12-27

## 2019-12-27 DIAGNOSIS — M89.9 DISORDER OF BONE, UNSPECIFIED: ICD-10-CM

## 2019-12-27 DIAGNOSIS — M94.9 DISORDER OF CARTILAGE, UNSPECIFIED: ICD-10-CM

## 2020-01-07 ENCOUNTER — COMMUNICATION - HEALTHEAST (OUTPATIENT)
Dept: CARDIOLOGY | Facility: CLINIC | Age: 85
End: 2020-01-07

## 2020-01-09 ENCOUNTER — COMMUNICATION - HEALTHEAST (OUTPATIENT)
Dept: CARDIOLOGY | Facility: CLINIC | Age: 85
End: 2020-01-09

## 2020-01-09 ENCOUNTER — OFFICE VISIT - HEALTHEAST (OUTPATIENT)
Dept: CARDIOLOGY | Facility: CLINIC | Age: 85
End: 2020-01-09

## 2020-01-09 ENCOUNTER — AMBULATORY - HEALTHEAST (OUTPATIENT)
Dept: CARDIOLOGY | Facility: CLINIC | Age: 85
End: 2020-01-09

## 2020-01-09 DIAGNOSIS — I48.0 PAROXYSMAL ATRIAL FIBRILLATION (H): ICD-10-CM

## 2020-01-09 DIAGNOSIS — Z95.818 STATUS POST PLACEMENT OF IMPLANTABLE LOOP RECORDER: ICD-10-CM

## 2020-01-09 DIAGNOSIS — I63.9 CRYPTOGENIC STROKE (H): ICD-10-CM

## 2020-01-09 DIAGNOSIS — I63.81 ACUTE LACUNAR STROKE (H): ICD-10-CM

## 2020-01-09 DIAGNOSIS — R29.6 FALLS FREQUENTLY: ICD-10-CM

## 2020-01-09 ASSESSMENT — MIFFLIN-ST. JEOR: SCORE: 968.39

## 2020-01-10 ENCOUNTER — COMMUNICATION - HEALTHEAST (OUTPATIENT)
Dept: CARDIOLOGY | Facility: CLINIC | Age: 85
End: 2020-01-10

## 2020-01-10 ENCOUNTER — AMBULATORY - HEALTHEAST (OUTPATIENT)
Dept: CARDIOLOGY | Facility: CLINIC | Age: 85
End: 2020-01-10

## 2020-01-10 DIAGNOSIS — I48.0 PAROXYSMAL ATRIAL FIBRILLATION (H): ICD-10-CM

## 2020-01-13 ENCOUNTER — COMMUNICATION - HEALTHEAST (OUTPATIENT)
Dept: FAMILY MEDICINE | Facility: CLINIC | Age: 85
End: 2020-01-13

## 2020-01-13 ENCOUNTER — RECORDS - HEALTHEAST (OUTPATIENT)
Dept: ADMINISTRATIVE | Facility: OTHER | Age: 85
End: 2020-01-13

## 2020-01-14 ENCOUNTER — COMMUNICATION - HEALTHEAST (OUTPATIENT)
Dept: CARDIOLOGY | Facility: CLINIC | Age: 85
End: 2020-01-14

## 2020-01-16 ENCOUNTER — COMMUNICATION - HEALTHEAST (OUTPATIENT)
Dept: CARDIOLOGY | Facility: CLINIC | Age: 85
End: 2020-01-16

## 2020-01-21 ENCOUNTER — AMBULATORY - HEALTHEAST (OUTPATIENT)
Dept: CARDIOLOGY | Facility: CLINIC | Age: 85
End: 2020-01-21

## 2020-01-21 ENCOUNTER — RECORDS - HEALTHEAST (OUTPATIENT)
Dept: ADMINISTRATIVE | Facility: OTHER | Age: 85
End: 2020-01-21

## 2020-01-21 DIAGNOSIS — I63.9 CRYPTOGENIC STROKE (H): ICD-10-CM

## 2020-01-22 ENCOUNTER — COMMUNICATION - HEALTHEAST (OUTPATIENT)
Dept: CARDIOLOGY | Facility: CLINIC | Age: 85
End: 2020-01-22

## 2020-01-24 ENCOUNTER — AMBULATORY - HEALTHEAST (OUTPATIENT)
Dept: CARDIOLOGY | Facility: CLINIC | Age: 85
End: 2020-01-24

## 2020-01-24 ENCOUNTER — SURGERY - HEALTHEAST (OUTPATIENT)
Dept: CARDIOLOGY | Facility: CLINIC | Age: 85
End: 2020-01-24

## 2020-01-24 DIAGNOSIS — I48.0 PAROXYSMAL ATRIAL FIBRILLATION (H): ICD-10-CM

## 2020-01-27 ENCOUNTER — OFFICE VISIT - HEALTHEAST (OUTPATIENT)
Dept: CARDIOLOGY | Facility: CLINIC | Age: 85
End: 2020-01-27

## 2020-01-27 ENCOUNTER — COMMUNICATION - HEALTHEAST (OUTPATIENT)
Dept: CARDIOLOGY | Facility: CLINIC | Age: 85
End: 2020-01-27

## 2020-01-27 ENCOUNTER — AMBULATORY - HEALTHEAST (OUTPATIENT)
Dept: CARDIOLOGY | Facility: CLINIC | Age: 85
End: 2020-01-27

## 2020-01-27 ENCOUNTER — HOSPITAL ENCOUNTER (OUTPATIENT)
Dept: CT IMAGING | Facility: CLINIC | Age: 85
Discharge: HOME | End: 2020-01-27
Attending: INTERNAL MEDICINE

## 2020-01-27 DIAGNOSIS — I48.0 PAROXYSMAL ATRIAL FIBRILLATION (H): ICD-10-CM

## 2020-01-27 DIAGNOSIS — I63.9 CRYPTOGENIC STROKE (H): ICD-10-CM

## 2020-01-27 DIAGNOSIS — G20.A1 PARKINSON DISEASE (H): ICD-10-CM

## 2020-01-27 LAB
CREAT BLD-MCNC: 0.7 MG/DL (ref 0.6–1.1)
GFR SERPL CREATININE-BSD FRML MDRD: >60 ML/MIN/1.73M2

## 2020-01-27 ASSESSMENT — MIFFLIN-ST. JEOR
SCORE: 972.47
SCORE: 968.39

## 2020-02-04 ENCOUNTER — COMMUNICATION - HEALTHEAST (OUTPATIENT)
Dept: CARDIOLOGY | Facility: CLINIC | Age: 85
End: 2020-02-04

## 2020-02-04 DIAGNOSIS — I48.0 PAROXYSMAL ATRIAL FIBRILLATION (H): ICD-10-CM

## 2020-02-04 LAB
BSA FOR ECHO PROCEDURE: 1.58 M2
CCTA EJECTION FRACTION ESTIMATED: 55 %

## 2020-02-06 ENCOUNTER — OFFICE VISIT - HEALTHEAST (OUTPATIENT)
Dept: CARDIOLOGY | Facility: CLINIC | Age: 85
End: 2020-02-06

## 2020-02-06 DIAGNOSIS — R29.6 FALLS FREQUENTLY: ICD-10-CM

## 2020-02-06 DIAGNOSIS — Z86.73 H/O: CVA (CEREBROVASCULAR ACCIDENT): ICD-10-CM

## 2020-02-06 DIAGNOSIS — G20.A1 PARKINSON DISEASE (H): ICD-10-CM

## 2020-02-06 DIAGNOSIS — I48.0 PAROXYSMAL ATRIAL FIBRILLATION (H): ICD-10-CM

## 2020-02-06 ASSESSMENT — MIFFLIN-ST. JEOR: SCORE: 959.32

## 2020-02-08 ENCOUNTER — HEALTH MAINTENANCE LETTER (OUTPATIENT)
Age: 85
End: 2020-02-08

## 2020-02-17 ENCOUNTER — AMBULATORY - HEALTHEAST (OUTPATIENT)
Dept: CARDIOLOGY | Facility: CLINIC | Age: 85
End: 2020-02-17

## 2020-02-20 ENCOUNTER — ANESTHESIA - HEALTHEAST (OUTPATIENT)
Dept: CARDIOLOGY | Facility: CLINIC | Age: 85
End: 2020-02-20

## 2020-02-20 ENCOUNTER — SURGERY - HEALTHEAST (OUTPATIENT)
Dept: CARDIOLOGY | Facility: CLINIC | Age: 85
End: 2020-02-20

## 2020-02-20 ASSESSMENT — MIFFLIN-ST. JEOR
SCORE: 955.23
SCORE: 962.04

## 2020-02-21 ASSESSMENT — MIFFLIN-ST. JEOR: SCORE: 959.6

## 2020-02-24 ENCOUNTER — AMBULATORY - HEALTHEAST (OUTPATIENT)
Dept: CARDIOLOGY | Facility: CLINIC | Age: 85
End: 2020-02-24

## 2020-02-24 ENCOUNTER — SURGERY - HEALTHEAST (OUTPATIENT)
Dept: CARDIOLOGY | Facility: CLINIC | Age: 85
End: 2020-02-24

## 2020-02-24 DIAGNOSIS — I48.0 PAROXYSMAL ATRIAL FIBRILLATION (H): ICD-10-CM

## 2020-02-24 ASSESSMENT — MIFFLIN-ST. JEOR: SCORE: 963.85

## 2020-03-30 ENCOUNTER — COMMUNICATION - HEALTHEAST (OUTPATIENT)
Dept: CARDIOLOGY | Facility: CLINIC | Age: 85
End: 2020-03-30

## 2020-04-09 ENCOUNTER — OFFICE VISIT - HEALTHEAST (OUTPATIENT)
Dept: CARDIOLOGY | Facility: CLINIC | Age: 85
End: 2020-04-09

## 2020-04-09 ENCOUNTER — AMBULATORY - HEALTHEAST (OUTPATIENT)
Dept: CARDIOLOGY | Facility: CLINIC | Age: 85
End: 2020-04-09

## 2020-04-09 DIAGNOSIS — G20.A1 PARKINSON DISEASE (H): ICD-10-CM

## 2020-04-09 DIAGNOSIS — I48.0 PAROXYSMAL ATRIAL FIBRILLATION (H): ICD-10-CM

## 2020-04-09 DIAGNOSIS — I42.8 NON-ISCHEMIC CARDIOMYOPATHY (H): ICD-10-CM

## 2020-04-09 DIAGNOSIS — Z95.818 PRESENCE OF LEFT ATRIAL APPENDAGE CLOSURE DEVICE COMPOSED OF NICKEL-TITANIUM ALLOY WITH POLYETHYLENE TEREPHTHALATE MEMBRANE: ICD-10-CM

## 2020-04-21 ENCOUNTER — COMMUNICATION - HEALTHEAST (OUTPATIENT)
Dept: CARDIOLOGY | Facility: CLINIC | Age: 85
End: 2020-04-21

## 2020-04-21 ENCOUNTER — AMBULATORY - HEALTHEAST (OUTPATIENT)
Dept: CARDIOLOGY | Facility: CLINIC | Age: 85
End: 2020-04-21

## 2020-04-21 DIAGNOSIS — I63.9 CRYPTOGENIC STROKE (H): ICD-10-CM

## 2020-04-21 DIAGNOSIS — Z95.818 STATUS POST PLACEMENT OF IMPLANTABLE LOOP RECORDER: ICD-10-CM

## 2020-04-22 ENCOUNTER — AMBULATORY - HEALTHEAST (OUTPATIENT)
Dept: CARDIOLOGY | Facility: CLINIC | Age: 85
End: 2020-04-22

## 2020-04-22 DIAGNOSIS — I48.0 PAROXYSMAL ATRIAL FIBRILLATION (H): ICD-10-CM

## 2020-05-01 ENCOUNTER — COMMUNICATION - HEALTHEAST (OUTPATIENT)
Dept: CARDIOLOGY | Facility: CLINIC | Age: 85
End: 2020-05-01

## 2020-05-01 DIAGNOSIS — I48.0 PAROXYSMAL ATRIAL FIBRILLATION (H): ICD-10-CM

## 2020-05-04 ENCOUNTER — COMMUNICATION - HEALTHEAST (OUTPATIENT)
Dept: CARDIOLOGY | Facility: CLINIC | Age: 85
End: 2020-05-04

## 2020-05-05 ENCOUNTER — AMBULATORY - HEALTHEAST (OUTPATIENT)
Dept: CARDIOLOGY | Facility: CLINIC | Age: 85
End: 2020-05-05

## 2020-05-05 DIAGNOSIS — Z95.818 PRESENCE OF WATCHMAN LEFT ATRIAL APPENDAGE CLOSURE DEVICE: ICD-10-CM

## 2020-05-05 DIAGNOSIS — I48.0 PAROXYSMAL ATRIAL FIBRILLATION (H): ICD-10-CM

## 2020-05-06 ENCOUNTER — COMMUNICATION - HEALTHEAST (OUTPATIENT)
Dept: CARDIOLOGY | Facility: CLINIC | Age: 85
End: 2020-05-06

## 2020-05-08 ENCOUNTER — OFFICE VISIT - HEALTHEAST (OUTPATIENT)
Dept: CARDIOLOGY | Facility: CLINIC | Age: 85
End: 2020-05-08

## 2020-05-08 DIAGNOSIS — I48.0 PAROXYSMAL ATRIAL FIBRILLATION (H): ICD-10-CM

## 2020-05-08 DIAGNOSIS — Z95.818 PRESENCE OF LEFT ATRIAL APPENDAGE CLOSURE DEVICE COMPOSED OF NICKEL-TITANIUM ALLOY WITH POLYETHYLENE TEREPHTHALATE MEMBRANE: ICD-10-CM

## 2020-05-08 DIAGNOSIS — G20.A1 PARKINSON DISEASE (H): ICD-10-CM

## 2020-05-12 ENCOUNTER — OFFICE VISIT - HEALTHEAST (OUTPATIENT)
Dept: FAMILY MEDICINE | Facility: CLINIC | Age: 85
End: 2020-05-12

## 2020-05-12 DIAGNOSIS — I48.0 PAROXYSMAL ATRIAL FIBRILLATION (H): ICD-10-CM

## 2020-05-12 DIAGNOSIS — Z95.818 PRESENCE OF WATCHMAN LEFT ATRIAL APPENDAGE CLOSURE DEVICE: ICD-10-CM

## 2020-05-14 ENCOUNTER — COMMUNICATION - HEALTHEAST (OUTPATIENT)
Dept: CARDIOLOGY | Facility: CLINIC | Age: 85
End: 2020-05-14

## 2020-05-19 ENCOUNTER — COMMUNICATION - HEALTHEAST (OUTPATIENT)
Dept: CARDIOLOGY | Facility: CLINIC | Age: 85
End: 2020-05-19

## 2020-05-19 DIAGNOSIS — L03.032 PARONYCHIA OF TOE, LEFT: ICD-10-CM

## 2020-05-27 ENCOUNTER — VIRTUAL VISIT (OUTPATIENT)
Dept: NEUROLOGY | Facility: CLINIC | Age: 85
End: 2020-05-27
Payer: MEDICARE

## 2020-05-27 DIAGNOSIS — R26.9 GAIT DIFFICULTY: ICD-10-CM

## 2020-05-27 DIAGNOSIS — R26.89 BALANCE PROBLEMS: ICD-10-CM

## 2020-05-27 DIAGNOSIS — G20.A1 PARKINSON'S DISEASE (H): Primary | ICD-10-CM

## 2020-05-27 RX ORDER — METOPROLOL SUCCINATE 50 MG/1
50 TABLET, EXTENDED RELEASE ORAL DAILY
COMMUNITY
Start: 2020-04-23 | End: 2021-07-12

## 2020-05-27 RX ORDER — CLOPIDOGREL BISULFATE 75 MG/1
75 TABLET ORAL DAILY
COMMUNITY
Start: 2020-05-19 | End: 2021-10-28

## 2020-05-27 RX ORDER — CARBIDOPA AND LEVODOPA 25; 100 MG/1; MG/1
TABLET ORAL
Qty: 360 TABLET | Refills: 3 | Status: SHIPPED | OUTPATIENT
Start: 2020-05-27 | End: 2021-05-10

## 2020-05-27 NOTE — PATIENT INSTRUCTIONS
May 27, 2020    Dear Ms. ALEXA Morales Janelle [facility],    Video Visit Summary: -     __  To improve wearing off take Sinemet as below: -    PD Medications 7 am 11:30 pm 4 pm   Sinemet 25/100 mg  1.5 1.5 1       __  If you experience dyskinesias (big abnormal movements), call us or send a [employer] message.     __  Prescription with the new dose has been sent to your pharmacy.    __  Continue to use your scooter to prevent falls and walk only with stand by assist.     __  Keep up the good work using your stationary bike.    __  Return in 3 months.     __  May contact us anytime as needed.     For questions, you may send us a [employer] message or call [phone]    Fax number: [phone]    CRYSTAL Lang, CNP  [facility]

## 2020-05-27 NOTE — PROGRESS NOTES
"ALEXA Luu is a 87 year old female who is being evaluated via a billable video visit.    The patient has been notified of following:     \"This video visit will be conducted via a call between you and your physician/provider. We have found that certain health care needs can be provided without the need for an in-person physical exam.  This service lets us provide the care you need with a video conversation.  If a prescription is necessary we can send it directly to your pharmacy.  If lab work is needed we can place an order for that and you can then stop by our lab to have the test done at a later time.    Video visits are billed at different rates depending on your insurance coverage.  Please reach out to your insurance provider with any questions.    If during the course of the call the physician/provider feels a video visit is not appropriate, you will not be charged for this service.\"    Patient has given verbal consent for Video visit? Yes    How would you like to obtain your AVS? NORCAT    Patient would like the video invitation sent by: Text to cell phone: [phone]    Will anyone else be joining your video visit? No        Video-Visit Details    Type of service:  Video Visit    Originating Location (pt. Location): Home    Distant Location (provider location):  [employer]     Platform used for Video Visit: Unable to complete video visit    ------------------------------------------------------------------------------------------------------------------------------------------------------------------------------------------------------  MOVEMENT DISORDERS VIDEO VISIT:     PATIENT: ALEXA Luu    : 1932    DATE: May 27, 2020    REASON FOR VISIT: Parkinson's disease (PD) follow up.    After a review of the patient s situation, this visit was changed from an in-person visit to a video visit to reduce the risk of COVID-19 exposure.    She was diagnosed with PD in August " "2015. Her symptoms started with stooped posture, bradykinesias, and falling.  She reports that the medication is helping her.  She has wearing off 30 minutes before her 11:30 pm and 4 pm dose.  When she wears off, she feels stiff.  She denies side effects from Carbidopa/Levodopa (CD/LD).  She is very nervous about getting dyskinesias.  In the past, she had some swaying.  She eats 1 - 2 hours after her dose.  She generally goes to bed about 10:30 pm.  In the evening and when she wakes up her symptoms are good; \"I'm not bad.\"    PD Medications 7 am 11:30 pm 4 pm   Sinemet 25/100 mg  1 1 1     Since she had a stroke, she uses a scooter. She can't walk like she did before. She had an ER visit on 4/6/2020 due to a fall and head injury. Since then, she has stopped walking alone. She only walks when there is someone with her. When she gets up in the middle of the night, she use her scooter to go to the bathroom. She lives alone. Her daughter lives about 5 minutes away. She is using a recumbent bike for 30 minutes x 4 - 5 times a week. Yesterday, she walked for 2 miles with her daughter using a belt.    MEDICATIONS:   Outpatient Medications Marked as Taking for the 5/27/20 encounter (Virtual Visit) with Bossman Bolaños APRN CNP   Medication Sig     acetaminophen (TYLENOL) 500 MG tablet Take 500-1,000 mg by mouth 2 times daily as needed      aspirin (ASA) 81 MG EC tablet 81mg tab by mouth daily @8am     calcium carbonate 500 MG CHEW 2 tums by mouth nightly as needed @ 10/11pm     calcium carbonate 600 mg-vitamin D 400 units (CALTRATE) 600-400 MG-UNIT per tablet Take 1 tablet by mouth daily     carbidopa-levodopa (SINEMET)  MG tablet 1 tabs by mouth 3/day @ 7am, 1130am and 4pm     clopidogrel (PLAVIX) 75 MG tablet Take 75 mg by mouth daily     metoprolol succinate ER (TOPROL-XL) 50 MG 24 hr tablet Take 50 mg by mouth daily     pravastatin (PRAVACHOL) 20 MG tablet 20mg tab by mouth nightly @8-10pm     ALLERGIES: " Codeine; Morphine; Penicillins; and Vicodin [hydrocodone-acetaminophen]    Patient reports that no new changes in medical history, surgical Hx, family Hx, and social Hx.    ASSESSMENT:    1)  Parkinson's Disease:  Has wearing off.     2)  Gait Problems:  Due to PD and post stroke.       3)  Balance Problems:  Due to #2.     PLAN:    __  To improve wearing off, will take Sinemet as below: -    PD Medications 7 am 11:30 pm 4 pm   Sinemet 25/100 mg  1.5 1.5 1     __  If pt experiences dyskinesias, will call us or send a [employer] message.     __  Rx with the new dose sent to her pharmacy.    __  Encouraged to continue using her scooter to prevent falls and walk only with stand by assist.     __  Commended for using her stationary bike regularly.    __  Will return in 3 months.     __  May contact us anytime as needed.     Total video time was 31 minutes.  Greater than 50% of this time was spent in therapeutic plan, counseling, and coordination of care.     CRYSTAL Lang,  CNP  [facility]      [employer] Telephone:  Started: 11:30 am  Stopped: 12:01 pm    Stevan K[facility]  Start: 05/27/2020 11:21 am   Stop: 05/27/2020 11:30 am

## 2020-06-09 ENCOUNTER — COMMUNICATION - HEALTHEAST (OUTPATIENT)
Dept: CARDIOLOGY | Facility: CLINIC | Age: 85
End: 2020-06-09

## 2020-08-14 ENCOUNTER — AMBULATORY - HEALTHEAST (OUTPATIENT)
Dept: CARDIOLOGY | Facility: CLINIC | Age: 85
End: 2020-08-14

## 2020-08-14 DIAGNOSIS — Z95.818 STATUS POST PLACEMENT OF IMPLANTABLE LOOP RECORDER: ICD-10-CM

## 2020-08-14 DIAGNOSIS — I63.9 CRYPTOGENIC STROKE (H): ICD-10-CM

## 2020-08-25 ENCOUNTER — OFFICE VISIT - HEALTHEAST (OUTPATIENT)
Dept: CARDIOLOGY | Facility: CLINIC | Age: 85
End: 2020-08-25

## 2020-08-25 DIAGNOSIS — Z95.818 PRESENCE OF LEFT ATRIAL APPENDAGE CLOSURE DEVICE COMPOSED OF NICKEL-TITANIUM ALLOY WITH POLYETHYLENE TEREPHTHALATE MEMBRANE: ICD-10-CM

## 2020-08-25 DIAGNOSIS — Z95.818 STATUS POST PLACEMENT OF IMPLANTABLE LOOP RECORDER: ICD-10-CM

## 2020-08-25 DIAGNOSIS — R29.6 FALLS FREQUENTLY: ICD-10-CM

## 2020-08-25 DIAGNOSIS — I48.91 ATRIAL FIBRILLATION (H): ICD-10-CM

## 2020-08-25 DIAGNOSIS — I48.0 PAROXYSMAL ATRIAL FIBRILLATION (H): ICD-10-CM

## 2020-11-08 ENCOUNTER — HEALTH MAINTENANCE LETTER (OUTPATIENT)
Age: 85
End: 2020-11-08

## 2020-11-10 ENCOUNTER — OFFICE VISIT - HEALTHEAST (OUTPATIENT)
Dept: FAMILY MEDICINE | Facility: CLINIC | Age: 85
End: 2020-11-10

## 2020-11-10 DIAGNOSIS — R29.6 FALLS FREQUENTLY: ICD-10-CM
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
## 2020-11-10 DIAGNOSIS — Z23 ENCOUNTER FOR IMMUNIZATION: ICD-10-CM

## 2020-11-13 ENCOUNTER — AMBULATORY - HEALTHEAST (OUTPATIENT)
Dept: CARDIOLOGY | Facility: CLINIC | Age: 85
End: 2020-11-13

## 2020-11-13 DIAGNOSIS — Z95.818 STATUS POST PLACEMENT OF IMPLANTABLE LOOP RECORDER: ICD-10-CM

## 2020-11-13 DIAGNOSIS — Z86.73 H/O: CVA (CEREBROVASCULAR ACCIDENT): ICD-10-CM

## 2020-11-19 ENCOUNTER — COMMUNICATION - HEALTHEAST (OUTPATIENT)
Dept: CARDIOLOGY | Facility: CLINIC | Age: 85
End: 2020-11-19

## 2020-11-19 DIAGNOSIS — E78.2 MIXED HYPERLIPIDEMIA: ICD-10-CM

## 2020-12-29 ENCOUNTER — COMMUNICATION - HEALTHEAST (OUTPATIENT)
Dept: CARDIOLOGY | Facility: CLINIC | Age: 85
End: 2020-12-29

## 2020-12-29 DIAGNOSIS — I48.0 PAROXYSMAL ATRIAL FIBRILLATION (H): ICD-10-CM

## 2021-01-11 ENCOUNTER — HOSPITAL ENCOUNTER (OUTPATIENT)
Dept: MAMMOGRAPHY | Facility: CLINIC | Age: 86
Discharge: HOME | End: 2021-01-11
Attending: FAMILY MEDICINE

## 2021-01-11 DIAGNOSIS — Z12.31 VISIT FOR SCREENING MAMMOGRAM: ICD-10-CM

## 2021-01-14 ENCOUNTER — COMMUNICATION - HEALTHEAST (OUTPATIENT)
Dept: FAMILY MEDICINE | Facility: CLINIC | Age: 86
End: 2021-01-14

## 2021-01-14 ENCOUNTER — OFFICE VISIT - HEALTHEAST (OUTPATIENT)
Dept: FAMILY MEDICINE | Facility: CLINIC | Age: 86
End: 2021-01-14

## 2021-01-14 DIAGNOSIS — R29.6 FALLS FREQUENTLY: ICD-10-CM

## 2021-01-14 DIAGNOSIS — I63.9 CRYPTOGENIC STROKE (H): ICD-10-CM

## 2021-01-14 DIAGNOSIS — I51.7 CARDIOMEGALY: ICD-10-CM

## 2021-01-14 DIAGNOSIS — I42.8 NON-ISCHEMIC CARDIOMYOPATHY (H): ICD-10-CM

## 2021-01-14 DIAGNOSIS — G20.A1 PARKINSON DISEASE (H): ICD-10-CM

## 2021-01-14 DIAGNOSIS — M94.9 DISORDER OF BONE AND CARTILAGE: ICD-10-CM

## 2021-01-14 DIAGNOSIS — Z78.0 ASYMPTOMATIC MENOPAUSE: ICD-10-CM

## 2021-01-14 DIAGNOSIS — M89.9 DISORDER OF BONE AND CARTILAGE: ICD-10-CM

## 2021-01-14 DIAGNOSIS — Z79.82 LONG TERM (CURRENT) USE OF ASPIRIN: ICD-10-CM

## 2021-01-14 DIAGNOSIS — Z00.00 HEALTHCARE MAINTENANCE: ICD-10-CM

## 2021-01-14 DIAGNOSIS — I48.0 PAROXYSMAL ATRIAL FIBRILLATION (H): ICD-10-CM

## 2021-01-14 DIAGNOSIS — E78.2 MIXED HYPERLIPIDEMIA: ICD-10-CM

## 2021-01-14 LAB
ALBUMIN SERPL-MCNC: 3.8 G/DL (ref 3.5–5)
ALP SERPL-CCNC: 47 U/L (ref 45–120)
ALT SERPL W P-5'-P-CCNC: <9 U/L (ref 0–45)
ANION GAP SERPL CALCULATED.3IONS-SCNC: 9 MMOL/L (ref 5–18)
AST SERPL W P-5'-P-CCNC: 11 U/L (ref 0–40)
BILIRUB SERPL-MCNC: 0.6 MG/DL (ref 0–1)
BUN SERPL-MCNC: 19 MG/DL (ref 8–28)
CALCIUM SERPL-MCNC: 9.2 MG/DL (ref 8.5–10.5)
CHLORIDE BLD-SCNC: 104 MMOL/L (ref 98–107)
CHOLEST SERPL-MCNC: 162 MG/DL
CO2 SERPL-SCNC: 26 MMOL/L (ref 22–31)
CREAT SERPL-MCNC: 0.76 MG/DL (ref 0.6–1.1)
FASTING STATUS PATIENT QL REPORTED: YES
GFR SERPL CREATININE-BSD FRML MDRD: >60 ML/MIN/1.73M2
GLUCOSE BLD-MCNC: 90 MG/DL (ref 70–125)
HDLC SERPL-MCNC: 51 MG/DL
LDLC SERPL CALC-MCNC: 85 MG/DL
POTASSIUM BLD-SCNC: 4.8 MMOL/L (ref 3.5–5)
PROT SERPL-MCNC: 6.3 G/DL (ref 6–8)
SODIUM SERPL-SCNC: 139 MMOL/L (ref 136–145)
TRIGL SERPL-MCNC: 128 MG/DL

## 2021-01-14 ASSESSMENT — MIFFLIN-ST. JEOR: SCORE: 970.66

## 2021-01-15 LAB
25(OH)D3 SERPL-MCNC: 22.5 NG/ML (ref 30–80)
25(OH)D3 SERPL-MCNC: 22.5 NG/ML (ref 30–80)

## 2021-02-06 ENCOUNTER — COMMUNICATION - HEALTHEAST (OUTPATIENT)
Dept: FAMILY MEDICINE | Facility: CLINIC | Age: 86
End: 2021-02-06

## 2021-02-12 ENCOUNTER — AMBULATORY - HEALTHEAST (OUTPATIENT)
Dept: CARDIOLOGY | Facility: CLINIC | Age: 86
End: 2021-02-12

## 2021-02-12 DIAGNOSIS — Z95.818 STATUS POST PLACEMENT OF IMPLANTABLE LOOP RECORDER: ICD-10-CM

## 2021-02-12 DIAGNOSIS — I48.0 PAROXYSMAL ATRIAL FIBRILLATION (H): ICD-10-CM

## 2021-02-17 ENCOUNTER — COMMUNICATION - HEALTHEAST (OUTPATIENT)
Dept: CARDIOLOGY | Facility: CLINIC | Age: 86
End: 2021-02-17

## 2021-03-19 ENCOUNTER — COMMUNICATION - HEALTHEAST (OUTPATIENT)
Dept: FAMILY MEDICINE | Facility: CLINIC | Age: 86
End: 2021-03-19

## 2021-03-19 ENCOUNTER — OFFICE VISIT - HEALTHEAST (OUTPATIENT)
Dept: FAMILY MEDICINE | Facility: CLINIC | Age: 86
End: 2021-03-19

## 2021-03-19 DIAGNOSIS — Z87.81 HISTORY OF CERVICAL FRACTURE: ICD-10-CM

## 2021-03-19 DIAGNOSIS — I63.81 ACUTE LACUNAR STROKE (H): ICD-10-CM

## 2021-03-19 DIAGNOSIS — I63.9 CRYPTOGENIC STROKE (H): ICD-10-CM

## 2021-03-19 DIAGNOSIS — R29.6 FALLS FREQUENTLY: ICD-10-CM

## 2021-03-19 DIAGNOSIS — G20.A1 PARKINSON DISEASE (H): ICD-10-CM

## 2021-03-28 ENCOUNTER — HEALTH MAINTENANCE LETTER (OUTPATIENT)
Age: 86
End: 2021-03-28

## 2021-03-29 ENCOUNTER — RECORDS - HEALTHEAST (OUTPATIENT)
Dept: ADMINISTRATIVE | Facility: OTHER | Age: 86
End: 2021-03-29

## 2021-03-30 ENCOUNTER — COMMUNICATION - HEALTHEAST (OUTPATIENT)
Dept: CARDIOLOGY | Facility: CLINIC | Age: 86
End: 2021-03-30

## 2021-03-30 ENCOUNTER — TRANSFERRED RECORDS (OUTPATIENT)
Dept: HEALTH INFORMATION MANAGEMENT | Facility: CLINIC | Age: 86
End: 2021-03-30
Payer: MEDICARE

## 2021-04-06 ENCOUNTER — AMBULATORY - HEALTHEAST (OUTPATIENT)
Dept: FAMILY MEDICINE | Facility: CLINIC | Age: 86
End: 2021-04-06

## 2021-04-06 DIAGNOSIS — M89.9 DISORDER OF BONE AND CARTILAGE: ICD-10-CM

## 2021-04-06 DIAGNOSIS — I63.9 CRYPTOGENIC STROKE (H): ICD-10-CM

## 2021-04-06 DIAGNOSIS — R29.6 FALLS FREQUENTLY: ICD-10-CM

## 2021-04-06 DIAGNOSIS — G20.A1 PARKINSON DISEASE (H): ICD-10-CM

## 2021-04-06 DIAGNOSIS — M94.9 DISORDER OF BONE AND CARTILAGE: ICD-10-CM

## 2021-04-06 DIAGNOSIS — Z87.81 HISTORY OF CERVICAL FRACTURE: ICD-10-CM

## 2021-04-06 DIAGNOSIS — I63.81 ACUTE LACUNAR STROKE (H): ICD-10-CM

## 2021-04-08 ENCOUNTER — AMBULATORY - HEALTHEAST (OUTPATIENT)
Dept: CARDIOLOGY | Facility: CLINIC | Age: 86
End: 2021-04-08

## 2021-04-08 ENCOUNTER — OFFICE VISIT - HEALTHEAST (OUTPATIENT)
Dept: CARDIOLOGY | Facility: CLINIC | Age: 86
End: 2021-04-08

## 2021-04-08 DIAGNOSIS — I48.0 PAROXYSMAL ATRIAL FIBRILLATION (H): ICD-10-CM

## 2021-04-08 DIAGNOSIS — Z95.818 PRESENCE OF LEFT ATRIAL APPENDAGE CLOSURE DEVICE COMPOSED OF NICKEL-TITANIUM ALLOY WITH POLYETHYLENE TEREPHTHALATE MEMBRANE: ICD-10-CM

## 2021-04-08 DIAGNOSIS — Z95.818 STATUS POST PLACEMENT OF IMPLANTABLE LOOP RECORDER: ICD-10-CM

## 2021-04-08 DIAGNOSIS — G20.A1 PARKINSON DISEASE (H): ICD-10-CM

## 2021-04-08 DIAGNOSIS — I63.9 CRYPTOGENIC STROKE (H): ICD-10-CM

## 2021-04-08 ASSESSMENT — MIFFLIN-ST. JEOR: SCORE: 968.39

## 2021-04-20 ENCOUNTER — COMMUNICATION - HEALTHEAST (OUTPATIENT)
Dept: FAMILY MEDICINE | Facility: CLINIC | Age: 86
End: 2021-04-20

## 2021-04-23 ENCOUNTER — COMMUNICATION - HEALTHEAST (OUTPATIENT)
Dept: FAMILY MEDICINE | Facility: CLINIC | Age: 86
End: 2021-04-23

## 2021-04-28 ENCOUNTER — MEDICAL CORRESPONDENCE (OUTPATIENT)
Dept: HEALTH INFORMATION MANAGEMENT | Facility: CLINIC | Age: 86
End: 2021-04-28

## 2021-04-28 ENCOUNTER — TRANSFERRED RECORDS (OUTPATIENT)
Dept: HEALTH INFORMATION MANAGEMENT | Facility: CLINIC | Age: 86
End: 2021-04-28

## 2021-04-29 ENCOUNTER — OFFICE VISIT - HEALTHEAST (OUTPATIENT)
Dept: FAMILY MEDICINE | Facility: CLINIC | Age: 86
End: 2021-04-29

## 2021-04-29 ENCOUNTER — AMBULATORY - HEALTHEAST (OUTPATIENT)
Dept: FAMILY MEDICINE | Facility: CLINIC | Age: 86
End: 2021-04-29

## 2021-04-29 DIAGNOSIS — N39.0 URINARY TRACT INFECTION WITHOUT HEMATURIA, SITE UNSPECIFIED: ICD-10-CM

## 2021-04-29 DIAGNOSIS — R30.0 DYSURIA: ICD-10-CM

## 2021-04-29 LAB
ALBUMIN UR-MCNC: NEGATIVE G/DL
APPEARANCE UR: CLEAR
BACTERIA #/AREA URNS HPF: ABNORMAL /[HPF]
BILIRUB UR QL STRIP: NEGATIVE
COLOR UR AUTO: YELLOW
GLUCOSE UR STRIP-MCNC: NEGATIVE MG/DL
HGB UR QL STRIP: NEGATIVE
KETONES UR STRIP-MCNC: NEGATIVE MG/DL
LEUKOCYTE ESTERASE UR QL STRIP: NEGATIVE
NITRATE UR QL: POSITIVE
PH UR STRIP: 6 [PH] (ref 5–8)
RBC #/AREA URNS AUTO: ABNORMAL HPF
SP GR UR STRIP: 1.02 (ref 1–1.03)
SQUAMOUS #/AREA URNS AUTO: ABNORMAL LPF
UROBILINOGEN UR STRIP-ACNC: ABNORMAL
WBC #/AREA URNS AUTO: ABNORMAL HPF

## 2021-04-30 ENCOUNTER — COMMUNICATION - HEALTHEAST (OUTPATIENT)
Dept: CARDIOLOGY | Facility: CLINIC | Age: 86
End: 2021-04-30

## 2021-05-01 LAB — BACTERIA SPEC CULT: ABNORMAL

## 2021-05-08 DIAGNOSIS — G20.A1 PARKINSON'S DISEASE (H): ICD-10-CM

## 2021-05-10 RX ORDER — CARBIDOPA AND LEVODOPA 25; 100 MG/1; MG/1
TABLET ORAL
Qty: 120 TABLET | Refills: 0 | Status: SHIPPED | OUTPATIENT
Start: 2021-05-10 | End: 2021-09-02

## 2021-05-10 NOTE — TELEPHONE ENCOUNTER
Rx Authorization:    Requested Medication/ Dose carbidopa-levodopa (SINEMET)  MG tablet    Date last refill ordered: 5/27/20    Quantity ordered: 360    # refills: 3    Date of last clinic visit with ordering provider: 5/27/20    Date of next clinic visit with ordering provider:     All pertinent protocol data (lab date/result):     Include pertinent information from patients message:

## 2021-05-26 VITALS
RESPIRATION RATE: 99 BRPM | TEMPERATURE: 98.6 F | DIASTOLIC BLOOD PRESSURE: 80 MMHG | SYSTOLIC BLOOD PRESSURE: 134 MMHG | OXYGEN SATURATION: 97 % | HEART RATE: 77 BPM

## 2021-05-26 VITALS — OXYGEN SATURATION: 98 %

## 2021-05-26 VITALS
SYSTOLIC BLOOD PRESSURE: 112 MMHG | HEART RATE: 77 BPM | DIASTOLIC BLOOD PRESSURE: 60 MMHG | TEMPERATURE: 97.3 F | RESPIRATION RATE: 16 BRPM | OXYGEN SATURATION: 97 %

## 2021-05-26 VITALS
OXYGEN SATURATION: 96 % | SYSTOLIC BLOOD PRESSURE: 128 MMHG | HEART RATE: 77 BPM | TEMPERATURE: 98.5 F | DIASTOLIC BLOOD PRESSURE: 72 MMHG

## 2021-05-26 VITALS — SYSTOLIC BLOOD PRESSURE: 100 MMHG | DIASTOLIC BLOOD PRESSURE: 68 MMHG | HEART RATE: 76 BPM | OXYGEN SATURATION: 96 %

## 2021-05-26 VITALS — OXYGEN SATURATION: 94 % | SYSTOLIC BLOOD PRESSURE: 112 MMHG | DIASTOLIC BLOOD PRESSURE: 64 MMHG | HEART RATE: 81 BPM

## 2021-05-27 VITALS — TEMPERATURE: 97.8 F | HEART RATE: 82 BPM | DIASTOLIC BLOOD PRESSURE: 72 MMHG | SYSTOLIC BLOOD PRESSURE: 130 MMHG

## 2021-05-27 NOTE — PROGRESS NOTES
Subjective:   ALEXA Luu is a(n) 86 y.o. White or  female who presents to [facility], accompanied by her daughter, with the following complaint(s):  Chest Pain (patient fell a few days ago)    History of Present Illness:  Primary symptom: Chest pain  Onset: 4 days ago  Progression: Persisting  Location: Left lower ribs posteriorly  Quality: Aching, sharp at times  Radiation: No  Intensity: 8/10 at its worst  Frequency: Constant  Exacerbating factors: Twisting, deep breathing, coughing, Valsalva.   Relieving factors: Decreases with acetaminophen, ibuprofen, and oxycodone (left over from February when she fractured her 10th and 11th ribs).   Associated shortness of breath: Only when her pain is most severe.   Associated palpitations: No  Associated diaphoresis: No  Associated nausea: No  Additional symptoms: None  Known injury: Fell at home in her kitchen on the evening of 4/14/2019. Did not fall all the way to the floor but cannot say how she struck her back. Did not strike her head. No loss of consciousness. Pain developed immediately after her incomplete fall.   History of coronary artery disease: No  History of pulmonary embolism: No  History of deep vein thrombosis: No  Family history of coronary artery disease: No  Family history of thromboembolic disease: No  Additional pertinent history: Has Parkinson's.   Tobacco user / exposure: No    The following portions of the patient's history were reviewed and updated as appropriate: allergies, current medications, past family history, past medical history, past social history, past surgical history and problem list.    Review of Systems:   Review of Systems   All other systems reviewed and are negative.    Objective:     Vitals:    04/18/19 1444   BP: 121/66   Patient Site: Right Arm   Patient Position: Sitting   Cuff Size: Adult Regular   Pulse: 77   Resp: 16   Temp: 97.7  F (36.5  C)   TempSrc: Oral   SpO2: 97%   Weight: 127 lb (57.6 kg)      Physical Exam   Constitutional: She is oriented to person, place, and time. She appears well-developed and well-nourished.  Non-toxic appearance. No distress.   HENT:   Head: Normocephalic and atraumatic.   Mouth/Throat: Mucous membranes are normal.   Cardiovascular: Normal rate, regular rhythm, S1 normal and S2 normal. Exam reveals no gallop and no friction rub.   No murmur heard.  Pulmonary/Chest: Effort normal and breath sounds normal. No stridor. She has no wheezes. She has no rhonchi. She has no rales. She exhibits tenderness (left lower posterior chest wall). She exhibits no crepitus and no deformity.   Neurological: She is alert and oriented to person, place, and time. She displays tremor. No cranial nerve deficit or sensory deficit. GCS eye subscore is 4. GCS verbal subscore is 5. GCS motor subscore is 6.   Skin: Skin is warm, dry and intact. No ecchymosis and no rash noted. No pallor.   Nursing note and vitals reviewed.    Laboratory:  N/A    Radiology:  EXAM: XR RIBS LEFT W PA CHEST  LOCATION: [facility]  DATE/TIME: 4/18/2019 3:40 PM  INDICATION: Fall, left posterior chest wall pain  COMPARISON: CT chest 04/27/2019  FINDINGS: The heart is upper normal in size. The pulmonary vascular pattern is normal. Shows increased atelectasis at the extreme right base when compared with the recent CT.  I is new pleural blunting, infiltrate and atelectasis at the left base. There are fracture injuries of the ninth and 10th ribs laterally which may be acute or subacute. There is an old healed fracture of the posterior left seventh rib. No pneumothorax is seen.    EXAM: CT CHEST ABDOMEN PELVIS WO ORAL W IV CONTRAST  LOCATION: [facility]  DATE/TIME: 4/18/2019 5:34 PM  INDICATION: Chest trauma, blunt, low energy, x-rays show 9th/10th rib fractures, effusion, and infiltrate / atelectasis;  COMPARISON: 4/18/2019 PA chest and left rib radiographs and 2/27/2019 CT chest, abdomen and  pelvis  TECHNIQUE: Helical thin-section CT scan of the chest, abdomen, and pelvis was performed following injection of IV contrast. Multiplanar reformats were obtained. Dose reduction techniques were used.   CONTRAST: Iohexol (Omni) 100 mL  FINDINGS:   CHEST: No pneumothorax. Several strands of linear atelectasis in the basilar segments left lower lobe and a single strand of linear atelectasis in the basilar segments of the right lower lobe. Trace amount of pleural fluid and thickening inferior left   hemithorax. Trachea, bronchi, lungs and pleura are otherwise negative. Normal caliber thoracic aorta and central pulmonary arteries. Heart size within normal limits.   ABDOMEN: No free fluid. Several small benign hepatic cysts are unchanged. Liver, spleen, pancreas, kidneys, adrenal glands, gallbladder, bile ducts, and abdominal aorta are otherwise negative.  PELVIS: No free fluid. Vaginal pessary. Colonic diverticulosis. Hysterectomy. Small intestine, colon, ureters, bladder and adnexa otherwise negative.  MUSCULOSKELETAL: Nondisplaced acute fracture posterolateral aspect left ninth rib. In addition there are subacute healing fractures lateral aspect left ninth rib, posterolateral and posteromedial aspects of the left 10th rib and posteromedial aspect left   11th rib with interval development of callus formation since 2/27/2019. Chronic healed fracture deformity mid sternal body unchanged.  CONCLUSION:  1.  Nondisplaced acute fracture posterolateral aspect left ninth rib.  2.  Subacute healing fractures left ribs 9, 10 and 11 with interval development of callus formation since 2/27/2019.  3.  Minimal linear atelectasis left lower lobe and trace amount of pleural fluid and/or thickening inferior left hemithorax.  4.   Chest, abdomen and pelvis is otherwise negative. Results discussed with Dr. Verma 6:00 PM 4/18/2019.    Electrocardiogram:  N/A    Assessment/Plan   1. Closed fracture of one rib of left side, initial  encounter  - oxyCODONE (ROXICODONE) 5 MG immediate release tablet; Take 0.5 tablets (2.5 mg total) by mouth every 4 (four) hours as needed for pain. Use sparingly due to risk of sedation and falls.  Dispense: 10 tablet; Refill: 0    2. Closed fracture of multiple ribs of left side with routine healing, subsequent encounter  - oxyCODONE (ROXICODONE) 5 MG immediate release tablet; Take 0.5 tablets (2.5 mg total) by mouth every 4 (four) hours as needed for pain. Use sparingly due to risk of sedation and falls.  Dispense: 10 tablet; Refill: 0    3. Injury of chest wall, initial encounter  - XR Ribs Left W PA Chest  - CT Chest Abdomen Pelvis Without Oral With IV Contrast; Future    4. Abnormal chest x-ray  - CT Chest Abdomen Pelvis Without Oral With IV Contrast; Future    - Left rib and PA chest x-ray completed for initial workup. X-rays show increased atelectasis in the right base, pleural blunting and atelectasis / infiltrate in the left base, 9th and 10th rib fractures, and healed 7th rib fracture. CT of the chest / abdomen / pelvis completed to evaluate for pulmonary contusion versus infiltrate and splenic injury. CT shows acute fracture of the left 9th rib, healing fractures of the left 9th, 10th, and 11th ribs, minimal left lower lobe atelectasis and pleural fluid / thickening, and no signs of intraabdominal injury. CT results were reviewed with the patient by phone (CT was ordered as a hold and call study at [facility]). Discussed symptomatic / supportive cares and typical healing time of rib fractures. Prescribed oxycodone as listed above to be used sparingly as needed for severe rib pain.   - Counseled patient regarding assessment and plan for evaluation and treatment. Questions were answered. See AVS for the specific written instructions and educational handout(s) regarding rib fracture that were provided at the conclusion of the visit.   - Discussed signs / symptoms that warrant urgent / emergent  medical attention.   - Follow up within 1 week if symptoms persist / worsen.     Atul Verma MD

## 2021-05-30 VITALS — WEIGHT: 136.9 LBS | BODY MASS INDEX: 22.78 KG/M2

## 2021-05-30 NOTE — TELEPHONE ENCOUNTER
Test Results  Who is calling?:  Carmen  Who ordered the test:  Mann Peralta NP  Type of test: Imaging  Date of test:  6/26/19  Where was the test performed:  [facility]  What are your questions/concerns?:  What is result of test?  Okay to leave a detailed message?:  Yes

## 2021-05-30 NOTE — PROGRESS NOTES
Clinic Note    Assessment:     Assessment and Plan:  1. Bilateral pleural effusion  She was seen at [facility] on 6/18 after a fall.  X-ray imaging of her shoulder and ribs apparently revealed bilateral pleural effusion.  She was told to follow-up with PCP.  I do not have access to those images.  Recheck x-ray today.  I will call her with results and further recommendations.  - XR Chest 2 Views; Future       Patient Instructions   We will call you with results and recommendations once xray is back.    No follow-ups on file.         Subjective:      Patient comes to clinic today for follow-up.    She was originally evaluated at [facility] on 6/18/2019.  She was seen at the [place] location.    She had sustained a recent fall prior to her encounter.  She was experiencing some right shoulder pain and right rib pain seem to be worse with breathing.    She had x-ray imaging done at that time which was negative.  She was sent home and told to use NSAIDs and physical therapy.    She received a call later on by the PA who evaluated her.  Apparently, a radiologist reviewed her x-ray images and told her that she had a bilateral pleural effusion.  It was recommended that the patient follow-up with her PCP for further evaluation.    Today, patient states that she is still experiencing some moderate chest wall pain as a result of her fall but thinks that she is making improvements.    She does not have any respiratory complaints today.  No substernal chest pain or shortness of breath.  No new cough.  No dyspnea on exertion.  No pleurisy.    The following portions of the patient's history were reviewed and updated as appropriate: Allergies, medications, problems, prior note.    Review of Systems:    Review is otherwise negative except for what is mentioned above.     Social Hx:    Social History     Tobacco Use   Smoking Status Never Smoker   Smokeless Tobacco Never Used         Objective:     Vitals:    06/26/19  0959   BP: 128/70   Patient Site: Right Arm   Patient Position: Sitting   Cuff Size: Adult Regular   Pulse: 80   Weight: 128 lb 3.2 oz (58.2 kg)       Exam:    General: No apparent distress. Calm. Alert and Oriented X3. Pt behavior is appropriate.  Head:Atraumatic. Normocephalic, non-tender to palpation  Neck: Supple. No JVD. Full ROM. No adenopathy  Eyes: PERRL, No discharge. No strabismus. No nystagmus.  Ears: TMs pearly gray with landmarks visible.   Nose/Mouth/Throat: Patent nares, no oral lesions, pharynx clear and without exudate. Uvula mid-line. Nasal septum mid-line. Clear turbinates.   Lymph: No axillar or cervical adenopathy.   Chest/Lungs: Normal chest wall, clear to auscultation, normal respiratory effort and rate.   Heart/Pulses: Regular rate and rhythm, strong and equal radial pulses, no murmurs. Capillary refill <2 seconds. No edema.   Abdomen: Soft, no palpable masses. No hepatosplenomegaly, no tenderness with palpation noted. Bowel sounds active in all quadrants. No increased tympany.   Genitalia: Not examined.   Musculoskeletal: No CVA tenderness with palpation. Good ROM with extremities.   Neurologic: Interactive, alert, no focal findings, CNs intact.   Skin: Warm, dry. Normal hair pattern. Free of lesions. Normal skin turgor.       Patient Active Problem List   Diagnosis     Hemorrhoids     Venous Insufficiency     Cystocele     Mixed hyperlipidemia     Appendiceal Mucocele     Osteopenia     Left Ventricular Hypertrophy     Vaginal wall prolapse     Parkinson disease (H)     Closed fracture of multiple ribs of both sides, initial encounter     Fall     Current Outpatient Medications   Medication Sig Dispense Refill     acetaminophen (TYLENOL) 500 MG tablet Take 500 mg by mouth every 6 (six) hours as needed for pain.       ascorbic acid, vitamin C, (VITAMIN C) 1000 MG tablet        calcium carbonate-vit D3-min 600 mg calcium- 400 unit Tab Take by mouth.       calcium-vitamin D (CALCIUM-VITAMIN D)  500 mg(1,250mg) -200 unit per tablet Take 1 tablet by mouth 2 (two) times a day with meals.       carbidopa-levodopa (SINEMET)  mg per tablet Take 1 tablet by mouth 3 (three) times a day.        cholecalciferol, vitamin D3, (VITAMIN D3) 2,000 unit Tab Take 1 tablet by mouth daily.              ibuprofen (ADVIL,MOTRIN) 200 MG tablet Take 200 mg by mouth every 6 (six) hours as needed for pain.              MAGNESIUM CITRATE ORAL Take 1 tablet by mouth 2 (two) times a day.       OMEGA-3/DHA/EPA/FISH OIL (FISH OIL-OMEGA-3 FATTY ACIDS) 300-1,000 mg capsule Take 1 g by mouth daily.              polyvinyl alcohol (ARTIFICIAL TEARS, POLYVIN ALC,) 1.4 % ophthalmic solution Administer 1 drop to both eyes 4 (four) times a day as needed for dry eyes.       pravastatin (PRAVACHOL) 10 MG tablet Take 1 tablet (10 mg total) by mouth daily. 90 tablet 3     oxyCODONE (ROXICODONE) 5 MG immediate release tablet Take 0.5 tablets (2.5 mg total) by mouth every 4 (four) hours as needed for pain. Use sparingly due to risk of sedation and falls. 10 tablet 0     No current facility-administered medications for this visit.            Danilo Peralta (Rob), ZACH    6/26/2019

## 2021-05-31 VITALS — WEIGHT: 129 LBS | BODY MASS INDEX: 21.47 KG/M2

## 2021-05-31 VITALS — BODY MASS INDEX: 21.87 KG/M2 | WEIGHT: 131.4 LBS

## 2021-05-31 NOTE — PROGRESS NOTES
[facility] Note                                                                                 Date of Visit: 8/22/2019     Chief Complaint   ALEXA Luu is a(n) 87 y.o. White or  female who presents to [facility], accompanied by her daughter, with the following complaint(s):  Fall (fell backwards in the kitchen and bounced back of head on floor around 9:30 am this morning)       Assessment and Plan   1. Closed head injury, initial encounter  - CT Head Without Contrast; Future    2. Parkinson disease (H)      Patient with Parkinson Disease and frequent falls presenting following a fall from standing with closed head injury without loss of consciousness. STAT head CT was ordered to rule out intracranial bleeding and bony injury secondary to fall. Spoke with patient by phone at 1247 (CT was ordered as a hold and call study at [facility]). Informed her that CT shows no signs of intracranial injury. Discussed signs / symptoms of concussion that warrant reevaluation. Discussed possible referral to the Concussion Program if symptoms develop.     Counseled patient and her daughter regarding assessment and plan for evaluation and treatment. Questions were answered. See AVS for the specific written instructions and educational handout(s) regarding head injury that were provided at the conclusion of the visit.     Discussed signs / symptoms that warrant urgent / emergent medical attention.     Follow up as needed.      History of Present Illness   Date of injury: 8/22/2019  Time of injury: Approximately 0930  Location injury occurred: Kitchen of her home  Mechanism of injury: Was going to go the fridge and was trying to move her wheeled walker behind her but stumbled and fell backwards. Struck the back of her head on the hardwood floor and felt her head bounce.   Symptoms: No loss of consciousness. Was able to get herself up off of the floor. No lacerations or abrasions noted. Denies headache,  vision changes, rhinorrhea, and otorrhea. Denies lightheadedness, chest pain, palpitations, shortness of breath, and nausea / vomiting. Tweaked her right shoulder today; reports having a chronic rotator cuff tear.   Additional pertinent history: Has Parkinson's Disease and falls frequently. Fell in her bedroom 6 days ago and struck her head on the closet door. No loss of consciousness noted. Hurt her neck at that time and called her Neurosurgery clinic. Was instructed to be seen for evaluation but did not do so. Does not take aspirin or other anticoagulants. Has no history of intracranial bleeding.      Review of Systems   Review of Systems   All other systems reviewed and are negative.       Physical Exam   Vitals:    08/22/19 1030   BP: 109/64   Patient Site: Right Arm   Patient Position: Sitting   Cuff Size: Adult Regular   Pulse: 72   Resp: 16   Temp: 97.9  F (36.6  C)   TempSrc: Oral   SpO2: 96%     Physical Exam   Constitutional: She is oriented to person, place, and time. She appears well-developed and well-nourished.  Non-toxic appearance. She does not appear ill. No distress.   HENT:   Head: Normocephalic and atraumatic.   Right Ear: Tympanic membrane, external ear and ear canal normal. No hemotympanum.   Left Ear: Tympanic membrane, external ear and ear canal normal. No hemotympanum.   Nose: No mucosal edema or rhinorrhea.   Mouth/Throat: Uvula is midline, oropharynx is clear and moist and mucous membranes are normal. No oral lesions.   No abrasions, lacerations, hematomas, depressions, or tender areas on the skull.    Eyes: Pupils are equal, round, and reactive to light. Conjunctivae, EOM and lids are normal.   Neck: Neck supple. No spinous process tenderness present. No edema and no erythema present.   Cardiovascular: Normal rate, regular rhythm, S1 normal and S2 normal. Exam reveals no gallop and no friction rub.   No murmur heard.  Pulmonary/Chest: Effort normal and breath sounds normal. No stridor.  She has no wheezes. She has no rhonchi. She has no rales.   Lymphadenopathy:     She has no cervical adenopathy.   Neurological: She is alert and oriented to person, place, and time. She displays tremor. No cranial nerve deficit or sensory deficit. GCS eye subscore is 4. GCS verbal subscore is 5. GCS motor subscore is 6.   Skin: Skin is warm and dry. She is not diaphoretic. No pallor.   Nursing note and vitals reviewed.       Diagnostic Studies   Laboratory:  N/A    Radiology:  EXAM: CT HEAD WO CONTRAST  LOCATION: [facility]  DATE/TIME: 8/22/2019 11:49 AM  INDICATION: Head trauma, mod-severe Fall from standing, hit head on floor, R/O bleed  COMPARISON: CT head 1/1/2017, MRI brain 3/7/2019  TECHNIQUE: Routine without IV contrast. Multiplanar reformats. Dose reduction techniques were used.  FINDINGS:  INTRACRANIAL CONTENTS: No intracranial hemorrhage, extraaxial collection, or mass effect.  No CT evidence of acute infarct. Unchanged mild diffuse parenchymal volume loss with ex vacuo ventricular dilatation when compared to 3/7/2019. Periventricular   low-attenuation most in keeping with sequela of chronic microvascular ischemic change. Unchanged left cerebellar chronic lacunar infarct.  VISUALIZED ORBITS/SINUSES/MASTOIDS: No significant orbital abnormality. No significant paranasal sinus mucosal disease. No significant middle ear or mastoid effusion.  BONES/SOFT TISSUES: No significant abnormality.  1.  No acute intracranial abnormality.    Electrocardiogram:  N/A     Procedure Note   N/A     Pertinent History   The following portions of the patient's history were reviewed and updated as appropriate: allergies, current medications, past family history, past medical history, past social history, past surgical history and problem list.    Patient has Hemorrhoids; Venous Insufficiency; Cystocele; Mixed hyperlipidemia; Appendiceal Mucocele; Osteopenia; Left Ventricular Hypertrophy; Vaginal wall prolapse; Parkinson  disease (H); Closed fracture of multiple ribs of both sides, initial encounter; and Fall on their problem list.    Patient has a past medical history of Breast cyst, Finger fracture, left (02/27/2019), Multiple rib fractures (02/27/2019), and Parkinson disease (H) (1/20/2016).    Patient has a past surgical history that includes pr total abdom hysterectomy; Hysterectomy (1982); and Breast cyst aspiration.    Patient's family history includes Breast cancer in her maternal aunt.    Patient reports that she has never smoked. She has never used smokeless tobacco. She reports that she does not drink alcohol.     Atul Verma MD  [facility]

## 2021-06-01 VITALS — BODY MASS INDEX: 21.23 KG/M2 | WEIGHT: 127.6 LBS

## 2021-06-01 VITALS — WEIGHT: 127.6 LBS | BODY MASS INDEX: 21.23 KG/M2

## 2021-06-01 NOTE — PROGRESS NOTES
How have you been doing since we last saw you? Most important neurological symptom or concern for this visit (Medication wearing off, hallucinations, freezing, pain, sleep difficulty, constipation etc.) sleeping not so well, pain in neck, intermittent  freezing through out the day. Most likely after last dose of medication. Patient feels heavy sensation sometimes.     How many falls has the pt. Had over the last year?(if pt. Falls frequently, daily, weekly, monthly:  45  times in 12 month period    Best explanation of falls (poor balance, fail/recognizing/judgement, trip, dizzy, moving too quickly, carrying too much, poor lighting, any loss of consciousness, confusion, incontinence, tongue biting, or any unusual features of events)? Unknown                             Sleeping not so well

## 2021-06-01 NOTE — PATIENT INSTRUCTIONS - HE
Follow-up with your new neurology provider next week Monday as originally scheduled.    Referral for ENT appointment in for impacted cerumen on the right side.    Change positions slowly.  Bring walker with you everywhere.   Vaginal Delivery

## 2021-06-01 NOTE — PROGRESS NOTES
.OUT PATIENT CLINICAL SOCIAL WORK: PSYCHOSOCIAL ASSESSMENT      ALEXA Luu   6/19/1932 9/9/2019    Primary Language: English  Referral Source:  Jerzy Aparicio MD   Person(s) Present at Visit: Daughter Mariel  Goal(s) for Visit: First Consultation  Presenting Concerns: Patient is concerned that she continues to fall, patient has modified life due to fear of falling.  Patient would like to review medications as well as become more active again.  Cognitive: Patient like would like to reengage with activities that exercise her mind.  Behavioral: N/A      ALEXA Luu is a 87 y.o. female .      PRIMARY DECISION MAKER FOR HEALTHCARE  Patient  Copy of legal documents on chart? Yes    Name: Carmen Luu  Relationship: Self      Additional Information: Pt does not name a healthcare agent but has specifically identified directives to manage her own medical care.     PATIENT REPRESENTATIVE  Same as above    Primary Decision Maker for Healthcare provides verbal authorization for this clinic to have care coordination conversations with the above contacts as needed: N/A    HEALTHCARE DIRECTIVE/LEGAL ISSUES  Pt has healthcare directive: Yes    If yes, copy of documents on chart? Yes  N/A    FINANCIAL INFORMATION  Primary Funding Source: Social Security   Secondary Funding Source: School FDC Fund (pension)    Additional Financial Information:    Financial POA: Yes - who: Pt did not disclose information    SSI / SSDI: No     Social Security: Yes    [place]l: No    LTC Insurance: yes    Medical Assistance (MA) Status:     N/A    Waiver Services: No      OCCUPATION / EDUCATION:  Current Employment: None/Retired  Prior Occupation(s): School RN      BELIEF SYSTEM: Mandaeism      MEDICAL:  Please see  consultation from for complete medical history.  Community MD/Clinic:    Dr Aparicio     CHEMICAL HEALTH:  Current Tobacco Use: None  Prior Tobacco Use: None       Current Alcohol/Drug Use: None         Treatment: None  Prior Alcohol/Drug Use: None       Treatment: None  Additional Information/Concerns:  None    PSYCHIATRIC / BEHAVIORAL:  Current Dx: None  Current Treatment: N/A  Prior Dx: None  Prior treatment: N/A  Additional Information/Concerns:      HOME / LIVING ENVIRONMENT:  Patient lives: Alone  Home Accessibility Concerns: None  Additional Information/Concerns: Pt lives in a Twin Home, no stairs or environmental concerns.    COMMUNITY / SOCIAL SUPPORT:   Community services: None    Additional Information/Concerns: Pt belongs to a Parkinson's support group, she also has very supportive family and friends.  Patient has close neighbors which she exercises with.  Strengths: Strong support network  Limitations: Living alone with history of significant falls.    FAMILY SUPPORT:  Relationship Status:   Children: Two Daughters, one lives 5 mins away and one lives 20 mins away. Pt has two sons who both live in [place]   Additional Information/Concerns:   Strengths: Family is near and often checks in on patient.  Limitations: Patient struggles with planning for meals and is fearful of falling.    DAILY ROUTINE:  Sleep-has poor sleep hygiene sleeps 2 to 3 hours then is up for several hours nutrition-patient stated she does eat well however meal planning is more difficult because of her physical deficits.  Activities-patient tries to practice dahlia chi, enjoys walking, and exercise.  Patient attended the [facility] however due to continually falling patient does not feel [facility].  Hobbies-she endorses enjoying exercise as part of her daily living, she and also enjoys activities with her family.  Patient is also active in her Sikhism.  Additional Information/Concerns: Patient no longer feels comfortable [facility] to the Y due to her falls and feels vulnerable.  Strengths: Patient continues to try to be as active as possible even with continued falls.  Limitations: The patient has limited her  activities due to continuous falls.    FUNCTIONAL STATUS:  Is patient driving? No     Is patient independent with the following activities? Bathing, Dressing, Grooming, Toileting, Eating, Medication Management, Meal Preparation and Financial Management  Additional Information/Concerns: Patient's daughter assists patient with light housework, meal prep and some shopping.      PRIOR COGNITIVE TESTING / IMAGING: Patient has had numerous CTs and imaging in the past.  Patient stated she has had imaging done at [facility] and  of .    INTERVENTION(S):  Resources  Additional Information: Social work provided information on homemaking services/home care services as well as Meals on Wheels.    PATIENT/FAMILY OBSERVATIONS:  Patient: Patient is very motivated to regain mobile stability and return to an active life.   Family/Caregiver: Family is engaged and supportive of patient, and concerned with continued mobility deterioration.      PLAN/FOLLOW-UP: Social work will remain available for follow-up with patient and family and provided patient with contact information.      Pita Grider, MSW, LGSW

## 2021-06-01 NOTE — PROGRESS NOTES
Clinic Note    Assessment:     Assessment and Plan:  1. Falls frequently  Undoubtedly due to her Parkinson's disease.  Likely, she has an appointment with a new neurologist early next week.  Perhaps we can make some changes to her Parkinson's medications.  Falling is not new for her, and she has already participated in a variety of different physical/occupational therapies.  She uses her walker appropriately.  She has a life alert bracelet that she uses at home.    2. Impacted cerumen of right ear  - Ambulatory referral to ENT       Patient Instructions   Follow-up with your new neurology provider next week Monday as originally scheduled.    Referral for ENT appointment in for impacted cerumen on the right side.    Change positions slowly.  Bring walker with you everywhere.    Return in about 3 months (around 12/6/2019).         Subjective:      ALEXA Luu is a 87 y.o. female who comes to clinic today for her frequent falls.    Patient has a history of Parkinson's and is due to meet with a new neurologist early next week.  Falls are not new for her.  She was recently seen at our [facility] and had a head CT for an acute fall episode.  Head CT was normal.  She is not on anticoagulation.    She had another fall 3 days ago in which she was at a movie theater.  She denies hitting her head or losing consciousness.    She uses her walker at all times.  She does not have an issue with rugs in her home.  She still lives independently and is resistant to the idea of moving into assisted living.  She has a mobility scooter at home but does not use this frequently.    She has tried a variety of different therapies both occupational and physical.  She has some appointment set up with a therapist at the [facility] for some hip weakness.    She feels as though she has some built up earwax in her right ear and would like me to inspect her ear canal today.    The following portions of the patient's history were  reviewed and updated as appropriate: Allergies, medications, problem list, prior note.     Review of Systems:    Review is otherwise negative except for what is mentioned above.     Social Hx:    Social History     Tobacco Use   Smoking Status Never Smoker   Smokeless Tobacco Never Used         Objective:     Vitals:    09/06/19 0903   BP: 140/80   Pulse: 72   Weight: 125 lb 6.4 oz (56.9 kg)       Exam:    General: No apparent distress. Calm. Alert and Oriented X3. Pt behavior is appropriate.  Ears: Her right TM is completely occluded with cerumen.  Left TM normal  Musculoskeletal: She is able to ambulate up and down the hallway with her walker without significant difficulty.  She does not drag her feet or shuffle her gait.  She is able to rise from her chair without much difficulty.  Neurologic: Interactive, alert, no focal findings, CNs intact.   Skin: Warm, dry. Normal hair pattern. Free of lesions. Normal skin turgor.       Patient Active Problem List   Diagnosis     Hemorrhoids     Venous Insufficiency     Cystocele     Mixed hyperlipidemia     Appendiceal Mucocele     Osteopenia     Left Ventricular Hypertrophy     Vaginal wall prolapse     Parkinson disease (H)     Closed fracture of multiple ribs of both sides, initial encounter     Fall     Current Outpatient Medications   Medication Sig Dispense Refill     acetaminophen (TYLENOL) 500 MG tablet Take 500 mg by mouth every 6 (six) hours as needed for pain.       ascorbic acid, vitamin C, (VITAMIN C) 1000 MG tablet        calcium-vitamin D (CALCIUM-VITAMIN D) 500 mg(1,250mg) -200 unit per tablet Take 1 tablet by mouth 2 (two) times a day with meals.       carbidopa-levodopa (SINEMET)  mg per tablet Take 1 tablet by mouth 3 (three) times a day.        ibuprofen (ADVIL,MOTRIN) 200 MG tablet Take 200 mg by mouth every 6 (six) hours as needed for pain.              MAGNESIUM CITRATE ORAL Take 1 tablet by mouth 2 (two) times a day.       OMEGA-3/DHA/EPA/FISH  OIL (FISH OIL-OMEGA-3 FATTY ACIDS) 300-1,000 mg capsule Take 1 g by mouth daily.              polyvinyl alcohol (ARTIFICIAL TEARS, POLYVIN ALC,) 1.4 % ophthalmic solution Administer 1 drop to both eyes 4 (four) times a day as needed for dry eyes.       pravastatin (PRAVACHOL) 10 MG tablet Take 1 tablet (10 mg total) by mouth daily. 90 tablet 3     No current facility-administered medications for this visit.        I spent 25 minutes with patient face to face, of which >50% was counseling regarding the above plan       Danilo Peralta (Rob), CNP    9/6/2019

## 2021-06-01 NOTE — PROGRESS NOTES
"Physical Therapy  PHYSICAL THERAPY  MOVEMENT DISORDER:  INITIAL EVALUATION    SUBJECTIVE INFORMATION    Diagnosis: Parkinson's Disease (Diagnosed with PSP by Dr. Mendoza and appears corroborated by MRI)  Date of diagnosis: Aug 2015 with PD    Date of last Neurologist visit: 9/9/2019  Referring Provider: NONA Kam  Treatment Diagnosis: Postural instability  Precautions/PMH: Osteopenia, Pt reports a stable fx of C2 and C7 due to a fall  (May 2018), h/o R RTC tear with subsequent pain and ROM loss that becomes exacerbated with falls, has broken ribs due to falls  First movement disorder symptom presentation: posterior LOB and small writing  Date: 2012  Non-motor symptoms: Visual impairments, Early mild cognitive impairment, Urinary symptoms, Sleep disturbances, Fatigue and Restless Leg Syndrome    Time of Evaluation: 1400       Time of last PD med: 1230       Time of next PD med: 1600  On/Off presentation/symptoms: Increased freezing towards the end of her medication  History of PD specific PT? Yes: When and where?: Previously at [facility] in 2016 and [facility] 2017, [facility] land therapy in [place] 2018    Pain: No    Living Situation: Twin home.  One level home, no stairs.  Caregiver Support: Lives alone, has 2 daughters near by (1 is only 5 minutes away and checks in with her daily).  Equipment: Uses a U-Step walker at home and a 4WW out in the community; pt always use an AD.  Grab bars in bathroom.   Current Activity Level: Pt walks 2 miles/day and goes on errands frequently with her daughter.  Pt does hip and shoulder exercises daily.  Chief Mobility Complaint: Increased fear of falling, feeling unsteady, difficulty standing from a low chair.  Patient's Functional Goal: \"To feel more steady\" and reduce pt's fall frequency, \"I haven't fallen in 3 weeks and that's a long time for me!\"    Fall history:On average, pt reports falling \"at least 2x/wk\"    Freezing: Often - about once a day.  Can occur " towards the evening or when pt needs to use a restroom.      OBJECTIVE INFORMATION    Cognition: comments: Appears WNL    Bradykinesia and Hypokinesia (Combining slowness, hesitency, decreased arm swing, small amplitude and poverty of movement in general):  Mild    Dyskinesia:No    Posture: Slight FHP and rounded shoulders.     Postural Stability: Posterior tug test (Response to sudden posterior displacement produced by pull on shoulders while patient is erect, with eyes open and feet slightly apart.  Patient is prepared.)  Very unstable, tends to lose balance spontaneously    Transitional Movements  Sit?Stand:   SBA       Sit? Supine:   Modified Independent  360 degree turn: Clockwise:  15 steps Counterclockwise: 17 steps   TUG(Timed Up and Go): 16.32 seconds with min A for stand to sit to ensure pt gets to chair safely.  Pt does not stay with 4WW very well during turns. (>13 sec:  Falls Risk)    Divided Attention   Cognitive TU.49 seconds (Task):  Counting backwards by 3's from 50; pt kept counting but miscounted after 47       Strength   30 Second Chair Stand: # 5   Without UE assist  Age/Gender Norm: F85 - 88 y/o: 10     Balance      Eyes open Eyes closed     Romberg (sec) 30 30     Semi-Romberg (sec) 30 2.9     Sharpened Romberg (sec) NT NT    Left LE Right LE     Single Leg Stance (sec) unable unable      Balance Comments Pt tends to lose her balance to the right.  Min A to achieve all positions above due to instability.       Motor Planning / Coordination   Four Square Step Test NT/NA    Gait   Preferred Gait Speed  6 meters in 7.52 seconds Meters/second: 0.79 Age/Gender Norm: F80 - 88 y/o: 1.15 +/- .21 meters/second  # steps in 6 meters: 12-13  Assistive Device: 4WW  Gait Speed Fall risk:  Med Falls Risk (0.6-1.2 m/s)  Gait Analysis: NBOS, cross over of LE (L>R), occ toe catch, reduced B step length, reduced B heel strik    Fast Gait Speed  6 meters in 4.78 seconds Meters/second: 1.25 Age/Gender  Norm: F80 - 88 y/o: 1.59 +/- .28 meters/second  # steps in 6 meters: 10  Assistive Device: 4WW  Gait Analysis: See above, PT provided CGA to ensure safety due to her cross over gait pattern.    Activity Tolerance   6 minute walk test:  NT due to time constraints    Total OP Minutes: 55      Outpatient Units: OP EVAL

## 2021-06-01 NOTE — PROGRESS NOTES
Occupational Therapy  Movement Disorders/Parkinson's Occupational Therapy Initial Evaluation    Medicare Insurance           Units: 1 OT evaluation  Total Minutes: 55    Medical Diagnosis: Parkinson's Disease   Treatment Diagnosis:Lack of Coordination 781.3, Muscle weakness (general) 729.87, bradykinesia    Referring Practitioner: NONA Kam  Date of Onset: 2015  Start of Care: 9/23/2019    ASSESSMENT    Patient is a 87 year old female who received diagnosis of Parkinson's disease in 2015.  Patient reports having possible differential diagnosis of PSP.  Patient arrived on-time and was unaccompanied to her appointment.  Patient ambulated to OT clinic with use of FWW.  Patient 's primary complaints include significant fall history and lack of coordination in LUE which negatively impact I/ADL performance.  Patient's fine motor coordination (FMC) with bilateral UE appears impaired when compared to age/gender norms on 9 Hole Peg Test, with increased deficits in LUE.  Patient's gross motor coordination (GMC) with bilateral UE appears impaired, per Box and Blocks test, when compared to age/gender norms.  Patient demonstrates functional strength in BUE but impaired R ABD secondary to a rotator cuff injury.  Pt has scored WNL on MOCA assessment. Patient is appropriate for skilled OT services at this time to address above deficits in order to maximize independence and safety with I/ADLs.      Recommendations: Patient is appropriate for 1:1 skilled OT at this time.    PLAN  Frequency/Duration: 2 times per week, for 4 weeks;   Up to 8 additional visits    Long-term goals:     1. Patient will demonstrate large amplitude movements into I/ADL tasks, 3/4 trials to improve efficiency and safety.  2. Patient will demonstrate and verbalize understanding of work efficiency techniques related to PD to improve safety and decrease fatigue.  3. Patient will verbalize understanding of 10 fall prevention strategies to improve  safety during I/ADLs.  4. Patient will improve FMC per 9HPT by 5 seconds to improve I/ADL independence.  5. Patient will improve GMC per B & B by 6 blocks to improve I/ADL independence.  6. Patient will demonstrate understanding of adaptive equipment and compensatory strategies to improve handwriting per patient/family report to improve functional independence.  7. Patient will verbalize and demonstrate understanding of home exercise program for coordination and strength with bilateral UE to improve function with I/ADLs.    Plan of Care: Self Cares / ADL Training, Communications with referral Source, patient, caregiver and treatment Team, Patient/ Family instruction: Etiology of diagnosis or presented symtoms, Treatment plan/rationale, Home Exercise Program and Expected Functional Outcomes , Neuromuscular Re-education, Therapeutic Exercise, Mobility/ Transfer Training and Adaptive Equipment/ DME.  Prognosis to achieve goals: Good    Goals were established with the patient: Yes    SUBJECTIVE  Pain: No      Past Medical History: Old Right rotator cuff injury; re injured in fall, H/o cervical spine fx/rib fx from fall, osteopenia, venous insufficiency, L ventricular hypertrophy    Current Level of Function:  Lives 1 story town home  Stairs required: no  Work: Retired RN  Shopping: Pt's daughter completes.   Meals: Pt completes cooking but takes longer than usual  Housework: Patient's daughter helps with cleaning; pt attempts to do more, but is loading .   Laundry: Family helps but pt will complete as needed.    Driving: No; stopped 1 year ago  Medications: Pt's managing using pill box  Medication On/Off times: Yes, increased stiffness in fingers, difficulty lifting arms, impaired gait.    Finances: Pt manages  Sleep: Pt reports frequent waking  Leisure activities: Spend time with family, [employer] activities, Used to go to [employer] d/t decr balance.   Assistive devices: FWW, U-step walker  Fall history:  August 4th was last fall, typically 1-2x/week    Functional Performance during ADLs   Independent / Needs   Assist Comments   Eating Indep Using non-dominant hand to bring cup to mouth  Increased time to cut foods/eat   Grooming Mod I Uses non-dom hand to lift up RUE d/t shoulder pain   UB Dressing Needs assistance Receives assistance at end of day if pt's dtrs are there   LB Dressing Indep Some pain in groin with bending   Bed Mobility Indep    Basic Transfers Indep Pt reports difficulty with soft surfaces   Shower Transfers Mod I    Toilet Transfers Indep Grab bars present; pt does not use   Meal Prep           Handwriting/Keyboarding: Micrographia and decreased legibility    Motor Impairments:  Stooped posture, bradykinesia             OBJECTIVE  U/E Motor Assessments: Dominant Upper Extremity: Right   L R   AROM Within Functional Limits SH flexion: WFL  0-80* SH ABD  WFL distally     GMC Impaired Impaired   Strength Within Functional Limits   4/5 Within Functional Limits     3+/5      L L Norms R R Norms   9 HPT 36.65 Female 71+ years old: 24.11 seconds 25.49 sec Female 71+ years old: 22.49 seconds   Box & Blocks 44blocks Female 75+ years old: 63.6 blocks 37 blocks Female 75+ years old: 65.0 blocks       Physical Performance Test (7 item): NT d/t time constraints, will assess next isit.     Cognitive Assessments:   MOCA: 28/30 (>26/30 is considered 'normal') - version 8.1  MOCA memory index score 10/15    Visual-Perceptual Skills:  Occular ROM: WNL  Visual tracking and pursuits: WNL  Convergence: WNL with prism glasses on  Saccades: hypometric 3/5  Reports of Double vision: Yes; wears prism glasses for past 2 years      Insurance: Medicare,  [facility] # [id number]; Provider #   Certification Dates: from 9/23/2019 to 12/21/19.      Physician Recommendation:  1. I certify the need for these services furnished within this plan and while under my care. I agree with the therapist's recommendation for plan of  care.  2. If there is any recommendation for modification of therapy plan, please indicate below.    RICHARDSON Martinez, OTR/L on 9/23/2019

## 2021-06-01 NOTE — PROGRESS NOTES
Speech Language/Pathology  Outpatient Speech Therapy   Evaluation and Initial Plan of Care    ALEXA Luis [facility]  YOB: 1932      [id number]   Referring Provider: Ct Toney FNP    Date of Onset: order received in [facility] in September 2019  Start of Care: 9/23/19  Medical Diagnosis: Parkinson's Disease    Treatment Diagnosis: dysarthria and voice  Session 1 of 8      Medicare Certification  [facility]   [id number]  Certification Dates: 9/23/19 to 12/23/19    Assessment  Patient presents with motor speech deficits. Voice deficits are characterized by decreased breath support which negatively impacts vocal projection and vocal quality. Combination of deficits listed above negatively impact how well patient is heard. 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 Patient demonstrates decreased sensory awareness of how much effort they feel they are using to speak and volume of voice they produce vs. actual voice volume and projection of speech. Patient is appropriate for skilled 1:1 ST to address her speech/voice and communicative impairments related to diagnosis in order to optimize function due to progressive nature of disease. Patient is a good candidate for speech therapy and rehab potential is judged as good due to stimulability for improved vocal projection and loudness during evaluation with strategies to increase amplitude and breath support. Patient was informed of the results and was in agreement with the recommendations.     Plan  Speech therapy 2 times per week for 4 weeks for a total of 8 sessions  Ongoing patient/family instruction regarding treatment plan/rationale, home program, expected functional outcome .    POC  LONG TERM GOALS:  1. Patient will modify vocal loudness to improve communication to meet social and home demands      SHORT TERM GOALS:  1. Patient will perform x1 vocal warm-ups to  "improve vocal strength while maintaining a minimum vocal intensity of 78 dB ANGEL by end of POC.  2. Patient will sustain phonation of /a/ for 10 seconds to improve vocal strength while maintaining a minimum of  80 dB ANGEL by end of POC  3. Patient will implement compensatory strategy of projecting voice when reciting numeric sequences to improve vocal loudness while maintaining a minimum of 76 dB ANGEL by end of POC  4. Patient will implement compensatory strategy of projecting voice when reading phrases, sentences, and paragraphs to improve vocal loudness while maintaining a minimum of 74 dB on at least 80% of time ANGEL by end of POC.  5. Patient will implement compensatory strategy of projecting voice during conversation to improve vocal loudness while maintaining a minimum of 70 dB on at least 80% of time ANGEL by end of POC     Subjective:   Pertinent history includes see EMR.   Patient presents as alert and cooperative during the session.  An  was not applicable.  Patient reports no pain      Patient is a 87 year old female who presents for a speech, voice, and communication evaluation secondary to a diagnosis of Parkinson's Disease. Patient was diagnosed with Parkinson's Disease approx 4 years ago. Patient reports no course of speech therapy, however received a speech and language evaluation at [facility] approx 3 years ago. Patient reports always having a \"froggy voice\" for most of her life. Patient is currently taking medications to manage symptoms secondary to Parkinson's Disease. She reports no difficulties with managing medications. Patient lives alone in a town home. She has two children that lives close by.     Objective:   SWALLOWING:  Does patient/family report difficulty with swallowing?  Intermittent difficulties, reports some coughing with food. Observed with 2 oz of consecutive sips with no s/s of aspiration.    ( x ) Regular diet      ORAL PERIPHERAL SCREENING:  Labial function:    Decreased " movement on upper lip of left side  Lingual function:  WFL    Velar function:     WFL       Facial expression: Slight masking   Diadochokinetic rates:   variable rate        Additional Information:       EVALUATION OF SPEECH & VOICE:    Vocal Intensity:   Conversation: Avg. vocal intensity: 68 dB  Paragraph reading: Avg. vocal intensity: 70 dB  Avg. intensity of sustained phonation on /a/: 81 dB fading to 76 dB for 13 seconds.       Appears WFL Mild Impairment Moderate Impairment Severe Impairment Profound Impairment   Vocal Intensity  x      Breath Support  x      Intonation of Speech x       Articulation x       Intelligibility x       Fluency x       Rate of Speech x       Vocal Quality  x        Vocal Quality Description:   (  ) Wet vocal quality   ( x ) Glottal montaño   (  ) Pitch Breaks       (  ) Phonation Breaks   (  ) Vocal Tremors   (  ) Breathy   (  ) Aphonia    (  ) Harsh/Hoarse  Additional Information:        STIMULABILITY TESTING:  Using techniques patient performed the following:  Sustained Phonation (average): 12 seconds at an average vocal intensity of 85 dB  Patient read phrases/sentences at a vocal intensity average of 72 dB    The following improvements were noted:   (  ) No Improvement   (  ) Articulation  ( x ) Vocal Quality   (  ) Facial Expression    (  ) Fluency    (  ) Intonation of Speech  (  ) Rate of Speech   (  ) Intelligibility  ( x ) Vocal Intensity   ( x ) Breath Support      Time: 46 speech/language minutes    Hannah Mendes MS, CCC-SLP    Physician Recommendation:  1. I certify the need for these services furnished within this jplan and while under my care.  I agree with the therapist's recommendtion for plan of care.  2. If there is any recommendation for modification of therapy plan, please indicate below.    Physician Signature: ____________________________________________

## 2021-06-01 NOTE — PROGRESS NOTES
Physical Therapy  Physical Therapy  Movement Disorders  Medicare Certification    Medical Diagnosis: Parkinson's Disease vs PSP    Treatment Diagnosis: Postural Instability, Bradykinesia, LE weakness    Referring MD: Ct Toney  Onset Date: 9/9/2019  Start of Care: 9/23/2019    Assessment: Pt is an 86 yo female who was diagnosed with PD in 2015, though Dr. Mendoza recently thought she might have PSP.  The pt comes to PT with previous history of PD specific PT both at [facility] and [place].  The pt comes to PT this date with the chief complaint of frequent falls and poor balance.  The PT eval reveals the pt is very unsteady and relies significantly on her 4WW for mobility and safety.  The pt requires hand held assistance to achieve basic positions if not holding on to her walker and is a high fall risk as evidenced by her own admission of frequent falls as well as by her TUG score, her LOB during the PT eval, and her gait speed.  Patient would benefit from skilled 1:1 PT to address deficits, decrease falls risk, and optimize function due to progressive nature of disease.       Prognostic Indicators: Rehabilitation Potential Good  Impairment: Acitivity Tolerance, Balance, Bradykinesia/Hypokinesia, Motor Function, Motor Planning/Coordination, Pain, Postural/Gait Instability, Sensory Function and Gait    Functional Goals to be met by 13 visits  Patient will show improved efficiency and quality of movement, improved gait and postural stability for increased safety, decreased fall risk when performing ADL's, IADL's, household &/or community ambulation as measured by:      1.  360 degree turn < 10 steps   2.  TUG < 13 seconds   3.  30 Second Chair Stand > 8 stands   4.  Balance Assessment: Semi Romberg >10 sec with EC   5.  Gait speed > 0.97m/second    6.  Patient will ambulate > 1000 feet in 6 minute  to indicate improved functional activity tolerance for participating in social events &/or household/community  "ambulation.   7.  Patient will be mod I/SBA with HEP  Patient Functional Goal: \"To feel more steady\" and reduce pt's fall frequency  Goals were established with the patient: yes    Plan of Care  Communication with: referral Source, patient, caregiver and treatment Team, Patient / Family / Instruction: Etiology of diagnosis or presented symtoms, Treatment plan/rationale, Home Exercise Program and Expected Functional Outcomes, Big/PWR Techniques, Therapeutic Exercise, Mobility / Transfer Training, Gait Training, Neuro Re-ed / Balance, Compensatory Strategies, Neuromuscular reeducation and Stair training    Frequency/Duration 2x/week for 6 weeks.  Up to 13 visits    MEDICARE PATIENTS:  [facility] [id number]  Provider #   Certification Dates: from 9/23/2019 to 12/20/19    Physician Recommendation:  1. I certify the need for these services furnished within this plan and while under my care. I agree with the therapist's recommendation for plan of care.    2. If there is any recommendation for modification of therapy plan, please indicate below.      Physician's Signature (Printed):  _____________________________        "

## 2021-06-01 NOTE — PROGRESS NOTES
Assessment:     1.  Parkinson's disease versus PSP  2.  Frequent falls  3.  Osteopenia  4.  Left ventricular hypertrophy    Select the one statement which best describes you:    Speech: Normal    Saliva/drooling: Slight excess of saliva, sometimes drool on pillow    Swallowing: Rarely choke    Handwriting: Slightly slow or small    Cutting Food/Using Utensils: Slow/clumsy, able to feed myself without help    Dressing: slow, do not need help    Hygiene (bathing, brushing teeth, combing hair): Slow, no help needed    Turning in bed or adjusting sheets: Clumsy/slow turning in bed, no help needed    Falling: Fall average once per day    Freezing (unable to walk for a few seconds): Frequently freeze, occasionally fall because of freezing    Walking: Severe problems, need assistance    Visible tremor anywhere in body: Slight tremor infrequently present    Numbness, tingling, discomfort, aching: No numbness, tingling, or aching    Concerns/problems/updates/changes you wish to discuss with provider today:  New Parkinson's meds    Are you experiencing any of the following symptoms:  Stiffness, Slowness of movement, Posture/balance, Painful cramps, Dyskinesia (dance-like movements), Sleep problems, Dreams/nightmares, Light headedness and Constipation    Do you use:  Walker     The patient presents to the Parkinson's clinic on referral from another 1 of my patients.  The patient is here to see if there are any newer meds she can use to help her Parkinson's disease.  The patient has had many falls and feels very unsteady when she walks.  She hopes to have better stability, be more active, continue to walk every day and get better sleep.  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  She currently lives alone and does not drive.  She lives in a 1 level twin home and has 2 daughters that live within 5 minutes from her.  She reports she has good support great family and friends.  She reports she does not  sleep well and used to be a lot more active, she used to take physical therapy, back she has taken it here.  She reports that used to do dahlia chi and walk every single day.  She is very worried about her falls.  She denies any hallucinations.  The patient did have an MR which showed difficult midbrain loss, so is questionable if she may have PSP.  When I watch the patient walk her foot almost seem to catch her other foot, was almost like she was head dyskinesia when walking.  She does state that she was taught to walk where she put one foot in front of the other, by doing this I believe that when she turns into certain things she does trip over her other foot this may be why she has some any falls.      Plan:     Plan:  Continue Parkinson medication as before       8am    12 pm   4 pm    HS               Sinemet IR   25/100   I tab   1 tab   1 tab                         2.  Evaluation by physical therapy, Occupational Therapy, speech therapy and treat if needed.  3.  Information given to patient on different medications  4.  Patient will return and I will do on-off testing  5. labs          Subjective:        Parkinson's history: The patient was diagnosed with Parkinson's by Dr. Her in August 2015.  Patient states that she was falling, had a shuffling gait, stiffness, but no tremor.  She does state that her right side was worse.  He prescribed carbidopa levodopa.  She noticed significant improvement in her balance and gait since started the medication.    She has increased the Sinemet 25/100 IR to 4 times a day, but got severe dyskinesia so it was reduced back down to 3 times a day.  Patient states that she has not tried any other Parkinson's medications.    PD: Currently taking 1 tablet of Sinemet IR 5/103 times a day at any time between 7:53 AM, around 12 AM, and sometimes around 4:30 PM she admits to not taking her medication at a consistent time every day  RLS-believes she does have restless leg  ADL -she  "reports he is independent with all ADLs and    Driving-she does not drive  Exercise -she tries to exercise frequently but with all of her falls she has been moving less  Medication Side Effects -no side effects that she is aware of  Memory -believes that her memory is fine, she does have a neuropsychological assessment from the beginning of the year which shows no cognitive deficits  Sleep-reports that her sleep is \"horrible\" and \"really bad\".  Psych/Depression- denies any problem  Autonomic- reports mild lightheadedness  Sialorrhea  denies   Dyskinesia-no dyskinesia noted today when sitting, when the patient walks her feet have almost a dance like movement to it, I am not quite sure if this is dyskinesia.  Tremor -no tremor noted today  Advanced Directives-discussed  Surgery -discussed   Bradykinesia and Hypokinesia -none noted today    Patient Active Problem List    Diagnosis Date Noted     Closed fracture of multiple ribs of both sides, initial encounter 02/27/2019     Fall 02/27/2019     Parkinson disease (H) 01/20/2016     Vaginal wall prolapse 08/18/2015     Left Ventricular Hypertrophy      Hemorrhoids      Venous Insufficiency      Cystocele      Mixed hyperlipidemia      Appendiceal Mucocele      Osteopenia      Past Medical History:   Diagnosis Date     Breast cyst      Finger fracture, left 02/27/2019    ring finger      Multiple rib fractures 02/27/2019     Parkinson disease (H) 1/20/2016     Past Surgical History:   Procedure Laterality Date     BREAST CYST ASPIRATION       HYSTERECTOMY  1982     ND TOTAL ABDOM HYSTERECTOMY      Description: Hysterectomy;  Proc Date: 01/01/1988;  Comments: couldn't find her ovaries     Family History   Problem Relation Age of Onset     Breast cancer Maternal Aunt      Current Outpatient Medications   Medication Sig Dispense Refill     acetaminophen (TYLENOL) 500 MG tablet Take 500 mg by mouth every 6 (six) hours as needed for pain.       ascorbic acid, vitamin C, " "(VITAMIN C) 1000 MG tablet        calcium-vitamin D (CALCIUM-VITAMIN D) 500 mg(1,250mg) -200 unit per tablet Take 1 tablet by mouth 2 (two) times a day with meals.       carbidopa-levodopa (SINEMET)  mg per tablet Take 1 tablet by mouth 3 (three) times a day.        ibuprofen (ADVIL,MOTRIN) 200 MG tablet Take 200 mg by mouth every 6 (six) hours as needed for pain.              MAGNESIUM CITRATE ORAL Take 1 tablet by mouth 2 (two) times a day.       OMEGA-3/DHA/EPA/FISH OIL (FISH OIL-OMEGA-3 FATTY ACIDS) 300-1,000 mg capsule Take 1 g by mouth daily.              polyvinyl alcohol (ARTIFICIAL TEARS, POLYVIN ALC,) 1.4 % ophthalmic solution Administer 1 drop to both eyes 4 (four) times a day as needed for dry eyes.       pravastatin (PRAVACHOL) 10 MG tablet Take 1 tablet (10 mg total) by mouth daily. 90 tablet 3     No current facility-administered medications for this encounter.        Allergies   Allergen Reactions     Acetaminophen-Codeine Unknown     Codeine      Morphine Other (See Comments)     Made me feel really wierd     Penicillins Other (See Comments)     Tongue and throat \"tingling\" when in 20s     Social History     Socioeconomic History     Marital status:      Spouse name: Not on file     Number of children: Not on file     Years of education: Not on file     Highest education level: Not on file   Occupational History     Not on file   Social Needs     Financial resource strain: Not on file     Food insecurity:     Worry: Not on file     Inability: Not on file     Transportation needs:     Medical: Not on file     Non-medical: Not on file   Tobacco Use     Smoking status: Never Smoker     Smokeless tobacco: Never Used   Substance and Sexual Activity     Alcohol use: No     Frequency: Never     Drug use: Not on file     Sexual activity: Not on file   Lifestyle     Physical activity:     Days per week: Not on file     Minutes per session: Not on file     Stress: Not on file   Relationships     " Social connections:     Talks on phone: Not on file     Gets together: Not on file     Attends Orthodoxy service: Not on file     Active member of club or organization: Not on file     Attends meetings of clubs or organizations: Not on file     Relationship status: Not on file     Intimate partner violence:     Fear of current or ex partner: Not on file     Emotionally abused: Not on file     Physically abused: Not on file     Forced sexual activity: Not on file   Other Topics Concern     Not on file   Social History Narrative     Not on file       The following portions of the patient's history were reviewed and updated as appropriate: allergies, current medications, past family history, past medical history, past social history, past surgical history and problem list.    Review of Systems          Objective:     Vitals:    09/09/19 1403   BP: 128/69   Pulse: 71       Imaging: DATE/TIME: 3/7/2019 5:18 PM    FINDINGS:  INTRACRANIAL CONTENTS: No acute or subacute infarct. Small cluster of chronic  infarcts in the left cerebellar hemisphere grossly similar to the prior study. A  few tiny chronic lacunar infarcts in the right cerebellar hemisphere were not  well visualized on the previous exam. Small midline chronic cerebellar infarcts  are stable. Mild to moderate cerebral and mild cerebellar volume loss. Small  chronic cortical infarct right superior frontal gyrus. Tiny chronic lacunar  infarct left basal ganglia. Significant midbrain volume loss out of proportion  to the global volume loss can be seen in progressive supranuclear palsy. No  mass, acute hemorrhage, or extra-axial fluid collections. Mild burden presumed  chronic small vessel ischemia. Normal position of the cerebellar tonsils.     SELLA: No significant abnormality accounting for technique.    OSSEOUS STRUCTURES/SOFT TISSUES: Nondisplaced type II odontoid fracture with  normal alignment. Mild widening of the fracture gap compared to the previous  CT  6/25/2018. No high-grade canal narrowing. The major intracranial vascular flow  voids are maintained.     ORBITS: No significant abnormality accounting for technique.     SINUSES/MASTOIDS: No significant paranasal sinus mucosal disease. Moderate right  mastoid effusion.     CONCLUSION:  1.  No acute intracranial abnormality. No restricted diffusion/acute infarct,  space-occupying hemorrhage, or mass effect.    2.  Significant midbrain volume loss is out of proportion to the degree of  global volume loss and can be seen in progressive supranuclear palsy.    3.  Age-related changes and scattered areas of chronic ischemia.    4.  Moderate right mastoid effusion.    Neuropsychological Assessment  Completed at different facility, no cognitive impairments noted      Review of Systems    Alert, cooperative, no distress Sitting, appears stated age, normocephalic, atraumatic without cyanosis, edema or skin rashes. Normal mood.      Motor strength   normal  Bulk normal  Tone  Normal  Rigidity mild  Balance - stumbled with turns  Tremors: none noted  Dyskinesia: very mild noted in legs  Getting up from chair.-able to do without pushing off   I asked her to call with any questions or concerns and will see her again in clinic in about 4 weeks . We discussed the risks and benefits of the medication including risk of worsening depression with medication adjustments and even the possibility of emergence of suicidality      Total time spent with the patient today was 90 minutes with greater than 50% of the time spent in counseling and care coordination. .

## 2021-06-02 VITALS — BODY MASS INDEX: 21.28 KG/M2 | WEIGHT: 124 LBS

## 2021-06-02 VITALS — BODY MASS INDEX: 21.3 KG/M2 | WEIGHT: 128 LBS

## 2021-06-02 VITALS — BODY MASS INDEX: 21.46 KG/M2 | WEIGHT: 127 LBS

## 2021-06-02 NOTE — TELEPHONE ENCOUNTER
Request for Orders  [facility] home care received a referral for this patient to start home care services for           SN to begin within 48 hrs of referral. However, the pt has requested the SN SOC begin on 10.19.2019    Who s Requesting: Ct      Orders being requested:SN SOC for 10.19.2019    Where to send Orders: Simply respond to this Epic Telephone Call message string, and we can take as a verbal order if appropriate.   Thank you.

## 2021-06-02 NOTE — TELEPHONE ENCOUNTER
Phone call to patient regarding additional ILR alert for AF, which was a false positive. EGMs are messy and frequent undersensing of R-waves. Device determines AF with R-R's so if it cannot see the R's we will continue to get alerts.    VM left for patient requesting she come in for additional programming with a Device, not urgent.     Also, plan to call [employer] [facility] to discuss this further for recommended reprogramming.    Patient called back and agreed to appointment; 11/13 at 320PM at .         Remote Report:  Type: Full loop recorder transmission for 2 episodes labeled AF detected.   Presenting Rhythm: Ventricular sensing, rate in the 90s with one r-wave undersensed.  Battery status: OK  Arrhythmias: Since 10/21/19, no Symptom, Tachy, Pause, or AT episodes detected. 2 episodes labeled AF detected, these appear SR/ST with ectopy and significant r-wave undersensing.   Comments: Routed to Device RN for review. CAH

## 2021-06-02 NOTE — TELEPHONE ENCOUNTER
"LVM on cell phone to have patient call back to discuss symptoms. She said her arms were tingly \"sleepy\" this morning. It came and went and has now resolved. She also seems pretty nervous from our call. I discussed that we just needed more information, but everything was WNL. She agreed with this. ZAC        Remote ILR report:  Type: remote loop recorder transmission for AF and pause episodes detected.  Presenting rhythm: ventricular sensed, appears normal sinus with frequent PVCs, rate 100 bpm.  Battery status: OK  Arrhythmias: since 10/16/19, one pause and two AF episodes detected. Pause appears to be R wave undersensing and the AF episodes appear sinus with intermittent R wave undersensing. No AT, tachy, rivka, or symptom triggered events.  Comments: normal loop recorder function. Routed to device RN as patient stated she is feeling funny this morning (does not correlate with episodes) and wanted to speak with an RN. prd ADD: EGMs are very messy, difficult to discern rhythm but undersensing R's noted. See telephone for assessment. JMTIM  "

## 2021-06-02 NOTE — TELEPHONE ENCOUNTER
Patient will come for reprogramming. Reviewed with tech services they recommend first increasing the sensitivity from 0.035mV to 0.025mV.    Plan to try this programming first and then will call [employer] if additional reprogramming is necessary.     Janny Corona RN BSN  [facility]

## 2021-06-02 NOTE — TELEPHONE ENCOUNTER
Request for Orders    Who s Requesting: Home Care Occupational Therapist    Orders being requested: OT 2w3 for AE recommendations and training, energy conservation and breathing techniques, shower and kitchen safety for increased independence with ADLs    Where to send Orders: please reply to this message    Thank you

## 2021-06-02 NOTE — PROGRESS NOTES
Assessment/Plan:     Patient presents to clinic to follow-up after being diagnosed with an acute stroke with some residual unsteadiness.  Patient's history is complicated by baseline Parkinson's, although she is well controlled on medication.  Of note, patient has had 110 falls and 2 major fracture events in the past 4 years.  I recommended that patient follow-up with cardiology for evaluation of the loop recorder, but I would be very hesitant to place this patient on anticoagulation even if the loop recorder does demonstrate atrial fibrillation.   We discussed bone health, patient has a history of osteopenia and she takes vitamin D and calcium.  I would continue this as well as weightbearing exercise.  She is already working with physical therapy to regain balance and prevent future falls.  I think is reasonable to continue the aspirin, patient has completed the course of Plavix.    1. Acute lacunar stroke (H)  2. Falls frequently  3. Atrial fibrillation, unspecified type (H)  4. Cryptogenic stroke (H)  5. Parkinson disease (H)  - HM2(CBC w/o Differential)  - Comprehensive Metabolic Panel  - Ambulatory referral to Cardiology        Discontinued Medications:  Medications Discontinued During This Encounter   Medication Reason     magnesium hydroxide (MILK OF MAG) 400 mg/5 mL Susp suspension      polyethylene glycol (MIRALAX) 17 gram packet      Return to clinic in 4 weeks for annual wellness and likely establishment of care.    Total time spent with patient was 45 minutes with greater than 50% spent in face-to-face counseling regarding the above plan.    This note has been dictated using voice recognition software. Any grammatical or context distortions are unintentional and inherent to the the software.     Juhi Lovell MD  Family Medicine [facility]      Subjective:      ALEXA Luu is a 87 y.o. female who presents to clinic for hospital follow up s/p acute right JACIEL stroke, presenting outside  "the TPA window. Patient was hospitalized from 10/5-10/9 and was discharged on aspirin and Plavix, discontinued Plavix per recommendations.  Patient does have a history of frequent falls and a known diagnosis of Parkinson's which made her hesitant to try true blood thinners.  An implantable loop recorder was placed to evaluate for possible undiagnosed atrial fibrillation.    Patient has questions about her implantable loop recorder and whether or not this would be an inconvenience for mammogram screening.  Patient is currently asymptomatic with respect to her most recent stroke, although she still feels unsteady on feet.  She is a life alert but does live at home.  She is considered assisted living but is not quite willing to make the change yet.  She has physical therapy which comes to her house and she is doing well.    Patient has fallen 110 times in the past 4 years.  She has these documented.  She has had a history of both rib fractures and cervical spinal fractures.        Old records reviewed:   - discharge summary    Past Medical History, Family History, and Social History reviewed.   Social History     Tobacco Use   Smoking Status Never Smoker   Smokeless Tobacco Never Used       Review of systems is as stated in HPI.  The remainder of the 10 system review is otherwise negative.    Objective:     /78   Pulse 81   Ht 5' 4\" (1.626 m)   Wt 123 lb (55.8 kg)   SpO2 98%   BMI 21.11 kg/m   Body mass index is 21.11 kg/m .    Gen: Alert, NAD, appears stated age, normal hygiene   MSK: grossly full range of motion in all joints, no obvious deformity, strong hand shake, sitting in wheelchair  Neuro: CN II-XII grossly intact, no deficits in coordination, no tremor  Psych: no apparent hallucinations or delusions, no pressured speech; alert, oriented x3      Current Outpatient Medications on File Prior to Visit   Medication Sig Dispense Refill     aspirin 81 MG EC tablet Take 81 mg by mouth daily.       " acetaminophen (TYLENOL) 500 MG tablet Take 500 mg by mouth 2 (two) times a day.              calcium carbonate-vit D3-min 600 mg calcium- 400 unit Tab Take 1 tablet by mouth 2 (two) times a day with meals.       carbidopa-levodopa (SINEMET)  mg per tablet Take 1 tablet by mouth 3 (three) times a day.        clopidogrel (PLAVIX) 75 mg tablet Take 1 tablet (75 mg total) by mouth daily for 15 days. 15 tablet 0     magnesium citrate 100 mg Tab Take 200 mg by mouth 2 (two) times a day.       pantoprazole (PROTONIX) 40 MG tablet Take 40 mg by mouth daily. Until 10/25/19       polyvinyl alcohol (ARTIFICIAL TEARS, POLYVIN ALC,) 1.4 % ophthalmic solution Administer 1 drop to both eyes 4 (four) times a day as needed for dry eyes.       pravastatin (PRAVACHOL) 20 MG tablet Take 1 tablet (20 mg total) by mouth at bedtime. 90 tablet 3     [DISCONTINUED] magnesium hydroxide (MILK OF MAG) 400 mg/5 mL Susp suspension Take 30 mL by mouth daily as needed.  0     [DISCONTINUED] polyethylene glycol (MIRALAX) 17 gram packet Take 1 packet (17 g total) by mouth daily.  0     No current facility-administered medications on file prior to visit.

## 2021-06-02 NOTE — PATIENT INSTRUCTIONS - HE
Implantable Loop Recorder (ILR) Post-operative Checklist      The Device Registered Nurse (RN) reviewed the device function.      The Device RN did a wound assessment and wound care teaching.    Please call the Device Nurses with any signs of infection or questions regarding wound healing. Device Nurse Line: [phone], Option #3.      The Device RN demonstrated and displayed the specific remote monitoring system for your device.      The Device RN reviewed the Partnership Agreement Form.    Patient Instructions    You were provided with a device identification (ID) in the hospital. Please carry it with you.       You may travel by any mode of transportation; just show your device ID card.       For any surgery, let your doctor know you have an implantable loop recorder.       Your device is MRI safe             Device Clinic Contact Information  Device Nurses: [phone], Option #3.  Device Remote Specialists: [phone], Option #2. For questions about your Remote Transmission or Transmission Schedule  Device Schedulers: [phone], Option #1

## 2021-06-02 NOTE — TELEPHONE ENCOUNTER
Request for Orders    Who s Requesting: Home Care Physical Therapist    Orders being requested: once more this week and then 3x/week x 4 weeks to work on gait, transfers, balance, there ex    Where to send Orders:  Reply to message, thank you.

## 2021-06-02 NOTE — TELEPHONE ENCOUNTER
Medication Request  Medication name:   pravastatin (PRAVACHOL) 20 MG tablet  0 10/9/2019     Sig - Route: Take 1 tablet (20 mg total) by mouth at bedtime. - Oral    Class: No Print        Pharmacy Name and Location: [facility] mail order pharmacy  Reason for request: this was increased from 10 mg to 20 mg while I was in the hospital. Please send the refill to the mail order pharmacy.   When did you use medication last?:  today  Patient offered appointment:  patient declined  Okay to leave a detailed message: yes

## 2021-06-02 NOTE — PROGRESS NOTES
"DEVICE CLINIC RN POST-OP NOTE    Reason for visit: 1 week post-op loop recorder implant.       Device:[employer] [id number] Reveal LINQ  Procedure date: 10/08/19  Implant Diagnosis: Cryptogenic CVA  Implanting Physician: Dr. Hurd      Assessment  Subjective: No c/o pain, shortness of breath, or nausea.   Vitals: /66 (Patient Site: Right Arm, Patient Position: Sitting, Cuff Size: Adult Regular)   Pulse 68   Temp 97.4  F (36.3  C)   Resp 16   Ht 5' 4\" (1.626 m)   Wt 125 lb (56.7 kg)   BMI 21.46 kg/m  '    Incision/device pocket: Bandage and three stitches removed, area was cleansed with adhesive remover and alcohol wipes. The incision is clean, dry and intact. There are no signs of infection present. The incision is well healed.        Device Findings  Interrogation of device reveals Normal sensing.   See the Paceart Report for a full summary of the device information.        Patient Education  ALEXA Luu was accompanied today by daughter, Anisha.    [facility]'s Partnership Agreement for Device Patients and Post-operative Checklist were presented and reviewed with patient at today's appointment.    Signs and symptoms of infection, poor wound healing, and device function were reviewed.  She was issued a carelink remote monitor and instructed on its set-up and use for remote monitoring by the [employer] representative prior to hospital discharge. These instructions were reviewed with the patient at today s visit.       Plan  -Next check will be a remote check from [facility] on 11/11/19.   -In clinic check scheduled for 12/5/19.          "

## 2021-06-02 NOTE — TELEPHONE ENCOUNTER
Completed start of care homecare assessment 10/19/19.  Requesting approval of orders as follows:      Skilled nursing with focus on safety, post CVA education 2x week x 2 weeks plus 2 PRN SNV for change in condition    PT comprehensive eval and treat for hx frequent falls, Parkinson's and new CVA    OT comprehensive eval and treat for hx frequent falls, Parkinson's and new CVA ADL challenges    MSW consult plus 1 PRN for long term care planning/LUIS/PCA resources.      Also patient is questioning if she can resume her vitamin C and fish oil.   Fish oil not listed on discharge at all, vitamin C was discontinued by hospital, not sure why.    She would also like an order/ok to take Magnesium Sulfate 400mg OTC to help with  Muscle spasms from Parkinson's since hospital discontinued her magnesium citrate (did educate her why).    Patient declined senna-docuate as ordered by hospital as said no need.      Please respond to service order requests ASAP, medication questions can wait for PCP if needed.  Thank you.

## 2021-06-03 ENCOUNTER — RECORDS - HEALTHEAST (OUTPATIENT)
Dept: ADMINISTRATIVE | Facility: CLINIC | Age: 86
End: 2021-06-03

## 2021-06-03 VITALS
RESPIRATION RATE: 16 BRPM | DIASTOLIC BLOOD PRESSURE: 64 MMHG | HEIGHT: 64 IN | BODY MASS INDEX: 21.17 KG/M2 | HEART RATE: 76 BPM | SYSTOLIC BLOOD PRESSURE: 126 MMHG | WEIGHT: 124 LBS

## 2021-06-03 VITALS
RESPIRATION RATE: 14 BRPM | DIASTOLIC BLOOD PRESSURE: 70 MMHG | TEMPERATURE: 97.6 F | WEIGHT: 125.4 LBS | BODY MASS INDEX: 21.19 KG/M2 | HEART RATE: 76 BPM | SYSTOLIC BLOOD PRESSURE: 128 MMHG | OXYGEN SATURATION: 95 %

## 2021-06-03 VITALS
DIASTOLIC BLOOD PRESSURE: 80 MMHG | WEIGHT: 125.4 LBS | HEART RATE: 72 BPM | SYSTOLIC BLOOD PRESSURE: 140 MMHG | BODY MASS INDEX: 21.19 KG/M2

## 2021-06-03 VITALS
DIASTOLIC BLOOD PRESSURE: 78 MMHG | HEART RATE: 81 BPM | WEIGHT: 123 LBS | SYSTOLIC BLOOD PRESSURE: 120 MMHG | BODY MASS INDEX: 21 KG/M2 | OXYGEN SATURATION: 98 % | HEIGHT: 64 IN

## 2021-06-03 VITALS
HEIGHT: 64 IN | RESPIRATION RATE: 16 BRPM | BODY MASS INDEX: 21.34 KG/M2 | DIASTOLIC BLOOD PRESSURE: 66 MMHG | SYSTOLIC BLOOD PRESSURE: 130 MMHG | HEART RATE: 68 BPM | WEIGHT: 125 LBS | TEMPERATURE: 97.4 F

## 2021-06-03 VITALS
WEIGHT: 123 LBS | SYSTOLIC BLOOD PRESSURE: 140 MMHG | RESPIRATION RATE: 16 BRPM | BODY MASS INDEX: 21 KG/M2 | DIASTOLIC BLOOD PRESSURE: 78 MMHG | HEART RATE: 80 BPM | HEIGHT: 64 IN

## 2021-06-03 VITALS — WEIGHT: 128.2 LBS | BODY MASS INDEX: 21.67 KG/M2

## 2021-06-03 VITALS — WEIGHT: 121.3 LBS | HEIGHT: 64 IN | BODY MASS INDEX: 20.71 KG/M2

## 2021-06-03 NOTE — PATIENT INSTRUCTIONS - HE
Below is a list of instructions we discussed today in clinic:   1. The loop recorder will continue to monitor your rhythm for atrial fibrillation.   2. If Afib is identified then we will consider a blood thinner to prevent additional strokes or a Watchman left atrial appendage closure device.  3. Follow up in 3 months or sooner if needed     It was a pleasure to meet with you today in clinic.  Please do not hesitate to call the [facility] clinic with any questions or concerns at [phone].    Sincerely,     Rolando Kothari MD  Non-invasive Cardiology  [facility]

## 2021-06-03 NOTE — TELEPHONE ENCOUNTER
----- Message from Janny Corona RN sent at 11/14/2019  4:10 PM CST -----  Oneal Sharif,   She was pretty adamant about following with with a stress test. Could you reach out to her as well?    Thanks- Janny   ----- Message -----  From: Rolando Kothari MD  Sent: 11/14/2019   3:07 PM CST  To: Janny Corona RN, Chante Ricketts RN    I agree that she should trust her manual pulse over the exercise bike. If Ms. Janelle Luu is concerned about exercising and would be reassured with a stress test then we can order that for her. Would do pharmacologic nuclear stress.    NOEL Kothari    ----- Message -----  From: Janny Corona RN  Sent: 11/13/2019   3:57 PM CST  To: Rolando Kothari MD

## 2021-06-03 NOTE — PROGRESS NOTES
In clinic device check with Device RN.  Please see link for full device report.  Patient was informed of results and next follow up during today's visit.     Reprogramming today due to undersensing of R-waves on ILR.     Patient notes that she feels her heart rate racing and some times irregular when on her exercise bike. She takes her pulse and see heart rates up to 130bpm, but the bike is showing heart rates much hire. We discussed that she should trust her manual pulse and not the bike. She agrees. But she would also like me to ask Dr. JAGRUTI Kothari if she can have a stress test. Routing to him for review. ZAC

## 2021-06-03 NOTE — PROGRESS NOTES
Remote device check.  Please see link for full device report.  Patient was informed of results and next follow up via phone call.     Recommended she come into the clinic for reprogramming, this is not an urgent request but will be necessary.

## 2021-06-03 NOTE — TELEPHONE ENCOUNTER
Patient was contacted and advised that orders were placed for a pharmacological nuclear stress test and that she should expect a call to schedule this soon. sk/RN

## 2021-06-04 VITALS
RESPIRATION RATE: 16 BRPM | BODY MASS INDEX: 20.98 KG/M2 | HEART RATE: 80 BPM | DIASTOLIC BLOOD PRESSURE: 82 MMHG | SYSTOLIC BLOOD PRESSURE: 156 MMHG | WEIGHT: 122.9 LBS | HEIGHT: 64 IN

## 2021-06-04 VITALS — HEIGHT: 64 IN | WEIGHT: 122 LBS | BODY MASS INDEX: 20.83 KG/M2

## 2021-06-04 VITALS
BODY MASS INDEX: 21 KG/M2 | WEIGHT: 123 LBS | HEART RATE: 74 BPM | OXYGEN SATURATION: 97 % | SYSTOLIC BLOOD PRESSURE: 128 MMHG | DIASTOLIC BLOOD PRESSURE: 78 MMHG | HEIGHT: 64 IN

## 2021-06-04 VITALS
WEIGHT: 122 LBS | HEART RATE: 88 BPM | DIASTOLIC BLOOD PRESSURE: 70 MMHG | RESPIRATION RATE: 16 BRPM | HEIGHT: 64 IN | BODY MASS INDEX: 20.83 KG/M2 | SYSTOLIC BLOOD PRESSURE: 126 MMHG

## 2021-06-04 VITALS
HEIGHT: 64 IN | SYSTOLIC BLOOD PRESSURE: 100 MMHG | BODY MASS INDEX: 21.17 KG/M2 | HEART RATE: 80 BPM | WEIGHT: 124 LBS | DIASTOLIC BLOOD PRESSURE: 60 MMHG | RESPIRATION RATE: 16 BRPM

## 2021-06-04 VITALS
HEART RATE: 68 BPM | WEIGHT: 120 LBS | DIASTOLIC BLOOD PRESSURE: 62 MMHG | RESPIRATION RATE: 16 BRPM | HEIGHT: 64 IN | SYSTOLIC BLOOD PRESSURE: 118 MMHG | BODY MASS INDEX: 20.49 KG/M2

## 2021-06-04 VITALS
WEIGHT: 121 LBS | BODY MASS INDEX: 20.66 KG/M2 | HEART RATE: 89 BPM | HEIGHT: 64 IN | TEMPERATURE: 98.1 F | RESPIRATION RATE: 14 BRPM | DIASTOLIC BLOOD PRESSURE: 68 MMHG | SYSTOLIC BLOOD PRESSURE: 120 MMHG

## 2021-06-04 VITALS — HEIGHT: 64 IN | BODY MASS INDEX: 20.5 KG/M2 | WEIGHT: 120.06 LBS

## 2021-06-04 NOTE — TELEPHONE ENCOUNTER
Fall risk concerns are certainly valid. Carmen should come in for an appointment with me or in afib clinic to discuss anticoagulation risks/benefits and also potentially discuss Watchman. Depending on how high of a fall risk Carmen has (depending on her gait instability or number of recent falls) it would be reasonable to start eliquis as recommended or wait until the appointment.   NOEL Kothari

## 2021-06-04 NOTE — TELEPHONE ENCOUNTER
Recommendations relayed to pt.  Pt agreeable to seeing a-fib clinic and would like to wait to start Eliquis until then, as she is also worried about cost of Eliquis.  Call transferred to scheduling for a-fib clinic appt - pt will see OLVIN 1/8/20.  austen

## 2021-06-04 NOTE — TELEPHONE ENCOUNTER
Patient with Loop Recorder, now with new diagnosis of Afib. History of previous CVA but also history of falls.   Dr. Henao, what are your thoughts regarding anticoagulation given this  Noted in Dr. NOEL Kothari's previous clinic consult.  sk/RN        1. 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 She has a history of multiple small infarct areas since 2015 when she was diagnosed with Parkinson's disease  2. Implantable loop recorder - afib suggested on interrogation but is more likely undersensing. Currently afib is NOT diagnosed.  3. Parkinson's disease with multiple falls over the past 3 years (110 in total) with two significant injuries (cervical spinal fracture and rib fractures). No falls since she started to use a wheelchair about a month ago.     Plan:  1. Continue ILR monitoring. She is scheduled to have it reprogrammed next week to try to improve sensing.          Progress Notes     Angel Hurd MD at 12/21/2019 10:05 AM     Status: Signed      I reviewed ILR; some artifact  But looks like episodes of AF  Considering her CVA. I would favor anticoagulation   Age > 80 and weight < 60 Kg; could take Eliquis 2.5 mg two times a day  Will defer decision ot Dr Kothari-primary cardiologist

## 2021-06-04 NOTE — PROGRESS NOTES
Assessment and Plan:   Patient presents to clinic for annual wellness visit.    1. Screening exam for skin cancer  Although patient's hyperpigmented lesion does not appear significantly concerning, patient does state that it has changed.  She is on medication for Parkinson's and it is recommended that she have a yearly skin exam.  Refer to dermatology for evaluation.  - Ambulatory referral to Dermatology    2. Falls frequently with Parkinsons  Patient has a history of frequent falls.  Most recent one was last week, however having the wheelchair has dramatically decreased the number of falls.  She does have a suspected history of atrial fibrillation although it has not yet been visualized, agree with no anticoagulant at this time given her frequent falls.  Finally, patient needs a referral to physical therapy for her frequent falls and Parkinson's.  - Ambulatory referral to PT/OT    3. Osteopenia  - DXA Bone Density Scan; Future    4.  Insomnia:  -Recommended sleep hygiene melatonin    5.  Trigger finger, bilateral  -Recommended bracing, described the type of brace needed and patient will require this online    6.  Elevated blood pressure without a diagnosis of hypertension  Well-controlled today; recently seen in the emergency department.  Patient is particularly concerned given her history of recent stroke.  She is followed by cardiology.      USPSTF Recommendations for age 87:  Patient has been counseled on/screened for:  - intimate partner violence and there are no concerns at this time  - a healthful diet and physical activity for CVD disease prevention  - Diabetes and hyperlipidemia: screening was not performed  - Fall prevention: Patient falls frequently, PT ordered  Colorectal Cancer: Colonoscopy last performed 2011  Immunizations: up to date except for shingles which was recommended  Breast Cancer: Last mammogram in March  Bone Density: Dexa scan ordered today      The patient's current medical problems  were reviewed.    I have had an Advance Directives discussion with the patient.     The following health maintenance schedule was reviewed with the patient and provided in printed form in the after visit summary:   Health Maintenance   Topic Date Due     ZOSTER VACCINES (2 of 3) 06/06/2014     DXA SCAN  08/10/2019     MEDICARE ANNUAL WELLNESS VISIT  10/10/2019     FALL RISK ASSESSMENT  12/05/2020     ADVANCE CARE PLANNING  10/21/2024     TD 18+ HE  02/25/2026     PNEUMOCOCCAL IMMUNIZATION 65+ LOW/MEDIUM RISK  Completed     INFLUENZA VACCINE RULE BASED  Completed        Subjective:   Chief Complaint: ALEXA Luu is an 87 y.o. female here for an Annual Wellness visit.   HPI:    - trigger finger: Patient has bilateral trigger fingers.  On the right she has already undergone surgery but is still suffering symptoms.  Patient does not brace the finger but is not interested in future surgeries.  - blood pressure: Patient was seen in the emergency department for elevated blood pressures recently, they are normal today and patient is currently symptomatic  -Patient needs a new referral for physical therapy for her Parkinson's and frequent falling  -Patient would like a DEXA scan ordered  -Patient is interested in the shingles vaccine but is uncertain if she wants it in clinic today or in the pharmacy  -Patient is struggling to sleep at night when she awakens in the middle the night    Review of Systems:  Leg cramps.  Please see above.  The rest of the review of systems are negative for all systems.    Patient Care Team:  Juhi Lovell MD as PCP - General (Family Medicine)  Jerzy Aparicio MD as Assigned PCP  Juhi Lovell MD as Assigned PCP     Patient Active Problem List   Diagnosis     Hemorrhoids     Venous Insufficiency     Cystocele     Mixed hyperlipidemia     Appendiceal Mucocele     Osteopenia     Left Ventricular Hypertrophy     Vaginal wall prolapse     Parkinson disease (H)     Posterior  capsular opacification     History of cervical fracture     Acute lacunar stroke (H)     Non-ischemic cardiomyopathy (H)     Atrial fibrillation, unspecified type (H)     Cryptogenic stroke (H)     Falls frequently     Past Medical History:   Diagnosis Date     Breast cyst      Finger fracture, left 02/27/2019    ring finger      Multiple rib fractures 02/27/2019     Parkinson disease (H) 1/20/2016      Past Surgical History:   Procedure Laterality Date     BREAST CYST ASPIRATION       EP LOOP RECORDER IMPLANT N/A 10/8/2019    Procedure: EP Loop Recorder Insertion;  Surgeon: Angel Hurd MD;  Location: [facility];  Service: Cardiology     HYSTERECTOMY  1982     OR TOTAL ABDOM HYSTERECTOMY      Description: Hysterectomy;  Proc Date: 01/01/1988;  Comments: couldn't find her ovaries      Family History   Problem Relation Age of Onset     Breast cancer Maternal Aunt       Social History     Socioeconomic History     Marital status:      Spouse name: Not on file     Number of children: Not on file     Years of education: Not on file     Highest education level: Not on file   Occupational History     Not on file   Social Needs     Financial resource strain: Not on file     Food insecurity:     Worry: Not on file     Inability: Not on file     Transportation needs:     Medical: Not on file     Non-medical: Not on file   Tobacco Use     Smoking status: Never Smoker     Smokeless tobacco: Never Used   Substance and Sexual Activity     Alcohol use: No     Frequency: Never     Drug use: Never     Sexual activity: Not on file   Lifestyle     Physical activity:     Days per week: Not on file     Minutes per session: Not on file     Stress: Not on file   Relationships     Social connections:     Talks on phone: Not on file     Gets together: Not on file     Attends Buddhist service: Not on file     Active member of club or organization: Not on file     Attends meetings of clubs or organizations: Not on  "file     Relationship status: Not on file     Intimate partner violence:     Fear of current or ex partner: Not on file     Emotionally abused: Not on file     Physically abused: Not on file     Forced sexual activity: Not on file   Other Topics Concern     Not on file   Social History Narrative     Not on file      Current Outpatient Medications   Medication Sig Dispense Refill     acetaminophen (TYLENOL) 500 MG tablet Take 500 mg by mouth 2 (two) times a day.              aspirin 81 MG EC tablet Take 81 mg by mouth daily.       calcium carbonate-vit D3-min 600 mg calcium- 400 unit Tab Take 1 tablet by mouth daily.              carbidopa-levodopa (SINEMET)  mg per tablet Take 1 tablet by mouth 3 (three) times a day.        polyvinyl alcohol (ARTIFICIAL TEARS, POLYVIN ALC,) 1.4 % ophthalmic solution Administer 1 drop to both eyes 4 (four) times a day as needed for dry eyes.       pravastatin (PRAVACHOL) 20 MG tablet Take 1 tablet (20 mg total) by mouth at bedtime. 90 tablet 3     No current facility-administered medications for this visit.       Objective:   Vital Signs:   Visit Vitals  /78   Pulse 74   Ht 5' 4\" (1.626 m)   Wt 123 lb (55.8 kg)   SpO2 97%   BMI 21.11 kg/m         VisionScreening:  No exam data present     PHYSICAL EXAM  Gen: Alert, NAD, appears stated age, normal hygiene   Eyes: conjunctivae without injection, sclera clear, EOMI  ENT/mouth: nares clear, septum midline, absent rhinorrhea, throat without injection, neck is supple, no thyroid enlargement  CV: RRR, no murmur appreciated, pedal edema absent bilaterally  Resp: CTAB, no wheezes, rales or ronchi  ABD: normoactive, non-tender to palpation, nondistended  MSK: grossly full range of motion in all joints, no obvious deformity; bilateral trigger finger present  Neuro: CN II-XII grossly intact, no deficits in coordination  Psych: no apparent hallucinations or delusions, no pressured speech; alert, oriented x3  SKIN: Irregularly bordered " hyperpigmented subcentimeter raised papule appreciated on patient's left upper arm  Heme/lymph: no pallor, no active bleeding/bruising, very large bilateral submandibular adenopathy appreciated      Assessment Results 12/5/2019   Activities of Daily Living 2-4 - Moderate impairment   Instrumental Activities of Daily Living 2-4 - Moderate impairment   Get Up and Go Score Less than 12 seconds   Mini Cog Total Score 5   Some recent data might be hidden     A Mini-Cog score of 0-2 suggests the possibility of dementia, score of 3-5 suggests no dementia    Identified Health Risks:     The patient reports that she has difficulty with activities of daily living. I have asked that the patient make a follow up appointment as needed where this issue will be further evaluated and addressed.  She is at risk for falling and has been provided with information to reduce the risk of falling at home.  Patient's advanced directive was discussed and I am comfortable with the patient's wishes.

## 2021-06-04 NOTE — TELEPHONE ENCOUNTER
Recommendations relayed to daughter Anisha and pt.  Both verbalized understanding, but ask if this really is a-fib since previous episodes were device under-sensing.  Secondly, they ask if a blood thinner appropriate for pt since she has frequent falls.    Dr Kothari - daughter and pt have a lot of questions, and not sure if Eliquis would be appropriate due to falls.  What do you recommend?  See notes below from Dr Hurd and Dr Henao.    ----- Message -----  From: Emeka Henao MD  Sent: 12/26/2019   8:53 AM CST  To: VIC Farley suggests starting eliquis 2.5 two times a day, would agree with this, follow up in a fib clinic    Angel Hurd MD (Physician)      I reviewed ILR; some artifact  But looks like episodes of AF  Considering her CVA. I would favor anticoagulation   Age > 80 and weight < 60 Kg; could take Eliquis 2.5 mg two times a day  Will defer decision ot Dr Kothari-primary cardiologist

## 2021-06-04 NOTE — TELEPHONE ENCOUNTER
----- Message from Blank Albert RN sent at 12/23/2019  9:21 AM CST -----    ----- Message -----  From: Angel Hurd MD  Sent: 12/21/2019  10:05 AM CST  To: Brigette Cole Rn Support Pool

## 2021-06-04 NOTE — TELEPHONE ENCOUNTER
LM stating recommendations and to call back to [phone] to discuss.  -rah    ----- Message -----  From: Emeka Henao MD  Sent: 12/26/2019   8:53 AM CST  To: VIC Farley suggests starting eliquis 2.5 two times a day, would agree with this, follow up in a fib clinic

## 2021-06-04 NOTE — PROGRESS NOTES
I reviewed ILR; some artifact  But looks like episodes of AF  Considering her CVA. I would favor anticoagulation   Age > 80 and weight < 60 Kg; could take Eliquis 2.5 mg two times a day  Will defer decision ot Dr Kothari-primary cardiologist

## 2021-06-05 VITALS
DIASTOLIC BLOOD PRESSURE: 70 MMHG | RESPIRATION RATE: 16 BRPM | HEIGHT: 64 IN | WEIGHT: 122 LBS | HEART RATE: 72 BPM | SYSTOLIC BLOOD PRESSURE: 146 MMHG | BODY MASS INDEX: 20.83 KG/M2

## 2021-06-05 VITALS
OXYGEN SATURATION: 98 % | SYSTOLIC BLOOD PRESSURE: 154 MMHG | BODY MASS INDEX: 19.83 KG/M2 | HEART RATE: 67 BPM | WEIGHT: 119 LBS | HEIGHT: 65 IN | DIASTOLIC BLOOD PRESSURE: 84 MMHG

## 2021-06-05 VITALS — OXYGEN SATURATION: 98 % | HEART RATE: 72 BPM | DIASTOLIC BLOOD PRESSURE: 80 MMHG | SYSTOLIC BLOOD PRESSURE: 138 MMHG

## 2021-06-05 NOTE — PROGRESS NOTES
LAAO device education was completed:     See documented H&P completed by NP in EPIC    Reviewed pre and post op MARITA/Watchman procedure with pt, pre-procedure letter reviewed and given to pt- see letter in EPIC under documentation, see additional LAAO devic prep note for details on procedure/prep, risks of MARITA/LAAO discussed, answered pt questions and concerns regarding procedure, time spent with pt was >45min and reviewed available pre-op lab results.    Discussed importance of continuing anticoagulation uninterrupted pre and post LAAO, pt denies missing any doses of their anticoagulant, and pt denies issues with hickey placement in the past.  Instructed patient that they will be contacted post MARITA for further discussion regarding the Watchman procedure.       MODIFIED COLLETTE SCALE   CVA previously, No resdiual    Score Description  0 No symptoms at all  1 No significant disability despite symptoms; able to carry out all usual duties and activities  2 Slight disability; unable to carry out all previous activities, but able to look after own affairs without assistance  3 Moderate disability; requiring some help, but able to walk without assistance  4 Moderately severe disability; unable to walk without assistance and unable to attend to own bodily needs without assistance  5 Severe disability; bedridden, incontinent and requiring constant nursing care and attention  6 Dead     Total score (0 - 6): 0      Please see additional anticoagulation note for INR/Coumadin dosing.    Contact information reviewed and pt states understanding.

## 2021-06-05 NOTE — TELEPHONE ENCOUNTER
Who is calling:  Patient   Reason for Call:  Patient stated she would like to discuss with Juhi Lovell MD on being on Eliquis and getting the Watchmen device. Patient stated she's been reading up on the bleeding issues with being Eliquis and the complications with having a Watchmen device. Patient stated cardiology wants to put on the Watchmen device because she at risk for falls.  Date of last appointment with primary care: 12/5/19  Okay to leave a detailed message: Yes  [phone]

## 2021-06-05 NOTE — PROGRESS NOTES
LAAO Patient Decision Making Process Document    LAAO Implant Criteria  Diagnosis: Non-Valvular Atrial Fibrillation  Indication/Per CMS (Centers for Medicare & Medicaid Services): Patients with AFib at increased risk of stroke who have contraindications to long-term anticoagulation    Records and Chart Review  MARITA/CT results: 1/27/20 - pending  Assessment of most recent EF: 45% (10/6/19)  [id number]: 6/HAS-BLED: 3    Patient Decision Discussion  Present for discussion: Patient and Daughter  Discussion was: Face to Face in the clinic on: 1/27/20 with FABIANA Carlson and Columba Paul, Nurse Coordinator.  [employer] Patient Brochure Provided, Pt watched [employer] video.  Education/Review completed: Rational, Reasoning, and Indication for LAAO.  Risks as listed below.  Benefits of LAAO.  Alternative therapies.  Reviewed educational handout on LAAO implant.  Addressed pts questions/concerns  Verification of understanding: Pt Verbalizes understanding, and agrees to proceed with LAAO implant.     [employer] Patient Decision Making Tool    Risks for LAAO (WATCHMAN) Implant Procedure (<2%):           device embolization (device moves from intended location)         air embolism (air bubbles in the blood stream)           heart perforation (hole in the heart)           pericardial effusion (fluid collection in the sack around the heart)           device thrombosis (clot on the device)           atrial septal defect (hole between upper chambers of the heart)           stroke or TIA           death    Risk associated with heart catheterization:    groin bleeding/swelling     AV fistula (hole between the arter and vein)    blood loss    clot in the vein    MARITA risks:    throat pain, esophageal bleeding/trauma        Documentation Date:1/27/2020 11:53 AM  Columba Paul RN

## 2021-06-05 NOTE — TELEPHONE ENCOUNTER
Left voicemail because patient called us regarding her upcoming appointment and left us a voicemail. Returned call and asked her to call us back to so we can answer her questions. ZAC

## 2021-06-05 NOTE — TELEPHONE ENCOUNTER
Review of current egms appear to be undersensing of the R-waves, they appear to march out on available EGMs.    Patient is being worked up for LAAO and was started on Eliquis.     Dr. Fried, can you review EGMs attached to report? I have not seen AF only undersensed R-waves.    Janny Corona, RN BSN  [facility]          Remote Report:        Type: routine loop recorder remote transmission.   Presenting rhythm: ventricular sensing, appears sinus 77 bpm.   Battery/lead status: OK.   Arrhythmias: since 1/9/20; 7 AF episodes, longest duration lasting 4min, EGMs show R-wave undersensing, but also appears to be very irregular, appears AF.   Comments: appears to be normal loop recorder function. Routed to device RN for review. RIO

## 2021-06-05 NOTE — PATIENT INSTRUCTIONS - HE
ALEXA Luu,    It was a pleasure to see you today at the [facility].     My recommendations after this visit include:    Start Eliquis 2.5 mg bid to avoid stoke with afib.   Columba, RN Watchman coordinator will call you tomorrow if you do have questions on the implant.    You can stop the aspirin since you are on Eliquis.     Avoid Ibuprofen when you can. Avoid fish oil for now.     Vitmain D 2000 units is recommended during winter months.     Shanell Love NP-C  [facility]  Electrophysiology

## 2021-06-05 NOTE — TELEPHONE ENCOUNTER
Called patient to discuss anticoagulation, watchman device, and goal planning.     We discussed that her risk of clotting was high without anticoagulation but that her risk of severe morbidity or mortality from falling on anticoagulation were also very high.  We discussed that the benefits provided by the watchman device would likely be worth the risk.  Patient will discuss with family.

## 2021-06-05 NOTE — TELEPHONE ENCOUNTER
Pt was called per Shanell, pt did not receive message thru my chart.   Per Shanell copy of note sent to pt in mail  Pt was asked to call me Monday if she did not receive, pt understands and agrees to plan and has my direct number for call.

## 2021-06-05 NOTE — TELEPHONE ENCOUNTER
Patient called with question about her Eliquis dosing. Reviewed with patient that the correct dose of Eliquis is 2.5 mg twice daily, and that there was an accidental misprint on her Discharge instructions. Patient verbalized understanding of correct dose. Denies any other questions at this time.     Vicky Rene RN

## 2021-06-05 NOTE — TELEPHONE ENCOUNTER
Reviewed ILR records from Dec 2019.  Strips demonstrate cycle length irregularity and are c/w atrial fibrillation even taking into account signal drop-out.  Agree with plans as outlined.

## 2021-06-05 NOTE — TELEPHONE ENCOUNTER
Spoke with pt today regarding LAAO consult  Pt would like to proceed next avail  CT order placed and scheduling notified to reach out to pt to set up future dx appt  Pt would like to go next avail should there be any cancellations  Otherwise she will see Dr Kothari in consult on 2/6/20 for implant on 2/20/20

## 2021-06-05 NOTE — PROGRESS NOTES
In clinic device check with Device RN and Lolita Love NP.  Please see link for full device report.  Patient was informed of results and next follow up during today's visit.

## 2021-06-05 NOTE — TELEPHONE ENCOUNTER
Are you able to call and discuss with patient or would you like her to schedule an office visit?   I am unfamiliar with what a watchman device is?

## 2021-06-06 NOTE — ANESTHESIA POSTPROCEDURE EVALUATION
Patient: ALEXA Luis [facility]  Procedure(s) with comments:  EP THANIA Closure - general anesthesia whole case, teach-Columba, [employer] booked 02/18, non invasive-Dr. Kothari  Anesthesia type: general    Patient location: PACU  Last vitals:   Vitals Value Taken Time   /62 2/20/2020  3:55 PM   Temp 36.8  C (98.3  F) 2/20/2020  3:00 PM   Pulse 70 2/20/2020  3:59 PM   Resp 18 2/20/2020  3:59 PM   SpO2 98 % 2/20/2020  3:59 PM   Vitals shown include unvalidated device data.  Post vital signs: stable  Level of consciousness: awake and responds to simple questions  Post-anesthesia pain: pain controlled  Post-anesthesia nausea and vomiting: no  Pulmonary: unassisted, return to baseline  Cardiovascular: stable and blood pressure at baseline  Hydration: adequate  Anesthetic events: no    QCDR Measures:  ASA# 11 - Christina-op Cardiac Arrest: ASA11B - Patient did NOT experience unanticipated cardiac arrest  ASA# 12 - Christina-op Mortality Rate: ASA12B - Patient did NOT die  ASA# 13 - PACU Re-Intubation Rate: ASA13B - Patient did NOT require a new airway mgmt  ASA# 10 - Composite Anes Safety: ASA10A - No serious adverse event    Additional Notes:

## 2021-06-06 NOTE — ANESTHESIA PREPROCEDURE EVALUATION
Anesthesia Evaluation      Patient summary reviewed     Airway   Mallampati: I  Neck ROM: full   Pulmonary - negative ROS    breath sounds clear to auscultation                         Cardiovascular   Exercise tolerance: > or = 4 METS  (+) dysrhythmias, ,     (-) murmur  Rhythm: regular  Rate: normal,    no murmur   ROS comment: Negative stress test  EF 66%  No significant valve abnormalities     Neuro/Psych    (+) neuromuscular disease,  CVA ,     Comments: Parkinson Disease    Endo/Other - negative ROS      GI/Hepatic/Renal - negative ROS      Other findings:   Hgb 13.1  Plt 294        Dental - normal exam                        Anesthesia Plan  Planned anesthetic: general endotracheal  Parkinson's disease - avoid reglan, haldol, meperidine, and droperidol. Aspiration risk precautions on induction and extubation.   ASA 3   Induction: intravenous   Anesthetic plan and risks discussed with: patient and child/children    Post-op plan: routine recovery

## 2021-06-06 NOTE — PATIENT INSTRUCTIONS - HE
Your anticoagulation medication (Eliquis & Aspirin):    It is important to remain on your anticoagulation medication uninterrupted after your left atrial occlusion device implant to reduce your risk of a stroke or heart attack, DO NOT STOP THIS MEDICATION.    Healing from your left atrial occlusion device implant:    Stay well hydrated, it is important to increase your fluid intake during your recovery period.    Eat foods high in protein to help the healing process.    Gradually increase your activity over the next 7-10 days, back to baseline;  No aggressive exercise for one week.    No lifting more that 10 lb for 7 days after procedure.    You are ok to drive.  Keep your incision sites clean, dry and open to air.    Please call me if any of the following occur:    Shortness of breath, dizziness, or chest pain.    Changes in your groin sites including:  Swelling, hardening, drainage, increase in bruising or an increase in pain.          Call 911 if you experience any symptoms of a stroke:    Difficulty with speech    Problems walking or with your balance    Vision changes or disturbances    Facial drooping or numbness    Muscle weakness or numbness on one side of your body    Your follow up appointments are as follows:    You will be seen by our PA/Nurse Practitioner in 5-6 weeks.    You will have a MARITA in 6-8 weeks to assess your left atrial appendage closure device.    You will be contacted after the MARITA is complete to discuss anticoagulation instructions. Please continue your (Eliquis and ASA) until notified with your post-MARITA results     Please delay any dental procedures until 6 weeks post implant.     You will be seen by your primary cardiologist in 6 months.    Thank you,  Columba Paul RN  [phone]    After hours please contact the on call service at [phone]

## 2021-06-06 NOTE — PROGRESS NOTES
"Pt was seen in clinic for post op LAAO device WATCHMAN placement with Dr. Fried on 2/20/20.  Pt VSS, wt stable, LSC, denies SOB, heart rate regular, heart sounds S1 and S2 present, and palpable radial and pedal pulses.  No peripheral edema noted, no abdominal bloating or discomfort.   Pt denies any neurological changes at this time.  Pt denies generalized or localized pain at this time, is tolerating advancement in activity well, staying well hydrated, and has a good appetite and eating well balanced meals.  Pt is having bowel movements and is voiding without difficulty.      Pt right groin has 1 puncture site, is C/D/I, no drainage, large area of bruising noted around puncture site into abd as well as right groin, diffuse ifeanyi to knee cap, 2 sutures intact, groin is soft w/ small hematoma note 1\"x1\", and pt denies pain at the site.  Pt has strong femoral and pedal pulses present.  Sutures were removed from the right groin site without complication and site was left open to air.      Pt continues anticoagulation: Eliquis and daily 81 mg Aspirin.    Reviewed post op LAAO healing process, f/u apts, physical restrictions, nutritional requirements, when to contact EP RN/EP MD, and contact information was given to the pt for further concerns or questions.  Pt verbalized understanding.       "

## 2021-06-06 NOTE — ANESTHESIA CARE TRANSFER NOTE
Last vitals:   Vitals:    02/20/20 1158   BP: 143/62   Pulse: 74   Resp: 18   Temp:    SpO2: 99%     Patient's level of consciousness is drowsy  Spontaneous respirations: yes  Maintains airway independently: yes  Dentition unchanged: yes  Oropharynx: oropharynx clear of all foreign objects    QCDR Measures:  ASA# 20 - Surgical Safety Checklist: WHO surgical safety checklist completed prior to induction    PQRS# 430 - Adult PONV Prevention: 4558F - Pt received => 2 anti-emetic agents (different classes) preop & intraop  ASA# 8 - Peds PONV Prevention: NA - Not pediatric patient, not GA or 2 or more risk factors NOT present  PQRS# 424 - Christina-op Temp Management: 4559F - At least one body temp DOCUMENTED => 35.5C or 95.9F within required timeframe  PQRS# 426 - PACU Transfer Protocol: - Transfer of care checklist used  ASA# 14 - Acute Post-op Pain: ASA14B - Patient did NOT experience pain >= 7 out of 10

## 2021-06-06 NOTE — PROGRESS NOTES
Left Atrial Appendage Certification     I have personally reviewed the medical records for MOUSTAPHA Luis as it pertains to her candidacy for left atrial appendage occlusion.     The patient has documented nonvalvular atrial fibrillation and is currently on oral anticoagulant therapy (Eliquis).   [id number] - VASc score =  6. The HAS-BLED risk score is 3.   The patients indication to come off anticoagulation: History of stroke, falls, and Parkinson's..     The patient has been provided education regarding atrial fibrillation treatment options including the risks and benefits of warfarin, novel oral anticoagulants, and left atrial appendage occlusion.     Summary:    The patient is a good candidate for proceeding with left atrial appendage screening and implant.

## 2021-06-07 NOTE — TELEPHONE ENCOUNTER
Pt calling regarding extension on her Eliquis until we are able to get her back in for her post implant MARITA.  I explained to patient that we hope to have a protocol in place by today to start moving forward, which would include a pre-covid test prior to the MARITA.  Pt is in agreement. She will  two more weeks worth of the Eliquis in the interim.

## 2021-06-07 NOTE — PROGRESS NOTES
Remote device check.  Please see link for full device report.  Patient was informed of results and next follow up via mail.     Reopened to print Communication Letter to patient.

## 2021-06-07 NOTE — PROGRESS NOTES
Attempted to contact pt, voicemail message was left with contact information and instructing pt to call back.  4/22/2020 2:11 PM  Blank Albert RN

## 2021-06-07 NOTE — TELEPHONE ENCOUNTER
Spoke with pt regarding questions and concerns about current medication regimen and f/u dx MARITA timing. Pt is tolerating current regimen w/o issues and verbalizes understanding of continuing meds and timing delay for  Repeat MARITA.   I explained again that once it's safe to resume testing we will call and set her up.

## 2021-06-07 NOTE — TELEPHONE ENCOUNTER
----- Message from Uriel Polk RN sent at 5/1/2020 10:42 AM CDT -----  Regarding: FW: Call back / MAURI Pt  MAHENDRA Waterman,  Could you please call this patient as she is a Watchman device post-implant questions.  Thank you  Cara SAUCEDA  ----- Message -----  From: Ashley Florence  Sent: 5/1/2020   8:56 AM CDT  To: Uriel Polk RN  Subject: Call back / MAURI Pt                               Caller: Carmen     Primary cardiologist: Dr. NOEL Kothari     Detailed reason for call: Carmen states she needs to speak with RN regarding an esophageal test she thought she should be having due to the Watchman device, and also she has an Rx issue. Please call back.     Best phone number: [phone]    Best time to contact: Today    Ok to leave a detailed message? Y    Device? Watchman

## 2021-06-07 NOTE — TELEPHONE ENCOUNTER
Attempted to reach pt's daughter. Detailed message left for her regarding her mother's upcoming pre-MARITA covid test. I clarified the date of 5/12 at 12:30 curbside. She was instructed to call me back directly with any further questions.

## 2021-06-07 NOTE — PROGRESS NOTES
"Pt states she fell while using her new walker this past week.  She was seen in the ED for evaluation, no bleed following the head trauma. (CT neg for bleed).  Pt states she was feeling fine, other than the \"bump\" on her head.  She will continue her current therapy and when cleared to resume diagnostic testing, pt will be contacted to set up her testing.   "

## 2021-06-07 NOTE — TELEPHONE ENCOUNTER
"Type: Routine ILR remote transmission.  Presenting Rhythm: Appears to be sinus rhythm, rate in the 90s per the ILR ECG.  Battery: Okay  R-wave: Continued intermittent R-wave undersensing most likely due to small R-wave signal. Rare R-wave oversensing.  Arrhythmias: 6 Tachy Episodes detected, new for this patient; 5 occurred on 03/17/20 between 1330 and 1804 hours, longest lasting 15.5 minutes, median rate 146 bpm. Most recently occurred 04/20/20 at 1530 hours lasting 6.5 minutes, median rate 146 bpm. Tachy episodes appear to be supraventricular in origin. No other arrhythmia detections.  Symptom triggers: None  Comments: Normal device function.     Phone call to patient to discern whether she experienced any symptoms associated with her most recent tachy episode on 04/20/20 around 1530 hours.      Received a call back from Carmen.  She denies any correlating symptoms to yesterday's tachy episode and states, \"There was nothing unusual to my day yesterday.  I was with my daughter in the afternoon.\"  She further denies anything out of the ordinary on 03/17/2020 during the afternoon/early evening with those brief tachy episode detections.  Routed to Dr. Hurd for review of tachy detections.  "

## 2021-06-07 NOTE — TELEPHONE ENCOUNTER
"----- Message from Uriel Polk RN sent at 3/27/2020  4:21 PM CDT -----  Regarding: FW: Post Watchman Rx question  MAHENDRA Orourke- post watchman questions. thanks  ----- Message -----  From: Ashley Florecne  Sent: 3/27/2020   4:12 PM CDT  To: Uriel Polk RN  Subject: Post Watchman Rx question                        Caller:  Carmen     Primary cardiologist: Dr. Rolando Kothari    Detailed reason for call: Carmen states she isn't certain what her Rx Eloquis or Aspirin intake and routine should be post watchman? Please call back.     Best phone number: [phone]    Best time to contact: Any    Ok to leave a detailed message? Y    Device? Watchman    Additional Info: Agent reminded Carmen she has appointment on 4/9/2020 with Odalis Beckford and Columba Melgar and she stated \"those are cancelled\". Please advise.      "

## 2021-06-07 NOTE — PROGRESS NOTES
Return call from patient.  Reviewed the below information from Dr. Hurd.  She states understanding, agreeable to starting Metoprolol daily.  Explained rationale and possible side effects.  Reviewed contact information for questions and sent rx to her pharmacy

## 2021-06-07 NOTE — PROGRESS NOTES
Implantable loop recorder shows episodes of regular tachycardia but 150 bpm  Does not look like sinus tachycardia  Could be SVT, could be atrial flutter  Has a history of watchman/left atrial appendage occlusion  Plan  Start metoprolol succinate 50 mg daily

## 2021-06-07 NOTE — PROGRESS NOTES
Attempted to contact pt, voicemail message was left with my contact information instructing pt to call back  4/23/2020 9:10 AM  Blank Albert RN

## 2021-06-07 NOTE — PROGRESS NOTES
Review of Systems - History obtained from the patient  General ROS: negative  Psychological ROS: negative  Ophthalmic ROS: negative  ENT ROS: negative  Allergy and Immunology ROS: negative  Hematological and Lymphatic ROS: negative  Endocrine ROS: negative  Respiratory ROS: negative  Cardiovascular ROS: negative  Gastrointestinal ROS: negative  Genito-Urinary ROS: positive for - nocturia  Musculoskeletal ROS: negative  Neurological ROS: positive for loss of balance   Dermatological ROS: negative

## 2021-06-07 NOTE — PROGRESS NOTES
MODIFIED COLLETTE SCALE   Follow up (post Watchman Implant)  Timepoint: 6 week/45 day    Previous score: 0    Score Description   0 No symptoms at all   1 No significant disability despite symptoms; able to carry out all usual duties and activities   2 Slight disability; unable to carry out all previous activities, but able to look after own affairs without assistance   3 Moderate disability; requiring some help, but able to walk without assistance   4 Moderately severe disability; unable to walk without assistance and unable to attend to own bodily needs without assistance   5 Severe disability; bedridden, incontinent and requiring constant nursing care and attention   6 Dead    Total score (0 - 6):  0  Change in score? None      If yes, notify implanting cardiologist.    Columba Paul RN

## 2021-06-07 NOTE — TELEPHONE ENCOUNTER
Spoke to patient today regarding moving forward with her post LAAC implant f/u MARITA.  I explained pre-covid testing prior. Pt agrees and would like to move forward so she can get off her Eliquis (she renewed it for 2 weeks) and she fell back on 4/07 again and hit her head.   Pt verbalizes understanding and scheduling was notified.

## 2021-06-08 ENCOUNTER — RECORDS - HEALTHEAST (OUTPATIENT)
Dept: ADMINISTRATIVE | Facility: OTHER | Age: 86
End: 2021-06-08

## 2021-06-08 NOTE — TELEPHONE ENCOUNTER
Spoke with pt regarding the Plavix/ASA duration post LAAC implant.  Pt was instructed to continue to take daily Plavix and ASA until her 6 mth post implant f/u appt.  I explained bruising is a common SE of the dual therapy. She was also informed that unless she has significant bleeding or drop in Hgb on the combination we would not stop it.  Pt verbalizes understanding.

## 2021-06-08 NOTE — TELEPHONE ENCOUNTER
PC to patient. Review of MARITA post-watchman procedure. Recommendation to stop Eliquis, and start Plavix 75 mg and Baby aspirin daily. Pt verbalized understanding, and confirms that she is currently taking Plavix and aspirin daily. Minimal, small soft bruises occurring.     Pt wondering for how long that this medication regimen is to be followed. Discussed with the patient will forward to Watchman coordinator to call back to discuss. Encouraged to maintain the medication regimen. Also, monitoring bruising and if any large bruised areas, or a bruise with a large-marble-like hardness under the skin to call to discuss. Pt verbalized understanding.    Columba gallegos you please call the patient to discuss the Watchman protocol and the medications. Also, how long she will be on the medication regimen? Thank you KOMAL,RN

## 2021-06-08 NOTE — PROGRESS NOTES
Assessment/Plan:        1. Fall, subsequent encounter     2. Parkinson disease       She is recovering well from the recent ER visit.  There is just mild yellowish discoloration in the area of hematoma on the top of her head but since she does not have any neurological symptoms of concern, she will just continue following up with me for other medical problems and with her neurologist for Parkinson's disease.    This note has been dictated using voice recognition software. Any grammatical or context distortions are unintentional and inherent to the software.      Return in about 6 months (around 7/5/2017) for Recheck.    There are no Patient Instructions on file for this visit.        Subjective:    ALEXA Luu is a 84 y.o. female  here for    Chief Complaint   Patient presents with     Hospital Visit Follow Up     Patient was seen in the ER on general first 2017.  She has a history of Parkinson disease and was seen in the ER, after she slipped and fell backwards in front of her car.  She is striking the back of her head on the concrete ground.  She didn't lose consciousness.  The CT of the head showed just a hematoma but no intracranial bleeding.  For the last 4 days, she denies any headaches, lightheadedness, dizziness, difficulty swallowing, weakness in arms and legs, vomiting.  Parkinson disease is managed by her neurologist and she finished the physical therapy at [facility].  She does have a history of frequent falls.  She refused management of osteopenia.  She refused future DEXA scans.  Luckily she didn't have any major fractures.    Social History     Social History     Marital status:      Spouse name: N/A     Number of children: N/A     Years of education: N/A     Occupational History     Not on file.     Social History Main Topics     Smoking status: Never Smoker     Smokeless tobacco: Not on file     Alcohol use Not on file     Drug use: Not on file     Sexual activity: Not on  file     Other Topics Concern     Not on file     Social History Narrative       Family History   Problem Relation Age of Onset     Breast cancer Maternal Aunt      Review of Systems:     A 12 point comprehensive review of systems was negative except as noted in HPI.            Objective:    Physical Exam   Visit Vitals     /70     Pulse 68     Wt 136 lb 14.4 oz (62.1 kg)     BMI 22.78 kg/m2       Constitutional: oriented to person, place, and time, appears well-nourished. No distress.   HENT:   Head: Normocephalic.   Mouth/Throat: Oropharynx is clear and moist.   Eyes: Conjunctivae are normal. Pupils are equal, round, and reactive to light.   Neck: Normal range of motion. Neck supple.   Cardiovascular: Normal rate, regular rhythm and normal heart sounds.    Pulmonary/Chest: Effort normal and breath sounds normal.  Abdominal: Soft. Bowel sounds are normal.   Musculoskeletal: Normal range of motion.   Neurological: alert and oriented to person, place, and time.  Psychiatric:  normal mood and affect.    Patient Active Problem List   Diagnosis     Hemorrhoids     Venous Insufficiency     Cystocele     Hyperlipidemia     Appendiceal Mucocele     Osteopenia     Left Ventricular Hypertrophy     Vaginal wall prolapse     Parkinson disease       Current Outpatient Prescriptions on File Prior to Visit   Medication Sig Dispense Refill     aspirin 81 MG EC tablet Take 81 mg by mouth daily.       CALCIUM CARBONATE (CALCIUM 500 ORAL) Take by mouth.       carbidopa-levodopa (SINEMET)  mg per tablet Take 1 tablet by mouth 3 (three) times a day. 1.5 tablets three times a day       cholecalciferol, vitamin D3, (VITAMIN D3) 2,000 unit Tab Take by mouth.       ibuprofen (ADVIL,MOTRIN) 400 MG tablet Take 400 mg by mouth every 6 (six) hours as needed for pain.       OMEGA-3/DHA/EPA/FISH OIL (FISH OIL-OMEGA-3 FATTY ACIDS) 300-1,000 mg capsule Take 2 g by mouth daily.       pravastatin (PRAVACHOL) 10 MG tablet Take 1 tablet  (10 mg total) by mouth daily. 90 tablet 3     [DISCONTINUED] ciprofloxacin HCl (CIPRO) 250 MG tablet   0     No current facility-administered medications on file prior to visit.                Jerzy Aparicio  1/5/2017

## 2021-06-08 NOTE — TELEPHONE ENCOUNTER
----- Message from Vidya Hilton sent at 6/8/2020  3:16 PM CDT -----  General phone call:  HOW LONG SHOULD SHE BE ON PLAVIX AND ASPRIN  PATIENT HAS SOME BRUISING AND WANTS TO KNOW.  PLEASE CALL  Caller: PATIENT  Primary cardiologist: DR NOEL PERERA  Detailed reason for call: SEE ABOVE  New or active symptoms? NO  Best phone number: [phone]  Best time to contact: ANY TIME  Ok to leave a detailed message? YES  Device? NO    Additional Info:

## 2021-06-10 ENCOUNTER — COMMUNICATION - HEALTHEAST (OUTPATIENT)
Dept: CARDIOLOGY | Facility: CLINIC | Age: 86
End: 2021-06-10

## 2021-06-10 NOTE — PROGRESS NOTES
MODIFIED COLLETTE SCALE   Follow up (post Watchman Implant)  Timepoint: 6 mths    Previous score: 0    Score Description   0 No symptoms at all   1 No significant disability despite symptoms; able to carry out all usual duties and activities   2 Slight disability; unable to carry out all previous activities, but able to look after own affairs without assistance   3 Moderate disability; requiring some help, but able to walk without assistance   4 Moderately severe disability; unable to walk without assistance and unable to attend to own bodily needs without assistance   5 Severe disability; bedridden, incontinent and requiring constant nursing care and attention   6 Dead    Total score (0 - 6):  0  Change in score? none  If yes, notify implanting cardiologist.    Columba Paul RN

## 2021-06-10 NOTE — PROGRESS NOTES
Review of systems done with patient over the phone positive for falls. All other review of systems within normal range.

## 2021-06-11 ENCOUNTER — COMMUNICATION - HEALTHEAST (OUTPATIENT)
Dept: FAMILY MEDICINE | Facility: CLINIC | Age: 86
End: 2021-06-11

## 2021-06-12 NOTE — PROGRESS NOTES
Assessment/Plan:     Patient presents to clinic after a minor mechanical fall approximately 10 days ago for which she is suffering only slight residual tenderness to palpation in the chest.  This could be exacerbated by the underlying loop recorder.  Most of today's appointment spent encouraging patient to consider reorganizing her exercise locations such that she is less likely to become tangled to prevent future falls.  Patient does fall frequently and was amenable to this suggestion after prompting.  Do not feel that further work-up is necessary for the minor injuries she sustained.    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  Reassurance provided.  Immunizations were ordered.  Offered referral for hemorrhoids but patient declined.  Offered referral for trigger finger but patient declined.    Problem List Items Addressed This Visit     Falls frequently      Other Visit Diagnoses     Fall, subsequent encounter    -  Primary    Encounter for immunization        Relevant Orders    Influenza,Quad,High Dose,PF 65 YR+ (Completed)          Return to clinic in 1 month for physical.    Subjective:      ALEXA Luu is a 88 y.o. female who presents to clinic for follow-up from fall.    Patient was seen in the emergency department on October 30 for a mechanical injury when she was attempting to exercise and became tangled.  Patient suffered a minor head injury which was hemostatic upon arrival and her CT was normal.  Patient also impacted her chest and has residual tenderness to palpation.  Otherwise, patient is doing well.    Patient had a list of other concerns including:  -A small lesion on the roof of her mouth she would like examined  -Ongoing hemorrhoids for which she would desire no treatment  -Trigger fingers located on her left hand; patient is had surgery for this before and does not desire surgery again.  She is using a brace.  -Patient wonders about the utility of her  loop recorder  -Would like her immunizations today.    Past Medical History, Family History, and Social History reviewed.    Current Outpatient Medications on File Prior to Visit   Medication Sig Dispense Refill     acetaminophen (TYLENOL) 500 MG tablet Take 500 mg by mouth 3 (three) times a day.        ascorbic acid, vitamin C, (VITAMIN C) 1000 MG tablet Take 1,000 mg by mouth daily.       aspirin 81 MG EC tablet Take 81 mg by mouth daily.       calcium carbonate-vit D3-min 600 mg calcium- 400 unit Tab Take 1 tablet by mouth daily.              carbidopa-levodopa (SINEMET)  mg per tablet Take 1 tablet by mouth 3 (three) times a day. 1 1/2 am, 1 1/2 lunch and 1 pm       metoprolol succinate (TOPROL-XL) 50 MG 24 hr tablet Take 1 tablet (50 mg total) by mouth daily. 90 tablet 2     polyvinyl alcohol (ARTIFICIAL TEARS, POLYVIN ALC,) 1.4 % ophthalmic solution Administer 1 drop to both eyes 4 (four) times a day as needed for dry eyes.       pravastatin (PRAVACHOL) 20 MG tablet Take 1 tablet (20 mg total) by mouth at bedtime. 90 tablet 3     No current facility-administered medications on file prior to visit.        Review of systems is as stated in HPI.  The remainder of the 10 system review is otherwise negative.    Objective:     /80   Pulse 72   SpO2 98%  There is no height or weight on file to calculate BMI.    Gen: Alert, NAD, appears stated age, normal hygiene   Eyes: conjunctivae without injection, sclera clear, EOMI  ENT/mouth: Flesh-colored papule appreciated midline on patient's hard palate less than 5 mm in diameter  MSK: Arrives in wheelchair today; tenderness to palpation along the sternum but not on the bilateral rib cage  Psych: no apparent hallucinations or delusions, no pressured speech; alert, oriented x3  SKIN: dry and without lesions  Heme/lymph: no pallor, no active bleeding/bruising, no adenopathy appreciated    This note has been dictated using voice recognition software. Any  grammatical or context distortions are unintentional and inherent to the the software.     Juhi Lovell MD

## 2021-06-14 NOTE — TELEPHONE ENCOUNTER
She is calling to state that Medicare is not covering her Dexascan. I guess they cover every 2 years, unless the doctor can link a diagnosis or enough medical justification for it to be done more frequently than it might be covered.     To Dr Lovell for review.     Im not sure if this is useful but I pended an order and linked osteoporosis to it for you to review.     Thank you

## 2021-06-14 NOTE — PROGRESS NOTES
Carmen comes in for evaluation of swelling behind the right knee.  She states that this started about 2 days ago after working out at the [facility].  She was having some very mild knee pain at that time, but did not affect her day-to-day activities.  This morning she was bathing and noticed some swelling behind the knee as well as some increased varicosities that she had been having.  She was worried and came in today.  Again her pain is not significant.  She denies any fevers and is otherwise feeling well.    Objective: Vital signs are as per the EMR.  In general the patient is alert pleasant and in no acute distress.  She appears healthy.    Musculoskeletal: She has an obvious area of swelling in the posterior aspect of the right knee consistent with a Baker's cyst.  This is not tender to palpation.    Assessment and plan: Right-sided Baker's cyst.  I explained that this would likely get better on its own.  If she still has present within the next 1-2 weeks we should see her back for further evaluation.  Otherwise follow-up as needed.

## 2021-06-14 NOTE — PROGRESS NOTES
Assessment and Plan:   Patient presents to clinic for annual wellness visit:    His blood pressure is elevated today.  She will let me know if her blood pressures are elevated in this coming week.  If they refer back to normal, no changes in medication today.    1. Left Ventricular Hypertrophy  2. Non-ischemic cardiomyopathy (H)  3. Paroxysmal atrial fibrillation (H)  Well-controlled with pacer and metoprolol.  Blood pressure slightly elevated today, this is atypical for the patient.  She is willing to monitor at home to ensure that it reverts back to a more normal value.  She will reach out to me if this is not an aberration.  Followed by cardiology.  Continue metoprolol at this time.    4. Cryptogenic stroke (H)  No significant residual deficits.  Continue statin.    5. Parkinson disease (H)  Managed by outside provider, patient doing well.    6. Falls frequently  Patient is already in physical therapy, reorder today specifically to evaluate falls.  Previously we had recommended that the location in which patient does her exercises be adjusted to limit falls.  - Ambulatory referral to PT/OT  - Vitamin D, Total (25-Hydroxy)  -DEXA scan    7. Mixed hyperlipidemia  Discussed that a lipid panel would not  but pt was curious and it was ordered per preference.   - Lipid Oliver FASTING    USPSTF Recommendations for age 88:  Patient has been counseled on/screened for:  - intimate partner violence and there are no concerns at this time  - a healthful diet and physical activity for CVD prevention  - Diabetes screening was performed  Colorectal Cancer: demographically patient could stop screening but she has a family history of colon cancer; discussed pt declines referral at this time  Immunizations: up to date   Mammogram: already performed this month  Bone Density: Dexa scan ordered today  Fall risk assessment: could not complete get up and go testing today; pt in wheelchair with frequent fall  history    The patient's current medical problems were reviewed.    I have had an Advance Directives discussion with the patient.     The following health maintenance schedule was reviewed with the patient and provided in printed form in the after visit summary:   Health Maintenance   Topic Date Due     MEDICARE ANNUAL WELLNESS VISIT  01/14/2022     FALL RISK ASSESSMENT  01/14/2022     ADVANCE CARE PLANNING  01/14/2026     TD 18+ HE  02/25/2026     Pneumococcal Vaccine: 65+ Years  Completed     INFLUENZA VACCINE RULE BASED  Completed     ZOSTER VACCINES  Completed     Pneumococcal Vaccine: Pediatrics (0 to 5 Years) and At-Risk Patients (6 to 64 Years)  Aged Out     HEPATITIS B VACCINES  Aged Out        Subjective:   Chief Complaint: ALXEA Luu is an 88 y.o. female here for an Annual Wellness visit.   HPI:    - hearing concerns: Patient wonders if there is any occlusion in her ears.  Sometimes she can hear people when they are playing games and speaking softly.  Patient does not desire an audiology referral at this time.    Review of Systems:  Hearing changes, weakness, fatigue, constipation, arhtritis, joint pain.   Please see above.  The rest of the review of systems are negative for all systems.    Patient Care Team:  Juhi Lovell MD as PCP - General (Family Medicine)  Juhi Lovell MD as Assigned PCP  Odalis Beckford PA-C as Assigned Heart and Vascular Provider     Patient Active Problem List   Diagnosis     Hemorrhoids     Venous Insufficiency     Cystocele     Mixed hyperlipidemia     Appendiceal Mucocele     Osteopenia     Left Ventricular Hypertrophy     Vaginal wall prolapse     Parkinson disease (H)     Posterior capsular opacification     History of cervical fracture     Acute lacunar stroke (H)     Non-ischemic cardiomyopathy (H)     Paroxysmal atrial fibrillation (H)     Cryptogenic stroke (H)     Falls frequently     Status post placement of implantable loop recorder      left atrial appendage closure (WATCHMAN)     Past Medical History:   Diagnosis Date     Atrial fibrillation (H)     per H&P     Breast cyst      CVA (cerebral vascular accident) (H)     per H&P     Finger fracture, left 02/27/2019    ring finger      Multiple rib fractures 02/27/2019     Parkinson disease (H) 1/20/2016      Past Surgical History:   Procedure Laterality Date     BREAST CYST ASPIRATION       EP THANIA CLOSURE N/A 2/20/2020    Procedure: EP THANIA Closure;  Surgeon: Javier Smith MD;  Location: [facility] Lab;  Service: Cardiology     EP LOOP RECORDER IMPLANT N/A 10/8/2019    Procedure: EP Loop Recorder Insertion;  Surgeon: Angel Hurd MD;  Location: [facility] Cath Lab;  Service: Cardiology     HYSTERECTOMY  1982     IA TOTAL ABDOM HYSTERECTOMY      Description: Hysterectomy;  Proc Date: 01/01/1988;  Comments: couldn't find her ovaries      Family History   Problem Relation Age of Onset     Breast cancer Maternal Aunt       Social History     Socioeconomic History     Marital status:      Spouse name: Not on file     Number of children: Not on file     Years of education: Not on file     Highest education level: Not on file   Occupational History     Not on file   Social Needs     Financial resource strain: Not on file     Food insecurity     Worry: Not on file     Inability: Not on file     Transportation needs     Medical: Not on file     Non-medical: Not on file   Tobacco Use     Smoking status: Never Smoker     Smokeless tobacco: Never Used   Substance and Sexual Activity     Alcohol use: No     Frequency: Never     Drug use: Never     Sexual activity: Not on file   Lifestyle     Physical activity     Days per week: Not on file     Minutes per session: Not on file     Stress: Not on file   Relationships     Social connections     Talks on phone: Not on file     Gets together: Not on file     Attends Faith service: Not on file     Active member of club or organization:  "Not on file     Attends meetings of clubs or organizations: Not on file     Relationship status: Not on file     Intimate partner violence     Fear of current or ex partner: Not on file     Emotionally abused: Not on file     Physically abused: Not on file     Forced sexual activity: Not on file   Other Topics Concern     Not on file   Social History Narrative     Not on file      Current Outpatient Medications   Medication Sig Dispense Refill     acetaminophen (TYLENOL) 500 MG tablet Take 500 mg by mouth 3 (three) times a day.        ascorbic acid, vitamin C, (VITAMIN C) 1000 MG tablet Take 1,000 mg by mouth daily.       aspirin 81 MG EC tablet Take 81 mg by mouth daily.       calcium carbonate-vit D3-min 600 mg calcium- 400 unit Tab Take 1 tablet by mouth daily.              carbidopa-levodopa (SINEMET)  mg per tablet Take 1 tablet by mouth 3 (three) times a day. 1 1/2 am, 1 1/2 lunch and 1 pm       metoprolol succinate (TOPROL-XL) 50 MG 24 hr tablet Take 1 tablet (50 mg total) by mouth daily. 90 tablet 1     polyvinyl alcohol (ARTIFICIAL TEARS, POLYVIN ALC,) 1.4 % ophthalmic solution Administer 1 drop to both eyes 4 (four) times a day as needed for dry eyes.       pravastatin (PRAVACHOL) 20 MG tablet Take 1 tablet (20 mg total) by mouth at bedtime. 90 tablet 3     No current facility-administered medications for this visit.       Objective:   Vital Signs:   Visit Vitals  /84   Pulse 67   Ht 5' 5\" (1.651 m)   Wt 119 lb (54 kg)   SpO2 98%   BMI 19.80 kg/m           VisionScreening:  No exam data present     PHYSICAL EXAM  Gen: Alert, NAD, appears stated age, normal hygiene   Eyes: conjunctivae without injection, sclera clear, EOMI  ENT/mouth: nares clear, septum midline, absent rhinorrhea, throat without injection, neck is supple, no thyroid enlargement, bilateral TMs unremarkable  CV: RRR, no murmur appreciated, pedal edema absent bilaterally  Resp: CTAB, no wheezes, rales or ronchi  ABD: normoactive, " non-tender to palpation, nondistended  MSK: grossly full range of motion in all joints, no obvious deformity  Neuro: CN II-XII grossly intact, no deficits in coordination  Psych: no apparent hallucinations or delusions, no pressured speech; alert, oriented x3  SKIN: dry and without lesions  Heme/lymph: no pallor, no active bleeding/bruising, no adenopathy appreciated      Assessment Results 1/14/2021   Activities of Daily Living 1 - Full function   Instrumental Activities of Daily Living 5-6 - Severe functional impairment   Get Up and Go Score (No Data)   Mini Cog Total Score 5   Some recent data might be hidden     A Mini-Cog score of 0-2 suggests the possibility of dementia, score of 3-5 suggests no dementia    Identified Health Risks:     The patient reports that she has difficulty with activities of daily living. I have asked that the patient make a follow up appointment in 6 months where this issue will be further evaluated and addressed.  The patient reports that she has difficulty with instrumental activities of daily living.  She was provided with a list of local organizations that provide support services and advised to make a follow up appointment in 6 months to address this further.   She is at risk for falling and has been provided with information to reduce the risk of falling at home.  Patient's advanced directive was discussed and I am comfortable with the patient's wishes.

## 2021-06-15 NOTE — PROGRESS NOTES
Clinic Note    Assessment:     Assessment and Plan:    1. Cyst on ear  She did have a small palpable sebaceous cyst on her left ear lobe proximal to her earring hole.  I performed a I&D on this today, small amount of purulent material was drained.  2 x 2 bandages were applied and taped to the patient's here.  I sent her with a week's worth of Oral Bactrim to use as well. She was told to avoid placing earrings in her ears for 2 weeks.     2. Groin pain  I think this is a simple muscle/tendon strain.  Patient was told to withhold from aggravating activities for a couple of weeks.  She is not having any pain today and is asymptomatic.     Patient Instructions   Continue to clean the ear with warm water. Do not use hydrogen peroxide.     You can use some some Vaseline to put on the ear twice daily.     Take the antibiotic twice daily for seven days.     Come back to see me if your ear gets red, more painful, or swollen.              Subjective:      ALEXA Luu is a 85 y.o. female who comes to the clinic for a cyst on her ear lobe, and groin pain.    Cyst: first noticed this about one month ago. Says that there was originally a small growth in the area but over the past month it has been getting worse when she tries to pass her ear rings through the ear. Has tries using some hydrogen peroxide without much success. Has been using ear rings up until yesterday. Has     Groin pain: Has some bilateral groin pain with squat exercises at her [employer] program. Occasionally has pain when rising from chair. No pain today. No problems with urination or voiding. No fevers. No issues with stools. Does not feel like a hernia.     The following portions of the patient's history were reviewed and updated as appropriate: Allergies, Medications, Problem List.     Review of Systems:    Review is negative except for what is mentioned above.     Social Hx:    History   Smoking Status     Never Smoker   Smokeless Tobacco     Not on  file         Objective:     Vitals:    01/17/18 0858   BP: 110/60   Pulse: 70   Weight: 131 lb 6.4 oz (59.6 kg)       Exam:    General: No apparent distress. Calm. Alert and Oriented X3. Pt behavior is appropriate.  Ears: There is a small sebaceous cyst present on patient's left ear proximal to her earring hole.  Slightly erythematous.  Soft.  Musculoskeletal: Normal range of motion with all extremities.  Patient asymptomatic in exam room today.  Neurologic: Interactive, alert, no focal findings, CNs intact.         Patient Active Problem List   Diagnosis     Hemorrhoids     Venous Insufficiency     Cystocele     Hyperlipidemia     Appendiceal Mucocele     Osteopenia     Left Ventricular Hypertrophy     Vaginal wall prolapse     Parkinson disease     Current Outpatient Prescriptions   Medication Sig Dispense Refill     aspirin 81 MG EC tablet Take 81 mg by mouth daily.       CALCIUM CARBONATE (CALCIUM 500 ORAL) Take by mouth.       carbidopa-levodopa (SINEMET)  mg per tablet Take 1 tablet by mouth 3 (three) times a day. 1.5 tablets three times a day       cholecalciferol, vitamin D3, (VITAMIN D3) 2,000 unit Tab Take by mouth.       ibuprofen (ADVIL,MOTRIN) 400 MG tablet Take 400 mg by mouth every 6 (six) hours as needed for pain.       OMEGA-3/DHA/EPA/FISH OIL (FISH OIL-OMEGA-3 FATTY ACIDS) 300-1,000 mg capsule Take 2 g by mouth daily.       pravastatin (PRAVACHOL) 10 MG tablet Take 1 tablet (10 mg total) by mouth daily. 90 tablet 3     No current facility-administered medications for this visit.        Total time spent with patient was 20 minutes with >50% of time spent in face-to-face counseling regarding the above plan       Danilo Peralta CNP (Rob)    1/17/2018

## 2021-06-16 NOTE — TELEPHONE ENCOUNTER
VIDAL Goldman from [facility] calling. [phone].    Carmen has apt next week for wheelchair assessment, Karen was told that Dionne Parisi was going to come out today to review and order,Kaern  had patient cancel today so she can see her next week to do the assessment to  make sure all the patients need's are met, If you have any questions Karen said to give her a call.

## 2021-06-16 NOTE — TELEPHONE ENCOUNTER
Telephone Encounter by Stella West LPN at 1/16/2020  2:20 PM     Author: Stella West LPN Service: -- Author Type: Licensed Nurse    Filed: 1/16/2020  2:22 PM Encounter Date: 1/16/2020 Status: Signed    : Stella West LPN (Licensed Nurse)       Shanell Love CNP Mueller, Carlynn M, LPN             Could you check with patient if she got the my chart message I sent her in response to an email she sent Angeli about the watchman. Appreciated.    Previous Messages      ----- Message -----   From: Shanell Love CNP   Sent: 1/14/2020   To: ALEXA Luis [facility]   Subject: Unread Message Notification

## 2021-06-16 NOTE — TELEPHONE ENCOUNTER
"Telephone Encounter by Janny Corona RN at 12/16/2019 10:17 AM     Author: Janny Corona RN Service: -- Author Type: Registered Nurse    Filed: 12/16/2019 10:28 AM Encounter Date: 12/16/2019 Status: Signed    : Janny Corona RN (Registered Nurse)       Patient had a IRL implanted by Dr. Hurd on 10/8/19 for cryptogenic CVA. Patient has frequent undersensing of R-waves leading to inappropriate \"AF\" events.     Sensitivity was reprogrammed to most sensitive (0.025mV) on 11/13/19, she continues to have events. Blanking and delay delay were reprogrammed to the shortest (130ms) on 12/11/19. She then she had two additional events of undersensing of R-waves. Patient is distressed that her device is not working well.     Reviewed with patient and routing to Dr. Hurd so he is aware. [facility]      Remote Report:  Type: Loop recorder remote transmission for 2 episodes labeled (AF).   Presenting Rhythm: Ventricular sensing, rate ~80s.   Battery status: OK  Arrhythmias: Since 12/11/19, no Symptom, Tachy, Pause, Micheal, or AT detected. 2 episodes labeled AF detected, these appear ST with r-wave undersensing, rare noise detected.   Comments: Routed to Device RN for review. [facility] ADD: Agree with above, two additional events show undersensing of R-waves therefore inaccurate AF detection. This is known issue and all reprogramming available has been done. Routing to Dr. Hurd so he is aware. JML             "

## 2021-06-16 NOTE — PROGRESS NOTES
ALEXA Luu is a 88 y.o. female who is being evaluated via a billable video visit.      How would you like to obtain your AVS? MyChart.  If dropped from the video visit, the video invitation should be resent by: Text to cell phone: [phone]   Will anyone else be joining your video visit? No      Video Start Time: 1:38 PM    Assessment & Plan     Patient presents to clinic today to expect appropriateness of a motorized wheelchair.  Mobility exam performed via video visit, as it is slightly complex, but there is no doubt in my mind that patient needs this device in order to safely move about her environment.    Problem List Items Addressed This Visit        Edg Concept Cardiac Problems    Cryptogenic stroke (H)       Other    Parkinson disease (H)    History of cervical fracture - Primary    Acute lacunar stroke (H)    Falls frequently            Medical conditions impacting patient's mobility needs:  - Patient has Parkinson's disease, frequent falls (more than 100 falls in 3 years), recent fractures from falls, and stroke in 2018. Residual deficits from stroke include paresthesias in left arm.     Personal Mobility device is necessary to get to the bathroom to toilet, to the kitchen to prepare meals, and to get to the bedroom to groom/dress. She would also like to go outside but feels like a prisoner in her house.     Patient cannot relay on a cane or walk 2/2 frequency of severe falls.   Patient cannot use a manual wheelchair because of the lack of strength in her shoulders/hands.  Patient cannot use a scooter because of postural instability.     I believe patient can safely operate the mobility device both mentally and physically. She appears to be willing and motivated to use it in her home.        No follow-ups on file.    Juhi Lovell MD  [facility]   ALEXA Luu is 88 y.o. and presents today for the following health issues   HPI: mobility  "examination    Patient would like a motorized wheelchair. Patient states that her health has changed after her recent stroke necessitating the new device to help with movement. She has had several falls. She has known Parkinson's. She had several rib fractures, torn rotator cuff and a broken finger after the fall. She had an ED visit on 10/30/20 and 2/23/21 for falls.      Patient's most recent height and weight on file is 5'4\" and 118 lbs. Most recent pulse ox was 95%. She has no swelling or ulcerations. She can shift her weight but she needs assistance standing and falls frequently.    Pain is present bilaterally in hips and shoulders. Left greater than right:  LUE: 4/10  RUE: 5/10  LLE: 9/10  RLE:8/10        Objective       Vitals:  No vitals were obtained today due to virtual visit.    Physical Exam  Gen: NAD  Psych: Normal affect, no hallucinations or delusions, not tearful  Cardiopulmonary: no obvious respiratory distress, good coloration, respiratory rate appears normal, no audible wheezing, excellent capillary refill  MSK: no obvious deformities; msk exam performed by patient's daughter while I watched and it appears both upper extremities might qualify as 4/5 in strength but for a very limited time as patient fatigues almost immediately; lower extremities are also equal and at least 4/5 in strength but patient is unsteady and fatigues easily with chronic pain impacting strength duration  RUE: severe limitation, unable to abduct to 90 degrees  LUE: no range of motion decrease  LLE: able to extend and flex without difficulty  RLE: fully flexion and extension  Gait: very unsteady in the trunk, difficulty arising from a seated position, uses a walker, she shuffles less with a walker, she is a cautious walker  Neuro: CN II-XII intact (as well as could be tested via video), intact sensation bilaterally in upper and lower extremities          Video-Visit Details    Type of service:  Video Visit    Video End Time " (time video stopped): 2:06 PM  Originating Location (pt. Location): Home    Distant Location (provider location):  [facility]     Platform used for Video Visit: Milagros

## 2021-06-16 NOTE — TELEPHONE ENCOUNTER
"FYI--  Pt's daughter Anisha called to report that she thinks pt might have a UTI as she is having sx exactly like pt was with previous UTI. She explains they are not urinary related and more like shaking with feeling \"out of it\"  Anisha requesting to come in for lab only appt to leave urine for UA.   Pt advised that maybe it would be best for Carmen to be seen at urgent care and daughter agrees and will bring her in to be seen.  "

## 2021-06-17 NOTE — PATIENT INSTRUCTIONS - HE
Patient Instructions by Juhi Lovell MD at 12/5/2019 10:30 AM     Author: Juhi Lovell MD Service: -- Author Type: Physician    Filed: 12/5/2019 10:42 AM Encounter Date: 12/5/2019 Status: Addendum    : Juhi Lovell MD (Physician)    Related Notes: Original Note by Juhi Lovell MD (Physician) filed at 12/5/2019 10:25 AM         Patient Education   Activities of Daily Living  Your Health Risk Assessment indicates you have difficulties with activities of daily living such as eating, getting dressed, grooming, bathing, walking, or using the toilet. Please make a follow up appointment for us to address this issue in more detail.     Patient Education   Preventing Falls in the Home  As you get older, falls are more likely. Thats because your reaction time slows. Your muscles and joints may also get stiffer, making them less flexible. Illness, medications, and vision changes can also affect your balance. A fall could leave you unable to live on your own. To make your home safer, follow these tips:    Floors    Put nonskid pads under area rugs.    Remove throw rugs.    Replace worn floor coverings.    Tack carpets firmly to each step on carpeted stairs. Put nonskid strips on the edges of uncarpeted stairs.    Keep floors and stairs free of clutter and cords.    Arrange furniture so there are clear pathways.    Clean up any spills right away.    Bathrooms    Install grab bars in the tub or shower.    Apply nonskid strips or put a nonskid rubber mat in the tub or shower.    Sit on a bath chair to bathe.    Use bathmats with nonskid backing.    Lighting    Keep a flashlight in each room.    Put a nightlight along the pathway between the bedroom and the bathroom.    6345-5211 [employer]. [address] [place] [address], [place] [address]. All rights reserved. This information is not intended as a substitute for professional medical care. Always follow your healthcare  professional's instructions.           Advance Directive  Patients advance directive was discussed and I am comfortable with the patients wishes.  Patient Education   Personalized Prevention Plan  You are due for the preventive services outlined below.  Your care team is available to assist you in scheduling these services.  If you have already completed any of these items, please share that information with your care team to update in your medical record.  Health Maintenance   Topic Date Due   ? ZOSTER VACCINES (2 of 3) 06/06/2014   ? DXA SCAN  08/10/2019   ? MEDICARE ANNUAL WELLNESS VISIT  10/10/2019   ? FALL RISK ASSESSMENT  09/01/2020   ? ADVANCE CARE PLANNING  10/21/2024   ? TD 18+ HE  02/25/2026   ? PNEUMOCOCCAL IMMUNIZATION 65+ LOW/MEDIUM RISK  Completed   ? INFLUENZA VACCINE RULE BASED  Completed           Melatonin: try it in the middle of the night; anything between 0.5 mg and 20 mg dosing    Sleep is an essential part of feeling well and feeling happy, but almost everyone experiences problems sleeping at some time of their life. Sleep disruption is common, especially during times when you may feel emotionally overwhelmed. Anxiety, relentless replaying of the day's events, and heightened emotions may significantly interfere with your sleep. Lack of sleep robs you of needed rest, making management of your illness more difficult. Bringing sleep patterns under control is important - you need your rest. However, it often takes some time to get problematic sleep under control and rarely can this be done overnight!     The most common cause of insomnia is a change in your daily routine or stress. For example travelling, exams, work stress, change in work hours, disruption caused by eating, exercise, or leisure, relationship conflicts, etc. may all cause problems. Paying attention to good sleep hygiene is the most important thing you can do to maintain good sleep.     Do:   - Go to bed at the same time each day    - Get up from bed at the same time each day   - Get regular exercise each day, preferably in the morning (There is good evidence regular exercise improves restful sleep)   - Get regular exposure to outdoor or bright lights, especially in the late afternoon   - Keep the temperature in your bedroom comfortable Keep the bedroom dark enough to facilitate sleep   - Keep the bedroom quiet - try thicker curtains, sleeping at the back of your house or even ear plugs to avoid being woken by noise   - Use your bed only for sleep and sex   - Use a relaxation exercise just before going to sleep or a relaxation tape   - Try muscle relaxation to help distress and unwind, e.g. a warm bath or a massage   - Keep your feet and hands warm. Wear warm socks and/or mittens or gloves to bed     Don't:   - Exercise just before going to bed   - Engage in stimulating activity just before bed, such as playing computer games, watching an exciting program on television or movie, or having an important discussion with a loved one - Have caffeine in the evening (coffee, teas, chocolate, etc.)   - Have alcohol in the evening or use alcohol to sleep (it may make you drowsy but it doesnt improve sleep and you will wake to go to the toilet)   - Smoke before going to bed - nicotine is a stimulant and will keep you awake Read or watch television in bed   - Go to bed too hungry or too full   - Take another person's sleeping pills   - Never take daytime naps or doze off in front of the TV in the evening - keep yourself awake with something stimulating or your risk resetting your body clock   - Command yourself to go to sleep. This only makes your mind and body more alert   - If you lie in bed awake for more than 20-30 minutes, get up, go to a different room (or different part of the bedroom), participate in a quiet activity (e.g. non-excitable reading or television), then return to bed when you feel sleepy. Do this as many times during the night as  needed   - If needed, you may try over the counter sleep remedies, such as Nytol, but these are no substitute for addressing the problems that cause poor sleep. Sleeping tablets do not address these issues either and are not suitable for most people.

## 2021-06-17 NOTE — TELEPHONE ENCOUNTER
----- Message from Eli Richgreyson sent at 4/30/2021 10:05 AM CDT -----  General phone call:    Caller: Patient  Primary cardiologist: Vannessa  Detailed reason for call: Patient having dental extraction on Monday May 3 and is wondering if it will be OK for her to take aspirin and antibiotics prior to dental extraction with her heart history.  New or active symptoms? no  Best phone number: [phone]  Best time to contact: any  Ok to leave a detailed message? yes  Device? no    Additional Info:

## 2021-06-17 NOTE — PATIENT INSTRUCTIONS - HE
Patient Instructions by Atul Verma MD at 8/22/2019 10:20 AM     Author: Atul Verma MD Service: -- Author Type: Physician    Filed: 8/22/2019 12:57 PM Encounter Date: 8/22/2019 Status: Addendum    : Atul Verma MD (Physician)    Related Notes: Original Note by Atul Verma MD (Physician) filed at 8/22/2019 12:51 PM       - CT shows no signs of bleeding or injury to the head / brain.   - Monitor for signs / symptoms of concussion and be seen if they develop. Referral to the Concussion Program would be considered in that situation.   Patient Education     Head Injury (Adult)    You have a head injury. It does not appear serious at this time. But symptoms of a more serious problem, such as a mild brain injury (concussion) or bruising or bleeding in the brain, may appear later. For this reason, you or someone caring for you will need to watch for the symptoms listed below. Once youre home, also be sure to follow any care instructions youre given.  Home care  Watch for the following symptoms  Seek emergency medical care if you have any of these symptoms over the next hours to days:     Headache    Nausea or vomiting    Dizziness    Sensitivity to light or noise    Unusual sleepiness or grogginess    Trouble falling asleep    Personality changes    Vision changes    Memory loss    Confusion    Trouble walking or clumsiness    Loss of consciousness (even for a short time)    Inability to be awakened    Stiff neck    Weakness or numbness in any part of the body    Seizures  General care    If you were prescribed medicines for pain, use them as directed. Note: Dont take other medicines for pain without talking to your provider first.    To help reduce swelling and pain, apply a cold source to the injured area for up to 20 minutes at a time. Do this as often as directed. Use a cold pack or bag of ice wrapped in a thin towel. Never apply a cold source directly to the skin.    If you have  cuts or scrapes as a result of your head injury, care for them as directed.    For the next 24 hours (or longer, if instructed):  ? Dont drink alcohol or use sedatives or other medicines that make you sleepy.  ? Dont drive or operate machinery.  ? Dont do anything strenuous, such as heavy lifting or straining.  ? Limit tasks that require concentration. This includes reading, using a smartphone or computer, watching TV, and playing video games.  ? Dont return to sports or other activities that could result in another head injury.  Follow-up care  Follow up with your healthcare provider, or as directed. If imaging tests were done, they will be reviewed by a doctor. You will be told the results and any new findings that may affect your care.  When to seek medical advice  Call your healthcare provider right away if any of these occur:    Pain doesnt get better or worsens    New or increased swelling or bruising    Fever of 100.4 F (38 C) or higher, or as directed by your provider    Increased redness, warmth, drainage, or bleeding from the injured area    Fluid drainage or bleeding from the nose or ears    Any depression or bony abnormality in the injured area  Date Last Reviewed: 9/26/2015 2000-2017 [employer]. [address] [place] [address], [place] [address]. All rights reserved. This information is not intended as a substitute for professional medical care. Always follow your healthcare professional's instructions.

## 2021-06-17 NOTE — PATIENT INSTRUCTIONS - HE
Patient Instructions by Leandra Gary MD at 9/1/2019  2:40 PM     Author: Leandra Gary MD Service: -- Author Type: Physician    Filed: 9/1/2019  3:33 PM Encounter Date: 9/1/2019 Status: Addendum    : Leandra Gary MD (Physician)    Related Notes: Original Note by Leandra Gary MD (Physician) filed at 9/1/2019  3:32 PM       1. Be mindful of fall prevention  2. Keep your follow up appointment with primary care provider  3. If you have any questions, call the clinic number - the number is answered 24/7  Patient Education     Parkinsons Disease: Home Safety    As Parkinsons disease progresses, home safety will be an increasing concern. This page includes tips that can help make your daily life safer and easier. Your doctor may also recommend a therapist to advise you on the best ways to set up your home.  Setting up living spaces  Get help from family and friends to make these changes:    Keep walkways open and free of clutter. Move phone and electrical cords out of the way. Remove throw rugs to prevent trips.    Get a cordless or speaker phone. Program numbers for family and emergency services.    Make sure rooms are well lit. Install nightlights along walkways.    If freezing at doorways is a problem, consider placing lines of tape on the floor between rooms. Stepping over the tape may prompt you to keep moving.  Setting up the bathroom  Use the tips below to make changes to your bathroom. Medicare or insurance may help cover the costs of some of these items, depending on your particular needs and plan.    Have grab bars put in the shower or tub for support getting in and out.    Install a hand-held showerhead for easier bathing.    Raise the height of the toilet with a commode chair or elevated toilet seat.    Use a rubber-backed bath mat to help prevent slips and falls.    Buy a shower seat to make bathing safer and less tiring.     Preventing falls  Parkinsons symptoms make falls more  "likely. Safety improvements around the house can help. But if you begin having frequent falls, talk to your doctor. He or she may recommend physical therapy. This helps you learn the safest ways to move around. If needed, your therapist may also teach you how to use a cane or walker. Consider buying a life line so that if you do fall while you are alone, you'll have a way to get help.   Date Last Reviewed: 11/9/2015 2000-2017 [employer]. [address] [place] [address]. All rights reserved. This information is not intended as a substitute for professional medical care. Always follow your healthcare professional's instructions.       Patient Education     Concussion    A concussion can be caused by a direct blow to the head, neck, face, or somewhere else on the body with the force being transmitted to the head. This may cause you to lose consciousness - be \"knocked out\" - but not always. Depending on the severity of the blow, it will take from a few hours up to a few days to get better. Sometimes symptoms may last a few months or longer. This is called post-concussion syndrome.  At first, you may have a headache, nausea, vomiting, or dizziness. You may also have problems concentrating or remembering things. This is normal.  Symptoms should get better as the hours and days go by. Symptoms that get worse could be a sign of a more serious injury. This might be a bruise or bleeding in the brain. Thats why its important to watch for the warning signs listed below.  Home care  If your injury is mild and there are no serious signs or symptoms, your healthcare provider may recommend that you be monitored at home. If there is evidence that the injury is more serious, you will be monitored in the hospital. Follow these tips to help care for yourself at home:    After a concussion, your healthcare provider may recommend that a family member or friend monitor you for 12 to 24 hours. They may be told " to wake you every few hours during sleep to check for the signs below.    If your face or scalp swells, apply an ice pack for 20 minutes every 1 to 2 hours. Do this until the swelling starts to go down. You can make an ice pack by putting ice cubes in a plastic bag and wrapping the bag in a towel.    You may use acetaminophen to control pain, unless another pain medicine was prescribed. Do not use aspirin or ibuprofen after a head injury. If you have chronic liver or kidney disease, talk with your doctor before using these medicines. Also talk with your doctor if you ever had a stomach ulcer or gastrointestinal bleeding.    For the next 24 hours:  ? Dont drink alcohol or take sedatives or medicines that make you sleepy.  ? Dont drive or operate machinery.  ? Avoid doing anything strenuous. Dont lift or strain.    Dont return to sports or any activity that could cause you to hit your head until all symptoms are gone and you have been cleared by your doctor. A second head injury before fully recovering from the first one can lead to serious brain injury.    Avoid doing activities that require a lot of concentration or a lot of attention. This will allow your brain to rest and heal quicker.  Follow-up care  Follow up with your doctor in 1 week, or as directed.  Note: A radiologist will review any X-rays or CT scans that were taken. You will be told of any new findings that may affect your care.  When to seek medical advice  Call your healthcare provider right away if any of these occur:    Repeated vomiting    Headache or dizziness that is severe or gets worse    Loss of consciousness    Unusual drowsiness, or unable to wake up as usual    Weakness or decreased ability to walk or move any limb    Confusion, agitation, or change in behavior or speech, or memory loss    Blurred vision    Convulsion (seizure)    Swelling on the scalp or face that gets worse    Changes in pupil size (the black part of the eye)    Redness,  warmth, or pus from the swollen area    Fluid draining from or bleeding from the nose or ears     Date Last Reviewed: 8/14/2015 2000-2017 [employer]. [address] [place] [address], [place] [address]. All rights reserved. This information is not intended as a substitute for professional medical care. Always follow your healthcare professional's instructions.

## 2021-06-17 NOTE — TELEPHONE ENCOUNTER
Attempted to contact ptBRENDAN for her to call back to discuss.    Pt had loop implant in 2019 and watchman on 2/20/2020.      No indication to start abx for tooth extraction. OK to continue with ASA from cardiology standpoint.    Will wait for pt to call back.    Blanca

## 2021-06-17 NOTE — PATIENT INSTRUCTIONS - HE
Patient Instructions by Odalis Beckford PA-C at 1/27/2020 10:30 AM     Author: Odalis Beckford PA-C Service: -- Author Type: Physician Assistant    Filed: 1/27/2020 11:16 AM Encounter Date: 1/27/2020 Status: Signed    : Odalis Beckford PA-C (Physician Assistant)       ALEXA Luis [facility],    It was a pleasure to see you today at the [facility].     We will review your CT and then be in contact with more details moving forward with implant      If you have questions or concerns, please call using the numbers below:      After Hours/Scheduling  [phone]    Otherwise you can dial the nurse directly at:    [facility]   [phone]

## 2021-06-17 NOTE — PROGRESS NOTES
"    Assessment & Plan     Dysuria  She is not having any classic UTI symptoms per se.  However, she feels a bit \"off\" and says that the last time she felt like this she was diagnosed with a UTI in the ER.  She wants a urinalysis completed to rule out infection.  She was unable to leave us a urine sample during our visit today.  We sent her home with supplies to collect a sample and bring back to lab.  - Urinalysis-UC if Indicated      Review of external notes as documented in note  10 minutes spent on the date of the encounter doing chart review, history and exam, documentation and further activities per the note       No follow-ups on file.    Danilo Peralta, CNP  [facility] Carmenlester Luu is 88 y.o. and presents today for the following health issues   HPI     Patient has a history of Parkinson's and frequent falls.  She felt a bit unstable a few days ago and had a near fall event.  She thinks that her symptoms mirror a similar episode from last year in which she was seen in the ED and diagnosed with UTI after a nonmechanical fall.    She is not having any fevers, nausea, vomiting, flank pain, hematuria, etc.    She is otherwise feeling well and has no acute complaints today    Review of Systems  Negative      Objective    /72   Pulse 82   Temp 97.8  F (36.6  C)   There is no height or weight on file to calculate BMI.  Physical Exam  Patient is accompanied by her daughter.  She appears well today              "

## 2021-06-17 NOTE — PATIENT INSTRUCTIONS - HE
Patient Instructions by Atul Verma MD at 4/18/2019  2:40 PM     Author: Atul Verma MD Service: -- Author Type: Physician    Filed: 4/18/2019  9:53 PM Encounter Date: 4/18/2019 Status: Addendum    : Atul Verma MD (Physician)    Related Notes: Original Note by Atul Verma MD (Physician) filed at 4/18/2019  9:52 PM       - Your chest CT shows a new fracture in your left 9th rib as well as old healing fractures in your left 9th, 10th, and 11th ribs. CT also shows collapse of the lung tissue in the lower left lung.   - Be sure to take deep breaths frequently to help prevent pneumonia.   - Take oxycodone sparingly only as needed for severe rib pain.       Patient Education     Rib Fracture    You broke one or more ribs. This is called a rib fracture. Rib fractures do not require a cast like other bones. They will heal by themselves in about 4 to 6 weeks. The first 3 to 4 weeks will be the most painful because deep breathing, coughing, or changing position from sitting to lying down, may cause the broken ends to move slightly.  Home care    Rest. You should not be doing any heavy lifting or strenuous exertion until the pain goes away.    It hurts to breathe when you have a broken rib. This puts you at risk of getting pneumonia from poor airflow through your lungs. To prevent this:  ? Take several very deep breaths once an hour while you're awake. Exhale through pursed lips as if you are blowing up a balloon. If possible, actually blow up a balloon or a rubber glove. This exercise builds up pressure inside the lung and prevents collapse of the small air sacs of the lung. This exercise may cause some pain at the site of injury, which is normal.  ? You may have gotten a breathing exercise device called an incentive spirometer. Use it at least 4 times a day, or as directed.    Apply an ice pack over the injured area for 15 to 20 minutes every 1 to 2 hours. You should do this for the  first 24 to 48 hours. You can make an ice pack by filling a plastic bag that seals at the top with ice cubes and then wrapping it with a thin towel. Continue with ice packs as needed for the relief of pain and swelling.    You may use over-the-counter pain medicine to control pain, unless another pain medicine was prescribed. If you have chronic liver or kidney disease or ever had a stomach ulcer or GI bleeding, talk with your healthcare provider before using these medicines.    If your pain is not controlled, contact your healthcare provider. Sometimes a stronger pain medicine may be needed. A nerve block can be done in case of severe pain. It will numb the nerve between the ribs.  Follow-up care  Follow up with your healthcare provider, or as advised. Rarely, a broken rib will cause complications within the first few days that may not be evident during your initial exam. This can include collapsed lung, bleeding around the lung or into the abdomen, or pneumonia. Therefore, watch for the signs below.  If X-rays were taken, you will be told of any new findings that may affect your care.  Call 911  Call 911 if you have:    Dizziness, weakness or fainting    Shortness of breath with or without chest discomfort    New or worsening abdominal pain    Discomfort in other areas of your upper body such as your shoulders, jaw, neck, or arms  When to seek medical advice  Call your healthcare provider right away if any of these occur:    Increasing chest pain with breathing    Fever of 100.4 F (38 C) or higher, or as directed by your healthcare provider    Congested cough  Date Last Reviewed: 12/3/2015    9631-8204 [employer]. [address] [place] [address], [place] [address]. All rights reserved. This information is not intended as a substitute for professional medical care. Always follow your healthcare professional's instructions.

## 2021-06-17 NOTE — TELEPHONE ENCOUNTER
Call returned from pt daughter who stated we can leave detailed message during call back.  She stated pt is currently taking ABX that was not prescribed by [facility].  Left message for daughter stating that from cardiac standpoint we would not hold ASA prior to tooth extraction.  We also would not prescribe new ABX for tooth extraction.  I advised to discuss current ABX and reasoning for ABX with dentist who will be doing tooth extraction to make sure it doesn't interfere with procedure.    EP RN number left if they have any further questions or concerns.    Blanca

## 2021-06-18 NOTE — PATIENT INSTRUCTIONS - HE
Patient Instructions by Odalis Beckford PA-C at 4/9/2020 12:50 PM     Author: Odalis Beckford PA-C Service: -- Author Type: Physician Assistant    Filed: 4/9/2020  1:28 PM Encounter Date: 4/9/2020 Status: Signed    : Odalis Beckford PA-C (Physician Assistant)       ALEXA Carmen Luis [facility],    It was a pleasure to see you today at the [facility].     My recommendations after this visit include:    - for now continue Eliquis and aspirin since we are not doing any elective cardioversions.      - when we are finally scheduling TEEs, our scheduling team will reach out to you.  At that time you will need a history and physical either with your PCP or me.     - once we confirm that the MARITA is ok, then we'll be in touch with you in regards to instructions on medication changes      If you have questions or concerns, please call using the numbers below:    After Hours/Scheduling  [phone]    Otherwise you can dial the nurse directly at:    VIC Azar   [phone]

## 2021-06-18 NOTE — PATIENT INSTRUCTIONS - HE
Patient Instructions by Odalis Beckford PA-C at 5/8/2020  2:10 PM     Author: Odalis Beckford PA-C Service: -- Author Type: Physician Assistant    Filed: 5/8/2020  3:38 PM Encounter Date: 5/8/2020 Status: Signed    : Odalis Beckford PA-C (Physician Assistant)       ALEXA Luis [facility],    It was a pleasure to see you today at the [facility].     - report for your MARITA on May 15 at the instructed time.  - once the echo is read, you will get a call from our office instructing you to stop your Eliquis  - once you stop the Eliquis, you will continue your baby aspirin (81 mg) daily and start Plavix 75 mg daily.  The plavix Rx will be sent into your pharmacy    - if you have questions, please call:  Shira Azar coordinator RN @ [phone]    You should followup with me in August for your 6 month visit - the scheduling team will call you closer to that date to schedule        After hours/scheduling line  [phone]

## 2021-06-18 NOTE — PATIENT INSTRUCTIONS - HE
Patient Instructions by Juhi Lovell MD at 1/14/2021 10:30 AM     Author: Juhi Lovell MD Service: -- Author Type: Physician    Filed: 1/14/2021 10:21 AM Encounter Date: 1/14/2021 Status: Signed    : Juhi Lovell MD (Physician)         Patient Education   Activities of Daily Living  Your Health Risk Assessment indicates you have difficulties with activities of daily living such as eating, getting dressed, grooming, bathing, walking, or using the toilet. Please make a follow up appointment for us to address this issue in more detail.     Patient Education   Instrumental Activities of Daily Living  Your Health Risk Assessment indicates you have difficulties with instrumental activities of daily living which include laundry, housekeeping, banking, shopping, using the telephone, food preparation, transportation, or taking your own medications. Please make a follow up appointment for us to address this issue in more detail.    [employer] has resources available on the following website: [email or web address]     Also, here is a local agency that provides help with meals and other assistance:   [facility] Line: 428.789.1413     Patient Education   Preventing Falls in the Home  As you get older, falls are more likely. Thats because your reaction time slows. Your muscles and joints may also get stiffer, making them less flexible. Illness, medications, and vision changes can also affect your balance. A fall could leave you unable to live on your own. To make your home safer, follow these tips:    Floors    Put nonskid pads under area rugs.    Remove throw rugs.    Replace worn floor coverings.    Tack carpets firmly to each step on carpeted stairs. Put nonskid strips on the edges of uncarpeted stairs.    Keep floors and stairs free of clutter and cords.    Arrange furniture so there are clear pathways.    Clean up any spills right away.    Bathrooms    Install grab  bars in the tub or shower.    Apply nonskid strips or put a nonskid rubber mat in the tub or shower.    Sit on a bath chair to bathe.    Use bathmats with nonskid backing.    Lighting    Keep a flashlight in each room.    Put a nightlight along the pathway between the bedroom and the bathroom.    7328-2948 [employer]. [address] [place] [address]. All rights reserved. This information is not intended as a substitute for professional medical care. Always follow your healthcare professional's instructions.           Advance Directive  Patients advance directive was discussed and I am comfortable with the patients wishes.  Patient Education   Personalized Prevention Plan  You are due for the preventive services outlined below.  Your care team is available to assist you in scheduling these services.  If you have already completed any of these items, please share that information with your care team to update in your medical record.  Health Maintenance   Topic Date Due   ? FALL RISK ASSESSMENT  11/10/2021   ? MEDICARE ANNUAL WELLNESS VISIT  01/14/2022   ? ADVANCE CARE PLANNING  12/05/2024   ? TD 18+ HE  02/25/2026   ? Pneumococcal Vaccine: 65+ Years  Completed   ? INFLUENZA VACCINE RULE BASED  Completed   ? ZOSTER VACCINES  Completed   ? Pneumococcal Vaccine: Pediatrics (0 to 5 Years) and At-Risk Patients (6 to 64 Years)  Aged Out   ? HEPATITIS B VACCINES  Aged Out

## 2021-06-18 NOTE — PATIENT INSTRUCTIONS - HE
Patient Instructions by Rolando Kothari MD at 2/6/2020 10:10 AM     Author: Rolando Kothari MD Service: -- Author Type: Physician    Filed: 2/6/2020 10:36 AM Encounter Date: 2/6/2020 Status: Addendum    : Rolando Kothari MD (Physician)    Related Notes: Original Note by Rolando Kothari MD (Physician) filed at 2/6/2020 10:35 AM       Below is a list of instructions we discussed today in clinic:   1. Continue your medications as prescribed  2. Your watchman left atrial appendage implant date is scheduled for 2/20.  3. Follow up with me in 6 months or sooner if needed.     It was a pleasure to meet with you today in clinic.  Please do not hesitate to call the [facility] clinic with any questions or concerns at [phone].    Sincerely,

## 2021-06-19 NOTE — LETTER
Letter by Janny Corona RN at      Author: Janny Corona RN Service: -- Author Type: --    Filed:  Encounter Date: 11/26/2019 Status: Signed         ALEXA Luis [facility]  [address] [address]      November 26, 2019      Dear Ms. Janelle Luu,    RE: Remote Results    We are writing to you regarding your recent Remote Loop Recorder check from home. Your transmission was received successfully. Battery status is satisfactory at this time.     Your results are being reviewed.  You will be contacted if further information is necessary.     Your next device appointment will be a clinic visit on December 11, 2019 at 10:20AM at our  [place] location, [address], [address].    To schedule or reschedule, please call [phone] and press 1.    NOTE: If you would like to do an extra transmission, please call [phone] and press 3 to speak to a nurse BEFORE transmitting. This ensures that the Device Clinic staff is aware of the reason you are sending a transmission, and can follow-up with you after it has been reviewed.    We will be checking your implanted device from home (remotely) every three months unless otherwise instructed. We will need to see you in the clinic at least once a year. You may need to be seen in the clinic sooner depending on the results of your check.    Please be aware:    The follow-up schedule is like a Physician prescription.    Your remote monitor is paired to your specific implanted device.      Sincerely,    [facility]

## 2021-06-19 NOTE — PROGRESS NOTES
"  Assessment/Plan:     Presents to clinic with symptoms of an unchanging cyst located on the anterior portion in the midline of the left knee that recently became discolored after compression from crossing her legs and after long day when standing on her feet.  I suspect that patient mobilized the small cyst laterally just enough to compress a blood vessel and cause a small extravasation of blood causing a bruise.  This would explain the resolution of symptoms the next day, the very mild tenderness to palpation, and the absence of symptoms prior to this point.  It is unclear if there is a more reasonable alternative diagnosis, the patient is comfortable with this current plan and will monitor for worsening symptoms or recurrence of symptoms.    Patient also had impacted cerumen located in the right ear which was disimpacted with lavage without difficulty.    Problem List Items Addressed This Visit     None      Visit Diagnoses     Cyst of left knee joint    -  Primary    Impacted cerumen of left ear        Relevant Orders    Ear cerumen removal          Return to clinic PRN.    Total time spent with patient was 15 minutes with greater than 50% spent in face-to-face counseling regarding the above plan.    Subjective:      ALEXA Luu is a 86 y.o. female who presents to clinic for left knee discoloration.    Patient endorses the presence of a \"cyst\" for 1 year.  It has not changed.  The first time she noticed an issue with it was 2 days ago.  She crossed her legs and applied pressure and by the end of the day noticed significant discoloration surrounding the nodule on the knee.  It is located on the left knee, she has never experienced discoloration like this before, and it does not seem to interfere with mobility of the knee.  The discoloration was more significant in a picture that patient brought, than is seen clinically on exam today.  Patient does feel like it improved throughout the day.  Patient notes " discomfort is a 3-4 out of 10.  She states it almost itches underneath.  There is some pain with pressure but overall patient is unconcerned.    Patient also endorses decreased hearing in the right ear.      Past Medical History, Family History, and Social History reviewed.     Current Outpatient Prescriptions on File Prior to Visit   Medication Sig Dispense Refill     aspirin 81 MG EC tablet Take 81 mg by mouth daily.       CALCIUM CARBONATE (CALCIUM 500 ORAL) Take by mouth.       carbidopa-levodopa (SINEMET)  mg per tablet Take 1 tablet by mouth 3 (three) times a day.        cholecalciferol, vitamin D3, (VITAMIN D3) 2,000 unit Tab Take by mouth.       ibuprofen (ADVIL,MOTRIN) 400 MG tablet Take 400 mg by mouth every 6 (six) hours as needed for pain.       OMEGA-3/DHA/EPA/FISH OIL (FISH OIL-OMEGA-3 FATTY ACIDS) 300-1,000 mg capsule Take 2 g by mouth daily.       pravastatin (PRAVACHOL) 10 MG tablet Take 1 tablet (10 mg total) by mouth daily. 90 tablet 3     No current facility-administered medications on file prior to visit.        Review of systems is as stated in HPI.  The remainder of the 10 system review is otherwise negative.    Objective:     /78 (Patient Site: Right Arm, Patient Position: Sitting, Cuff Size: Adult Small)  Pulse 77  Temp 98.1  F (36.7  C) (Oral)   Resp 14  Wt 127 lb 9.6 oz (57.9 kg)  SpO2 100%  BMI 21.23 kg/m2 Body mass index is 21.23 kg/(m^2).    Gen: Alert, NAD, appears stated age, normal hygiene   Eyes: conjunctivae without injection, sclera clear, EOMI  ENT/mouth: Left tympanic membrane unremarkable, right tympanic membrane obscured by cerumen  MSK: grossly full range of motion in all joints, no obvious deformity; pacifically, full range of motion of the knee  SKIN: 1 cm firm nodule present on the anterior portion of patient's knee midline that is nontender to palpation and appears fluid-filled; area of surrounding violaceous nonblanching discoloration that appears  bruised like, patient did present a picture which shows a more erythematous and larger diameter area surrounding the nodule    This note has been dictated using voice recognition software. Any grammatical or context distortions are unintentional and inherent to the the software.     Juhi Lovell MD

## 2021-06-19 NOTE — PROGRESS NOTES
ASSESSMENT:   1. Encounter for staple removal         PLAN:  86-year-old female presents for evaluation once stable that was placed 1 week ago to the right parietal scalp.  Sustained a head injury after falling, notes was secondary to her Parkinson's.  Wound is well-healed, no evidence of wound infection.  Staples removed, patient tolerated well.  Will return with signs and symptoms of infection.    I discussed red flag symptoms, return precautions to clinic/ER and follow up care with patient/parent.  Expected clinical course, symptomatic cares advised. Questions answered. Patient/parent amenable with plan.    Patient Instructions:  Patient Instructions   Return with signs or symptoms of infection such as redness around the wound, drainage, or fevers.      SUBJECTIVE:   ALEXA Luu is a 86 y.o. female who presents today for staple removal.  Had one staple placed to the right parietal scalp 1 week ago following a fall, had placed in urgent care.  No fevers, no drainage, no other complaints.      ROS:  Comprehensive 12 pt ROS completed, positives noted in HPI, otherwise negative.      Past Medical History:  Patient Active Problem List   Diagnosis     Hemorrhoids     Venous Insufficiency     Cystocele     Hyperlipidemia     Appendiceal Mucocele     Osteopenia     Left Ventricular Hypertrophy     Vaginal wall prolapse     Parkinson disease (H)       Surgical History:  Past Surgical History:   Procedure Laterality Date     BREAST CYST ASPIRATION       HYSTERECTOMY  1982     TX TOTAL ABDOM HYSTERECTOMY      Description: Hysterectomy;  Proc Date: 01/01/1988;  Comments: couldn't find her ovaries           Family History:  Family History   Problem Relation Age of Onset     Breast cancer Maternal Aunt        Reviewed; Non-contributory    History   Smoking Status     Never Smoker   Smokeless Tobacco     Never Used       Current Medications:  Current Outpatient Prescriptions on File Prior to Visit   Medication Sig  Dispense Refill     aspirin 81 MG EC tablet Take 81 mg by mouth daily.       CALCIUM CARBONATE (CALCIUM 500 ORAL) Take by mouth.       carbidopa-levodopa (SINEMET)  mg per tablet Take 1 tablet by mouth 3 (three) times a day.        cholecalciferol, vitamin D3, (VITAMIN D3) 2,000 unit Tab Take by mouth.       ibuprofen (ADVIL,MOTRIN) 400 MG tablet Take 400 mg by mouth every 6 (six) hours as needed for pain.       OMEGA-3/DHA/EPA/FISH OIL (FISH OIL-OMEGA-3 FATTY ACIDS) 300-1,000 mg capsule Take 2 g by mouth daily.       pravastatin (PRAVACHOL) 10 MG tablet Take 1 tablet (10 mg total) by mouth daily. 90 tablet 3     No current facility-administered medications on file prior to visit.        Allergies:   Allergies   Allergen Reactions     Acetaminophen-Codeine      Codeine      Morphine Other (See Comments)     Made me feel really wierd     Penicillins        OBJECTIVE:   Vitals:    07/02/18 1222   BP: 136/82   Patient Site: Right Arm   Patient Position: Sitting   Cuff Size: Adult Regular   Pulse: 90   Resp: 16   Temp: 97.6  F (36.4  C)   TempSrc: Oral   SpO2: 96%   Weight: 127 lb 9.6 oz (57.9 kg)     Physical exam reveals a pleasant 86 y.o. female.   Appears healthy, alert and cooperative. Non-toxic appearance.  Skin: pink, warm, dry, one staple in place to the right parietal scalp.  There is no surrounding erythema, no drainage, no evidence of infection.     RADIOLOGY    none  LABORATORY STUDIES    none      Leslie Arceo, ZACH

## 2021-06-20 NOTE — LETTER
Letter by Columba Paul RN at      Author: Columba Paul RN Service: -- Author Type: --    Filed:  Encounter Date: 1/27/2020 Status: (Other)         Dear ALEXA Luis [facility],    IMPORTANT INFORMATION REGARDING YOUR      Left Atrial Appendage Occlusion Device Implant   Thursday February 20th with Dr. Fried    Preparing for your Left Atrial Appendage Occlusion Device Implant (LAAO): Arrival Time Pending      You will be contacted after the implanting physician has a chance to review your CT, as to whether or not you are a candidate for the LAAO implant and to confirm your implant arrival date/time.     Have nothing to eat or drink after midnight the night prior to your procedure.     Please DO NOT take any medications the morning of your LAAO, with the exception of your Eliquis     If you use a CPAP, please bring this with you to the hospital.     This procedure requires you to spend the night in the hospital overnight for observation. The hospital staff have asked that you arrange for your family/health  to be present at the hospital by 9:00 AM the following day to review your discharge instructions and transport you home from the hospital. Their goal is to have you discharged from the hospital by around 10:00 AM if possible.    It is important to remain on your anticoagulation medication Eliquis uninterrupted before and after your procedure.    Please have your teeth cleaned prior to procedure or you will need to wait until 6 weeks post implant. No pre-treatment will be required.     Postoperative Information Left Atrial Appendage Occlusion Device Implant (LAAO) :       Please plan on taking 5-7 days after the procedure for recuperation.     We ask that you do not drive until you are seen in the clinic for your post op follow up. This is to aid in the healing of your groin sites.     Detailed information regarding discharge instructions will be given to you when you leave the  hospital.      Parking information:       Please arrive at [facility], located at: [address] [place] [id number] .     Parking is available at the [address] [place] [address] entrance,  parking opens at 5:00 am.    If you park in the ramp located on [address] [facility] [address], take the elevator to level one. Enter the doors to the atrium/lobby area and check in at the main reception desk.     You will be assisted to [facility] on the third floor.      Please ask at the main reception desk in the lobby or on the third floor for parking validation.     Contact Information :       / Dr. Celis/ Dr. Smith -Nurse Columba Paul. Please call with any questions or concerns regarding the procedure at : [phone]    Scheduling questions or concerns:  No Hannah : [phone]

## 2021-06-20 NOTE — LETTER
Letter by Sylvia Cassidy RN at      Author: Sylvia Cassidy RN Service: -- Author Type: --    Filed:  Encounter Date: 4/21/2020 Status: (Other)         [facility]  [address] [address]      May 11, 2020      Dear Ms. Janelle Paintercata,    RE: Remote Results    We are writing to you regarding your recent Remote Loop Recorder check from home on April 21, 2020. Your transmission was received successfully. Battery status is satisfactory at this time.     Your results are being reviewed.  You were contacted be Dr. Hurd's nurse following the transmission on April 21, 2020.  You will be contacted if further information is necessary.     Your next device appointment will be a remote check on 07/09/2020.  You can choose the time of day you wish to transmit.    To schedule or reschedule, please call [phone] and press 1.    NOTE: If you would like to do an extra transmission, please call [phone] and press 3 to speak to a nurse BEFORE transmitting. This ensures that the Device Clinic staff is aware of the reason you are sending a transmission, and can follow-up with you after it has been reviewed.    We will be checking your implanted device from home (remotely) every three months unless otherwise instructed. We will need to see you in the clinic at least once a year. You may need to be seen in the clinic sooner depending on the results of your check.    Please be aware:    The follow-up schedule is like a Physician prescription.    Your remote monitor is paired to your specific implanted device.      Sincerely,    [facility]

## 2021-06-20 NOTE — LETTER
Letter by Lory Mcneal RN at      Author: Lory Mcneal RN Service: -- Author Type: --    Filed:  Encounter Date: 8/14/2020 Status: (Other)         [facility]  [address] [address]      August 14, 2020      Dear Ms. Janelle Paintercata,    RE: Remote Results    We are writing to you regarding your recent Remote Loop Recorder check from home. Your transmission was received successfully. Battery status is satisfactory at this time.     Your results are within normal limits.    Your next device appointment will be a remote check on 11/13/2020.  You can choose the time of day you wish to transmit.    To schedule or reschedule, please call [phone] and press 1.    NOTE: If you would like to do an extra transmission, please call [phone] and press 3 to speak to a nurse BEFORE transmitting. This ensures that the Device Clinic staff is aware of the reason you are sending a transmission, and can follow-up with you after it has been reviewed.    We will be checking your implanted device from home (remotely) every three months unless otherwise instructed. We will need to see you in the clinic at least once a year. You may need to be seen in the clinic sooner depending on the results of your check.    Please be aware:    The follow-up schedule is like a Physician prescription.    Your remote monitor is paired to your specific implanted device.      Sincerely,    [facility]

## 2021-06-20 NOTE — LETTER
Letter by Kenisha Churchill at      Author: Kenisha Churchill Service: -- Author Type: --    Filed:  Encounter Date: 1/21/2020 Status: (Other)         [facility]  [address] [address]      January 22, 2020      Dear Ms. Janelle Paintercata,    RE: Remote Results    We are writing to you regarding your recent Remote Loop Recorder check from home. Your transmission was received successfully. Battery status is satisfactory at this time.     Your results are being reviewed.  You will be contacted if further information is necessary.     Your next device appointment will be a remote check on April 21, 2020.  You can choose the time of day you wish to transmit.    To schedule or reschedule, please call [phone] and press 1.    NOTE: If you would like to do an extra transmission, please call [phone] and press 3 to speak to a nurse BEFORE transmitting. This ensures that the Device Clinic staff is aware of the reason you are sending a transmission, and can follow-up with you after it has been reviewed.    We will be checking your implanted device from home (remotely) every three months unless otherwise instructed. We will need to see you in the clinic at least once a year. You may need to be seen in the clinic sooner depending on the results of your check.    Please be aware:    The follow-up schedule is like a Physician prescription.    Your remote monitor is paired to your specific implanted device.      Sincerely,    [facility]

## 2021-06-20 NOTE — PROGRESS NOTES
Assessment and Plan:       1. Healthcare maintenance    - HM2(CBC w/o Differential)  - Lipid Profile  - Vitamin D, Total (25-Hydroxy)  - Comprehensive Metabolic Panel  - Urinalysis-UC if Indicated  - Ambulatory referral for Colonoscopy  - DXA Bone Density Scan; Future    2. Parkinson disease (H)  Seeing Neurologist, on Carbidopa/levodopa, using walker, has high risk of falls.  - HM2(CBC w/o Differential)  - Lipid Profile  - Vitamin D, Total (25-Hydroxy)  - Comprehensive Metabolic Panel  - Urinalysis-UC if Indicated  - Ambulatory referral for Colonoscopy  - DXA Bone Density Scan; Future    3. Cystocele  Seeing Urologist, using pessary   - HM2(CBC w/o Differential)  - Lipid Profile  - Vitamin D, Total (25-Hydroxy)  - Comprehensive Metabolic Panel  - Urinalysis-UC if Indicated  - Ambulatory referral for Colonoscopy  - DXA Bone Density Scan; Future    4. Mixed hyperlipidemia  On statin  - 2(CBC w/o Differential)  - Lipid Profile  - Vitamin D, Total (25-Hydroxy)  - Comprehensive Metabolic Panel  - Urinalysis-UC if Indicated  - Ambulatory referral for Colonoscopy  - DXA Bone Density Scan; Future    5. Osteopenia  Due for DXA next year.  - HM2(CBC w/o Differential)  - Lipid Profile  - Vitamin D, Total (25-Hydroxy)  - Comprehensive Metabolic Panel  - Urinalysis-UC if Indicated  - Ambulatory referral for Colonoscopy  - DXA Bone Density Scan; Future    6. Left Ventricular Hypertrophy  Stable  - HM2(CBC w/o Differential)  - Lipid Profile  - Vitamin D, Total (25-Hydroxy)  - Comprehensive Metabolic Panel  - Urinalysis-UC if Indicated  - Ambulatory referral for Colonoscopy  - DXA Bone Density Scan; Future    7. Vaginal wall prolapse  Seeing Urology  - 2(CBC w/o Differential)  - Lipid Profile  - Vitamin D, Total (25-Hydroxy)  - Comprehensive Metabolic Panel  - Urinalysis-UC if Indicated  - Ambulatory referral for Colonoscopy  - DXA Bone Density Scan; Future    8. Colon polyp  Due for colonoscopy, last one in 2011. Discussed  with the patient. No symptoms of concern.  - HM2(CBC w/o Differential)  - Lipid Profile  - Vitamin D, Total (25-Hydroxy)  - Comprehensive Metabolic Panel  - Urinalysis-UC if Indicated  - Ambulatory referral for Colonoscopy  - DXA Bone Density Scan; Future    9. Routine general medical examination at a health care facility       The patient's current medical problems were reviewed.    I have had an Advance Directives discussion with the patient.  The following health maintenance schedule was reviewed with the patient and provided in printed form in the after visit summary:   Health Maintenance   Topic Date Due     INFLUENZA VACCINE RULE BASED (1) 11/30/2018 (Originally 8/1/2018)     FALL RISK ASSESSMENT  01/17/2019     DXA SCAN  08/10/2019     ADVANCE DIRECTIVES DISCUSSED WITH PATIENT  01/20/2021     TD 18+ HE  02/25/2026     PNEUMOCOCCAL POLYSACCHARIDE VACCINE AGE 65 AND OVER  Completed     PNEUMOCOCCAL CONJUGATE VACCINE FOR ADULTS (PCV13 OR PREVNAR)  Completed     ZOSTER VACCINE  Completed        Subjective:   Chief Complaint[facility] 86 [facility] female here for an Annual Wellness visit.   HPI:  Acute and chronic medical problems discussed as above.    Review of Systems:    Please see above.  The rest of the review of systems are negative for all systems.    Patient Care Team:  Jerzy Aparicio MD as PCP - General (Internal Medicine)     Patient Active Problem List   Diagnosis     Hemorrhoids     Venous Insufficiency     Cystocele     Mixed hyperlipidemia     Appendiceal Mucocele     Osteopenia     Left Ventricular Hypertrophy     Vaginal wall prolapse     Parkinson disease (H)     Past Medical History:   Diagnosis Date     Breast cyst      Parkinson disease (H) 1/20/2016      Past Surgical History:   Procedure Laterality Date     BREAST CYST ASPIRATION       HYSTERECTOMY  1982     OK TOTAL ABDOM HYSTERECTOMY      Description: Hysterectomy;  Proc Date: 01/01/1988;  Comments: couldn't find her ovaries       Family History   Problem Relation Age of Onset     Breast cancer Maternal Aunt       Social History     Social History     Marital status:      Spouse name: N/A     Number of children: N/A     Years of education: N/A     Occupational History     Not on file.     Social History Main Topics     Smoking status: Never Smoker     Smokeless tobacco: Never Used     Alcohol use Not on file     Drug use: Not on file     Sexual activity: Not on file     Other Topics Concern     Not on file     Social History Narrative      Current Outpatient Prescriptions   Medication Sig Dispense Refill     aspirin 81 MG EC tablet Take 81 mg by mouth daily.       CALCIUM CARBONATE (CALCIUM 500 ORAL) Take by mouth.       carbidopa-levodopa (SINEMET)  mg per tablet Take 1 tablet by mouth 3 (three) times a day.        cholecalciferol, vitamin D3, (VITAMIN D3) 2,000 unit Tab Take by mouth.       ibuprofen (ADVIL,MOTRIN) 400 MG tablet Take 400 mg by mouth every 6 (six) hours as needed for pain.       OMEGA-3/DHA/EPA/FISH OIL (FISH OIL-OMEGA-3 FATTY ACIDS) 300-1,000 mg capsule Take 2 g by mouth daily.       pravastatin (PRAVACHOL) 10 MG tablet Take 1 tablet (10 mg total) by mouth daily. 90 tablet 3     No current facility-administered medications for this visit.       Objective:   Vital Signs:   Visit Vitals     /60     Pulse 75     Wt 128 lb (58.1 kg)     SpO2 96%     BMI 21.3 kg/m2        VisionScreening:  No exam data present     PHYSICAL EXAM  General Appearance: Alert, cooperative, no distress, appears stated age.  Head: Normocephalic, without obvious abnormality, atraumatic  Eyes: PERRL, conjunctiva/corneas clear, EOM's intact  Ears: Normal TM's and external ear canals, both ears. Right ear cerumen impaction.  Nose: Nares normal, septum midline,mucosa normal, no drainage  Throat: Lips, mucosa, and tongue normal; teeth and gums normal  Neck: Supple, symmetrical, trachea midline, no adenopathy;  thyroid: not enlarged,  symmetric, no tenderness/mass/nodules; no carotid bruit or JVD  Back: Symmetric, no curvature, ROM normal, no CVA tenderness.  Lungs: Clear to auscultation bilaterally, respirations unlabored.  Breasts: No breast masses, tenderness, asymmetry, or nipple discharge.  Heart: Regular rate and rhythm, S1 and S2 normal, no murmur, rub, or gallop.  Abdomen: Soft, non-tender, bowel sounds active all four quadrants,  no masses, no organomegaly.  Pelvic:Not done.  Extremities: Extremities normal, atraumatic, no cyanosis or edema.  Skin: Skin color, texture, turgor normal, no rashes or lesions.  Lymph nodes: Cervical, supraclavicular, and axillary nodes normal.  Neurologic: No focal neurological findings.    Assessment Results 10/10/2018   Activities of Daily Living 1 - Full function   Instrumental Activities of Daily Living No help needed   Get Up and Go Score 12 seconds or more   Mini Cog Total Score 4   Some recent data might be hidden     A Mini-Cog score of 0-2 suggests the possibility of dementia, score of 3-5 suggests no dementia    Identified Health Risks:     The patient reports that she has difficulty with activities of daily living. I have asked that the patient make a follow up appointment in 12 weeks where this issue will be further evaluated and addressed.  She is at risk for falling and has been provided with information to reduce the risk of falling at home.  Patient's advanced directive was discussed and I am comfortable with the patient's wishes.

## 2021-06-21 NOTE — LETTER
Letter by Lory Mcneal RN at      Author: Lory Mcneal RN Service: -- Author Type: --    Filed:  Encounter Date: 11/13/2020 Status: (Other)         [facility]EX[facility]  [address] [address]      November 13, 2020      Dear Ms. Janelle Paintercata,    RE: Remote Results    We are writing to you regarding your recent Remote Loop Recorder check from home. Your transmission was received successfully. Battery status is satisfactory at this time.     Your results are within normal limits.    Your next device appointment will be a remote check on 2/12/2021; this will occur automatically.    To schedule or reschedule, please call [phone] and press 1.    NOTE: If you would like to do an extra transmission, please call [phone] and press 3 to speak to a nurse BEFORE transmitting. This ensures that the Device Clinic staff is aware of the reason you are sending a transmission, and can follow-up with you after it has been reviewed.    We will be checking your implanted device from home (remotely) every three months unless otherwise instructed. We will need to see you in the clinic at least once a year. You may need to be seen in the clinic sooner depending on the results of your check.    Please be aware:    The follow-up schedule is like a Physician prescription.    Your remote monitor is paired to your specific implanted device.      Sincerely,    [facility]

## 2021-06-21 NOTE — LETTER
Letter by Juhi Lovell MD at      Author: Juhi Lovell MD Service: -- Author Type: --    Filed:  Encounter Date: 3/19/2021 Status: (Other)         March 19, 2021     Patient: ALEXA Luu   YOB: 1932   Date of Visit: 3/19/2021       To Whom It May Concern:    It is my medical opinion that ALEXA Luu does have Parkinson disease.     If you have any questions or concerns, please don't hesitate to call.    Sincerely,        Electronically signed by Juhi Lovell MD

## 2021-06-24 ENCOUNTER — COMMUNICATION - HEALTHEAST (OUTPATIENT)
Dept: FAMILY MEDICINE | Facility: CLINIC | Age: 86
End: 2021-06-24

## 2021-06-24 ENCOUNTER — RECORDS - HEALTHEAST (OUTPATIENT)
Dept: ADMINISTRATIVE | Facility: OTHER | Age: 86
End: 2021-06-24

## 2021-06-24 NOTE — TELEPHONE ENCOUNTER
Patient was to be seen for [facility] PT SOC on 3/9/19.  PT called and spoke to patient and she requested to have SOC moved to Tuesday 3/12/19 due to having family from out of town visiting.  Requesting order to move SOC to 3/12/19 per patient request.

## 2021-06-24 NOTE — TELEPHONE ENCOUNTER
One of my clinicians requested this order on 3/9/19 with no response. Please respond ASAP so we can see this patient today    Patient was to be seen for [facility] PT SOC on 3/9/19.  PT called and spoke to patient and she requested to have SOC moved to Tuesday 3/12/19 due to having family from out of town visiting.  Requesting order to move SOC to 3/12/19 per patient request.

## 2021-06-24 NOTE — PROGRESS NOTES
Clinic Note    Assessment:     Assessment and Plan:  1. Falls frequently  She is seeing a Parkinson's disease specialist at the [facility] who is working on her frequent falls with her.  She is interested in supplementing her therapy with home PT.  - Ambulatory referral to Home Health    2. Closed fracture of multiple ribs of left side with routine healing, subsequent encounter  Pain appears to be well controlled.  She like to discontinue her oxycodone which I am okay with as at this point it seems like it may be only increasing her risk of falls.       Patient Instructions   I placed an order for home physical therapy.  Somebody will be contacting you over the next 24-48 hours to set this up.    Trialed a discontinuance of your oxycodone.  If it is not doing much for your pain, it may just be increasing your risk for additional falls.    Follow through with hand specialist for finger fracture as originally scheduled.    Let us have you come back to see Dr. Aparicio for routine checkup in 3 months.    Return in about 3 months (around 6/6/2019).         Subjective:      Patient presents clinic today for hospital discharge follow-up.    Patient was admitted to [facility] on 2/27 and discharged a day later.    The patient has a history of Parkinson's disease and recurrent falls.  She presented to the ER after falling at home.  She was ambulate in with her walker when she fell backwards and hit her chest wall on a piece of furniture.    She laid on the floor for a while until her daughter came home, she was eventually brought to the ER.    She was found to have posterior rib fractures on the left rib 10 and 11.  She was also found to have a nondisplaced middle fourth phalanx fracture extending into the PIP joint.  She was admitted.    She was eventually discharged on oxycodone 5 mg pain control.  Patient states that she is using this about twice per day.  Is not convinced that it is doing much for  her anyway and so she is wondering if it is okay to stop this.  She uses ibuprofen and Tylenol throughout the day for her baseline pain management and says that this is tolerable for her.    In regards to her recurrent falls, she is working with a Parkinson's disease physician from the [facility] on this.  She is interested in at home physical therapy to supplement her treatment.    She feels like her pain is well controlled at this point.  She has a life alert bracelet in the event that she were to fall again.  She is using her walker more frequently.  She is living at home by herself but feels safe.    The following portions of the patient's history were reviewed and updated as appropriate: Allergies, medications, problems, prior note.    Review of Systems:    Review is otherwise negative except for what is mentioned above.     Social Hx:    Social History     Tobacco Use   Smoking Status Never Smoker   Smokeless Tobacco Never Used         Objective:     Vitals:    03/06/19 1120   BP: 130/50   Pulse: 74   Weight: 124 lb (56.2 kg)       Exam:    General: No apparent distress. Calm. Alert and Oriented X3. Pt behavior is appropriate.  HMusculoskeletal: Pain with palpation along the posterior rib cage with palpation  Neurologic: Interactive, alert, no focal findings, CNs intact.   Skin: Warm, dry. Normal hair pattern. Free of lesions. Normal skin turgor.       Patient Active Problem List   Diagnosis     Hemorrhoids     Venous Insufficiency     Cystocele     Mixed hyperlipidemia     Appendiceal Mucocele     Osteopenia     Left Ventricular Hypertrophy     Vaginal wall prolapse     Parkinson disease (H)     Closed fracture of multiple ribs of both sides, initial encounter     Fall     Current Outpatient Medications   Medication Sig Dispense Refill     acetaminophen (TYLENOL) 500 MG tablet Take 500 mg by mouth every 6 (six) hours as needed for pain.       calcium-vitamin D (CALCIUM-VITAMIN D) 500 mg(1,250mg)  -200 unit per tablet Take 1 tablet by mouth 2 (two) times a day with meals.       carbidopa-levodopa (SINEMET)  mg per tablet Take 1 tablet by mouth 3 (three) times a day.        cholecalciferol, vitamin D3, (VITAMIN D3) 2,000 unit Tab Take 1 tablet by mouth daily.              ibuprofen (ADVIL,MOTRIN) 200 MG tablet Take 200 mg by mouth every 6 (six) hours as needed for pain.              MAGNESIUM CITRATE ORAL Take 1 tablet by mouth 2 (two) times a day.       OMEGA-3/DHA/EPA/FISH OIL (FISH OIL-OMEGA-3 FATTY ACIDS) 300-1,000 mg capsule Take 1 g by mouth daily.              oxyCODONE (ROXICODONE) 5 MG immediate release tablet Take 0.5 tablets (2.5 mg total) by mouth every 3 (three) hours as needed for pain. 10 tablet 0     polyethylene glycol (MIRALAX) 17 gram packet Take 1 packet (17 g total) by mouth daily as needed.  0     polyvinyl alcohol (ARTIFICIAL TEARS, POLYVIN ALC,) 1.4 % ophthalmic solution Administer 1 drop to both eyes 4 (four) times a day as needed for dry eyes.       pravastatin (PRAVACHOL) 10 MG tablet Take 1 tablet (10 mg total) by mouth daily. 90 tablet 3     No current facility-administered medications for this visit.        I spent 25 minutes with patient face to face, of which >50% was counseling regarding the above plan       Danilo Peralta (Rob), CNP    3/6/2019

## 2021-06-24 NOTE — PATIENT INSTRUCTIONS - HE
I placed an order for home physical therapy.  Somebody will be contacting you over the next 24-48 hours to set this up.    Trialed a discontinuance of your oxycodone.  If it is not doing much for your pain, it may just be increasing your risk for additional falls.    Follow through with hand specialist for finger fracture as originally scheduled.    Let us have you come back to see Dr. Aparicio for routine checkup in 3 months.

## 2021-06-24 NOTE — TELEPHONE ENCOUNTER
[facility] received a referral for this patient to be seen for  PT to be seen by today, 03/08/2019. However, we were finally able to reach the patient today, and she's requesting for new SOC date on 03/09/2019 instead.   Who s Requesting: DANTE    Orders being requested:  PT to be seen by 03/09/2019    Where to send Orders: Simply respond to this Epic Telephone Call message string, and we can take as a verbal order if appropriate.   Thank you.

## 2021-06-25 NOTE — TELEPHONE ENCOUNTER
Reason for call:  Patient reporting a symptom    Symptom or request: Groin Pain, Left side is worse    Duration (how long have symptoms been present): few years     Have you been treated for this before? Cant remember     Additional comments: Patient is taking 400mg ibprofen and 500 mg of acetaminophen for groin pain and wants to know if she can take 600mg of ibprofen with the 500mg of acetaminophen. Please call patient back and advise. If she doesn't answer patient said to leave instructions in her voicemail.     Phone Number patient can be reached at:  Cell number on file:    Telephone Information:   Mobile [phone]       Best Time:  Anytime     Can we leave a detailed message on this number: Yes    Call taken on 6/11/2021 at 8:38 AM by Adelita Jean

## 2021-06-25 NOTE — TELEPHONE ENCOUNTER
Called pt. Ok'd the dose of meds in the short term and with food.   Recommended against the ibuprofen because she is taking it every day for a month.    She has been seen by the pain doc and the hip doc.

## 2021-06-25 NOTE — TELEPHONE ENCOUNTER
Spoke to pt regarding recommendations, she agrees to plan.  In review of chart, last CMP 1/14/2021 showed normal kidney function, and no GI issues listed on chart or that pt is aware of.    No further questions or concerns at this time.    Blanca

## 2021-06-25 NOTE — TELEPHONE ENCOUNTER
----- Message from Vidya Hilton sent at 6/10/2021 10:08 AM CDT -----  General phone call:  PATIENT WANTS TO TALK ABOUT ADVIL AND ACETAMINOPHEN, AND PAIN CLINIC WANTS TO PUT HER ON A 10'S UNIT.  PLEASE CALL    Caller: PATIENT  Primary cardiologist: DR ALCANTAR  Detailed reason for call: SEE ABOVE  New or active symptoms? NO  Best phone number: [phone]  Best time to contact: ANY TIME  Ok to leave a detailed message? YES  Device? YES, POSSIBLE LOOP    Additional Info:

## 2021-06-25 NOTE — TELEPHONE ENCOUNTER
Call made to patient to discuss use of Tens unit with Loop recorder. Patient informed that it is safe to use, but to keep track of time and date so that if we call after a remote check with questions about an episode we are able to rule out use of tens unit use. Patient verbalized understanding and agrees with plan. -ANNITA

## 2021-06-25 NOTE — TELEPHONE ENCOUNTER
Return call to patient, she states she is having a lot of back and hip pain and is currently taking 1 acetaminophen tablet with 2 ibuprofen tablets every 6 hours for pain.  Her pain clinic wanted her to check with [facility] if she could increase her ibuprofen to 3 tablets every 6 hours because of her daily ASA 81 mg.    They would also like for her to wear an external Tens unit to help with pain if ok with her Loop recorder.    Pt with a hx of cryptogenic stroke and PAF, non ischemic cardiomyopathy, s/p [facility] on 2-20-20, pt was able to discontinue anticoagulation but will need lifelong ASA 81 mg.      Pt only saw [facility] in consult 10-6-19 and for ILR implant.  Has followed closely with the [facility] team.    Will review if ok to take 600 mg ibuprofen q 6 hr with Odalis Beckford  and if ok for use of  a Tens unit with the device team and call patient with recommendations.

## 2021-06-26 NOTE — TELEPHONE ENCOUNTER
Reason contacted:  Status update  Information relayed:    Patient wants Dr. Lovell to know she is seeing Dr. Dodd on 6/29/2021 due to left hip pain.    She is wondering if Dr. Lovell thinks she would be a good candidate for left hip surgery if the orthopedic specialist thinks this would be a good idea?     Please advise   Additional questions:  No  Further follow-up needed:  Yes  Okay to leave a detailed message:  Yes [phone]

## 2021-06-27 NOTE — PROGRESS NOTES
"Progress Notes by Leandra Gary MD at 9/1/2019  2:40 PM     Author: Leandra Gary MD Service: -- Author Type: Physician    Filed: 9/1/2019  5:23 PM Encounter Date: 9/1/2019 Status: Signed    : Leandra Gary MD (Physician)         Subjective:   ALEXA Luu is a 87 y.o. female  Roomed by: Tiffanie APPLE CMA    Accompanied by Other Rex   Refills needed? No    Do you have any forms that need to be filled out? No      Chief Complaint   Patient presents with   ? Fall     45 minutes ago, hit head on cupboard, no complaints of pain, N/V, loss of consiousness, balance disturbance   Patient fell on 8/22 was [facility].  Had a negative head CT at that time.  Patient states that she falls she has fallen many times since being diagnosed with Parkinson's 4 years ago.  States that this morning she had let her walker let go of her walker and was trying to unload or load her  when she lost her balance and fell over hitting the back of her head on the right side of her head.  States she was able to immediately get up and use her walker.  She denies any current headache.  Denies having lost consciousness.  Patient was wondering if she needs another CT scan.  Patient denies taking any aspirin or other blood thinners.  She does live alone but her children look in on her frequently.  She denies any chest pain or shortness of breath.  She denies any visual changes.  Review of Systems  See HPI for ROS, otherwise balance of other systems negative    Allergies   Allergen Reactions   ? Acetaminophen-Codeine Unknown   ? Codeine    ? Morphine Other (See Comments)     Made me feel really wierd   ? Penicillins Other (See Comments)     Tongue and throat \"tingling\" when in 20s       Current Outpatient Medications:   ?  acetaminophen (TYLENOL) 500 MG tablet, Take 500 mg by mouth every 6 (six) hours as needed for pain., Disp: , Rfl:   ?  ascorbic acid, vitamin C, (VITAMIN C) 1000 MG tablet, , Disp: , " Rfl:   ?  calcium carbonate-vit D3-min 600 mg calcium- 400 unit Tab, Take by mouth., Disp: , Rfl:   ?  calcium-vitamin D (CALCIUM-VITAMIN D) 500 mg(1,250mg) -200 unit per tablet, Take 1 tablet by mouth 2 (two) times a day with meals., Disp: , Rfl:   ?  carbidopa-levodopa (SINEMET)  mg per tablet, Take 1 tablet by mouth 3 (three) times a day. , Disp: , Rfl:   ?  cholecalciferol, vitamin D3, (VITAMIN D3) 2,000 unit Tab, Take 1 tablet by mouth daily.    , Disp: , Rfl:   ?  ibuprofen (ADVIL,MOTRIN) 200 MG tablet, Take 200 mg by mouth every 6 (six) hours as needed for pain.    , Disp: , Rfl:   ?  MAGNESIUM CITRATE ORAL, Take 1 tablet by mouth 2 (two) times a day., Disp: , Rfl:   ?  OMEGA-3/DHA/EPA/FISH OIL (FISH OIL-OMEGA-3 FATTY ACIDS) 300-1,000 mg capsule, Take 1 g by mouth daily.    , Disp: , Rfl:   ?  polyvinyl alcohol (ARTIFICIAL TEARS, POLYVIN ALC,) 1.4 % ophthalmic solution, Administer 1 drop to both eyes 4 (four) times a day as needed for dry eyes., Disp: , Rfl:   ?  pravastatin (PRAVACHOL) 10 MG tablet, Take 1 tablet (10 mg total) by mouth daily., Disp: 90 tablet, Rfl: 3  Patient Active Problem List   Diagnosis   ? Hemorrhoids   ? Venous Insufficiency   ? Cystocele   ? Mixed hyperlipidemia   ? Appendiceal Mucocele   ? Osteopenia   ? Left Ventricular Hypertrophy   ? Vaginal wall prolapse   ? Parkinson disease (H)   ? Closed fracture of multiple ribs of both sides, initial encounter   ? Fall     Past Medical History:   Diagnosis Date   ? Breast cyst    ? Finger fracture, left 02/27/2019    ring finger    ? Multiple rib fractures 02/27/2019   ? Parkinson disease (H) 1/20/2016    - if none on file, see Problem List    Objective:     Vitals:    09/01/19 1442   BP: 128/70   Pulse: 76   Resp: 14   Temp: 97.6  F (36.4  C)   TempSrc: Oral   SpO2: 95%   Weight: 125 lb 6.4 oz (56.9 kg)   Gen - Pt in NAD  Head -on the right occipital area there is a 2 cm in diameter round slightly raised area that is slightly tender  to palpation  Eyes - PERRL, EOMI, Conjunctiva clear  Neuro: Oriented x 3, CN - 2-12 intact, Sensory - neg drift, Strengths - 5/5 UE/LE = , DTRs =, Coord - intact TALIA  Psych - Affect - euthymic, well groomed, speech not pressured, good insight, no flight of ideas    Assessment - Plan   Medical Decision Making -87-year-old woman who has fallen multiple times since her diagnosis of Parkinson's 4 years ago presents with having fallen in her kitchen hitting the back of her head against the counter.  Patient had let go of her walker and was trying to unload the  without any balancing help.  States that she was immediate she immediately got up and started using her walker again.  She denies any current headache.  She denies any chest pain or shortness of breath.  On exam she is afebrile and vital signs are stable.  She is oriented x3 and the balance of her neuro neurological exam was within normal limits.  Did not test for balance during this.  She does have a slight swollen area in the back of the right side of her head.  Because of patient's exam being so reassuring, discussed with her and her daughter that further imaging did not seem to be indicated today.  Daughter said that she would be looking in on her mother over the next 24 hours because we must consider this a concussion.  Patient will follow-up with primary care within the next week to discuss prevention.  At this visit we already we also discussed prevention and that patient should sit on 1 of her walkers while she is trying to load and unload the  see patient instructions.    1. Closed head injury, initial encounter  - Nursing communication    2. Concussion without loss of consciousness, initial encounter    3. Fall from slip, trip, or stumble, initial encounter    4. Parkinson disease (H)    At the conclusion of the encounter, assessment and plan were discussed.   All questions were answered.   The patient or guardian acknowledged  understanding and was involved in the decision making regarding the overall care plan.    Patient Instructions     1. Be mindful of fall prevention  2. Keep your follow up appointment with primary care provider  3. If you have any questions, call the clinic number - the number is answered 24/7  Patient Education     Parkinsons Disease: Home Safety    As Parkinsons disease progresses, home safety will be an increasing concern. This page includes tips that can help make your daily life safer and easier. Your doctor may also recommend a therapist to advise you on the best ways to set up your home.  Setting up living spaces  Get help from family and friends to make these changes:    Keep walkways open and free of clutter. Move phone and electrical cords out of the way. Remove throw rugs to prevent trips.    Get a cordless or speaker phone. Program numbers for family and emergency services.    Make sure rooms are well lit. Install nightlights along walkways.    If freezing at doorways is a problem, consider placing lines of tape on the floor between rooms. Stepping over the tape may prompt you to keep moving.  Setting up the bathroom  Use the tips below to make changes to your bathroom. Medicare or insurance may help cover the costs of some of these items, depending on your particular needs and plan.    Have grab bars put in the shower or tub for support getting in and out.    Install a hand-held showerhead for easier bathing.    Raise the height of the toilet with a commode chair or elevated toilet seat.    Use a rubber-backed bath mat to help prevent slips and falls.    Buy a shower seat to make bathing safer and less tiring.     Preventing falls  Parkinsons symptoms make falls more likely. Safety improvements around the house can help. But if you begin having frequent falls, talk to your doctor. He or she may recommend physical therapy. This helps you learn the safest ways to move around. If needed, your therapist may  "also teach you how to use a cane or walker. Consider buying a life line so that if you do fall while you are alone, you'll have a way to get help.   Date Last Reviewed: 11/9/2015 2000-2017 [employer]. [address] [facility], [place] [address]. All rights reserved. This information is not intended as a substitute for professional medical care. Always follow your healthcare professional's instructions.       Patient Education     Concussion    A concussion can be caused by a direct blow to the head, neck, face, or somewhere else on the body with the force being transmitted to the head. This may cause you to lose consciousness - be \"knocked out\" - but not always. Depending on the severity of the blow, it will take from a few hours up to a few days to get better. Sometimes symptoms may last a few months or longer. This is called post-concussion syndrome.  At first, you may have a headache, nausea, vomiting, or dizziness. You may also have problems concentrating or remembering things. This is normal.  Symptoms should get better as the hours and days go by. Symptoms that get worse could be a sign of a more serious injury. This might be a bruise or bleeding in the brain. Thats why its important to watch for the warning signs listed below.  Home care  If your injury is mild and there are no serious signs or symptoms, your healthcare provider may recommend that you be monitored at home. If there is evidence that the injury is more serious, you will be monitored in the hospital. Follow these tips to help care for yourself at home:    After a concussion, your healthcare provider may recommend that a family member or friend monitor you for 12 to 24 hours. They may be told to wake you every few hours during sleep to check for the signs below.    If your face or scalp swells, apply an ice pack for 20 minutes every 1 to 2 hours. Do this until the swelling starts to go down. You can make an ice pack by putting ice " cubes in a plastic bag and wrapping the bag in a towel.    You may use acetaminophen to control pain, unless another pain medicine was prescribed. Do not use aspirin or ibuprofen after a head injury. If you have chronic liver or kidney disease, talk with your doctor before using these medicines. Also talk with your doctor if you ever had a stomach ulcer or gastrointestinal bleeding.    For the next 24 hours:  ? Dont drink alcohol or take sedatives or medicines that make you sleepy.  ? Dont drive or operate machinery.  ? Avoid doing anything strenuous. Dont lift or strain.    Dont return to sports or any activity that could cause you to hit your head until all symptoms are gone and you have been cleared by your doctor. A second head injury before fully recovering from the first one can lead to serious brain injury.    Avoid doing activities that require a lot of concentration or a lot of attention. This will allow your brain to rest and heal quicker.  Follow-up care  Follow up with your doctor in 1 week, or as directed.  Note: A radiologist will review any X-rays or CT scans that were taken. You will be told of any new findings that may affect your care.  When to seek medical advice  Call your healthcare provider right away if any of these occur:    Repeated vomiting    Headache or dizziness that is severe or gets worse    Loss of consciousness    Unusual drowsiness, or unable to wake up as usual    Weakness or decreased ability to walk or move any limb    Confusion, agitation, or change in behavior or speech, or memory loss    Blurred vision    Convulsion (seizure)    Swelling on the scalp or face that gets worse    Changes in pupil size (the black part of the eye)    Redness, warmth, or pus from the swollen area    Fluid draining from or bleeding from the nose or ears     Date Last Reviewed: 8/14/2015 2000-2017 [employer]. [address] [facility], [place] [address]. All rights reserved. This  information is not intended as a substitute for professional medical care. Always follow your healthcare professional's instructions.

## 2021-06-28 NOTE — PROGRESS NOTES
"Progress Notes by Odalis Beckford PA-C at 4/9/2020 12:50 PM     Author: Odalis Beckford PA-C Service: -- Author Type: Physician Assistant    Filed: 4/9/2020  3:09 PM Encounter Date: 4/9/2020 Status: Signed    : Odalis Beckford PA-C (Physician Assistant)       The patient has been notified of following:     \"This telephone visit will be conducted via a call between you and your physician/provider. We have found that certain health care needs can be provided without the need for a physical exam.  This service lets us provide the care you need with a phone conversation.  If a prescription is necessary we can send it directly to your pharmacy.  If lab work is needed we can place an order for that and you can then stop by our lab to have the test done at a later time. If during the course of the call the physician/provider feels a telephone visit is not appropriate, you will not be charged for this service.\" Verbal consent has been obtained for this service by care team member:         HEART CARE PHONE ENCOUNTER        The patient has chosen to have the visit conducted as a telephone visit, to reduce risk of exposure given the current status of Coronavirus in our community. This telephone visit is being conducted via a call between the patient and physician/provider. Health care needs are being provided without a physical exam.     Assessment/Recommendations   Assessment:    1.  Paroxysmal atrial fibrillation -identified on implantable loop recorder.  She is status post watchman implant on February 20.  She is doing well postprocedure and has had no neurologic events or symptoms.  At this time she is on Eliquis and aspirin.  Her postprocedure MARITA is currently on hold because of COVID concerns.     Once we are scheduling elective MARITA procedures, patient will be contacted.  She will need H&P prior to procedure.  For now, she should continue taking Eliquis 2.5 mg twice daily and aspirin 81 " mg daily.  Once she has the MARITA, if the MARITA shows that there is no leakage around the watchman device, she will be called and instructed to stop the Eliquis. She will then continue aspirin 81 mg daily and start Plavix 75 mg daily.  She will stay on the Plavix until August, at which time she will see us for her 6-month follow-up visit.    She understands all the instructions and his questions have been answered.  She is ready to proceed with MARITA.    2.  Parkinson's disease -with multiple falls over the past 3 years.  She had another fall this past weekend and was seen in the emergency department where CT scan showed no acute changes.  She did not fracture anything and  Is just sore.    I have reviewed the note as documented.  This accurately captures the substance of my conversation with the patient.    Total time of call between patient and provider was 7 minutes   Start Time: 1:04 pm    Stop Time: 1:11 pm       History of Present Illness/Subjective    M Carmen Luu is a 87 y.o. female who is being evaluated via a billable telephone visit.      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  An implantable loop recorder was placed. Atrial fibrillation was identified on the implantable loop recorder and due to fall risk and high risk for bleeding complications on anticoagulation due to Parkinson's disease, she underwent watchman implant on February 20, 2020.  Since that time she tells me that she has been doing well, but this past weekend was trying to use her new walker when she fell back against the wall and hit her head.  She was seen in the emergency department where there were no acute neurologic changes noted.  She still has some neck pain when laying down at night but denies any headaches.  She has family visiting from out of state and they have been taking her on a walk on a daily basis with gait belt.    She is currently having no  bleeding issues on the Eliquis/aspirin combination    I have reviewed and updated the patient's Past Medical History, Social History, Family History and Medication List.     Physical Examination not performed given phone encounter Review of Systems                                                Medical History  Surgical History Family History Social History   Past Medical History:   Diagnosis Date   ? Atrial fibrillation (H)     per H&P   ? Breast cyst    ? CVA (cerebral vascular accident) (H)     per H&P   ? Finger fracture, left 02/27/2019    ring finger    ? Multiple rib fractures 02/27/2019   ? Parkinson disease (H) 1/20/2016    Past Surgical History:   Procedure Laterality Date   ? BREAST CYST ASPIRATION     ? EP THANIA CLOSURE N/A 2/20/2020    Procedure: EP THANIA Closure;  Surgeon: Javier Smith MD;  Location: [facility] Cath Lab;  Service: Cardiology   ? EP LOOP RECORDER IMPLANT N/A 10/8/2019    Procedure: EP Loop Recorder Insertion;  Surgeon: Angel Hurd MD;  Location: [facility] Cath Lab;  Service: Cardiology   ? HYSTERECTOMY  1982   ? TN TOTAL ABDOM HYSTERECTOMY      Description: Hysterectomy;  Proc Date: 01/01/1988;  Comments: couldn't find her ovaries    Family History   Problem Relation Age of Onset   ? Breast cancer Maternal Aunt     Social History     Socioeconomic History   ? Marital status:      Spouse name: Not on file   ? Number of children: Not on file   ? Years of education: Not on file   ? Highest education level: Not on file   Occupational History   ? Not on file   Social Needs   ? Financial resource strain: Not on file   ? Food insecurity     Worry: Not on file     Inability: Not on file   ? Transportation needs     Medical: Not on file     Non-medical: Not on file   Tobacco Use   ? Smoking status: Never Smoker   ? Smokeless tobacco: Never Used   Substance and Sexual Activity   ? Alcohol use: No     Frequency: Never   ? Drug use: Never   ? Sexual activity: Not on file  "  Lifestyle   ? Physical activity     Days per week: Not on file     Minutes per session: Not on file   ? Stress: Not on file   Relationships   ? Social connections     Talks on phone: Not on file     Gets together: Not on file     Attends Denominational service: Not on file     Active member of club or organization: Not on file     Attends meetings of clubs or organizations: Not on file     Relationship status: Not on file   ? Intimate partner violence     Fear of current or ex partner: Not on file     Emotionally abused: Not on file     Physically abused: Not on file     Forced sexual activity: Not on file   Other Topics Concern   ? Not on file   Social History Narrative   ? Not on file          Medications  Allergies   Current Outpatient Medications   Medication Sig Dispense Refill   ? acetaminophen (TYLENOL) 500 MG tablet Take 500 mg by mouth 2 (two) times a day.            ? apixaban ANTICOAGULANT (ELIQUIS) 2.5 mg Tab tablet Take 1 tablet (2.5 mg total) by mouth 2 (two) times a day. 60 tablet 2   ? ascorbic acid, vitamin C, (VITAMIN C) 1000 MG tablet Take 1,000 mg by mouth daily.     ? aspirin 81 MG EC tablet Take 81 mg by mouth daily.     ? calcium carbonate-vit D3-min 600 mg calcium- 400 unit Tab Take 1 tablet by mouth daily.            ? carbidopa-levodopa (SINEMET)  mg per tablet Take 1 tablet by mouth 3 (three) times a day.      ? polyvinyl alcohol (ARTIFICIAL TEARS, POLYVIN ALC,) 1.4 % ophthalmic solution Administer 1 drop to both eyes 4 (four) times a day as needed for dry eyes.     ? pravastatin (PRAVACHOL) 20 MG tablet Take 1 tablet (20 mg total) by mouth at bedtime. 90 tablet 3     No current facility-administered medications for this visit.     Allergies   Allergen Reactions   ? Acetaminophen-Codeine Unknown   ? Codeine Other (See Comments)     Out off body experiance   ? Morphine Other (See Comments)     Made me feel really wierd   ? Penicillins Other (See Comments)     Tongue and throat \"tingling\" " when in 20s         Lab Results    Chemistry/lipid CBC Cardiac Enzymes/BNP/TSH/INR   Lab Results   Component Value Date    CHOL 175 10/06/2019    HDL 55 10/06/2019    LDLCALC 101 10/06/2019    TRIG 94 10/06/2019    CREATININE 0.70 02/21/2020    BUN 19 02/20/2020    K 4.0 02/20/2020     02/20/2020     02/20/2020    CO2 29 02/20/2020    Lab Results   Component Value Date    WBC 11.0 02/22/2020    HGB 11.3 (L) 02/22/2020    HCT 34.8 (L) 02/22/2020    MCV 96 02/22/2020     02/22/2020    Lab Results   Component Value Date    TROPONINI <0.01 10/05/2019    TSH 1.54 04/10/2015    INR 1.15 (H) 02/21/2020

## 2021-06-28 NOTE — PROGRESS NOTES
Progress Notes by Rolando Kothari MD at 11/5/2019  1:50 PM     Author: Rolando Kothari MD Service: -- Author Type: Physician    Filed: 11/5/2019  2:32 PM Encounter Date: 11/5/2019 Status: Signed    : Rolando Kothari MD (Physician)           Thank you, Jerzy Delacruz MD, for asking the [facility] team to see Ms. ALEXA Luu to Follow-up loop recorder/possible atrial fibrillation.      Assessment/Recommendations   Assessment:    1. 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 She has a history of multiple small infarct areas since 2015 when she was diagnosed with Parkinson's disease  2. Implantable loop recorder - afib suggested on interrogation but is more likely undersensing. Currently afib is NOT diagnosed.  3. Parkinson's disease with multiple falls over the past 3 years (110 in total) with two significant injuries (cervical spinal fracture and rib fractures). No falls since she started to use a wheelchair about a month ago.    Plan:  1. Continue ILR monitoring. She is scheduled to have it reprogrammed next week to try to improve sensing.         History of Present Illness   Ms. ALEXA Luu is a 87 y.o. female with a significant past history of parkinson's disease, multiple falls, and prior CVA who presents for cardiology follow-up.    Mrs. Janelle Luu presents today with her daughter.  She was recently hospitalized for a CVA about 6 weeks ago.  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  An implantable loop recorder was placed.  Subsequent loop recorder interrogations were suggestive of atrial fibrillation however on closer inspection these appear to be under sensing phenomena and the underlying rhythm was sinus.     Carmen says that she continues to have some left-sided tingling in her left arm but has otherwise recovered from her CVA.  She denies any symptoms of palpitations.  Also denies angina, or  heart failure symptoms.      Other than noted above, Ms. Janelle Luu denies any chest pain/pressure/tightness, shortness of breath at rest or with exertion, light headedness/dizziness, pre-syncope, syncope, lower extremity swelling, palpitations, paroxysmal nocturnal dyspnea (PND), or orthopnea.     Cardiac Problems and Cardiac Diagnostics     Most Recent Cardiac testing:  ECG dated 10/5/19 (personaly reviewed and interpreted): Sinus rhythm with a PVC and PAC.  Left axis deviation    ECHO (report reviewed):   Echo results:   Results for orders placed during the hospital encounter of 10/05/19   Echo Complete [ECH10] 10/06/2019    Narrative   Normal left ventricular size.The estimated left ventricular ejection   fraction is 45%. This represents a mildly decreased ejection fraction.   Mild hypertrophy noted.    Normal right ventricular size and systolic function.    Abnormal septal motion consistent with left bundle branch block.    Estimated central venous pressure equal to 3 mmHg.    No pulmonary hypertension present. The estimated systolic pulmonary   artery pressure is 23 mmhg.    When compared to the previous study dated 4/14/2014, no significant   change.               Medications  Allergies   Current Outpatient Medications   Medication Sig Dispense Refill   ? acetaminophen (TYLENOL) 500 MG tablet Take 500 mg by mouth 2 (two) times a day.            ? aspirin 81 MG EC tablet Take 81 mg by mouth daily.     ? calcium carbonate-vit D3-min 600 mg calcium- 400 unit Tab Take 1 tablet by mouth daily.            ? carbidopa-levodopa (SINEMET)  mg per tablet Take 1 tablet by mouth 3 (three) times a day.      ? polyvinyl alcohol (ARTIFICIAL TEARS, POLYVIN ALC,) 1.4 % ophthalmic solution Administer 1 drop to both eyes 4 (four) times a day as needed for dry eyes.     ? pravastatin (PRAVACHOL) 20 MG tablet Take 1 tablet (20 mg total) by mouth at bedtime. 90 tablet 3   ? clopidogrel (PLAVIX) 75 mg tablet Take 1 tablet  "(75 mg total) by mouth daily for 15 days. 15 tablet 0   ? magnesium citrate 100 mg Tab Take 200 mg by mouth 2 (two) times a day.     ? pantoprazole (PROTONIX) 40 MG tablet Take 40 mg by mouth daily. Until 10/25/19       No current facility-administered medications for this visit.       Allergies   Allergen Reactions   ? Acetaminophen-Codeine Unknown   ? Codeine    ? Morphine Other (See Comments)     Made me feel really wierd   ? Penicillins Other (See Comments)     Tongue and throat \"tingling\" when in 20s        Physical Examination Review of Systems   Vitals:    11/05/19 1351   BP: 126/64   Pulse: 76   Resp: 16     Body mass index is 21.28 kg/m .  Wt Readings from Last 3 Encounters:   11/05/19 124 lb (56.2 kg)   10/31/19 123 lb (55.8 kg)   10/16/19 125 lb (56.7 kg)       General Appearance:   Pleasant female, appears younger than stated age. no acute distress, normal body habitus   ENT/Mouth: membranes moist, no apparent gingival bleeding.      EYES:  no scleral icterus, normal conjunctivae   Neck: supple   Respiratory:   lungs are clear to auscultation, no rales or wheezing, equal chest wall expansion    Cardiovascular:   Regular rhythm, normal rate. Normal first and second heart sounds with no murmurs, rubs, or gallops; Jugular venous pressure normal, no edema bilaterally    Extremities: no cyanosis or clubbing   Skin: no xanthelasma, warm.    Heme/lymph/ Immunology No apparent bleeding noted.   Neurologic: Alert and oriented. Sitting in wheelchair, no tremors     Psychiatric: Pleasant, calm, appropriate affect.    A complete 10 system review of systems was performed and is negative except as mentioned in the HPI or below:  General: WNL  Eyes: WNL  Ears/Nose/Throat: WNL  Lungs: Snoring  Heart: WNL  Stomach: WNL  Bladder: Frequent Urination at Night  Muscle/Joints: WNL  Skin: WNL  Nervous System: Falls  Mental Health: WNL     Blood: WNL       Past History   Past Medical History:   Past Medical History:   Diagnosis " Date   ? Breast cyst    ? Finger fracture, left 02/27/2019    ring finger    ? Multiple rib fractures 02/27/2019   ? Parkinson disease (H) 1/20/2016       Past Surgical History:   Past Surgical History:   Procedure Laterality Date   ? BREAST CYST ASPIRATION     ? EP LOOP RECORDER IMPLANT N/A 10/8/2019    Procedure: EP Loop Recorder Insertion;  Surgeon: Angel Hurd MD;  Location: [facility];  Service: Cardiology   ? HYSTERECTOMY  1982   ? CA TOTAL ABDOM HYSTERECTOMY      Description: Hysterectomy;  Proc Date: 01/01/1988;  Comments: couldn't find her ovaries       Family History:   Family History   Problem Relation Age of Onset   ? Breast cancer Maternal Aunt         Social History:   Social History     Socioeconomic History   ? Marital status:      Spouse name: Not on file   ? Number of children: Not on file   ? Years of education: Not on file   ? Highest education level: Not on file   Occupational History   ? Not on file   Social Needs   ? Financial resource strain: Not on file   ? Food insecurity:     Worry: Not on file     Inability: Not on file   ? Transportation needs:     Medical: Not on file     Non-medical: Not on file   Tobacco Use   ? Smoking status: Never Smoker   ? Smokeless tobacco: Never Used   Substance and Sexual Activity   ? Alcohol use: No     Frequency: Never   ? Drug use: Never   ? Sexual activity: Not on file   Lifestyle   ? Physical activity:     Days per week: Not on file     Minutes per session: Not on file   ? Stress: Not on file   Relationships   ? Social connections:     Talks on phone: Not on file     Gets together: Not on file     Attends Muslim service: Not on file     Active member of club or organization: Not on file     Attends meetings of clubs or organizations: Not on file     Relationship status: Not on file   ? Intimate partner violence:     Fear of current or ex partner: Not on file     Emotionally abused: Not on file     Physically abused: Not on file      Forced sexual activity: Not on file   Other Topics Concern   ? Not on file   Social History Narrative   ? Not on file              Lab Results    Chemistry/lipid CBC Cardiac Enzymes/BNP/TSH/INR   Lab Results   Component Value Date    CHOL 175 10/06/2019    HDL 55 10/06/2019    LDLCALC 101 10/06/2019    TRIG 94 10/06/2019    CREATININE 0.72 10/31/2019    BUN 18 10/31/2019    K 4.3 10/31/2019     10/31/2019     10/31/2019    CO2 27 10/31/2019    Lab Results   Component Value Date    WBC 8.0 10/31/2019    HGB 12.5 10/31/2019    HCT 36.5 10/31/2019    MCV 93 10/31/2019     10/31/2019    Lab Results   Component Value Date    TROPONINI <0.01 10/05/2019    TSH 1.54 04/10/2015    INR 1.09 10/05/2019

## 2021-06-28 NOTE — PROGRESS NOTES
Progress Notes by Rolando Kothari MD at 2/6/2020 10:10 AM     Author: Rolando Kothari MD Service: -- Author Type: Physician    Filed: 2/6/2020 10:41 AM Encounter Date: 2/6/2020 Status: Signed    : Rolando Kothari MD (Physician)           Thank you, Juhi Foss MD, for asking the [facility] team to see Ms. ALEXA Luu to Follow-up  Atrial fibrillation.      Assessment/Recommendations   Assessment:    1. 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 She has a history of multiple small infarct areas since 2015 when she was diagnosed with Parkinson's disease  2. Paroxysmal atrial fibrillation - identified on implantable loop recorder. Asymptomatic. In sinus rhythm today.  3. Parkinson's disease with multiple falls over the past 3 years (110 in total) with two significant injuries (cervical spinal fracture and rib fractures). Seems to be fairly stable in treatment.    Plan:  1. Agree with THANIA closure with Watchman device.  2. Continue medications as prescribed.  3. Follow up in 6 months or sooner if needed          History of Present Illness   Ms. ALEXA Luu is a 87 y.o. female with a significant past history of parkinson's disease, multiple falls, and prior CVA who presents for cardiology follow-up.    Mrs. Janelle Luu was recently hospitalized for a CVA in fall 2019.  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  An implantable loop recorder was placed. Atrial fibrillation was identified on the implantable loop recorder and due to fall risk and high risk for bleeding complications on anticoagulation due to Parkinson's disease she was referred for Watchman THANIA closure implantation. This is scheduled for the end of this month.    Mrs. Janelle Luu reports feeling generally well today.  She has no new complaints or symptoms. She had several questions regarding her upcoming procedure which we  discussed.    Other than noted above, Ms. Janelle Luu denies any chest pain/pressure/tightness, shortness of breath at rest or with exertion, light headedness/dizziness, pre-syncope, syncope, lower extremity swelling, palpitations, paroxysmal nocturnal dyspnea (PND), or orthopnea.     Cardiac Problems and Cardiac Diagnostics     Most Recent Cardiac testing:  ECG dated 10/5/19 (personaly reviewed and interpreted): Sinus rhythm with a PVC and PAC.  Left axis deviation    ECHO (report reviewed):   Echo results:   Results for orders placed during the hospital encounter of 10/05/19   Echo Complete [ECH10] 10/06/2019    Narrative   Normal left ventricular size.The estimated left ventricular ejection   fraction is 45%. This represents a mildly decreased ejection fraction.   Mild hypertrophy noted.    Normal right ventricular size and systolic function.    Abnormal septal motion consistent with left bundle branch block.    Estimated central venous pressure equal to 3 mmHg.    No pulmonary hypertension present. The estimated systolic pulmonary   artery pressure is 23 mmhg.    When compared to the previous study dated 4/14/2014, no significant   change.        Nuclear stress test 11/29/2019  ?  The nuclear stress test is negative for inducible myocardial ischemia or infarction.  ?  The left ventricular ejection fraction at stress is 66%.  ?  The patient is at a low risk of future cardiac ischemic events.  ?  There is no prior study for comparison.         Medications  Allergies   Current Outpatient Medications   Medication Sig Dispense Refill   ? acetaminophen (TYLENOL) 500 MG tablet Take 500 mg by mouth 2 (two) times a day.            ? apixaban ANTICOAGULANT (ELIQUIS) 2.5 mg Tab tablet Take 1 tablet (2.5 mg total) by mouth 2 (two) times a day. 60 tablet 2   ? calcium carbonate-vit D3-min 600 mg calcium- 400 unit Tab Take 1 tablet by mouth daily.            ? carbidopa-levodopa (SINEMET)  mg per tablet Take 1 tablet by  "mouth 3 (three) times a day.      ? polyvinyl alcohol (ARTIFICIAL TEARS, POLYVIN ALC,) 1.4 % ophthalmic solution Administer 1 drop to both eyes 4 (four) times a day as needed for dry eyes.     ? pravastatin (PRAVACHOL) 20 MG tablet Take 1 tablet (20 mg total) by mouth at bedtime. 90 tablet 3     No current facility-administered medications for this visit.       Allergies   Allergen Reactions   ? Acetaminophen-Codeine Unknown   ? Codeine    ? Morphine Other (See Comments)     Made me feel really wierd   ? Penicillins Other (See Comments)     Tongue and throat \"tingling\" when in 20s        Physical Examination Review of Systems   Vitals:    02/06/20 1015   BP: 118/62   Pulse: 68   Resp: 16     Body mass index is 20.6 kg/m .  Wt Readings from Last 3 Encounters:   02/06/20 120 lb (54.4 kg)   01/27/20 122 lb (55.3 kg)   01/27/20 122 lb 14.4 oz (55.7 kg)       General Appearance:   Pleasant female, appears younger than stated age. no acute distress, normal body habitus   ENT/Mouth: membranes moist, no apparent gingival bleeding.      EYES:  no scleral icterus, normal conjunctivae   Neck: supple   Respiratory:   lungs are clear to auscultation, no rales or wheezing, equal chest wall expansion    Cardiovascular:   Normal rate, regular rhythm. Normal first and second heart sounds with no murmurs, rubs, or gallops; Jugular venous pressure normal, no edema bilaterally    Extremities: no cyanosis or clubbing   Skin: no xanthelasma, warm.    Heme/lymph/ Immunology No apparent bleeding noted.   Neurologic: Alert and oriented. Sitting in wheelchair, no tremors     Psychiatric: Pleasant, calm, appropriate affect.    A complete 10 system review of systems was performed and is negative except as mentioned in the HPI or below:  General: WNL  Eyes: WNL  Ears/Nose/Throat: WNL  Lungs: Snoring  Heart: WNL  Stomach: WNL  Bladder: Frequent Urination at Night  Muscle/Joints: Joint Pain  Skin: WNL  Nervous System: Falls, Daytime Sleepiness, " Loss of Balance  Mental Health: WNL     Blood: WNL       Past History   Past Medical History:   Past Medical History:   Diagnosis Date   ? Breast cyst    ? Finger fracture, left 02/27/2019    ring finger    ? Multiple rib fractures 02/27/2019   ? Parkinson disease (H) 1/20/2016       Past Surgical History:   Past Surgical History:   Procedure Laterality Date   ? BREAST CYST ASPIRATION     ? EP LOOP RECORDER IMPLANT N/A 10/8/2019    Procedure: EP Loop Recorder Insertion;  Surgeon: Angel Hurd MD;  Location: [facility];  Service: Cardiology   ? HYSTERECTOMY  1982   ? AR TOTAL ABDOM HYSTERECTOMY      Description: Hysterectomy;  Proc Date: 01/01/1988;  Comments: couldn't find her ovaries       Family History:   Family History   Problem Relation Age of Onset   ? Breast cancer Maternal Aunt         Social History:   Social History     Socioeconomic History   ? Marital status:      Spouse name: Not on file   ? Number of children: Not on file   ? Years of education: Not on file   ? Highest education level: Not on file   Occupational History   ? Not on file   Social Needs   ? Financial resource strain: Not on file   ? Food insecurity:     Worry: Not on file     Inability: Not on file   ? Transportation needs:     Medical: Not on file     Non-medical: Not on file   Tobacco Use   ? Smoking status: Never Smoker   ? Smokeless tobacco: Never Used   Substance and Sexual Activity   ? Alcohol use: No     Frequency: Never   ? Drug use: Never   ? Sexual activity: Not on file   Lifestyle   ? Physical activity:     Days per week: Not on file     Minutes per session: Not on file   ? Stress: Not on file   Relationships   ? Social connections:     Talks on phone: Not on file     Gets together: Not on file     Attends Holiness service: Not on file     Active member of club or organization: Not on file     Attends meetings of clubs or organizations: Not on file     Relationship status: Not on file   ? Intimate  partner violence:     Fear of current or ex partner: Not on file     Emotionally abused: Not on file     Physically abused: Not on file     Forced sexual activity: Not on file   Other Topics Concern   ? Not on file   Social History Narrative   ? Not on file              Lab Results    Chemistry/lipid CBC Cardiac Enzymes/BNP/TSH/INR   Lab Results   Component Value Date    CHOL 175 10/06/2019    HDL 55 10/06/2019    LDLCALC 101 10/06/2019    TRIG 94 10/06/2019    CREATININE 0.72 10/31/2019    BUN 18 10/31/2019    K 4.3 10/31/2019     10/31/2019     10/31/2019    CO2 27 10/31/2019    Lab Results   Component Value Date    WBC 8.0 10/31/2019    HGB 12.5 10/31/2019    HCT 36.5 10/31/2019    MCV 93 10/31/2019     10/31/2019    Lab Results   Component Value Date    TROPONINI <0.01 10/05/2019    TSH 1.54 04/10/2015    INR 1.09 10/05/2019

## 2021-06-28 NOTE — PROGRESS NOTES
Progress Notes by Odalis Beckford PA-C at 1/27/2020 10:30 AM     Author: Odalis Beckford PA-C Service: -- Author Type: Physician Assistant    Filed: 1/27/2020 12:46 PM Encounter Date: 1/27/2020 Status: Signed    : Odalis Beckford PA-C (Physician Assistant)             Assessment/Recommendations   1.  Paroxysmal atrial fibrillation:  Diagnosed by ILR.  I have personally reviewed this patient's chart and have spoken with the patient about the treatment options, including THANAI device.  She has a [id number] score of 5 for age greater than 75, female gender, prior stroke.  She has a HAS-BLED score of 2 for age and bleeding disposition.   She is not a candidate for long-term anticoagulation due to Parkinson's disease with high fall risk and several falls documented in the past.  She underwent CT pulmonary vein study earlier today.  This will be reviewed to make sure that her anatomy is amenable for implant.  She is tentatively scheduled the end of February.    We will plan on continuing pre-implant regimen, which consists of Eliquis 2.5 mg twice daily (she meets lower dose criteria because of age greater than 80 and weight less than 60 kg).  Once watchman is implanted, she will be started on baby aspirin along with her Eliquis.  Approximately 45 days after implant, she will have a post procedure MARITA. If the post MARITA is negative for leaks and no thrombus is seen on the surface of the device, she would be instructed to stop the Eliquis.  At that time he would continue the aspirin 81 mg and add Plavix 75 mg by mouth daily for an additional 4 months.  After being on DAPT for approximately 4 months, she will stop the Plavix and continue on just aspirin 81 mg daily indefinitely.  She understands that the risks of the procedure are <2% and include, but are not limited to device embolization, air embolism, myocardial perforation, device thrombosis, ASD, stroke, or death.  We discussed expected  recovery and follow-up.       The patient is a good candidate for proceeding with left atrial appendage screening and implant.  Her questions were answered to her satisfaction.    2.  Right cerebellar and precentral gyrus infarct: MRI confirmed multiple vascular territories involved, suggesting embolic events with no nor arrhythmias at that time.  Loop recorder implanted on that admission.  Patient has no residual deficits from her stroke    3.  Parkinson's disease: Patient seems to be stable on current dose of carbidopa- levodopa       History of Present Illness/Subjective    ALEXA Luu is a 87 y.o. female who comes in today for history and physical prior to screening MARITA and insertion of left atrial appendage occulsion device.  She has a family member with her  today.    ALEXA Luu has a past history of Parkinson's disease with multiple falls in the past, nonischemic cardiomyopathy, venous insufficiency and mixed hyperlipidemia.  Patient was admitted to the hospital in October with left-sided weakness and was found to have multiple areas of infarct on MRI.  This was felt to possibly be related to episodes of atrial fibrillation and she underwent loop recorder implantation.  Loop recorder monitoring has been followed and she now has documented atrial fibrillation.  She has been started on Eliquis, but has high fall risk with her Parkinson's disease.  She is currently mostly wheelchair-bound, but is participating in physical therapy 4 times a week to gain strength in her lower extremities.  Patient is hoping to be more mobile with rolling walker with help of this therapy.    ALEXA Luu denies chest discomfort, palpitations, shortness of breath, paroxysmal nocturnal dyspnea, orthopnea, lightheadedness, dizziness, pre-syncope, or syncope.  ALEXA Luu also denies any weight loss, changes in appetite, nausea or vomiting.     Medical, surgical, family, social history, and  medications were reviewed and updated as necessary.         Physical Examination Review of Systems   Vitals:    01/27/20 1033   BP: 156/82   Pulse: 80   Resp: 16     Body mass index is 21.1 kg/m .  Wt Readings from Last 3 Encounters:   01/27/20 122 lb 14.4 oz (55.7 kg)   01/27/20 122 lb (55.3 kg)   01/09/20 122 lb (55.3 kg)       General Appearance:   Alert, cooperative and in no acute distress   ENT/Mouth: membranes moist, no oral lesions or bleeding gums.      EYES:  no scleral icterus, normal conjunctivae   Neck: Thyroid not visualized   Chest/Lungs:   lungs are clear to auscultation, no rales or wheezing   Cardiovascular:   Regular . Normal first and second heart sounds with no murmurs, rubs or gallops; the carotid, radial and posterior tibial pulses are intact, no edema bilaterally    Abdomen:  Soft and nontender. Bowel sounds are present in all quadrants   Extremities: no cyanosis or clubbing   Skin: no xanthelasma, warm.    Neurologic: normal gait, normal  bilateral, no tremors   Psychiatric: Normal mood and affect    General: WNL  Eyes: WNL  Ears/Nose/Throat: WNL  Lungs: Snoring  Heart: WNL  Stomach: WNL  Bladder: Frequent Urination at Night  Muscle/Joints: Joint Pain  Skin: WNL  Nervous System: Falls, Loss of Balance  Mental Health: WNL     Blood: WNL     Medical History  Surgical History Family History Social History   Past Medical History:   Diagnosis Date   ? Breast cyst    ? Finger fracture, left 02/27/2019    ring finger    ? Multiple rib fractures 02/27/2019   ? Parkinson disease (H) 1/20/2016    Past Surgical History:   Procedure Laterality Date   ? BREAST CYST ASPIRATION     ? EP LOOP RECORDER IMPLANT N/A 10/8/2019    Procedure: EP Loop Recorder Insertion;  Surgeon: Angel Hurd MD;  Location: [facility];  Service: Cardiology   ? HYSTERECTOMY  1982   ? [place] TOTAL ABDOM HYSTERECTOMY      Description: Hysterectomy;  Proc Date: 01/01/1988;  Comments: couldn't find her ovaries     Family History   Problem Relation Age of Onset   ? Breast cancer Maternal Aunt     Social History     Socioeconomic History   ? Marital status:      Spouse name: Not on file   ? Number of children: Not on file   ? Years of education: Not on file   ? Highest education level: Not on file   Occupational History   ? Not on file   Social Needs   ? Financial resource strain: Not on file   ? Food insecurity:     Worry: Not on file     Inability: Not on file   ? Transportation needs:     Medical: Not on file     Non-medical: Not on file   Tobacco Use   ? Smoking status: Never Smoker   ? Smokeless tobacco: Never Used   Substance and Sexual Activity   ? Alcohol use: No     Frequency: Never   ? Drug use: Never   ? Sexual activity: Not on file   Lifestyle   ? Physical activity:     Days per week: Not on file     Minutes per session: Not on file   ? Stress: Not on file   Relationships   ? Social connections:     Talks on phone: Not on file     Gets together: Not on file     Attends Rastafari service: Not on file     Active member of club or organization: Not on file     Attends meetings of clubs or organizations: Not on file     Relationship status: Not on file   ? Intimate partner violence:     Fear of current or ex partner: Not on file     Emotionally abused: Not on file     Physically abused: Not on file     Forced sexual activity: Not on file   Other Topics Concern   ? Not on file   Social History Narrative   ? Not on file          Medications  Allergies   Current Outpatient Medications   Medication Sig Dispense Refill   ? acetaminophen (TYLENOL) 500 MG tablet Take 500 mg by mouth 2 (two) times a day.            ? apixaban (ELIQUIS) 2.5 mg Tab tablet Take 1 tablet (2.5 mg total) by mouth 2 (two) times a day. 60 tablet 2   ? calcium carbonate-vit D3-min 600 mg calcium- 400 unit Tab Take 1 tablet by mouth daily.            ? carbidopa-levodopa (SINEMET)  mg per tablet Take 1 tablet by mouth 3 (three) times a day.   "    ? polyvinyl alcohol (ARTIFICIAL TEARS, POLYVIN ALC,) 1.4 % ophthalmic solution Administer 1 drop to both eyes 4 (four) times a day as needed for dry eyes.     ? pravastatin (PRAVACHOL) 20 MG tablet Take 1 tablet (20 mg total) by mouth at bedtime. 90 tablet 3     No current facility-administered medications for this visit.     Allergies   Allergen Reactions   ? Acetaminophen-Codeine Unknown   ? Codeine    ? Morphine Other (See Comments)     Made me feel really wierd   ? Penicillins Other (See Comments)     Tongue and throat \"tingling\" when in 20s         Lab Results    Chemistry/lipid CBC Cardiac Enzymes/BNP/TSH/INR   Lab Results   Component Value Date    CHOL 175 10/06/2019    HDL 55 10/06/2019    LDLCALC 101 10/06/2019    TRIG 94 10/06/2019    CREATININE 0.72 10/31/2019    BUN 18 10/31/2019    K 4.3 10/31/2019     10/31/2019     10/31/2019    CO2 27 10/31/2019    Lab Results   Component Value Date    WBC 8.0 10/31/2019    HGB 12.5 10/31/2019    HCT 36.5 10/31/2019    MCV 93 10/31/2019     10/31/2019    Lab Results   Component Value Date    TROPONINI <0.01 10/05/2019    TSH 1.54 04/10/2015    INR 1.09 10/05/2019        This note has been dictated using voice recognition software. Any grammatical or context distortions are unintentional and inherent to the software.                                    "

## 2021-06-28 NOTE — PROGRESS NOTES
Progress Notes by Shanell Love CNP at 1/9/2020  2:10 PM     Author: Shanell Love CNP Service: -- Author Type: Nurse Practitioner    Filed: 1/9/2020  2:30 PM Encounter Date: 1/9/2020 Status: Signed    : Shanell Love CNP (Nurse Practitioner)         Thank you, Dr. Lovell, for asking the [facility] team to see Ms. ALEXA Luis [facility].      Assessment/Recommendations   Assessment:    Diagnoses and all orders for this visit:        Paroxysmal atrial fibrillation (H)  Seen on loop recorder implant.  Due to elevated chads 2 vasc score of 5 she has been recommended to start anticoagulation.  Although her fall risk does make her more at risk for hematoma, she is not falling daily, therefore risk of recurrent CVA outweighs risk of hemorrhagic bleed with falling.  However, given the severity of her falls, would suggest that she proceed with watchman device implant.  Risk and benefit discussed with her at length.  She is willing to proceed.  We will have VIC Waterman call patient tomorrow to discuss and set up.  She is also quite concerned about the cost of the Eliquis, we discussed that she would need to start the medication now, and can discontinue 45 days post procedure.    Falls frequently  She is currently utilizing a wheelchair for mobility.  She is working with PT to get her back to using the walker.  However, with her frequent falling they have advised her to stick to the walker for most distances.  Due to her significant falls, I would like her to proceed with watchman device implant.  Dr. Kothari also reiterated this on last phone call note.     Acute lacunar stroke (H)/Cryptogenic stroke (H)  Appears to be secondary to atrial fibrillation.  Stroke risk associated with A. fib, and left atrial appendage clot discussed with patient.  She does verbalize understanding, she continues to undergo therapy.  She does have some residual left-sided tingling.    Status post  placement of implantable loop recorder  She can continue routine follow-up.        [id number] score of 5 and on Eliquis, 2.5 mg twice daily dose adjusted for age and weight.  Follow up per [place] regarding watchman device implant.    Plan:  1.  Start Eliquis 2.5 mg twice daily, stop aspirin.  2.  Plan for watchman device implant     History of Present Illness/Subjective    Ms. ALEXA Luu is a 87 y.o. female with significant past medical history for Parkinson's disease, multiple falls, prior CVA classified as cryptogenic.  She presented with stroke 10/5/2019.  MRI confirmed acute cortical infarcts in the knob of the right precentral gyrus.  As no etiology for what appeared to be embolic stroke was found, loop recorder was implanted.    She has had multiple occasions where there was potential atrial fibrillation red, however she has known atrial undersensing.  Settings have been adjusted to most sensitive.  Dr. Foley did review December EGM's, and did feel there was evidence of atrial fibrillation.  Some of the EGM is reviewed also appear potential atrial flutter.  Either way, anticoagulation has been recommended due to known cryptogenic, embolic CVA.  She does still have some residual left arm tingling.    As was noted in the phone note from patient's daughter there is concern about patient's falls.  She tells me that she has 40 documented falls from January 2018 through October 2018.  She fell this morning, and hit the back of her head.  Unfortunately, when she is falling she is falling backwards due to the Parkinson's disease.  Prior to today, she had not had a fall since 12/27/2019.    She  denies chest pain, palpitations, shortness of breath, paroxysmal nocturnal dyspnea, orthopnea, lightheadedness, dizziness, pre-syncope, or syncope.  She is extremely unsteady on her feet, and does fall frequently.  She has joint pain in left shoulder, bilateral hips.    ECHOCARDIOGRAM 10/6/19:     Normal left  ventricular size.The estimated left ventricular ejection fraction is 45%. This represents a mildly decreased ejection fraction. Mild hypertrophy noted.    Normal right ventricular size and systolic function.    Abnormal septal motion consistent with left bundle branch block.    Estimated central venous pressure equal to 3 mmHg.    No pulmonary hypertension present. The estimated systolic pulmonary artery pressure is 23 mmhg.    When compared to the previous study dated 4/14/2014, no significant change.     Nuclear stress test 11/29/2019  ?  The nuclear stress test is negative for inducible myocardial ischemia or infarction.  ?  The left ventricular ejection fraction at stress is 66%.  ?  The patient is at a low risk of future cardiac ischemic events.  ?  There is no prior study for comparison    Loop recorder implant shows 7 EGM's with under sensed R waves, episodes previously reviewed with Dr. Hurd who did suggest atrial fibrillation.  Histograms stable     Physical Examination Review of Systems   Vitals:    01/09/20 1322   BP: 126/70   Pulse: 88   Resp: 16     Body mass index is 20.94 kg/m .  Wt Readings from Last 3 Encounters:   01/09/20 122 lb (55.3 kg)   12/11/19 124 lb (56.2 kg)   12/05/19 123 lb (55.8 kg)       General Appearance:   No acute distress, normal body habitus   ENT/Mouth: membranes moist, no oral lesions or bleeding gums.      EYES:  no scleral icterus, normal conjunctivae   Neck: no carotid bruits or thyromegaly   Chest/Lungs:   lungs are clear to auscultation, no rales or wheezing,    Cardiovascular:    Regular rhythm. Normal first and second heart sounds with no murmurs, rubs, or gallops; the radial and posterior tibial pulses are intact, no  JVD, no edema bilaterally    Abdomen:  no organomegaly, masses, bruits, or tenderness; bowel sounds are present   Extremities: no cyanosis or clubbing   Skin: Dry, warm, intact.    Neurologic: normal  bilateral, no tremors     Psychiatric: alert and  oriented x3, calm     General: WNL  Eyes: WNL  Ears/Nose/Throat: WNL  Lungs: WNL  Heart: WNL  Stomach: WNL  Bladder: WNL  Muscle/Joints: WNL  Skin: WNL  Nervous System: Falls, Loss of Balance  Mental Health: WNL     Blood: WNL     Medical History  Surgical History Family History Social History   Past Medical History:   Diagnosis Date   ? Breast cyst    ? Finger fracture, left 02/27/2019    ring finger    ? Multiple rib fractures 02/27/2019   ? Parkinson disease (H) 1/20/2016    Past Surgical History:   Procedure Laterality Date   ? BREAST CYST ASPIRATION     ? EP LOOP RECORDER IMPLANT N/A 10/8/2019    Procedure: EP Loop Recorder Insertion;  Surgeon: Angel Hurd MD;  Location: [facility];  Service: Cardiology   ? HYSTERECTOMY  1982   ? [place] TOTAL ABDOM HYSTERECTOMY      Description: Hysterectomy;  Proc Date: 01/01/1988;  Comments: couldn't find her ovaries    Family History   Problem Relation Age of Onset   ? Breast cancer Maternal Aunt     Social History     Socioeconomic History   ? Marital status:      Spouse name: Not on file   ? Number of children: Not on file   ? Years of education: Not on file   ? Highest education level: Not on file   Occupational History   ? Not on file   Social Needs   ? Financial resource strain: Not on file   ? Food insecurity:     Worry: Not on file     Inability: Not on file   ? Transportation needs:     Medical: Not on file     Non-medical: Not on file   Tobacco Use   ? Smoking status: Never Smoker   ? Smokeless tobacco: Never Used   Substance and Sexual Activity   ? Alcohol use: No     Frequency: Never   ? Drug use: Never   ? Sexual activity: Not on file   Lifestyle   ? Physical activity:     Days per week: Not on file     Minutes per session: Not on file   ? Stress: Not on file   Relationships   ? Social connections:     Talks on phone: Not on file     Gets together: Not on file     Attends Muslim service: Not on file     Active member of club or  "organization: Not on file     Attends meetings of clubs or organizations: Not on file     Relationship status: Not on file   ? Intimate partner violence:     Fear of current or ex partner: Not on file     Emotionally abused: Not on file     Physically abused: Not on file     Forced sexual activity: Not on file   Other Topics Concern   ? Not on file   Social History Narrative   ? Not on file          Medications  Allergies   Current Outpatient Medications   Medication Sig Dispense Refill   ? acetaminophen (TYLENOL) 500 MG tablet Take 500 mg by mouth 2 (two) times a day.            ? aspirin 81 MG EC tablet Take 81 mg by mouth daily.     ? calcium carbonate-vit D3-min 600 mg calcium- 400 unit Tab Take 1 tablet by mouth daily.            ? carbidopa-levodopa (SINEMET)  mg per tablet Take 1 tablet by mouth 3 (three) times a day.      ? polyvinyl alcohol (ARTIFICIAL TEARS, POLYVIN ALC,) 1.4 % ophthalmic solution Administer 1 drop to both eyes 4 (four) times a day as needed for dry eyes.     ? pravastatin (PRAVACHOL) 20 MG tablet Take 1 tablet (20 mg total) by mouth at bedtime. 90 tablet 3     No current facility-administered medications for this visit.     Allergies   Allergen Reactions   ? Acetaminophen-Codeine Unknown   ? Codeine    ? Morphine Other (See Comments)     Made me feel really wierd   ? Penicillins Other (See Comments)     Tongue and throat \"tingling\" when in 20s         Lab Results    Chemistry/lipid CBC Cardiac Enzymes/BNP/TSH/INR   Lab Results   Component Value Date    CHOL 175 10/06/2019    HDL 55 10/06/2019    LDLCALC 101 10/06/2019    TRIG 94 10/06/2019    CREATININE 0.72 10/31/2019    BUN 18 10/31/2019    K 4.3 10/31/2019     10/31/2019     10/31/2019    CO2 27 10/31/2019    Lab Results   Component Value Date    WBC 8.0 10/31/2019    HGB 12.5 10/31/2019    HCT 36.5 10/31/2019    MCV 93 10/31/2019     10/31/2019    Lab Results   Component Value Date    TROPONINI <0.01 " 10/05/2019    TSH 1.54 04/10/2015    INR 1.09 10/05/2019

## 2021-06-29 NOTE — PROGRESS NOTES
"Progress Notes by Odalis Beckford PA-C at 5/8/2020  2:10 PM     Author: Odalis Beckford PA-C Service: -- Author Type: Physician Assistant    Filed: 5/8/2020  3:44 PM Encounter Date: 5/8/2020 Status: Signed    : Odalis Beckford PA-C (Physician Assistant)           The patient has been notified of following:     \"This telephone visit will be conducted via a call between you and your physician/provider. We have found that certain health care needs can be provided without the need for a physical exam.  This service lets us provide the care you need with a phone conversation.  If a prescription is necessary we can send it directly to your pharmacy.  If lab work is needed we can place an order for that and you can then stop by our lab to have the test done at a later time. If during the course of the call the physician/provider feels a telephone visit is not appropriate, you will not be charged for this service.\" Verbal consent has been obtained for this service by care team member:         HEART CARE PHONE ENCOUNTER        The patient has chosen to have the visit conducted as a telephone visit, to reduce risk of exposure given the current status of Coronavirus in our community. This telephone visit is being conducted via a call between the patient and physician/provider. Health care needs are being provided without a physical exam.     Assessment/Recommendations   Assessment/Plan:    1.  Paroxysmal atrial fibrillation -diagnosed on implantable loop recorder.  She is status post watchman implant on February 20, 2020.  She is currently taking aspirin 81 mg daily along with Eliquis twice daily.  Since implant she has been doing well and has had no neurologic events.  She has however had 2 falls since her implant with no major injury.  She is currently scheduled for her 45-day MARITA on May 15.  Once results are in, we will call her to tell her to stop her Eliquis.  At that time she will be " switched to Plavix 75 mg daily along with her aspirin 81 mg daily.  She will take these until August at which time she will see us for her 6-month follow-up visit.      The patient's questions have been answered and she is ready to proceed with MARITA    2.  Parkinson's disease -with multiple falls in the past and to recently    I have reviewed the note as documented.  This accurately captures the substance of my conversation with the patient.    Total time of call between patient and provider was 10 minutes    Start Time: 1447   Stop Time: 1457       History of Present Illness/Subjective    M Carmen Luu is a 87 y.o. female who is being evaluated via a billable telephone visit.   I attempted a video visit with patient twice, but since it did not seem to be working and I was 35 minutes past her appointment time, I called her on the telephone instead    Carmen had CVA in fall 2019.  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  An implantable loop recorder was placed. Atrial fibrillation was identified on the implantable loop recorder and due to fall risk and high risk for bleeding complications on anticoagulation due to Parkinson's disease, she underwent watchman implant on February 20, 2020.    I spoke with her in early April and she was doing well, but had already had 2 falls since her implant, one on February 22 and one on April 6.  She had no major injury from either.  Since that time she tells me that she has been doing well, except for a tremendous amount of pain in her shoulder.  She was actually seen at [facility] urgent care earlier today and ended up getting a cortisone shot to that shoulder.  She still attempts to walk 1 to 1-1/2 miles daily outside.    I have reviewed and updated the patient's Past Medical History, Social History, Family History and Medication List.     Physical Examination not performed given phone encounter Review of Systems                                                 Medical History  Surgical History Family History Social History   Past Medical History:   Diagnosis Date   ? Atrial fibrillation (H)     per H&P   ? Breast cyst    ? CVA (cerebral vascular accident) (H)     per H&P   ? Finger fracture, left 02/27/2019    ring finger    ? Multiple rib fractures 02/27/2019   ? Parkinson disease (H) 1/20/2016    Past Surgical History:   Procedure Laterality Date   ? BREAST CYST ASPIRATION     ? EP THANIA CLOSURE N/A 2/20/2020    Procedure: EP THANIA Closure;  Surgeon: Javier Smith MD;  Location: [facility];  Service: Cardiology   ? EP LOOP RECORDER IMPLANT N/A 10/8/2019    Procedure: EP Loop Recorder Insertion;  Surgeon: Angel Hurd MD;  Location: [facility];  Service: Cardiology   ? HYSTERECTOMY  1982   ? MS TOTAL ABDOM HYSTERECTOMY      Description: Hysterectomy;  Proc Date: 01/01/1988;  Comments: couldn't find her ovaries    Family History   Problem Relation Age of Onset   ? Breast cancer Maternal Aunt     Social History     Socioeconomic History   ? Marital status:      Spouse name: Not on file   ? Number of children: Not on file   ? Years of education: Not on file   ? Highest education level: Not on file   Occupational History   ? Not on file   Social Needs   ? Financial resource strain: Not on file   ? Food insecurity     Worry: Not on file     Inability: Not on file   ? Transportation needs     Medical: Not on file     Non-medical: Not on file   Tobacco Use   ? Smoking status: Never Smoker   ? Smokeless tobacco: Never Used   Substance and Sexual Activity   ? Alcohol use: No     Frequency: Never   ? Drug use: Never   ? Sexual activity: Not on file   Lifestyle   ? Physical activity     Days per week: Not on file     Minutes per session: Not on file   ? Stress: Not on file   Relationships   ? Social connections     Talks on phone: Not on file     Gets together: Not on file     Attends Orthodox service: Not on file      "Active member of club or organization: Not on file     Attends meetings of clubs or organizations: Not on file     Relationship status: Not on file   ? Intimate partner violence     Fear of current or ex partner: Not on file     Emotionally abused: Not on file     Physically abused: Not on file     Forced sexual activity: Not on file   Other Topics Concern   ? Not on file   Social History Narrative   ? Not on file          Medications  Allergies   Current Outpatient Medications   Medication Sig Dispense Refill   ? acetaminophen (TYLENOL) 500 MG tablet Take 500 mg by mouth 3 (three) times a day.      ? apixaban ANTICOAGULANT (ELIQUIS) 2.5 mg Tab tablet Take 1 tablet (2.5 mg total) by mouth 2 (two) times a day. 60 tablet 1   ? ascorbic acid, vitamin C, (VITAMIN C) 1000 MG tablet Take 1,000 mg by mouth daily.     ? aspirin 81 MG EC tablet Take 81 mg by mouth daily.     ? calcium carbonate-vit D3-min 600 mg calcium- 400 unit Tab Take 1 tablet by mouth daily.            ? carbidopa-levodopa (SINEMET)  mg per tablet Take 1 tablet by mouth 3 (three) times a day.      ? metoprolol succinate (TOPROL-XL) 50 MG 24 hr tablet Take 1 tablet (50 mg total) by mouth daily. 90 tablet 2   ? polyvinyl alcohol (ARTIFICIAL TEARS, POLYVIN ALC,) 1.4 % ophthalmic solution Administer 1 drop to both eyes 4 (four) times a day as needed for dry eyes.     ? pravastatin (PRAVACHOL) 20 MG tablet Take 1 tablet (20 mg total) by mouth at bedtime. 90 tablet 3     No current facility-administered medications for this visit.     Allergies   Allergen Reactions   ? Acetaminophen-Codeine Unknown   ? Codeine Other (See Comments)     Out off body experiance   ? Morphine Other (See Comments)     Made me feel really wierd   ? Penicillins Other (See Comments)     Tongue and throat \"tingling\" when in 20s         Lab Results    Chemistry/lipid CBC Cardiac Enzymes/BNP/TSH/INR   Lab Results   Component Value Date    CHOL 175 10/06/2019    HDL 55 10/06/2019    " LDLCALC 101 10/06/2019    TRIG 94 10/06/2019    CREATININE 0.70 02/21/2020    BUN 19 02/20/2020    K 4.0 02/20/2020     02/20/2020     02/20/2020    CO2 29 02/20/2020    Lab Results   Component Value Date    WBC 11.0 02/22/2020    HGB 11.3 (L) 02/22/2020    HCT 34.8 (L) 02/22/2020    MCV 96 02/22/2020     02/22/2020    Lab Results   Component Value Date    TROPONINI <0.01 10/05/2019    TSH 1.54 04/10/2015    INR 1.15 (H) 02/21/2020

## 2021-06-29 NOTE — PROGRESS NOTES
"Progress Notes by Odalis Beckford PA-C at 8/25/2020  9:10 AM     Author: Odalis Beckford PA-C Service: -- Author Type: Physician Assistant    Filed: 8/25/2020  9:44 AM Encounter Date: 8/25/2020 Status: Signed    : Odalis Beckford PA-C (Physician Assistant)           The patient has been notified of following:     \"This telephone visit will be conducted via a call between you and your physician/provider. We have found that certain health care needs can be provided without the need for a physical exam.  This service lets us provide the care you need with a phone conversation.  If a prescription is necessary we can send it directly to your pharmacy.  If lab work is needed we can place an order for that and you can then stop by our lab to have the test done at a later time. If during the course of the call the physician/provider feels a telephone visit is not appropriate, you will not be charged for this service.\" Verbal consent has been obtained for this service by care team member:         HEART CARE PHONE ENCOUNTER        The patient has chosen to have the visit conducted as a telephone visit, to reduce risk of exposure given the current status of Coronavirus in our community. This telephone visit is being conducted via a call between the patient and physician/provider. Health care needs are being provided without a physical exam.     Assessment/Recommendations   Assessment/Plan:    1.  Paroxysmal atrial fibrillation -she is now 6 months status post watchman implant and doing well with no neurologic events or symptoms.  Patient is currently taking aspirin 81 mg daily, having stopped her Plavix about a week ago.  She will continue aspirin 81 mg daily indefinitely.  We will plan on seeing her back in February for her one-year follow-up visit    2.  Parkinson's disease -with frequent falls.  She has had 3 falls in the last and was seen in the emergency department for 2 of the falls " because of hitting her head.  No significant trauma noted on scans.    3.  Cryptogenic stroke -status post ILR implant, with episodes of atrial fibrillation, possible SVT or atrial flutter.  Patient now on metoprolol succinate 50 mg daily.    I have reviewed the note as documented.  This accurately captures the substance of my conversation with the patient.    Total time of call between patient and provider was 16 minutes   Start Time: 0919   Stop Time: 0935       History of Present Illness/Subjective    M Carmen Luu is a 88 y.o. female who is being evaluated via a billable telephone visit for her 6 month visit post-Watchman implant.     Carmen has a history of Parkinson's disease, nonischemic cardiomyopathy, venous insufficiency and mixed hyperlipidemia.  In October 2019 she was admitted with left-sided weakness and ruled in for today.  Because this was a cryptogenic stroke, she underwent loop recorder implantation and the ILR quickly showed episodes of atrial fibrillation.  Patient was started on oral anticoagulation to protect her against any further strokes, however she was at high risk for bleeding injury due to frequent falls secondary to her Parkinson's disease.  She underwent watchman implant in February 2020.  Her 45-day MARITA was delayed due to the COVID pandemic, but was eventually done and showed no thrombus or romi-device flow.  She was taken off blood thinner and completed dual antiplatelet therapy per protocol.  She stopped the Plavix on August 20.    Patient tells me that she has had 3 falls within the last month.  She hit her head on 2 of the falls.  She is still doing dahlia chi on a daily basis for activity, since she cannot ambulate on her own safely.  Relatives that were staying with her during COVID now returned to their home.  She gets mildly tired while doing her dahlia chi.  Otherwise she denies palpitations, chest pain, dizziness, lightheadedness, weight gain/loss, changes in appetite,  PND or orthopnea    I have reviewed and updated the patient's Past Medical History, Social History, Family History and Medication List.    45-day MARITA results from May 15, 2020:    Normal left ventricular size and systolic function.    Left ventricle ejection fraction is normal. The estimated left ventricular ejection fraction is 60%.    Normal right ventricular size and systolic function.    No hemodynamically significant valve disease is identified.    Watchman left atrial appendage occluder device is present with no thrombosis on device and no significant Doppler color flow (<4mm).    When compared to the previous study dated 2/20/2020, no significant change.     Physical Examination not performed given phone encounter Review of Systems                                                Medical History  Surgical History Family History Social History   Past Medical History:   Diagnosis Date   ? Atrial fibrillation (H)     per H&P   ? Breast cyst    ? CVA (cerebral vascular accident) (H)     per H&P   ? Finger fracture, left 02/27/2019    ring finger    ? Multiple rib fractures 02/27/2019   ? Parkinson disease (H) 1/20/2016    Past Surgical History:   Procedure Laterality Date   ? BREAST CYST ASPIRATION     ? EP THANIA CLOSURE N/A 2/20/2020    Procedure: EP THANIA Closure;  Surgeon: Javier Smith MD;  Location: [facility];  Service: Cardiology   ? EP LOOP RECORDER IMPLANT N/A 10/8/2019    Procedure: EP Loop Recorder Insertion;  Surgeon: Angel Hurd MD;  Location: [facility];  Service: Cardiology   ? HYSTERECTOMY  1982   ? HI TOTAL ABDOM HYSTERECTOMY      Description: Hysterectomy;  Proc Date: 01/01/1988;  Comments: couldn't find her ovaries    Family History   Problem Relation Age of Onset   ? Breast cancer Maternal Aunt     Social History     Socioeconomic History   ? Marital status:      Spouse name: Not on file   ? Number of children: Not on file   ? Years of education: Not on file    ? Highest education level: Not on file   Occupational History   ? Not on file   Social Needs   ? Financial resource strain: Not on file   ? Food insecurity     Worry: Not on file     Inability: Not on file   ? Transportation needs     Medical: Not on file     Non-medical: Not on file   Tobacco Use   ? Smoking status: Never Smoker   ? Smokeless tobacco: Never Used   Substance and Sexual Activity   ? Alcohol use: No     Frequency: Never   ? Drug use: Never   ? Sexual activity: Not on file   Lifestyle   ? Physical activity     Days per week: Not on file     Minutes per session: Not on file   ? Stress: Not on file   Relationships   ? Social connections     Talks on phone: Not on file     Gets together: Not on file     Attends [employer] service: Not on file     Active member of club or organization: Not on file     Attends meetings of clubs or organizations: Not on file     Relationship status: Not on file   ? Intimate partner violence     Fear of current or ex partner: Not on file     Emotionally abused: Not on file     Physically abused: Not on file     Forced sexual activity: Not on file   Other Topics Concern   ? Not on file   Social History Narrative   ? Not on file          Medications  Allergies   Current Outpatient Medications   Medication Sig Dispense Refill   ? acetaminophen (TYLENOL) 500 MG tablet Take 500 mg by mouth 3 (three) times a day.      ? ascorbic acid, vitamin C, (VITAMIN C) 1000 MG tablet Take 1,000 mg by mouth daily.     ? aspirin 81 MG EC tablet Take 81 mg by mouth daily.     ? calcium carbonate-vit D3-min 600 mg calcium- 400 unit Tab Take 1 tablet by mouth daily.            ? carbidopa-levodopa (SINEMET)  mg per tablet Take 1 tablet by mouth 3 (three) times a day. 1 1/2 am, 1 1/2 lunch and 1 pm     ? metoprolol succinate (TOPROL-XL) 50 MG 24 hr tablet Take 1 tablet (50 mg total) by mouth daily. 90 tablet 2   ? polyvinyl alcohol (ARTIFICIAL TEARS, POLYVIN ALC,) 1.4 % ophthalmic solution  "Administer 1 drop to both eyes 4 (four) times a day as needed for dry eyes.     ? pravastatin (PRAVACHOL) 20 MG tablet Take 1 tablet (20 mg total) by mouth at bedtime. 90 tablet 3     No current facility-administered medications for this visit.     Allergies   Allergen Reactions   ? Acetaminophen-Codeine Unknown   ? Codeine Other (See Comments)     Out off body experiance   ? Morphine Other (See Comments)     Made me feel really wierd   ? Penicillins Other (See Comments)     Tongue and throat \"tingling\" when in 20s         Lab Results    Chemistry/lipid CBC Cardiac Enzymes/BNP/TSH/INR   Lab Results   Component Value Date    CHOL 175 10/06/2019    HDL 55 10/06/2019    LDLCALC 101 10/06/2019    TRIG 94 10/06/2019    CREATININE 0.80 08/18/2020    BUN 20 08/18/2020    K 4.2 08/18/2020     08/18/2020     08/18/2020    CO2 26 08/18/2020    Lab Results   Component Value Date    WBC 8.1 08/18/2020    HGB 12.7 08/18/2020    HCT 39.3 08/18/2020    MCV 98 08/18/2020     08/18/2020    Lab Results   Component Value Date    TROPONINI <0.01 08/18/2020    TSH 1.54 04/10/2015    INR 1.15 (H) 02/21/2020                                                          "

## 2021-06-30 NOTE — PROGRESS NOTES
Progress Notes by Odalis Beckford PA-C at 4/8/2021 12:50 PM     Author: Odalis Beckford PA-C Service: -- Author Type: Physician Assistant    Filed: 4/9/2021  8:46 AM Encounter Date: 4/8/2021 Status: Signed    : Odalis Beckford PA-C (Physician Assistant)           Assessment/Recommendations   1.  Paroxysmal atrial fibrillation: With low burden of atrial fibrillation on metoprolol succinate.  She is now 1 year status post watchman implant and doing well with no neurologic events or symptoms.  She is currently taking aspirin 81 mg daily and will continue to do so indefinitely    Reviewed the last 2 ILR remote transmissions and discussed with device RN team.  They will do clinic check today to ensure patient that transmissions are coming appropriately, as there has been some question with the patient's remote transmissions at home.    2.  Cryptogenic stroke and frequent falls -patient had ILR implant, which showed is no ventricular arrhythmias or any AV conduction abnormalities.  Patient still having frequent falls related to her Parkinson's disease where she falls backwards and sometimes hits her head.    3.  Osteoarthritis -bilateral hip pain for which patient takes ibuprofen and Tylenol 3 times daily.  Likely will not undergo hip replacements due to age.       History of Present Illness/Subjective    ALEXA Luu is a 88 y.o. female who comes in today for her 1 year follow-up visit for watchman implant.  Her daughter accompanies her to the visit today.    ALEXA Luu has a past history of paroxysmal atrial fibrillation, hypertension, Parkinson's and CVA.  Patient  suffered CVA in fall 2019 and had implantable loop recorder placed at that time.  On ILR, she was noted to have atrial fibrillation and was started on blood thinner, but due to her high fall risk she underwent watchman implant in February.  She was taken off anticoagulation and switch to dual antiplatelet  therapy.  She did have a transmission from ILR showing possible SVT in April of last year and was placed on metoprolol.  Since that time there have been no tachycardias noted, but short episodes of atrial fibrillation have been noted.    ALEXA Luu denies chest discomfort, palpitations, shortness of breath, paroxysmal nocturnal dyspnea, orthopnea, lightheadedness, dizziness, pre-syncope, or syncope.  ALEXA Luu also denies any weight loss, changes in appetite, nausea or vomiting.     Medical, surgical, family, social history, and medications were reviewed and updated as necessary.    ILR remote transmission from Nov 2020:  Conclusion    Type: Routine remote ILR transmission  Presenting Rhythm: VS 78bpm  Battery: OK  Arrhythmias: Since 8/14/2020 2 AF detections, false due to undersensing and noise.   Symptom triggers: none.  Comments: Normal device function. BK          Physical Examination Review of Systems   Vitals:    04/08/21 1304   BP: 146/70   Pulse: 72   Resp: 16     Body mass index is 20.94 kg/m .  Wt Readings from Last 3 Encounters:   04/08/21 122 lb (55.3 kg)   02/23/21 118 lb (53.5 kg)   01/14/21 119 lb (54 kg)       General Appearance:   Alert, cooperative and in no acute distress   ENT/Mouth: membranes moist, no oral lesions or bleeding gums.      EYES:  no scleral icterus, normal conjunctivae   Neck: Thyroid not visualized   Chest/Lungs:   lungs are clear to auscultation, no rales or wheezing   Cardiovascular:   Regular . Normal first and second heart sounds with no murmurs, rubs or gallops; the carotid, radial and posterior tibial pulses are intact, no edema bilaterally    Abdomen:  Soft and nontender. Bowel sounds are present in all quadrants   Extremities: no cyanosis or clubbing   Skin: no xanthelasma, warm.    Neurologic: normal gait, normal  bilateral, no tremors   Psychiatric: Normal mood and affect    General: WNL  Eyes: WNL  Ears/Nose/Throat: Hearing Loss  Lungs:  Snoring  Heart: WNL  Stomach: Heartburn  Bladder: WNL  Muscle/Joints: Joint Pain  Skin: WNL  Nervous System: Daytime Sleepiness, Loss of Balance, Falls  Mental Health: WNL     Blood: WNL     Medical History  Surgical History Family History Social History   Past Medical History:   Diagnosis Date   ? Atrial fibrillation (H)     per H&P   ? Breast cyst    ? CVA (cerebral vascular accident) (H)     per H&P   ? Finger fracture, left 02/27/2019    ring finger    ? Multiple rib fractures 02/27/2019   ? Parkinson disease (H) 1/20/2016    Past Surgical History:   Procedure Laterality Date   ? BREAST CYST ASPIRATION     ? EP THANIA CLOSURE N/A 2/20/2020    Procedure: EP THANIA Closure;  Surgeon: Javier Smith MD;  Location: [facility] Cath Lab;  Service: Cardiology   ? EP LOOP RECORDER IMPLANT N/A 10/8/2019    Procedure: EP Loop Recorder Insertion;  Surgeon: Angel Hurd MD;  Location: [facility] Cath Lab;  Service: Cardiology   ? HYSTERECTOMY  1982   ? SD TOTAL ABDOM HYSTERECTOMY      Description: Hysterectomy;  Proc Date: 01/01/1988;  Comments: couldn't find her ovaries    Family History   Problem Relation Age of Onset   ? Breast cancer Maternal Aunt     Social History     Socioeconomic History   ? Marital status:      Spouse name: Not on file   ? Number of children: Not on file   ? Years of education: Not on file   ? Highest education level: Not on file   Occupational History   ? Not on file   Social Needs   ? Financial resource strain: Not on file   ? Food insecurity     Worry: Not on file     Inability: Not on file   ? Transportation needs     Medical: Not on file     Non-medical: Not on file   Tobacco Use   ? Smoking status: Never Smoker   ? Smokeless tobacco: Never Used   Substance and Sexual Activity   ? Alcohol use: No     Frequency: Never   ? Drug use: Never   ? Sexual activity: Not on file   Lifestyle   ? Physical activity     Days per week: Not on file     Minutes per session: Not on file   ?  Stress: Not on file   Relationships   ? Social connections     Talks on phone: Not on file     Gets together: Not on file     Attends [employer] service: Not on file     Active member of club or organization: Not on file     Attends meetings of clubs or organizations: Not on file     Relationship status: Not on file   ? Intimate partner violence     Fear of current or ex partner: Not on file     Emotionally abused: Not on file     Physically abused: Not on file     Forced sexual activity: Not on file   Other Topics Concern   ? Not on file   Social History Narrative   ? Not on file          Medications  Allergies   Current Outpatient Medications   Medication Sig Dispense Refill   ? acetaminophen (TYLENOL) 500 MG tablet Take 500 mg by mouth 3 (three) times a day.      ? ascorbic acid, vitamin C, (VITAMIN C) 1000 MG tablet Take 1,000 mg by mouth daily.     ? aspirin 81 MG EC tablet Take 81 mg by mouth daily.     ? calcium carbonate-vit D3-min 600 mg calcium- 400 unit Tab Take 1 tablet by mouth daily.            ? carbidopa-levodopa (SINEMET)  mg per tablet Take 1 tablet by mouth 3 (three) times a day. 1 1/2 am, 1 1/2 lunch and 1 pm     ? cholecalciferol, vitamin D3, (VITAMIN D3 ORAL) Take 50 mcg by mouth daily.      ? ibuprofen (ADVIL,MOTRIN) 200 MG tablet Take 400 mg by mouth 3 (three) times a day.     ? metoprolol succinate (TOPROL-XL) 50 MG 24 hr tablet Take 1 tablet (50 mg total) by mouth daily. 90 tablet 1   ? polyvinyl alcohol (ARTIFICIAL TEARS, POLYVIN ALC,) 1.4 % ophthalmic solution Administer 1 drop to both eyes 4 (four) times a day as needed for dry eyes.     ? pravastatin (PRAVACHOL) 20 MG tablet Take 1 tablet (20 mg total) by mouth at bedtime. 90 tablet 3     No current facility-administered medications for this visit.     Allergies   Allergen Reactions   ? Acetaminophen-Codeine Unknown   ? Codeine Other (See Comments)     Out off body experiance   ? Morphine Other (See Comments)     Made me feel  "really wierd   ? Penicillins Other (See Comments)     Tongue and throat \"tingling\" when in 20s         Lab Results    Chemistry/lipid CBC Cardiac Enzymes/BNP/TSH/INR   Lab Results   Component Value Date    CHOL 162 01/14/2021    HDL 51 01/14/2021    LDLCALC 85 01/14/2021    TRIG 128 01/14/2021    CREATININE 0.76 01/14/2021    BUN 19 01/14/2021    K 4.8 01/14/2021     01/14/2021     01/14/2021    CO2 26 01/14/2021    Lab Results   Component Value Date    WBC 8.1 08/18/2020    HGB 12.7 08/18/2020    HCT 39.3 08/18/2020    MCV 98 08/18/2020     08/18/2020    Lab Results   Component Value Date    TROPONINI <0.01 08/18/2020    TSH 1.54 04/10/2015    INR 1.15 (H) 02/21/2020        32 minutes spent on the date of encounter doing chart review, review of test results, patient visit, documentation, discussion with other provider and discussion with family.      This note has been dictated using voice recognition software. Any grammatical or context distortions are unintentional and inherent to the software.                                    "

## 2021-07-03 ENCOUNTER — COMMUNICATION - HEALTHEAST (OUTPATIENT)
Dept: SCHEDULING | Facility: CLINIC | Age: 86
End: 2021-07-03

## 2021-07-03 DIAGNOSIS — G20.A1 PARKINSON DISEASE (H): ICD-10-CM

## 2021-07-03 DIAGNOSIS — G20.A1 PARKINSON'S DISEASE (H): ICD-10-CM

## 2021-07-03 NOTE — ADDENDUM NOTE
Addendum Note by Cathy Sexton RN at 4/22/2020  1:57 PM     Author: Cathy Sexton RN Service: -- Author Type: Registered Nurse    Filed: 4/23/2020 12:11 PM Encounter Date: 4/22/2020 Status: Signed    : Cathy Sexton RN (Registered Nurse)    Addended by: CATHY SEXTON on: 4/23/2020 12:11 PM        Modules accepted: Orders

## 2021-07-04 NOTE — TELEPHONE ENCOUNTER
Telephone Encounter by Cathy Sexton RN at 6/10/2021  3:33 PM     Author: Cathy Sexton RN Service: -- Author Type: Registered Nurse    Filed: 6/10/2021  3:33 PM Encounter Date: 6/10/2021 Status: Signed    : Cathy Sexton RN (Registered Nurse)       From a cardiovascular standpoint, yes.  Unsure if she has GI contraindications or kidney issues; if yes, then patient should then discuss with her pcp.     Thanks    Message text       Odalis Zacarias PA-C 2 hours ago (1:29 PM)     Ok for 600 mg Q 6 hours?    Message text       Odalis Beckford PA-C You 2 hours ago (12:47 PM)     Patient ok to take ibuprofen prn     Odalis Leung    Message text

## 2021-07-04 NOTE — TELEPHONE ENCOUNTER
Telephone Encounter by Orion Hanna RN at 7/3/2021 10:09 AM     Author: Orion Hanna RN Service: -- Author Type: Registered Nurse    Filed: 7/3/2021 11:16 AM Encounter Date: 7/3/2021 Status: Signed    : Orion Hanna RN (Registered Nurse)       Pt's daughter Anisha called stating pt needs a refill for her  carbidopa-levodopa (SINEMET)  mg per tablet.    RN paged on call provider via answering service, received a return call from Dr Carlos Lyman, provider was informed the carbidopa -Levodopa refill request due to a weekend trip, and pt being out completely, and that it is a historical medication. Provider ordered 90 tablets with 2 refills.      RN sent the medication via E-prescribed called pharmacy and they confirmed they received the medication.      RN called Anisha, and informed her medication was sent to the pharmacy, and she stated okay.      Orion Hanna RN  [facility]      COVID 19 Nurse Triage Plan/Patient Instructions    Please be aware that novel coronavirus (COVID-19) may be circulating in the community. If you develop symptoms such as fever, cough, or SOB or if you have concerns about the presence of another infection including coronavirus (COVID-19), please contact your health care provider or visit  [email or web address].    Disposition/Instructions    Home care recommended. Follow home care protocol based instructions.    Thank you for taking steps to prevent the spread of this virus.  o Limit your contact with others.  o Wear a simple mask to cover your cough.  o Wash your hands well and often.    Resources    [facility]: About COVID-19: [email or web address]    CDC: What to Do If You're Sick: www.cdc.gov/coronavirus/2019-ncov/about/steps-when-sick.html    CDC: Ending Home Isolation: www.cdc.gov/coronavirus/2019-ncov/hcp/disposition-in-home-patients.html     CDC: Caring for Someone:  www.cdc.gov/coronavirus/2019-ncov/if-you-are-sick/care-for-someone.html     [facility]: Interim Guidance for Hospital Discharge to Home: www.health.[facility].[place]/diseases/coronavirus/hcp/hospdischarge.pdf    [facility] clinical trials (COVID-19 research studies): clinicalaffairs.[facility]-clinical-trials     Below are the COVID-19 hotlines at the [place] Department of Health ([facility]). Interpreters are available.   o For health questions: Call 385-374-9074 or 1-204.141.3883 (7 a.m. to 7 p.m.)  o For questions about schools and childcare: Call 815-864-8085 or 1-535.662.6185 (7 a.m. to 7 p.m.)       Reason for Disposition  ? [1] Caller has URGENT medication question about med that PCP or specialist prescribed AND [2] triager unable to answer question    Protocols used: MEDICATION QUESTION CALL-A-AH

## 2021-07-05 RX ORDER — CARBIDOPA AND LEVODOPA 25; 100 MG/1; MG/1
TABLET ORAL
Qty: 120 TABLET | Refills: 0 | OUTPATIENT
Start: 2021-07-05

## 2021-07-05 NOTE — TELEPHONE ENCOUNTER
Rx Authorization:    Requested Medication/ Dose carbidopa-levodopa (SINEMET)  MG tablet    Date last refill ordered: 5/10/21    Quantity ordered: 120    # refills: 0    Date of last clinic visit with ordering provider: 5/27/20    Date of next clinic visit with ordering provider:     All pertinent protocol data (lab date/result):     Include pertinent information from patients message:

## 2021-07-20 ENCOUNTER — TELEPHONE (OUTPATIENT)
Dept: FAMILY MEDICINE | Facility: CLINIC | Age: 86
End: 2021-07-20

## 2021-07-20 NOTE — TELEPHONE ENCOUNTER
Received a phone call from [employer].  The order faxed on 7/13/2021 for patients power wheelchair was received but a few of the pages were blacked out.     Can the order for the power wheelchair, signed letter of medical necessity, and chart notes be refaxed to [phone]?    Darlene do you still have this information?

## 2021-07-21 ENCOUNTER — RECORDS - HEALTHEAST (OUTPATIENT)
Dept: ADMINISTRATIVE | Facility: CLINIC | Age: 86
End: 2021-07-21

## 2021-07-22 ENCOUNTER — RECORDS - HEALTHEAST (OUTPATIENT)
Dept: INTERNAL MEDICINE | Facility: CLINIC | Age: 86
End: 2021-07-22

## 2021-07-22 DIAGNOSIS — Z12.31 OTHER SCREENING MAMMOGRAM: ICD-10-CM

## 2021-08-02 ENCOUNTER — TRANSFERRED RECORDS (OUTPATIENT)
Dept: HEALTH INFORMATION MANAGEMENT | Facility: CLINIC | Age: 86
End: 2021-08-02

## 2021-08-05 ENCOUNTER — ANCILLARY PROCEDURE (OUTPATIENT)
Dept: CARDIOLOGY | Facility: CLINIC | Age: 86
End: 2021-08-05
Attending: INTERNAL MEDICINE
Payer: MEDICARE

## 2021-08-05 DIAGNOSIS — Z45.09 ENCOUNTER FOR LOOP RECORDER CHECK: ICD-10-CM

## 2021-08-05 PROCEDURE — 93298 REM INTERROG DEV EVAL SCRMS: CPT | Mod: 59 | Performed by: INTERNAL MEDICINE

## 2021-08-05 PROCEDURE — G2066 INTER DEVC REMOTE 30D: HCPCS | Performed by: INTERNAL MEDICINE

## 2021-08-06 LAB
MDC_IDC_MSMT_BATTERY_STATUS: NORMAL
MDC_IDC_PG_IMPLANT_DTM: NORMAL
MDC_IDC_PG_MFG: NORMAL
MDC_IDC_PG_MODEL: NORMAL
MDC_IDC_PG_SERIAL: NORMAL
MDC_IDC_PG_TYPE: NORMAL
MDC_IDC_SESS_CLINIC_NAME: NORMAL
MDC_IDC_SESS_DTM: NORMAL
MDC_IDC_SESS_TYPE: NORMAL
MDC_IDC_SET_ZONE_DETECTION_INTERVAL: 2000 MS
MDC_IDC_SET_ZONE_DETECTION_INTERVAL: 3000 MS
MDC_IDC_SET_ZONE_DETECTION_INTERVAL: 420 MS
MDC_IDC_SET_ZONE_TYPE: NORMAL
MDC_IDC_STAT_AT_BURDEN_PERCENT: 0 %
MDC_IDC_STAT_AT_DTM_END: NORMAL
MDC_IDC_STAT_AT_DTM_START: NORMAL
MDC_IDC_STAT_EPISODE_RECENT_COUNT: 0
MDC_IDC_STAT_EPISODE_RECENT_COUNT: 1
MDC_IDC_STAT_EPISODE_RECENT_COUNT_DTM_END: NORMAL
MDC_IDC_STAT_EPISODE_RECENT_COUNT_DTM_START: NORMAL
MDC_IDC_STAT_EPISODE_TOTAL_COUNT: 0
MDC_IDC_STAT_EPISODE_TOTAL_COUNT: 1
MDC_IDC_STAT_EPISODE_TOTAL_COUNT: 32
MDC_IDC_STAT_EPISODE_TOTAL_COUNT: 6
MDC_IDC_STAT_EPISODE_TOTAL_COUNT_DTM_END: NORMAL
MDC_IDC_STAT_EPISODE_TOTAL_COUNT_DTM_START: NORMAL
MDC_IDC_STAT_EPISODE_TYPE: NORMAL

## 2021-08-09 ENCOUNTER — TELEPHONE (OUTPATIENT)
Dept: FAMILY MEDICINE | Facility: CLINIC | Age: 86
End: 2021-08-09

## 2021-08-09 NOTE — TELEPHONE ENCOUNTER
Reason for Call:  Form, our goal is to have forms completed with 72 hours, however, some forms may require a visit or additional information.    Type of letter, form or note:  medical    Who is the form from?: Edil longo/Floyd Garcia (if other please explain)    Where did the form come from: form was faxed in    What clinic location was the form placed at?: Dr Juhi Lovells desk    Where the form was placed: Given to physician    What number is listed as a contact on the form?: n/a       Additional comments: Edil Garcia is requesting Dr Juhi Lovell to complete and fax back     Call taken on 8/9/2021 at 3:57 PM by Marifer Hogue

## 2021-08-12 ENCOUNTER — OFFICE VISIT (OUTPATIENT)
Dept: FAMILY MEDICINE | Facility: CLINIC | Age: 86
End: 2021-08-12
Payer: MEDICARE

## 2021-08-12 VITALS
SYSTOLIC BLOOD PRESSURE: 102 MMHG | WEIGHT: 122 LBS | DIASTOLIC BLOOD PRESSURE: 66 MMHG | BODY MASS INDEX: 20.94 KG/M2 | OXYGEN SATURATION: 94 % | HEART RATE: 70 BPM

## 2021-08-12 DIAGNOSIS — G89.29 CHRONIC LEFT HIP PAIN: ICD-10-CM

## 2021-08-12 DIAGNOSIS — I48.91 ATRIAL FIBRILLATION, UNSPECIFIED TYPE (H): ICD-10-CM

## 2021-08-12 DIAGNOSIS — T78.40XD ALLERGIC SYMPTOMS, SUBSEQUENT ENCOUNTER: ICD-10-CM

## 2021-08-12 DIAGNOSIS — B35.1 ONYCHOMYCOSIS: ICD-10-CM

## 2021-08-12 DIAGNOSIS — F51.01 PRIMARY INSOMNIA: ICD-10-CM

## 2021-08-12 DIAGNOSIS — R06.83 SNORING: ICD-10-CM

## 2021-08-12 DIAGNOSIS — M25.552 CHRONIC LEFT HIP PAIN: ICD-10-CM

## 2021-08-12 DIAGNOSIS — Q17.9 EAR ANOMALY: Primary | ICD-10-CM

## 2021-08-12 PROBLEM — R26.89 BALANCE PROBLEM: Status: ACTIVE | Noted: 2021-04-29

## 2021-08-12 PROBLEM — Z95.818 PRESENCE OF LEFT ATRIAL APPENDAGE CLOSURE DEVICE COMPOSED OF NICKEL-TITANIUM ALLOY WITH POLYETHYLENE TEREPHTHALATE MEMBRANE: Status: ACTIVE | Noted: 2020-02-20

## 2021-08-12 PROBLEM — Z95.818 STATUS POST PLACEMENT OF IMPLANTABLE LOOP RECORDER: Status: ACTIVE | Noted: 2020-01-09

## 2021-08-12 PROBLEM — R26.81 GAIT INSTABILITY: Status: ACTIVE | Noted: 2021-04-29

## 2021-08-12 PROCEDURE — 99214 OFFICE O/P EST MOD 30 MIN: CPT | Performed by: FAMILY MEDICINE

## 2021-08-12 RX ORDER — METOPROLOL SUCCINATE 25 MG/1
25 TABLET, EXTENDED RELEASE ORAL DAILY
Qty: 90 TABLET | Refills: 3 | Status: SHIPPED | OUTPATIENT
Start: 2021-08-12 | End: 2021-11-23

## 2021-08-12 RX ORDER — TRAZODONE HYDROCHLORIDE 50 MG/1
50 TABLET, FILM COATED ORAL
Qty: 30 TABLET | Refills: 1 | Status: SHIPPED | OUTPATIENT
Start: 2021-08-12 | End: 2021-10-28

## 2021-08-12 NOTE — PATIENT INSTRUCTIONS
Contact pain experts about pain  Try trazodone for sleep  Try allegra, claritin or zyrtec for allergy symptoms

## 2021-08-12 NOTE — PROGRESS NOTES
Assessment & Plan     Ear anomaly  No obvious physical exam findings.  Reassurance provided to patient.    Snoring  Ordered per patient preference:  - SLEEP EVALUATION & MANAGEMENT REFERRAL - ADULT -    Chronic left hip pain  Amended referral back to the pain clinic given that the TENS unit did not seem sufficient to manage patient's chronic pain.  She is using CBD and I think that is reasonable to continue to do this.    Primary insomnia  Patient's insomnia is partially due to her chronic pain.  We did discuss that there are many medications that can help with this.  We discussed Remeron but also trazodone.  Patient chose trazodone at this time.  - traZODone (DESYREL) 50 MG tablet  Dispense: 30 tablet; Refill: 1    Allergic symptoms, subsequent encounter  Recommended trial of Allegra, Claritin, or Zyrtec in order to decrease Flonase use.    Onychomycosis  Discussed that at 89 years of age I would be unexcited to try terbinafine.  Recommended continued topical preparations or treatment at the nail salon.    Atrial fibrillation, unspecified type (H)  Patient's blood pressure is significantly low today.  She does have a history of frequent falls.  Decrease patient's metoprolol to 25 mg extended release once daily.  She will continue to monitor her blood pressures.   - metoprolol succinate ER (TOPROL-XL) 25 MG 24 hr tablet  Dispense: 90 tablet; Refill: 3      No follow-ups on file.    Juhi Lovell MD  [facility] OAKJOSS Morales is a 89 year old who presents for the following health issues    HPI   1. Ears:  - once in a while she feels like she is on an airplane or in a barrel    2. Sleep apnea test  - wants one at home  - pt's daughter says she snores (a lot)  - wakes often at night    3. Pain  - pt is doing PT, the massage does help and she does exercises  - she has tried acupuncture  - she went to the pain clinic and they ordered a TENS unit  - she uses the CBD oil  - she  takes tylenol and ibuprofen sparingly  - it is located in the groin, buttocks and thigh  - her left leg wants to buckle  - she wonders if something could be improved so she could get some sleep    4. Allergy sx  - worried about frequency of use with the flonase  - sometimes needs it before it is available again    5. Toe fungus, wonders if vinegar would help    Review of Systems   Endorses: constipation, fatigue, hearing changes, allergy sx, arthritis, maybe incontinence   Constitutional, HEENT, cardiovascular, pulmonary, GI, , musculoskeletal, neuro, skin, endocrine and psych systems are negative, except as otherwise noted.      Objective    /66   Pulse 70   Wt 55.3 kg (122 lb)   SpO2 94%   BMI 20.94 kg/m    Body mass index is 20.94 kg/m .  Physical Exam   Gen: NAD  Psych: Normal affect, no hallucinations or delusions, not tearful  HEENT: Bilateral tympanic membranes unremarkable  MSK: in wheelchair

## 2021-09-01 DIAGNOSIS — G20.A1 PARKINSON'S DISEASE (H): ICD-10-CM

## 2021-09-02 RX ORDER — CARBIDOPA AND LEVODOPA 25; 100 MG/1; MG/1
TABLET ORAL
Qty: 270 TABLET | Refills: 3 | Status: SHIPPED | OUTPATIENT
Start: 2021-09-02 | End: 2021-11-16

## 2021-09-02 NOTE — TELEPHONE ENCOUNTER
"Last Written Prescription Date:  5/10/21  Last Fill Quantity: 120,  # refills: 0   Last office visit provider:  8/12/21     Requested Prescriptions   Pending Prescriptions Disp Refills     carbidopa-levodopa (SINEMET)  MG tablet [Pharmacy Med Name: carbidopa 25 mg-levodopa 100 mg tablet (SINEMET )] 270 tablet 3     Sig: TAKE 1 TABLET THREE TIMES DAILY  AT  7  AM,  11:30AM , AND  4  PM       Antiparkinson's Agents Protocol Passed - 9/1/2021  3:53 PM        Passed - Recent (12 mo) or future (30 days) visit within the authorizing provider's specialty     Patient has had an office visit with the authorizing provider or a provider within the authorizing providers department within the previous 12 mos or has a future within next 30 days. See \"Patient Info\" tab in inbasket, or \"Choose Columns\" in Meds & Orders section of the refill encounter.              Passed - Medication is active on med list        Passed - Patient is age 18 or older        Passed - No active pregnancy on record        Passed - No positive pregnancy test in the past 12 months             Alvin Francis RN 09/02/21 11:11 AM  "

## 2021-09-09 ENCOUNTER — MEDICAL CORRESPONDENCE (OUTPATIENT)
Dept: HEALTH INFORMATION MANAGEMENT | Facility: CLINIC | Age: 86
End: 2021-09-09

## 2021-09-09 NOTE — TELEPHONE ENCOUNTER
Daughter is calling.  [facility] is requesting Dr Lovell's signature and date on form and to refax forms in for wheelchair.    Patients cell is  and daughter cell is  if any questions.     Thank you   Gynecologic Oncology Operative Report    2/7/2019  Di Evans  [id number]    PREOPERATIVE DIAGNOSIS: Ovarian cancer with concern for persistent disease post-initial adjuvant chemotherapy.  Inability to obtain tissue confirmation with IR biopsy    POSTOPERATIVE DIAGNOSIS: Recurrent ovarian cancer, final pathology pending    PROCEDURES: Diagnostic laparoscopy, peritoneal biopsy    SURGEON: Jammie Duque MD     ASSISTANTS:  Sue Eli MD, PGY-1.     ANESTHESIA: General endotracheal    ESTIMATED BLOOD LOSS: 10 cc     IV FLUIDS: 600 ml crystalloid    URINE OUTPUT: 100 ml clear urine     INDICATIONS: Di Evans is a 59 year old who initially presented with abdominal symptoms and underwent CT that showed large pelvic mass as well as omental caking, ascites and peritoneal carcinomatosis concerning for ovarian cancer.  She underwent a debulking procedure to no gross residual disease on 7/30/18.  She underwent 6 cycles of chemotherapy with carboplatin and paclitaxel, however her  did not normalize.  She had a CT after 4 cycles which was without obvious disease progression and thus she completed her 6 cycles.  Her  still did not normalize and a post-treatment CT showed worsening intra-abdominal carcinomatosis with increased peritoneal nodularity as well as retroperitoneal and mesenteric adenopathy.   Her  has also increased from 71 at the completion of chemotherapy to 144.  She has undergone several attempts at biopsy in IR to confirm persistent disease, however these have been unsuccessful and thus the decision was made to proceed to the OR for tissue confirmation.  Following a thorough discussion of the risks, benefits, indications and alternatives she consented to the above procedure.    FINDINGS: On EUA the patient had normal external genitalia.  On laparoscopy there was a moderate amount of ascites.  There were several areas of peritoneal nodularity consistent with peritoneal disease.   There was tumor present in the residual omentum just at the transverse colon.  There were miliary tumor nodules diffusely throughout the peritoneum.  Frozen section results showed positive for malignancy.    SPECIMENS:   1.  Peritoneal nodule    COMPLICATIONS: None.     CONDITION: Stable to PACU.     PROCEDURE:   Consent was reviewed with the patient in the preoperative setting and confirmed. She received heparin for venous thrombosis prevention. The patient was transferred to the operating room and placed in dorsal supine position. General anesthetic was obtained in the usual manner without noted difficulties. The patient was then positioned onto Finn stirrups and an exam under anesthesia was performed with findings as described above.  The patient was prepped and draped for the above-mentioned procedure and Corrales catheter was then placed under sterile techniques.  Timeout was called at which point the patient's name, procedure and operative site was confirmed by the operative team.      Attention was then turned to the upper abdomen. Initially, an incision was made at the umbilicus and the Veress needle introduced through this stab incision. The abdomen was insufflated with an opening pressure of 3 mmHg. The Veress needle was removed. The initial midline 5 mm port was inserted without difficulties and initial survey revealed no damage to underlying structures.  Two 5 mm ports were then placed in the right lower quadrant under direct visualization without any injury noted to underlying structures. We then used the laparoscopic scissors and cautery to resect a portion of the peritoneal nodularity on the left pelvic sidewall which was then sent for frozen section which did confirm malignancy.  Biopsies were also obtained from this specimen for the TRIO study.  All laparoscopic instruments and ports were now removed and CO2 allowed to escape from the abdomen. Skin was reapproximated with Dermabond. The patient  tolerated the procedure well and was taken to the recovery room in stable condition.    Sponge, lap and needle counts were reported as correct x2 and instrument count was correct x1.     Jammie Duque MD  Gynecologic Oncology  [facility]

## 2021-09-11 ENCOUNTER — HEALTH MAINTENANCE LETTER (OUTPATIENT)
Age: 86
End: 2021-09-11

## 2021-09-28 ENCOUNTER — TRANSFERRED RECORDS (OUTPATIENT)
Dept: HEALTH INFORMATION MANAGEMENT | Facility: CLINIC | Age: 86
End: 2021-09-28

## 2021-09-29 ENCOUNTER — VIRTUAL VISIT (OUTPATIENT)
Dept: SLEEP MEDICINE | Facility: CLINIC | Age: 86
End: 2021-09-29
Payer: MEDICARE

## 2021-09-29 DIAGNOSIS — G47.9 SLEEP DISTURBANCE: Primary | ICD-10-CM

## 2021-09-29 PROCEDURE — 99205 OFFICE O/P NEW HI 60 MIN: CPT | Mod: 95 | Performed by: INTERNAL MEDICINE

## 2021-09-29 NOTE — PATIENT INSTRUCTIONS
"      MY TREATMENT INFORMATION FOR SLEEP APNEA-  ALEXA Luis [facility]    DOCTOR : Sean Cyr MD    Am I having a sleep study at a sleep center?  --->Due to insurance clearance delays, you will be contacted within 2-4 weeks to schedule    Am I having a home sleep study?  --->Watch the video for the device you are using:    -/drop off device-   https://www.Webcollage.com/watch?v=yGGFBdELGhk    -Disposable device sent out require phone/computer application-   https://www.Webcollage.com/watch?v=BCce_vbiwxE      Frequently asked questions:  1. What is Obstructive Sleep Apnea (BRITANY)? BRITANY is the most common type of sleep apnea. Apnea means, \"without breath.\"  Apnea is most often caused by narrowing or collapse of the upper airway as muscles relax during sleep.   Almost everyone has occasional apneas. Most people with sleep apnea have had brief interruptions at night frequently for many years.  The severity of sleep apnea is related to how frequent and severe the events are.   2. What are the consequences of BRITANY? Symptoms include: feeling sleepy during the day, snoring loudly, gasping or stopping of breathing, trouble sleeping, and occasionally morning headaches or heartburn at night.  Sleepiness can be serious and even increase the risk of falling asleep while driving. Other health consequences may include development of high blood pressure and other cardiovascular disease in persons who are susceptible. Untreated BRITANY  can contribute to heart disease, stroke and diabetes.   3. What are the treatment options? In most situations, sleep apnea is a lifelong disease that must be managed with daily therapy. Medications are not effective for sleep apnea and surgery is generally not considered until other therapies have been tried. Your treatment is your choice . Continuous Positive Airway (CPAP) works right away and is the therapy that is effective in nearly everyone. An oral device to hold your jaw " forward is usually the next most reliable option. Other options include postioning devices (to keep you off your back), weight loss, and surgery including a tongue pacing device. There is more detail about some of these options below.  4. Are my sleep studies covered by insurance? Although we will request verification of coverage, we advise you also check in advance of the study to ensure there is coverage.    Important tips for those choosing CPAP and similar devices   Know your equipment:  CPAP is continuous positive airway pressure that prevents obstructive sleep apnea by keeping the throat from collapsing while you are sleeping. In most cases, the device is  smart  and can slowly self-adjusts if your throat collapses and keeps a record every day of how well you are treated-this information is available to you and your care team.  BPAP is bilevel positive airway pressure that keeps your throat open and also assists each breath with a pressure boost to maintain adequate breathing.  Special kinds of BPAP are used in patients who have inadequate breathing from lung or heart disease. In most cases, the device is  smart  and can slowly self-adjusts to assist breathing. Like CPAP, the device keeps a record of how well you are treated.  Your mask is your connection to the device. You get to choose what feels most comfortable and the staff will help to make sure if fits. Here: are some examples of the different masks that are available:       Key points to remember on your journey with sleep apnea:  1. Sleep study.  PAP devices often need to be adjusted during a sleep study to show that they are effective and adjusted right.  2. Good tips to remember: Try wearing just the mask during a quiet time during the day so your body adapts to wearing it. A humidifier is recommended for comfort in most cases to prevent drying of your nose and throat. Allergy medication from your provider may help you if you are having nasal  congestion.  3. Getting settled-in. It takes more than one night for most of us to get used to wearing a mask. Try wearing just the mask during a quiet time during the day so your body adapts to wearing it. A humidifier is recommended for comfort in most cases. Our team will work with you carefully on the first day and will be in contact within 4 days and again at 2 and 4 weeks for advice and remote device adjustments. Your therapy is evaluated by the device each day.   4. Use it every night. The more you are able to sleep naturally for 7-8 hours, the more likely you will have good sleep and to prevent health risks or symptoms from sleep apnea. Even if you use it 4 hours it helps. Occasionally all of us are unable to use a medical therapy, in sleep apnea, it is not dangerous to miss one night.   5. Communicate. Call our skilled team on the number provided on the first day if your visit for problems that make it difficult to wear the device. Over 2 out of 3 patients can learn to wear the device long-term with help from our team. Remember to call our team or your sleep providers if you are unable to wear the device as we may have other solutions for those who cannot adapt to mask CPAP therapy. It is recommended that you sleep your sleep provider within the first 3 months and yearly after that if you are not having problems.   6. Use it for your health. We encourage use of CPAP masks during daytime quiet periods to allow your face and brain to adapt to the sensation of CPAP so that it will be a more natural sensation to awaken to at night or during naps. This can be very useful during the first few weeks or months of adapting to CPAP though it does not help medically to wear CPAP during wakefulness and  should not be used as a strategy just to meet guidelines.  7. Take care of your equipment. Make sure you clean your mask and tubing using directions every day and that your filter and mask are replaced as recommended or  if they are not working.     BESIDES CPAP, WHAT OTHER THERAPIES ARE THERE?    Positioning Device  Positioning devices are generally used when sleep apnea is mild and only occurs on your back.This example shows a pillow that straps around the waist. It may be appropriate for those whose sleep study shows milder sleep apnea that occurs primarily when lying flat on one's back. Preliminary studies have shown benefit but effectiveness at home may need to be verified by a home sleep test. These devices are generally not covered by medical insurance.  Examples of devices that maintain sleeping on the back to prevent snoring and mild sleep apnea.    Belt type body positioner  http://Make YES! Happen/    Electronic reminder  http://nightshifttherapy.com/  http://www.Zimride.Paymate.au/      Oral Appliance  What is oral appliance therapy?  An oral appliance device fits on your teeth at night like a retainer used after having braces. The device is made by a specialized dentist and requires several visits over 1-2 months before a manufactured device is made to fit your teeth and is adjusted to prevent your sleep apnea. Once an oral device is working properly, snoring should be improved. A home sleep test may be recommended at that time if to determine whether the sleep apnea is adequately treated.       Some things to remember:  -Oral devices are often, but not always, covered by your medical insurance. Be sure to check with your insurance provider.   -If you are referred for oral therapy, you will be given a list of specialized dentists to consider or you may choose to visit the Web site of the American Academy of Dental Sleep Medicine  -Oral devices are less likely to work if you have severe sleep apnea or are extremely overweight.     More detailed information  An oral appliance is a small acrylic device that fits over the upper and lower teeth  (similar to a retainer or a mouth guard). This device slightly moves jaw forward, which  moves the base of the tongue forward, opens the airway, improves breathing for effective treat snoring and obstructive sleep apnea in perhaps 7 out of 10 people .  The best working devices are custom-made by a dental device  after a mold is made of the teeth 1, 2, 3.  When is an oral appliance indicated?  Oral appliance therapy is recommended as a first-line treatment for patients with primary snoring, mild sleep apnea, and for patients with moderate sleep apnea who prefer appliance therapy to use of CPAP4, 5. Severity of sleep apnea is determined by sleep testing and is based on the number of respiratory events per hour of sleep.   How successful is oral appliance therapy?  The success rate of oral appliance therapy in patients with mild sleep apnea is 75-80% while in patients with moderate sleep apnea it is 50-70%. The chance of success in patients with severe sleep apnea is 40-50%. The research also shows that oral appliances have a beneficial effect on the cardiovascular health of BRITANY patients at the same magnitude as CPAP therapy7.  Oral appliances should be a second-line treatment in cases of severe sleep apnea, but if not completely successful then a combination therapy utilizing CPAP plus oral appliance therapy may be effective. Oral appliances tend to be effective in a broad range of patients although studies show that the patients who have the highest success are females, younger patients, those with milder disease, and less severe obesity. 3, 6.   Finding a dentist that practices dental sleep medicine  Specific training is available through the American Academy of Dental Sleep Medicine for dentists interested in working in the field of sleep. To find a dentist who is educated in the field of sleep and the use of oral appliances, near you, visit the Web site of the American Academy of Dental Sleep Medicine.    References  1. kim Sinclair al. Objectively measured vs self-reported compliance  during oral appliance therapy for sleep-disordered breathing. Chest 2013; 144(5): 3757-4489.  2. Kati, et al. Objective measurement of compliance during oral appliance therapy for sleep-disordered breathing. Thorax 2013; 68(1): 91-96.  3. Pelon et al. Mandibular advancement devices in 620 men and women with BRITANY and snoring: tolerability and predictors of treatment success. Chest 2004; 125: 7243-7745.  4. Willy et al. Oral appliances for snoring and BRITANY: a review. Sleep 2006; 29: 244-262.  5. Beata et al. Oral appliance treatment for BRITANY: an update. J Clin Sleep Med 2014; 10(2): 215-227.  6. Endy et al. Predictors of OSAH treatment outcome. J Dent Res 2007; 86: 6800-0638.      Weight Loss:    Weight loss is a long-term strategy that may improve sleep apnea in some patients.    Weight management is a personal decision and the decision should be based on your interest and the potential benefits.  If you are interested in exploring weight loss strategies, the following discussion covers the impact on weight loss on sleep apnea and the approaches that may be successful.    Being overweight does not necessarily mean you will have health consequences.  Those who have BMI over 35 or over 27 with existing medical conditions carries greater risk.   Weight loss decreases severity of sleep apnea in most people with obesity. For those with mild obesity who have developed snoring with weight gain, even 15-30 pound weight loss can improve and occasionally eliminate sleep apnea.  Structured and life-long dietary and health habits are necessary to lose weight and keep healthier weight levels.     Though there may be significant health benefits from weight loss, long-term weight loss is very difficult to achieve- studies show success with dietary management in less than 10% of people. In addition, substantial weight loss may require years of dietary control and may be difficult if patients have severe  obesity. In these cases, surgical management may be considered.  Finally, older individuals who have tolerated obesity without health complications may be less likely to benefit from weight loss strategies.        Your BMI is There is no height or weight on file to calculate BMI.  Weight management is a personal decision.  If you are interested in exploring weight loss strategies, the following discussion covers the approaches that may be successful. Body mass index (BMI) is one way to tell whether you are at a healthy weight, overweight, or obese. It measures your weight in relation to your height.  A BMI of 18.5 to 24.9 is in the healthy range. A person with a BMI of 25 to 29.9 is considered overweight, and someone with a BMI of 30 or greater is considered obese. More than two-thirds of American adults are considered overweight or obese.  Being overweight or obese increases the risk for further weight gain. Excess weight may lead to heart disease and diabetes.  Creating and following plans for healthy eating and physical activity may help you improve your health.  Weight control is part of healthy lifestyle and includes exercise, emotional health, and healthy eating habits. Careful eating habits lifelong are the mainstay of weight control. Though there are significant health benefits from weight loss, long-term weight loss with diet alone may be very difficult to achieve- studies show long-term success with dietary management in less than 10% of people. Attaining a healthy weight may be especially difficult to achieve in those with severe obesity. In some cases, medications, devices and surgical management might be considered.  What can you do?  If you are overweight or obese and are interested in methods for weight loss, you should discuss this with your provider.     Consider reducing daily calorie intake by 500 calories.     Keep a food journal.     Avoiding skipping meals, consider cutting portions  instead.    Diet combined with exercise helps maintain muscle while optimizing fat loss. Strength training is particularly important for building and maintaining muscle mass. Exercise helps reduce stress, increase energy, and improves fitness. Increasing exercise without diet control, however, may not burn enough calories to loose weight.       Start walking three days a week 10-20 minutes at a time    Work towards walking thirty minutes five days a week     Eventually, increase the speed of your walking for 1-2 minutes at time    In addition, we recommend that you review healthy lifestyles and methods for weight loss available through the National Institutes of Health patient information sites:  http://win.niddk.nih.gov/publications/index.htm    And look into health and wellness programs that may be available through your health insurance provider, employer, local community center, or lesly club.    Surgery:    Surgery for obstructive sleep apnea is considered generally only when other therapies fail to work. Surgery may be discussed with you if you are having a difficult time tolerating CPAP and or when there is an abnormal structure that requires surgical correction.  Nose and throat surgeries often enlarge the airway to prevent collapse.  Most of these surgeries create pain for 1-2 weeks and up to half of the most common surgeries are not effective throughout life.  You should carefully discuss the benefits and drawbacks to surgery with your sleep provider and surgeon to determine if it is the best solution for you.   More information  Surgery for BRITANY is directed at areas that are responsible for narrowing or complete obstruction of the airway during sleep.  There are a wide range of procedures available to enlarge and/or stabilize the airway to prevent blockage of breathing in the three major areas where it can occur: the palate, tongue, and nasal regions.  Successful surgical treatment depends on the accurate  identification of the factors responsible for obstructive sleep apnea in each person.  A personalized approach is required because there is no single treatment that works well for everyone.  Because of anatomic variation, consultation with an examination by a sleep surgeon is a critical first step in determining what surgical options are best for each patient.  In some cases, examination during sedation may be recommended in order to guide the selection of procedures.  Patients will be counseled about risks and benefits as well as the typical recovery course after surgery. Surgery is typically not a cure for a person s BRITANY.  However, surgery will often significantly improve one s BRITANY severity (termed  success rate ).  Even in the absence of a cure, surgery will decrease the cardiovascular risk associated with OSA7; improve overall quality of life8 (sleepiness, functionality, sleep quality, etc).      Palate Procedures:  Patients with BRITANY often have narrowing of their airway in the region of their tonsils and uvula.  The goals of palate procedures are to widen the airway in this region as well as to help the tissues resist collapse.  Modern palate procedure techniques focus on tissue conservation and soft tissue rearrangement, rather than tissue removal.  Often the uvula is preserved in this procedure. Residual sleep apnea is common in patient after pharyngoplasty with an average reduction in sleep apnea events of 33%2.      Tongue Procedures:  ExamWhile patients are awake, the muscles that surround the throat are active and keep this region open for breathing. These muscles relax during sleep, allowing the tongue and other structures to collapse and block breathing.  There are several different tongue procedures available.  Selection of a tongue base procedure depends on characteristics seen on physical exam.  Generally, procedures are aimed at removing bulky tissues in this area or preventing the back of the tongue  from falling back during sleep.  Success rates for tongue surgery range from 50-62%3.    Hypoglossal Nerve Stimulation:  Hypoglossal nerve stimulation has recently received approval from the [place] Food and Drug Administration for the treatment of obstructive sleep apnea.  This is based on research showing that the system was safe and effective in treating sleep apnea6.  Results showed that the median AHI score decreased 68%, from 29.3 to 9.0. This therapy uses an implant system that senses breathing patterns and delivers mild stimulation to airway muscles, which keeps the airway open during sleep.  The system consists of three fully implanted components: a small generator (similar in size to a pacemaker), a breathing sensor, and a stimulation lead.  Using a small handheld remote, a patient turns the therapy on before bed and off upon awakening.    Candidates for this device must be greater than 22 years of age, have moderate to severe BRITANY (AHI between 20-65), BMI less than 32, have tried CPAP/oral appliance without success, and have appropriate upper airway anatomy (determined by a sleep endoscopy performed by Dr. Medeiros).    Hypoglossal Nerve Stimulation Pathway:    The sleep surgeon s office will work with the patient through the insurance prior-authorization process (including communications and appeals).    Nasal Procedures:  Nasal obstruction can interfere with nasal breathing during the day and night.  Studies have shown that relief of nasal obstruction can improve the ability of some patients to tolerate positive airway pressure therapy for obstructive sleep apnea1.  Treatment options include medications such as nasal saline, topical corticosteroid and antihistamine sprays, and oral medications such as antihistamines or decongestants. Non-surgical treatments can include external nasal dilators for selected patients. If these are not successful by themselves, surgery can improve the nasal airway either  alone or in combination with these other options.      Combination Procedures:  Combination of surgical procedures and other treatments may be recommended, particularly if patients have more than one area of narrowing or persistent positional disease.  The success rate of combination surgery ranges from 66-80%2,3.    References  1. Martha BUSCH. The Role of the Nose in Snoring and Obstructive Sleep Apnoea: An Update.  Eur Arch Otorhinolaryngol. 2011; 268: 1365-73.  2.  Chase SM; Jackie JA; Cristian JR; Pallanch JF; Jessica MB; Annalise SG; Ksenia DING. Surgical modifications of the upper airway for obstructive sleep apnea in adults: a systematic review and meta-analysis. SLEEP 2010;33(10):1624-3404. Darlene DUNHAM. Hypopharyngeal surgery in obstructive sleep apnea: an evidence-based medicine review.  Arch Otolaryngol Head Neck Surg. 2006 Feb;132(2):206-13.  3. Yuan YH1, Gris Y, Mikhail CAROLINE. The efficacy of anatomically based multilevel surgery for obstructive sleep apnea. Otolaryngol Head Neck Surg. 2003 Oct;129(4):327-35.  4. Darlene DUNHAM, Goldberg A. Hypopharyngeal Surgery in Obstructive Sleep Apnea: An Evidence-Based Medicine Review. Arch Otolaryngol Head Neck Surg. 2006 Feb;132(2):206-13.  5. Taisha COVINGTON et al. Upper-Airway Stimulation for Obstructive Sleep Apnea.  N Engl J Med. 2014 Jan 9;370(2):139-49.  6. Marcelo Y et al. Increased Incidence of Cardiovascular Disease in Middle-aged Men with Obstructive Sleep Apnea. Am J Respir Crit Care Med; 2002 166: 159-165  7. Meredith EM et al. Studying Life Effects and Effectiveness of Palatopharyngoplasty (SLEEP) study: Subjective Outcomes of Isolated Uvulopalatopharyngoplasty. Otolaryngol Head Neck Surg. 2011; 144: 623-631.    Drive Safe... Drive Alive     Sleep health profoundly affects your health, mood, and your safety.  Thirty three percent of the population (one in three of us) is not getting enough sleep and many have a sleep disorder. Not getting enough sleep or having an  untreated / undertreated sleep condition may make us sleepy without even knowing it. In fact, our driving could be dramatically impaired due to our sleep health. As your provider, here are some things I would like you to know about driving:     Here are some warning signs for impairment and dangerous drowsy driving:              -Having been awake more than 16 hours               -Looking tired               -Eyelid drooping              -Head nodding (it could be too late at this point)              -Driving for more than 30 minutes     Some things you could do to make the driving safer if you are experiencing some drowsiness:              -Stop driving and rest              -Call for transportation              -Make sure your sleep disorder is adequately treated     Some things that have been shown NOT to work when experiencing drowsiness while driving:              -Turning on the radio              -Opening windows              -Eating any  distracting  /  entertaining  foods (e.g., sunflower seeds, candy, or any other)              -Talking on the phone      Your decision may not only impact your life, but also the life of others. Please, remember to drive safe for yourself and all of us.

## 2021-09-29 NOTE — PROGRESS NOTES
Carmen Luu is a 89 year old who is being evaluated via a billable video visit.       How would you like to obtain your AVS? MyChart  If the video visit is dropped, the invitation should be resent by: Text to cell phone:  [phone]    Will anyone else be joining your video visit? No     Video-Visit Details     Type of service:  Video Visit   VideoStart: 09/29/2021 10:11 am  Video Stop: 09/29/2021 10:45 am    Originating Location (pt. Location): Home     Distant Location (provider location):  [facility]     Platform used for Video Visit: [facility]  Sleep Consultation Note     Date on this visit: 9/29/2021    ALEXA Luu is sent by No ref. provider found for a sleep consultation regarding snoring, evaluation for possible sleep apnea.    Primary Physician: Juhi Lovell     Chief complaint: wanting a sleep apnea test    ALEXA Luu 89 year old with PMH atypical parkinsonism, atrial fibrillation, non-ischemic cardiomyopathy, history of CVA 2019, status post implantable loop recorder(1/2020), who presents to sleep clinic for a video visit today to obtain evaluation for possible sleep apnea .  She has not had a previous sleep study.  She has chronic pain in both the hips particularly the left hip and is planning on following up at pain clinic.    Sleep Disordered Breathing   Carmen reports snoring, snort arousals, her son reported witnessed apneas, dry mouth, non-refreshing sleep, daytime sleepiness/fatigue.  Denies choking/gasping for air or  morning headaches.    Sleep Schedule/Sleep Complaints//Daytime Functioning   Carmen goes to bed at 11 PM and wakes up at 7 AM.  It usually takes 10 minutes to fall asleep.  She wakes up several times throughout the night.  Night time awakenings occurs due to pain(hips/legs), to use bathroom  After awakening,  "She reports difficulty falling back asleep mostly due to pain.  She reports non-refreshing sleep, daytime sleepiness/fatigue.  Patient is a night person  Patient no longer drives due to medical reasons.  She naps once per day for 30 minutes, feels better after naps.    Patient does not use electronics in bed.      Restless Legs symptoms  Carmen reports restlessness feelings in the legs occasionally when she goes to bed 0-1/month. She reports urge to move with symptoms. Massaging and moving legs, getting up and walking helps. She is not concerned about the symptoms. They do not affect her sleep.    Sleep Behaviors  She denies any cataplexy, sleep paralysis, sleep hallucinations.  She denies any night time behaviors - sleep walking, sleep talking, sleep eating.  She does not complain acting out dreams.      Social History  Carmen lives alone. She drinks coffee sometimes. Last caffeine intake is not within 6 hours of bed time.  She does not drink alcohol. Patient is a never smoker. Patient does not use drugs.  She does not use medical marijuana.    Allergies:    Allergies   Allergen Reactions     Codeine Other (See Comments)     Morphine Other (See Comments)     Made me feel really wierd     Penicillins Other (See Comments)     Tongue and throat tingling  Other reaction(s): Paresthesias  Tongue and throat tingling  Tingling on lips  Other reaction(s): Paresthesias  Tongue and throat tingling  Tingling on lips  Tongue and throat \"tingling\" when in 20s       Vicodin [Hydrocodone-Acetaminophen] Other (See Comments)     Weird feeling       Medications:    Current Outpatient Medications   Medication Sig Dispense Refill     acetaminophen (TYLENOL) 500 MG tablet Take 500-1,000 mg by mouth 2 times daily as needed        aspirin (ASA) 81 MG EC tablet 81mg tab by mouth daily @8am 30 tablet 0     calcium carbonate 500 MG CHEW 2 tums by mouth nightly as needed @ 10/11pm       calcium carbonate 600 mg-vitamin D 400 units " (CALTRATE) 600-400 MG-UNIT per tablet Take 1 tablet by mouth daily       carbidopa-levodopa (SINEMET)  MG tablet TAKE 1 TABLET THREE TIMES DAILY  AT  7  AM,  11:30AM , AND  4   tablet 3     clopidogrel (PLAVIX) 75 MG tablet Take 75 mg by mouth daily       metoprolol succinate ER (TOPROL-XL) 25 MG 24 hr tablet Take 1 tablet (25 mg) by mouth daily 90 tablet 3     pravastatin (PRAVACHOL) 20 MG tablet 20mg tab by mouth nightly @8-10pm 30 tablet 0     traZODone (DESYREL) 50 MG tablet Take 1 tablet (50 mg) by mouth nightly as needed for sleep 30 tablet 1     She reports that she no longer takes  plavix.    Problem List:  Patient Active Problem List    Diagnosis Date Noted     Balance problem 04/29/2021     Priority: Medium     Gait instability 04/29/2021     Priority: Medium     Chronic left hip pain 01/15/2021     Priority: Medium     Presence of left atrial appendage closure device composed of nickel-titanium alloy with polyethylene terephthalate membrane 02/20/2020     Priority: Medium     Formatting of this note might be different from the original.  Implanted 2/20/20 - 27 mm Watchman device  Formatting of this note might be different from the original.  Implanted 2/20/20 - 27 mm Watchman device       Status post placement of implantable loop recorder 01/09/2020     Priority: Medium     Formatting of this note might be different from the original.  Due to cryptogenic stroke       Atrial fibrillation, unspecified type (H) 11/11/2019     Priority: Medium     Non-ischemic cardiomyopathy (H) 11/11/2019     Priority: Medium     Falls frequently 10/31/2019     Priority: Medium     Cryptogenic stroke (H) 10/16/2019     Priority: Medium     Stroke (H) 10/09/2019     Priority: Medium     Acute lacunar stroke (H) 10/05/2019     Priority: Medium     History of cervical fracture 10/05/2019     Priority: Medium     Muscle weakness of upper extremity 10/05/2019     Priority: Medium     Abnormal brain MRI 03/11/2019      Priority: Medium     MR HEAD BRAIN WO3[facility]8/2019  [facility]  Other Result Information   This result has an attachment that is not available.   Result Narrative   [facility] RADIOLOGY    EXAM: MR HEAD BRAIN WO  LOCATION: [facility] [place]  DATE/TIME: 3/7/2019 5:18 PM    INDICATION: Atypical Parkinsonism (hc)  COMPARISON: CT 06/25/2018  TECHNIQUE: Routine multiplanar multisequence head MRI without intravenous  contrast.    FINDINGS:  INTRACRANIAL CONTENTS: No acute or subacute infarct. Small cluster of chronic  infarcts in the left cerebellar hemisphere grossly similar to the prior study. A  few tiny chronic lacunar infarcts in the right cerebellar hemisphere were not  well visualized on the previous exam. Small midline chronic cerebellar infarcts  are stable. Mild to moderate cerebral and mild cerebellar volume loss. Small  chronic cortical infarct right superior frontal gyrus. Tiny chronic lacunar  infarct left basal ganglia. Significant midbrain volume loss out of proportion  to the global volume loss can be seen in progressive supranuclear palsy. No  mass, acute hemorrhage, or extra-axial fluid collections. Mild burden presumed  chronic small vessel ischemia. Normal position of the cerebellar tonsils.     SELLA: No significant abnormality accounting for technique.    OSSEOUS STRUCTURES/SOFT TISSUES: Nondisplaced type II odontoid fracture with  normal alignment. Mild widening of the fracture gap compared to the previous CT  6/25/2018. No high-grade canal narrowing. The major intracranial vascular flow  voids are maintained.     ORBITS: No significant abnormality accounting for technique.     SINUSES/MASTOIDS: No significant paranasal sinus mucosal disease. Moderate right  mastoid effusion.     CONCLUSION:  1.  No acute intracranial abnormality. No restricted diffusion/acute infarct,  space-occupying hemorrhage, or mass effect.    2.  Significant midbrain volume loss is out of proportion  to the degree of  global volume loss and can be seen in progressive supranuclear palsy.    3.  Age-related changes and scattered areas of chronic ischemia.    4.  Moderate right mastoid effusion.   Other Result Information   Interface, Fluency[employer] - 03/08/2019  1:53 AM [facility] RADIOLOGY    EXAM: MR HEAD BRAIN WO  LOCATION: [facility] [place]  DATE/TIME: 3/7/2019 5:18 PM    INDICATION: Atypical Parkinsonism (hc)  COMPARISON: CT 06/25/2018  TECHNIQUE: Routine multiplanar multisequence head MRI without intravenous  contrast.    FINDINGS:  INTRACRANIAL CONTENTS: No acute or subacute infarct. Small cluster of chronic  infarcts in the left cerebellar hemisphere grossly similar to the prior study. A  few tiny chronic lacunar infarcts in the right cerebellar hemisphere were not  well visualized on the previous exam. Small midline chronic cerebellar infarcts  are stable. Mild to moderate cerebral and mild cerebellar volume loss. Small  chronic cortical infarct right superior frontal gyrus. Tiny chronic lacunar  infarct left basal ganglia. Significant midbrain volume loss out of proportion  to the global volume loss can be seen in progressive supranuclear palsy. No  mass, acute hemorrhage, or extra-axial fluid collections. Mild burden presumed  chronic small vessel ischemia. Normal position of the cerebellar tonsils.     SELLA: No significant abnormality accounting for technique.    OSSEOUS STRUCTURES/SOFT TISSUES: Nondisplaced type II odontoid fracture with  normal alignment. Mild widening of the fracture gap compared to the previous CT  6/25/2018. No high-grade canal narrowing. The major intracranial vascular flow  voids are maintained.     ORBITS: No significant abnormality accounting for technique.     SINUSES/MASTOIDS: No significant paranasal sinus mucosal disease. Moderate right  mastoid effusion.     CONCLUSION:  1.  No acute intracranial abnormality. No restricted diffusion/acute infarct,  space-occupying  hemorrhage, or mass effect.    2.  Significant midbrain volume loss is out of proportion to the degree of  global volume loss and can be seen in progressive supranuclear palsy.    3.  Age-related changes and scattered areas of chronic ischemia.    4.  Moderate right mastoid effusion.   Status         Formatting of this note might be different from the original.  MR HEAD BRAIN WO3/8/2019  [facility]  Other Result Information   This result has an attachment that is not available.   Result Narrative   [facility] RADIOLOGY    EXAM: MR HEAD BRAIN WO  LOCATION: [facility]  DATE/TIME: 3/7/2019 5:18 PM    INDICATION: Atypical Parkinsonism (hc)  COMPARISON: CT 06/25/2018  TECHNIQUE: Routine multiplanar multisequence head MRI without intravenous  contrast.    FINDINGS:  INTRACRANIAL CONTENTS: No acute or subacute infarct. Small cluster of chronic  infarcts in the left cerebellar hemisphere grossly similar to the prior study. A  few tiny chronic lacunar infarcts in the right cerebellar hemisphere were not  well visualized on the previous exam. Small midline chronic cerebellar infarcts  are stable. Mild to moderate cerebral and mild cerebellar volume loss. Small  chronic cortical infarct right superior frontal gyrus. Tiny chronic lacunar  infarct left basal ganglia. Significant midbrain volume loss out of proportion  to the global volume loss can be seen in progressive supranuclear palsy. No  mass, acute hemorrhage, or extra-axial fluid collections. Mild burden presumed  chronic small vessel ischemia. Normal position of the cerebellar tonsils.     SELLA: No significant abnormality accounting for technique.    OSSEOUS STRUCTURES/SOFT TISSUES: Nondisplaced type II odontoid fracture with  normal alignment. Mild widening of the fracture gap compared to the previous CT  6/25/2018. No high-grade canal narrowing. The major intracranial vascular flow  voids are maintained.     ORBITS: No significant  abnormality accounting for technique.     SINUSES/MASTOIDS: No significant paranasal sinus mucosal disease. Moderate right  mastoid effusion.     CONCLUSION:  1.  No acute intracranial abnormality. No restricted diffusion/acute infarct,  space-occupying hemorrhage, or mass effect.    2.  Significant midbrain volume loss is out of proportion to the degree of  global volume loss and can be seen in progressive supranuclear palsy.    3.  Age-related changes and scattered areas of chronic ischemia.    4.  Moderate right mastoid effusion.   Other Result Information   Interface, [employer] - 03/08/2019  1:53 AM [facility] RADIOLOGY    EXAM: MR HEAD BRAIN WO  LOCATION: [facility] [place]  DATE/TIME: 3/7/2019 5:18 PM    INDICATION: Atypical Parkinsonism (hc)  COMPARISON: CT 06/25/2018  TECHNIQUE: Routine multiplanar multisequence head MRI without intravenous  contrast.    FINDINGS:  INTRACRANIAL CONTENTS: No acute or subacute infarct. Small cluster of chronic  infarcts in the left cerebellar hemisphere grossly similar to the prior study. A  few tiny chronic lacunar infarcts in the right cerebellar hemisphere were not  well visualized on the previous exam. Small midline chronic cerebellar infarcts  are stable. Mild to moderate cerebral and mild cerebellar volume loss. Small  chronic cortical infarct right superior frontal gyrus. Tiny chronic lacunar  infarct left basal ganglia. Significant midbrain volume loss out of proportion  to the global volume loss can be seen in progressive supranuclear palsy. No  mass, acute hemorrhage, or extra-axial fluid collections. Mild burden presumed  chronic small vessel ischemia. Normal position of the cerebellar tonsils.     SELLA: No significant abnormality accounting for technique.    OSSEOUS STRUCTURES/SOFT TISSUES: Nondisplaced type II odontoid fracture with  normal alignment. Mild widening of the fracture gap compared to the previous CT  6/25/2018. No high-grade canal narrowing. The  major intracranial vascular flow  voids are maintained.     ORBITS: No significant abnormality accounting for technique.     SINUSES/MASTOIDS: No significant paranasal sinus mucosal disease. Moderate right  mastoid effusion.     CONCLUSION:  1.  No acute intracranial abnormality. No restricted diffusion/acute infarct,  space-occupying hemorrhage, or mass effect.    2.  Significant midbrain volume loss is out of proportion to the degree of  global volume loss and can be seen in progressive supranuclear palsy.    3.  Age-related changes and scattered areas of chronic ischemia.    4.  Moderate right mastoid effusion.   Status       Closed fracture of multiple ribs of both sides, initial encounter 02/27/2019     Priority: Medium     Fall 02/27/2019     Priority: Medium     Disorder of bone and cartilage 01/14/2019     Priority: Medium     Overview:   Created by Conversion    Replacement Utility updated for latest IMO load       Left ventricular hypertrophy 01/14/2019     Priority: Medium     Overview:   Created by Conversion    Created by Conversion       Hemorrhoids 01/14/2019     Priority: Medium     Overview:   Created by Conversion    Replacement Utility updated for latest IMO load       Mixed hyperlipidemia 01/14/2019     Priority: Medium     Overview:   Created by Conversion       Other and unspecified diseases of appendix 01/14/2019     Priority: Medium     Overview:   Created by Conversion       Venous insufficiency 01/14/2019     Priority: Medium     Overview:   Created by Conversion    Replacement Utility updated for latest IMO load       Atypical Parkinsonism (H) 02/02/2017     Priority: Medium     Tenderness of temporomandibular joint 04/11/2016     Priority: Medium     Prolapse of vaginal wall 08/18/2015     Priority: Medium     Overview:   Created by Conversion    Replacement Utility updated for latest IMO load       Posterior capsular opacification 01/02/2012     Priority: Medium        Past  Medical/Surgical History:  Past Medical History:   Diagnosis Date     Abnormal brain MRI 3/11/2019    MR HEAD BRAIN WO3/8/2019 [facility] Other Result Information This result has an attachment that is not available. Result Narrative [facility] RADIOLOGY  EXAM: MR HEAD BRAIN WO LOCATION: [facility] [place] DATE/TIME: 3/7/2019 5:18 PM  INDICATION: Atypical Parkinsonism (hc) COMPARISON: CT 06/25/2018 TECHNIQUE: Routine multiplanar multisequence head MRI without intravenou     Atrial fibrillation (H)     per H&P     Atypical Parkinsonism (H) 2/2/2017     Breast cyst      CVA (cerebral vascular accident) (H)     per H&P     Finger fracture, left 02/27/2019    ring finger      Multiple rib fractures 02/27/2019     Parkinson disease (H) 1/20/2016     Past Surgical History:   Procedure Laterality Date     ANKLE SURGERY Left     fracture     APPENDECTOMY       BREAST CYST ASPIRATION       C TOTAL ABDOM HYSTERECTOMY      Description: Hysterectomy;  Proc Date: 01/01/1988;  Comments: couldn't find her ovaries     EP THANIA CLOSURE N/A 2/20/2020    Procedure: EP THANIA Closure;  Surgeon: Javier Smith MD;  Location: [facility] Cath Lab;  Service: Cardiology     EP LOOP RECORDER IMPLANT N/A 10/8/2019    Procedure: EP Loop Recorder Insertion;  Surgeon: Angel Hurd MD;  Location: [facility] Cath Lab;  Service: Cardiology     EYE SURGERY Bilateral     cataract surgery 2000     HYSTERECTOMY      tahbso     HYSTERECTOMY  1982     KNEE SURGERY Right     staph infection     TONSILLECTOMY         Social History:  Social History     Socioeconomic History     Marital status:      Spouse name: Not on file     Number of children: Not on file     Years of education: Not on file     Highest education level: Not on file   Occupational History     Not on file   Tobacco Use     Smoking status: Never Smoker     Smokeless tobacco: Never Used   Substance and Sexual Activity     Alcohol use: No     Drug  use: Never     Sexual activity: Not on file   Other Topics Concern     Not on file   Social History Narrative    . lives in [place].     Anisha Little daughter    Retired nurse.     Various hospital    Gyn, intensive care,     School system    [place], [place]    Moved here in      x 2    First    = was 43 yrs old and had melanoma    Second   2013 had a stroke and  Was 87 yrs old.      Social Determinants of Health     Financial Resource Strain:      Difficulty of Paying Living Expenses:    Food Insecurity:      Worried About Running Out of Food in the Last Year:      Ran Out of Food in the Last Year:    Transportation Needs:      Lack of Transportation (Medical):      Lack of Transportation (Non-Medical):    Physical Activity:      Days of Exercise per Week:      Minutes of Exercise per Session:    Stress:      Feeling of Stress :    Social Connections:      Frequency of Communication with Friends and Family:      Frequency of Social Gatherings with Friends and Family:      Attends [employer] Services:      Active Member of Clubs or Organizations:      Attends Club or Organization Meetings:      Marital Status:    Intimate Partner Violence:      Fear of Current or Ex-Partner:      Emotionally Abused:      Physically Abused:      Sexually Abused:        Family History:  Family History   Problem Relation Age of Onset     Cerebrovascular Disease Mother      Heart Disease Mother      Other - See Comments Mother         Monegasque French     Dementia Father         10 yrs.     Other - See Comments Father         French     Paranoid behavior Father      Other - See Comments Sister         bhaves[place]     Other - See Comments Daughter         [place]     Other - See Comments Daughter         [place]     Other - See Comments Son         [place]     Other - See Comments Son         [place], [place]     Dementia Paternal Aunt      Dementia Paternal Aunt       Dementia Paternal Uncle      Breast Cancer Maternal Aunt        Review of Systems:  A complete review of systems reviewed by me is negative with the exeption of what has been mentioned in the history of present illness,  and symptoms listed below, highlighted in red:  CONSTITUTIONAL: weight gain/loss, fever, chills, sweats or night sweats, drug allergies.  EYES:  changes in vision, blind spots, double vision.  ENT: ear pain, sore throat, sinus pain, post-nasal drip, runny nose, bloody nose  CARDIAC:  fast heartbeats or fluttering in chest, chest pain or pressure, breathlessness when lying flat, swollen legs or swollen feet.  NEUROLOGIC: headaches, weakness or numbness in the arms or legs.  DERMATOLOGIC:  rashes, new moles or change in mole(s)  PULMONARY: SOB at rest, SOB with activity, dry cough, productive cough, coughing up blood, wheezing or whistling when breathing.    GASTROINTESTINAL:  nausea or vomitting, loose or watery stools, fat or grease in stools, constipation, abdominal pain, bowel movements black in color or blood noted.  GENITOURINARY: pain during urination, blood in urine, urinating more frequently than usual  MUSCULOSKELETAL:muscle pain/ joint pains(buttocks, hips and  legs hurt). She has balance issues. Uses  Gait belt when with her kids, at home uses scooter, planning on getting electric wheel chair  ENDOCRINE: increased thirst or urination, diabetes.  LYMPHATICS: swollen lymph nodes, lumps or bumps in the breasts or nipple discharge.  PSYCHE: depression, anxiety, denies suicidal ideations/thoughts of harming others    Physical Examination:  Vitals: There were no vitals taken for this visit.  BMI= There is no height or weight on file to calculate BMI.  Per epic and patient's report height: 5 feet 4 inches; patient reported weight: 118 pounds; BMI: 20.3 kg/m     General: No apparent distress, appropriately groomed  Head: Normocephalic, atraumatic  Chest: No cough, no audible wheezing, able to  talk in full sentences  Skin: no rash  Psych: coherent speech, normal rate and volume, able to articulate logical thoughts, able   to abstract reason, no tangential thoughts, no hallucinations   or delusions   Her affect is normal  Neuro:  Mental status: Alert and  Oriented X 3  Speech: normal      OTHER TESTS/LABS:   I have reviewed the labs and made my comment in the assessment and plan.  Last Comprehensive Metabolic Panel:  Sodium   Date Value Ref Range Status   01/14/2021 139 136 - 145 mmol/L Final     Potassium   Date Value Ref Range Status   01/14/2021 4.8 3.5 - 5.0 mmol/L Final     Chloride   Date Value Ref Range Status   01/14/2021 104 98 - 107 mmol/L Final     Carbon Dioxide (CO2)   Date Value Ref Range Status   01/14/2021 26 22 - 31 mmol/L Final     Anion Gap   Date Value Ref Range Status   01/14/2021 9 5 - 18 mmol/L Final     Glucose   Date Value Ref Range Status   01/14/2021 90 70 - 125 mg/dL Final     Urea Nitrogen   Date Value Ref Range Status   01/14/2021 19 8 - 28 mg/dL Final     Creatinine   Date Value Ref Range Status   01/14/2021 0.76 0.60 - 1.10 mg/dL Final     GFR Estimate   Date Value Ref Range Status   01/14/2021 >60 >60 mL/min/1.73m2 Final     Calcium   Date Value Ref Range Status   01/14/2021 9.2 8.5 - 10.5 mg/dL Final     Bilirubin Total   Date Value Ref Range Status   01/14/2021 0.6 0.0 - 1.0 mg/dL Final     Alkaline Phosphatase   Date Value Ref Range Status   01/14/2021 47 45 - 120 U/L Final     ALT   Date Value Ref Range Status   01/14/2021 <9 0 - 45 U/L Final     AST   Date Value Ref Range Status   01/14/2021 11 0 - 40 U/L Final       Impression/Plan:    Snoring, observed apneas,  non restorative sleep, fatigue, EDS. Possible Obstructive sleep apnea  STOP BANG score is 4/8. Patient likely has sleep apnea based on clinical history, age, post menopausal status  HST/ Polysomnography reviewed.  Recommended in-lab sleep study  to evaluate for sleep-related breathing disorder.  Patient does  "not feel comfortable  coming to the sleep lab because of her medical conditions including balance issues and prefers to do a home sleep study.   Orders were generated in the epic for WATCH PAT home sleep study.  Obstructive sleep apnea and consequences of untreated sleep apnea were reviewed.  Information provided regarding treatment options for BRITANY.    Restless legs symptoms (intermittent): she is not concerned about the symptoms.  She was instructed get back to me if in case symptoms were to increase in frequency or severity affecting her sleep and she agreed.    Chronic insomnia-mostly due to chronic pain.  The pain has been a major contributing factor affecting her sleep. She will follow up with  pain specialist for optimizing the management of the chronic pain.    History of atrial fibrillation, nonischemic cardiomyopathy: We discussed the association of BRITANY with atrial fibrillation or other cardiovascular disease.  She will continue follow-up with cardiology.    We discussed weight management with healthy diet, and exercise.    Patient was strongly advised to avoid driving, operating any heavy machinery or other hazardous situations while drowsy or sleepy.  Patient was counseled on the importance of driving while alert, to pull over if drowsy, or nap before getting into the vehicle if sleepy.      Plan is to communicate results of sleep study via video visit in 10-14 days.     CC: No ref. provider found      The above note was dictated using voice recognition software. Although reviewed after completion, some word and grammatical error may remain . Please contact the author for any clarifications.    \"I spent a total of 60  Minutes with ALEXA Luu during today's video visit, most  of this time was spent counseling the patient and  coordinating care regarding sleep disordered breathing, HST/PSG, insomnia, chronic pain, atrial fibrillation, BRITANY and CVD,  weight management ,  and chart review., " "including documentation and further activities as noted above.\"      Sean Cyr MD  [address] [address] [phone]  Dept: [phone]                      "

## 2021-09-30 ENCOUNTER — TELEPHONE (OUTPATIENT)
Dept: SLEEP MEDICINE | Facility: CLINIC | Age: 86
End: 2021-09-30

## 2021-09-30 NOTE — TELEPHONE ENCOUNTER
Message forward to Katie Márquez.        KAITY Olson [facility] ([facility]

## 2021-09-30 NOTE — TELEPHONE ENCOUNTER
----- Message from Sean Cyr MD sent at 9/29/2021  1:10 PM CDT -----  Regarding: check out info  Hi,    --Please call patient to schedule WATCH PAT HST, as soon as possible  --AVS  --Please schedule video visit to review the results of the HST in 7 to 10 days after the test is completed.    Thanks,  Zahra

## 2021-10-08 ENCOUNTER — OFFICE VISIT (OUTPATIENT)
Dept: SLEEP MEDICINE | Facility: CLINIC | Age: 86
End: 2021-10-08
Attending: INTERNAL MEDICINE
Payer: MEDICARE

## 2021-10-08 DIAGNOSIS — G47.9 SLEEP DISTURBANCE: ICD-10-CM

## 2021-10-08 NOTE — PROGRESS NOTES
Device has been registered and shipped via [employer] on 10/8/21. Patient was notified that package was mailed out. Watch [facility] serial number [id number].

## 2021-10-12 ENCOUNTER — TRANSFERRED RECORDS (OUTPATIENT)
Dept: HEALTH INFORMATION MANAGEMENT | Facility: CLINIC | Age: 86
End: 2021-10-12

## 2021-10-21 ENCOUNTER — TRANSFERRED RECORDS (OUTPATIENT)
Dept: HEALTH INFORMATION MANAGEMENT | Facility: CLINIC | Age: 86
End: 2021-10-21
Payer: MEDICARE

## 2021-10-25 NOTE — PROGRESS NOTES
Patient called in and said she isn't going to do the study.   Patient will return the study via mail.

## 2021-10-28 ENCOUNTER — VIRTUAL VISIT (OUTPATIENT)
Dept: FAMILY MEDICINE | Facility: CLINIC | Age: 86
End: 2021-10-28
Payer: MEDICARE

## 2021-10-28 DIAGNOSIS — R05.9 COUGH: ICD-10-CM

## 2021-10-28 DIAGNOSIS — F51.01 PRIMARY INSOMNIA: ICD-10-CM

## 2021-10-28 DIAGNOSIS — G89.29 CHRONIC LEFT HIP PAIN: Primary | ICD-10-CM

## 2021-10-28 DIAGNOSIS — R10.32 LEFT GROIN PAIN: ICD-10-CM

## 2021-10-28 DIAGNOSIS — M25.552 CHRONIC LEFT HIP PAIN: Primary | ICD-10-CM

## 2021-10-28 PROCEDURE — 99213 OFFICE O/P EST LOW 20 MIN: CPT | Mod: 95 | Performed by: FAMILY MEDICINE

## 2021-10-28 RX ORDER — TRAZODONE HYDROCHLORIDE 50 MG/1
50 TABLET, FILM COATED ORAL
Qty: 90 TABLET | Refills: 3 | Status: SHIPPED | OUTPATIENT
Start: 2021-10-28 | End: 2021-11-23

## 2021-10-28 RX ORDER — GABAPENTIN 100 MG/1
100 CAPSULE ORAL 3 TIMES DAILY PRN
Qty: 90 CAPSULE | Refills: 3 | Status: SHIPPED | OUTPATIENT
Start: 2021-10-28 | End: 2021-11-15

## 2021-10-28 NOTE — PATIENT INSTRUCTIONS
Start with 100 mg gabapentin tonight and tomorrow morning take 100 in the morning and 100 at night if you tolerate it.  Most people who do not tolerate gabapentin just feel too sleepy.  It is completely reasonable to continue only taking gabapentin at night, but if you would like better efficacy during the day, increase by 100/day.    This would look like 100 mg at night tonight  100 mg twice daily tomorrow  100 mg 3 times daily the next day  100 mg in the morning and at noon, followed by 200 mg at night to the following day  200 mg in the morning, 100 at noon, 200 mg at night the following day  200 mg 3 times a day  At this point, message me if you have any concerns.

## 2021-10-28 NOTE — PROGRESS NOTES
Carmen is a 89 year old who is being evaluated via a billable video visit.      How would you like to obtain your AVS? MyChart  If the video visit is dropped, the invitation should be resent by: Text to cell phone: see note  Will anyone else be joining your video visit? Yes: daughter. How would they like to receive their invitation? Text to cell phone: n/a, already present    Video Start Time: 11:18 AM    Assessment & Plan     Chronic left hip pain  Left groin pain  Strongly encourage patient to discontinue the ibuprofen use daily and to completely discontinue the oxycodone use as it is only causing constipation not helping with her pain.  In addition, it puts the patient at greater risk of falling.  Encourage patient to take 100 mg gabapentin tonight and slowly work her way up to 100 mg 3 times daily, after that patient can increase depending on efficacy.  I suspect the patient might not receive complete resolution of symptoms with gabapentin as I do not believe it is only nerve pain she is experiencing, but it is completely possible that she would not experience some improvement and I think it is worth a trial.  - gabapentin (NEURONTIN) 100 MG capsule  Dispense: 90 capsule; Refill: 3    Cough  Recommended symptomatic treatment and home Covid testing.        No follow-ups on file.    Juhi Lovell MD  [facility]   Carmen is a 89 year old who presents for the following health issues     HPI     Patient presents for discussion about pain management.  She is in a pain management clinic and was considering a spinal cord neurostimulator but was told she would not qualify.  Instead she is on oxycodone which makes her constipated and does not seem to help with the pain as well as ibuprofen which she takes daily.  The ibuprofen does help that she understands that she cannot take this daily.  The pain is predominant located in left hip and left groin, and it makes her left leg  feel unstable.  She is prone to falls.  Patient has a friend who recommended gabapentin and patient wonders if this would be appropriate for her.  She also has a dry cough which she was able to demonstrate.    She has no other symptoms and has a home Covid test at home.  It is not concerning to her.          Objective           Vitals:  No vitals were obtained today due to virtual visit.    Physical Exam   Gen: NAD  Psych: Normal affect, no hallucinations or delusions, not tearful  Pulm: Dry cough appreciated without any obvious signs of respiratory distress        Video-Visit Details    Type of service:  Video Visit    Video End Time:11:30 AM    Originating Location (pt. Location): Home    Distant Location (provider location):  [facility]     Platform used for Video Visit: "[employer]"

## 2021-11-03 ENCOUNTER — MYC MEDICAL ADVICE (OUTPATIENT)
Dept: FAMILY MEDICINE | Facility: CLINIC | Age: 86
End: 2021-11-03
Payer: MEDICARE

## 2021-11-03 DIAGNOSIS — F41.9 ANXIETY: ICD-10-CM

## 2021-11-03 DIAGNOSIS — G89.29 OTHER CHRONIC PAIN: Primary | ICD-10-CM

## 2021-11-05 NOTE — TELEPHONE ENCOUNTER
"Patient states that she tested positive for COVID. Patient reports symptoms of body aches, cough and \"cold\" symptoms. I advised patient of the following COVID protocol:     How can I protect others?  If you have symptoms (fever, cough, body aches or trouble breathing):    Stay home and away from others (self-isolate) until:  ? Your other symptoms have resolved (gotten better). And   ? You've had no fever--and no medicine that reduces fever--for 1 full day (24 hours). And   ? At least 10 days have passed since your symptoms started. (You may need to wait 20 days. Follow the advice of your care team.)  If you don't show symptoms, but testing showed that you have COVID-19:    Stay home and away from others (self-isolate) until at least 10 days have passed since the date of your first positive COVID-19 test.  During this time    Stay in your own room, even for meals. Use your own bathroom if you can.    Stay away from others in your home. No hugging, kissing or shaking hands. No visitors.    Don't go to work, school or anywhere else.    Clean \"high touch\" surfaces often (doorknobs, counters, handles). Use household cleaning spray or wipes.    You'll find a full list of  on the EPA website: www.epa.gov/pesticide-registration/list-n-disinfectants-use-against-sars-cov-2.    Cover your mouth and nose with a mask or other face covering to avoid spreading germs.    Wash your hands and face often. Use soap and water.    Caregivers in these groups are at risk for severe illness due to COVID-19:  ? People 65 years and older  ? People who live in a nursing home or long-term care facility  ? People with chronic disease (lung, heart, cancer, diabetes, kidney, liver, immunologic)  ? People who have a weakened immune system, including those who:    Are in cancer treatment    Take medicine that weakens the immune system, such as corticosteroids    Had a bone marrow or organ transplant    Have an immune deficiency    Have " poorly controlled HIV or AIDS    Are obese (body mass index of 40 or higher)    Smoke regularly    Caregivers should wear gloves while washing dishes, handling laundry and cleaning bedrooms and bathrooms.    Use caution when washing and drying laundry: Don't shake dirty laundry and use the warmest water setting that you can.    For more tips on managing your health at home, go to www.cdc.gov/coronavirus/2019-ncov/downloads/10Things.pdf.  How can I take care of myself at home?  1. Get lots of rest. Drink extra fluids (unless a doctor has told you not to).  2. Take Tylenol (acetaminophen) for fever or pain. If you have liver or kidney problems, ask your family doctor if it's okay to take Tylenol.   Adults can take either:   ? 650 mg (two 325 mg pills) every 4 to 6 hours, or   ? 1,000 mg (two 500 mg pills) every 8 hours as needed.  ? Note: Don't take more than 3,000 mg in one day. Acetaminophen is found in many medicines (both prescribed and over-the-counter medicines). Read all labels to be sure you don't take too much.   For children, check the Tylenol bottle for the right dose. The dose is based on the child's age or weight.  3. If you have other health problems (like cancer, heart failure, an organ transplant or severe kidney disease): Call your specialty clinic if you don't feel better in the next 2 days.  4. Know when to call 911. Emergency warning signs include:  ? Trouble breathing or shortness of breath  ? Pain or pressure in the chest that doesn't go away  ? Feeling confused like you haven't felt before, or not being able to wake up  ? Bluish-colored lips or face    Patient verbalized understanding.

## 2021-11-09 ENCOUNTER — VIRTUAL VISIT (OUTPATIENT)
Dept: FAMILY MEDICINE | Facility: CLINIC | Age: 86
End: 2021-11-09
Payer: MEDICARE

## 2021-11-09 ENCOUNTER — TELEPHONE (OUTPATIENT)
Dept: FAMILY MEDICINE | Facility: CLINIC | Age: 86
End: 2021-11-09

## 2021-11-09 DIAGNOSIS — U07.1 INFECTION DUE TO 2019 NOVEL CORONAVIRUS: ICD-10-CM

## 2021-11-09 DIAGNOSIS — R52 PAIN: Primary | ICD-10-CM

## 2021-11-09 DIAGNOSIS — F41.9 ANXIETY: ICD-10-CM

## 2021-11-09 PROCEDURE — 99441 PR PHYSICIAN TELEPHONE EVALUATION 5-10 MIN: CPT | Mod: 95 | Performed by: FAMILY MEDICINE

## 2021-11-09 RX ORDER — ZINC GLUCONATE 50 MG
1 TABLET ORAL EVERY 24 HOURS
COMMUNITY
Start: 2021-08-02 | End: 2021-11-23

## 2021-11-09 ASSESSMENT — ANXIETY QUESTIONNAIRES
1. FEELING NERVOUS, ANXIOUS, OR ON EDGE: NEARLY EVERY DAY
6. BECOMING EASILY ANNOYED OR IRRITABLE: NEARLY EVERY DAY
7. FEELING AFRAID AS IF SOMETHING AWFUL MIGHT HAPPEN: NEARLY EVERY DAY
5. BEING SO RESTLESS THAT IT IS HARD TO SIT STILL: NEARLY EVERY DAY
2. NOT BEING ABLE TO STOP OR CONTROL WORRYING: NEARLY EVERY DAY
3. WORRYING TOO MUCH ABOUT DIFFERENT THINGS: NEARLY EVERY DAY
GAD7 TOTAL SCORE: 21

## 2021-11-09 ASSESSMENT — PATIENT HEALTH QUESTIONNAIRE - PHQ9: 5. POOR APPETITE OR OVEREATING: NEARLY EVERY DAY

## 2021-11-09 NOTE — PROGRESS NOTES
Carmen is a 89 year old who is being evaluated via a billable telephone visit.      What phone number would you like to be contacted at? See chart  How would you like to obtain your AVS? MyChart    Assessment & Plan     Pain  Infection due to 2019 novel coronavirus  Anxiety  Patient presents to clinic to discuss anxiety related to recent Covid infection and the increase in pain.  Recommended increasing gabapentin, medication she is already on, in order to help with sleep, pain, and anxiety.  Also increase trazodone to help with sleep in the hopes that pain will be better controlled if patient can sleep through the night.  Would encourage patient to decrease the ibuprofen use and have a very low threshold to trial Remeron in this patient as I think that it would help with both mood and sleep.        Depression Screening Follow Up    PHQ 11/9/2021   PHQ-9 Total Score 16   Q9: Thoughts of better off dead/self-harm past 2 weeks Not at all       No follow-ups on file.    Juhi Lovell MD  [facility]   Carmen is a 89 year old who presents for the following health issues    HPI     Patient was recently diagnosed with covid. She feels antsy and fidgety. She is unsure if this is parkinson's or also anxiety. Patient's phq 9 score is 16 and dionna 7 score is 21.  Patient is feeling some episodes of panic related to her recent diagnosis and also is noticing some significant pain increase and difficulty sleeping.  She is taking ibuprofen which she knows she is not supposed to take every day.  Patient is already taking gabapentin but is insufficient to control her pain.          Objective           Vitals:  No vitals were obtained today due to virtual visit.    Physical Exam   N/a            Phone call duration: 9 minutes

## 2021-11-09 NOTE — TELEPHONE ENCOUNTER
Mom/patient asked daughter to call to get a message to Dr. Lovell.     Patient has been having panic attacks and feeling more down in the dumps. Patient has lost the ability to do a lot of the things she loves to do because of her parkinson's.     Patient also has Covid right now. Patient has gotten better but still experiencing a cough and body aches and pains which tend to feel more intense due to the fact she already without covid has aches and pains throughout her body and the intense pain makes her have more panic/anxiety attacks.     Patients daughter states that Pt wants to take a anti depressant and anti anxiety medication to help with her symptoms. Please advise and contact patient for further information.

## 2021-11-09 NOTE — PATIENT INSTRUCTIONS
Take 300 mg gabapentin tonight and 300 mg 3 times daily scheduled going forward.  If you feel particularly anxious it is okay to take an additional 100 mg tablet of gabapentin when you feel that anxiety come on.    Take 100 mg, 2 tablets, of the trazodone tonight.    Message me in 2 days.

## 2021-11-09 NOTE — TELEPHONE ENCOUNTER
Recvd call bk from /Carmen, she is scheduled for a TELEV with Dr Juhi Lovell today 11.09.2021 130- OK per Dr Lovell and Darlene

## 2021-11-10 ASSESSMENT — PATIENT HEALTH QUESTIONNAIRE - PHQ9: SUM OF ALL RESPONSES TO PHQ QUESTIONS 1-9: 16

## 2021-11-10 ASSESSMENT — ANXIETY QUESTIONNAIRES: GAD7 TOTAL SCORE: 21

## 2021-11-12 DIAGNOSIS — R10.32 LEFT GROIN PAIN: ICD-10-CM

## 2021-11-12 DIAGNOSIS — G20.A1 PARKINSON'S DISEASE (H): ICD-10-CM

## 2021-11-12 DIAGNOSIS — G89.29 CHRONIC LEFT HIP PAIN: ICD-10-CM

## 2021-11-12 DIAGNOSIS — M25.552 CHRONIC LEFT HIP PAIN: ICD-10-CM

## 2021-11-13 RX ORDER — GABAPENTIN 100 MG/1
100 CAPSULE ORAL 3 TIMES DAILY PRN
Qty: 90 CAPSULE | Refills: 3 | OUTPATIENT
Start: 2021-11-13

## 2021-11-14 RX ORDER — CARBIDOPA AND LEVODOPA 25; 100 MG/1; MG/1
TABLET ORAL
Qty: 270 TABLET | Refills: 3 | OUTPATIENT
Start: 2021-11-14

## 2021-11-15 ENCOUNTER — TELEPHONE (OUTPATIENT)
Dept: FAMILY MEDICINE | Facility: CLINIC | Age: 86
End: 2021-11-15
Payer: MEDICARE

## 2021-11-15 RX ORDER — GABAPENTIN 300 MG/1
300 CAPSULE ORAL 3 TIMES DAILY
Qty: 180 CAPSULE | Refills: 3 | Status: SHIPPED | OUTPATIENT
Start: 2021-11-15 | End: 2021-11-23

## 2021-11-15 NOTE — TELEPHONE ENCOUNTER
Patients daughter is calling to inform Dr. Lovell that the pharmacy wont fill her prescription for Carbidopa because the instructions are wrong.    Patient is taking a total of 4 pills a day    1 1/2 in the AM  1 1/2 at noon  1 in the PM    For a total of 4 tabs 3 times a day.     Please resend to pharmacy with proper instructions

## 2021-11-15 NOTE — TELEPHONE ENCOUNTER
Patient should have refills remaining on file at pharmacy.  Carbidopa-levodopa filled 9/2/2021 #270 tablets with 3 refills.    carbidopa-levodopa (SINEMET)  MG tablet 270 tablet 3 9/2/2021  No   Sig: TAKE 1 TABLET THREE TIMES DAILY  AT  7  AM,  11:30AM , AND  4  PM   Sent to pharmacy as: Carbidopa-Levodopa  MG Oral Tablet (SINEMET)   Class: E-Prescribe   Order: [id number]   E-Prescribing Status: Receipt confirmed by pharmacy (9/2/2021 11:12 AM CDT     Kimmie Stroud, RN  Triage Nurse Advisor

## 2021-11-16 DIAGNOSIS — G20.A1 PARKINSON'S DISEASE (H): ICD-10-CM

## 2021-11-16 RX ORDER — CARBIDOPA AND LEVODOPA 25; 100 MG/1; MG/1
TABLET ORAL
Qty: 360 TABLET | Refills: 3 | Status: SHIPPED | OUTPATIENT
Start: 2021-11-16 | End: 2021-11-23

## 2021-11-16 NOTE — TELEPHONE ENCOUNTER
Per Dr. Lovell     I don't prescribe it. It needs  to come from her neurologist.          Please relay message when patient or daughter calls back.

## 2021-11-19 NOTE — PROGRESS NOTES
VIDEO VISIT    Date of Visit: November 23, 2021  Name: ALEXA Luu  Date of Birth 6/19/1932    Address   [address] [address] Phone   [phone] (Home)   [phone] (Mobile) *Preferred* E-mail Address   [email or web address]     [phone]    Daughter lives across the street  Anisha [place]  Mariel lives 15 minutes away  [place] in [place]  Josias is in [facility]    Assessment:  Atypical parkinsonism  Gait disorder/fall risk  Prior STroke  89 years old    Carbidopa/levodopa Sinemet 25/100  1.5 - 1.5 - 1     Review of diagnosis    Parkinsonism - p ossible vascular vs degenerative vs both    Avoidance of dopamine blockers   Not taking    Motor complication review     Review of Impulse control disorders     Review of surgical or medication options     Gait/Balance/Falls   Not falling every day with her monitoring    Uses scooter  Cannot walk like she  Use to   Walk with walker and needs gait belt    Was living alone  Daughter 5 minutes  Had walked with belt  Recumbent bike in the past    Exercise/Therapy performed/offered     Cognitive/Driving     Mood   Had episodes of anxiety  She had used someone else's clonazepam on an occasion - ? Reaction -  Like a drunken    Hallucinations/delusions   Denies     Sleep   Sleep disordered breathing; stop bang was 4/8  RLS symptoms - rare  Sleep behaviors - absent   Chronic insomnia - has problems staying asleep  Has a number of good hours and then wakes up at 3am or so  Trazodone 50mg x 2   She has a motorized scooter/wheelchair  Has a commode by her bed    Bladder     GI/Constipation/GERD   hemorrhoids    ENDO   Lipid disorder  Pravastatin pravachol 20mg  Calcium vitamin d daily  Vitamin D3 cholecalciferol 50mcg 2000 units    Cardio/heart   Non ischemic cardiomyopathy  Left atrial appendage closure device  Implantable loop recorder  Venous insufficiency  Metoprolol succinate ER Toprol XL 25mg 24 hr  tablet    Vision   Wears glasses    Heme   Aspirin 81mg daily    Other:    Had covid    Hearing loss - needs aides  Will see audiologist and ent    Cervical fracture    Left hip joint pain that is not surgical  Steroids don't hurt    Acetaminophen 500mg at night   Ibuprofen advil/motrin 200mg x 2  night  Gabapentin neurontin 300mg 3/day     Zinc 50mg in the morning    Abnormal brain mri  Lacunar stroke  HEAD MRI from 10/5/2019  1.  Small acute cortical infarcts in the knob of the right precentral gyrus.  2.  Small acute lacunar infarct in the right cerebellar hemisphere.  3.  No mass effect or hemorrhagic transformation.  4.  Chronic cortical infarcts in the right middle frontal gyrus.  5.  Moderate burden of chronic infarcts in the cerebellar hemispheres, left greater than right.  6.  Chronic type II odontoid fracture with surrounding pannus. This fracture was acute on 06/25/2018.      Medications     7/8 12/1230 5/530 8pm 9/10p   Acetaminophen tylenol 500mg          1   Aspirin 81mg  1           Calcium carbonate 500mg tums        1   Calcium carbonate vit D 600-400  1           Carbidopa/levodopa Sinemet 25/100 1.5  1.5 1       Diclofenac voltaren 1% gel Not using       Gabapentin neurontin 300mg 1 1  1    Ibuprofen advil/motrin 200mg     2   Metoprolol succinate ER Toprol XL 25mg 24 hr 1       Pravastatin pravachol 20mg         1    Trazodone desyrel 50mg            2   Vitamin D3 cholecalciferol 50mcg 2000 units 1       Zinc gluconate 50mg 1                                     Plan:    For hip pain may consider over the counter pain medication like aspercreme/patch vs diclofenac/voltaren 1% gel    She may not be officially home bound per her report    Needs new orders for therapy to mary ellen tan    Return to see Analisa Bolaños in 3-6 months                  Documentation of a Face-to-Face Physician Encounter November 23, 2021    ALEXA Carmen Luis [facility]  6/19/1932  [id number]    I certify that this  patient is under my care and that I, or a nurse practitioner or physician's assistant working with me, had a face-to-face encounter that meets the physician face-to-face encounter requirements with this patient on: 11/23/2021.    The encounter with the patient was in whole, or in part, for the following medical condition, which is the primary reason for home health care:  Patient Active Problem List   Diagnosis     Atypical parkinsonism (H)     Disorder of bone and cartilage     Left ventricular hypertrophy     Hemorrhoids     Mixed hyperlipidemia     Other and unspecified diseases of appendix     Posterior capsular opacification     Prolapse of vaginal wall     Venous insufficiency     Abnormal brain MRI     Closed fracture of multiple ribs of both sides, initial encounter     Fall     Stroke (H)     Acute lacunar stroke (H)     Atrial fibrillation, unspecified type (H)     History of cervical fracture     Muscle weakness of upper extremity     Non-ischemic cardiomyopathy (H)     Tenderness of temporomandibular joint     Cryptogenic stroke (H)     Falls frequently     Balance problem     Chronic left hip pain     Gait instability     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 absences from home require considerable and taxing effort and are for medical reasons or [facility] services or infrequently or of short duration when for other reasons) because: parkinsonism      Physician signature ___________________________________ November 23,  "2021  Physician name: Ed Mendoza MD    Fax ([phone]) or scan/email ([email or web address]) this completed document to [facility] within 24 hours of the referral date.  Questions: [phone].      Medical Decision Making:  #  Chronic progressive medical conditions addressed  Hip pain  Review and/or interpretation of unique test or documentation from a provider outside of neurology no   Independent historian provided additional details  yes   Prescription drug management and review of potential side effects and/or monitoring for side effects  yes   Health impacted by social determinants of health  Yes - home bound    I have reviewed the note as documented above.  This accurately captures the substance of my conversation with the patient and total time spent preparing for visit, executing visit and completing visit on the day of the visit:  30 minutes.     Face to face time: 810am - 843am    Ed Mendoza MD      ------------------------------------------------------------------------------------------------------------------------------------------------------------------------    Video-Visit Details    The patient has been notified of following:     \"After a review of the patient s situation, this visit was changed from an in-person visit to a video visit to reduce the risk of COVID-19 exposure.   The patient is being evaluated via a billable video visit.\"    \"This video visit will be conducted via a call between you and your physician/provider. We have found that certain health care needs can be provided without the need for an in-person physical exam.  This service lets us provide the care you need with a video conversation.  If a prescription is necessary we can send it directly to your pharmacy.  If lab work is needed we can place an order for that and you can then stop by our lab to have the test done at a later time.    If during the course of the call the physician/provider feels a video " "visit is not appropriate, you will not be charged for this service.\"     Patient has given verbal consent for Video visit? Yes    Patient would like the video invitation sent by:     Type of service:  Video Visit    Video Start Time:     Video End Time (time video stopped):     Duration:  minutes - see above    Originating Location (pt. Location):     Distant Location (provider location):  [facility] [place]     Mode of Communication:  Video Conference via Nano[employer] (and if not possible then doximity)      Ed Mendoza MD      --------------------------------------------------------------------------------------------------------------    ALEXA Carmen Luu is a 89 year old female who is being evaluated via a billable video visit.      Charts reviewed  Consult from  Images reviewed      Answers for HPI/ROS submitted by the patient on 11/21/2021  General Symptoms: No  Skin Symptoms: No  HENT Symptoms: Yes  EYE SYMPTOMS: No  HEART SYMPTOMS: No  LUNG SYMPTOMS: No  INTESTINAL SYMPTOMS: No  URINARY SYMPTOMS: No  GYNECOLOGIC SYMPTOMS: No  BREAST SYMPTOMS: No  SKELETAL SYMPTOMS: No  BLOOD SYMPTOMS: No  NERVOUS SYSTEM SYMPTOMS: Yes  MENTAL HEALTH SYMPTOMS: Yes  Ear pain: No  Ear discharge: No  Hearing loss: Yes  Tinnitus: No  Nosebleeds: No  Congestion: No  Sinus pain: No  Trouble swallowing: No   Voice hoarseness: Yes  Mouth sores: No  Sore throat: No  Tooth pain: No  Gum tenderness: No  Bleeding gums: No  Change in taste: No  Change in sense of smell: No  Dry mouth: Yes  Hearing aid used: No  Neck lump: No  Trouble with coordination: Yes  Dizziness or trouble with balance: Yes  Fainting or black-out spells: No  Memory loss: No  Headache: No  Seizures: No  Speech problems: No  Tingling: No  Tremor: No  Weakness: Yes  Difficulty walking: Yes  Paralysis: No  Numbness: No  Nervous or Anxious: Yes  Depression: Yes  Trouble sleeping: Yes  Trouble thinking or concentrating: No  Mood changes: " "Yes  Panic attacks: Yes          I have reviewed and updated the patient's Past Medical History, Social History, Family History and Medication List.    ALLERGIES  Codeine, Morphine, Penicillins, and Vicodin [hydrocodone-acetaminophen]    Lasts visit details if there was a last visit:       14 Review of systems  are negative except for   Patient Active Problem List   Diagnosis     Atypical parkinsonism (H)     Disorder of bone and cartilage     Left ventricular hypertrophy     Hemorrhoids     Mixed hyperlipidemia     Other and unspecified diseases of appendix     Posterior capsular opacification     Prolapse of vaginal wall     Venous insufficiency     Abnormal brain MRI     Closed fracture of multiple ribs of both sides, initial encounter     Fall     Stroke (H)     Acute lacunar stroke (H)     Atrial fibrillation, unspecified type (H)     History of cervical fracture     Muscle weakness of upper extremity     Non-ischemic cardiomyopathy (H)     Tenderness of temporomandibular joint     Cryptogenic stroke (H)     Falls frequently     Balance problem     Chronic left hip pain     Gait instability     Presence of left atrial appendage closure device composed of nickel-titanium alloy with polyethylene terephthalate membrane     Status post placement of implantable loop recorder        Allergies   Allergen Reactions     Codeine Other (See Comments)     Morphine Other (See Comments)     Made me feel really wierd     Penicillins Other (See Comments)     Tongue and throat tingling  Other reaction(s): Paresthesias  Tongue and throat tingling  Tingling on lips  Other reaction(s): Paresthesias  Tongue and throat tingling  Tingling on lips  Tongue and throat \"tingling\" when in 20s       Vicodin [Hydrocodone-Acetaminophen] Other (See Comments)     Weird feeling     Past Surgical History:   Procedure Laterality Date     ANKLE SURGERY Left     fracture     APPENDECTOMY       BREAST CYST ASPIRATION       C TOTAL ABDOM HYSTERECTOMY "      Description: Hysterectomy;  Proc Date: 01/01/1988;  Comments: couldn't find her ovaries     EP THANIA CLOSURE N/A 2/20/2020    Procedure: EP THANIA Closure;  Surgeon: Javier Smith MD;  Location: [facility];  Service: Cardiology     EP LOOP RECORDER IMPLANT N/A 10/8/2019    Procedure: EP Loop Recorder Insertion;  Surgeon: Angel Hurd MD;  Location: [facility] Lab;  Service: Cardiology     EYE SURGERY Bilateral     cataract surgery 2000     HYSTERECTOMY      tahbso     HYSTERECTOMY  1982     KNEE SURGERY Right     staph infection     TONSILLECTOMY       Past Medical History:   Diagnosis Date     Abnormal brain MRI 3/11/2019    [facility] Other Result Information This result has an attachment that is not available. Result Narrative [facility] RADIOLOGY  EXAM: MR HEAD BRAIN WO LOCATION: [facility] DATE/TIME: 3/7/2019 5:18 PM  INDICATION: Atypical Parkinsonism (hc) COMPARISON: CT 06/25/2018 TECHNIQUE: Routine multiplanar multisequence head MRI without intravenou     Atrial fibrillation (H)     per H&P     Atypical parkinsonism (H) 2/2/2017     Breast cyst      CVA (cerebral vascular accident) (H)     per H&P     Finger fracture, left 02/27/2019    ring finger      Multiple rib fractures 02/27/2019     Parkinson disease (H) 1/20/2016     Social History     Socioeconomic History     Marital status:      Spouse name: Not on file     Number of children: Not on file     Years of education: Not on file     Highest education level: Not on file   Occupational History     Not on file   Tobacco Use     Smoking status: Never Smoker     Smokeless tobacco: Never Used   Substance and Sexual Activity     Alcohol use: No     Drug use: Never     Sexual activity: Not on file   Other Topics Concern     Not on file   Social History Narrative    . lives in [place].     Anisha Little daughter    Retired nurse.     Various hospital    Gyn,  intensive care,     School system    [place], [place]    Moved here in      x 2    First    = was 43 yrs old and had melanoma    Second    had a stroke and  Was 87 yrs old.      Social Determinants of Health     Financial Resource Strain: Not on file   Food Insecurity: Not on file   Transportation Needs: Not on file   Physical Activity: Not on file   Stress: Not on file   Social Connections: Not on file   Intimate Partner Violence: Not on file   Housing Stability: Not on file     Family History   Problem Relation Age of Onset     Cerebrovascular Disease Mother      Heart Disease Mother      Other - See Comments Mother         Tuvaluan [place]ese     Dementia Father         10 yrs.     Other - See Comments Father         Sudanese     Paranoid behavior Father      Other - See Comments Sister         bhavesh anne     Other - See Comments Daughter         kala     Other - See Comments Daughter         leonides lew     Other - See Comments Son         [place]     Other - See Comments Naveen [place], [place]     Dementia Paternal Aunt      Dementia Paternal Aunt      Dementia Paternal Uncle      Breast Cancer Maternal Aunt      Current Outpatient Medications   Medication Sig Dispense Refill     acetaminophen (TYLENOL) 500 MG tablet Take 500-1,000 mg by mouth 2 times daily as needed        aspirin (ASA) 81 MG EC tablet 81mg tab by mouth daily @8am 30 tablet 0     calcium carbonate 500 MG CHEW 2 tums by mouth nightly as needed @ 10/11pm       calcium carbonate 600 mg-vitamin D 400 units (CALTRATE) 600-400 MG-UNIT per tablet Take 1 tablet by mouth daily       carbidopa-levodopa (SINEMET)  MG tablet TAKE 1.5 TABLETs by mouth  AT  7  AM,  1.5 tablets at 11:30AM , AND 1 tablet at 4   tablet 3     gabapentin (NEURONTIN) 300 MG capsule Take 1 capsule (300 mg) by mouth 3 times daily 180 capsule 3     metoprolol succinate ER (TOPROL-XL) 25 MG 24 hr  tablet Take 1 tablet (25 mg) by mouth daily 90 tablet 3     pravastatin (PRAVACHOL) 20 MG tablet 20mg tab by mouth nightly @8-10pm 30 tablet 0     traZODone (DESYREL) 50 MG tablet Take 1 tablet (50 mg) by mouth nightly as needed for sleep 90 tablet 3     zinc gluconate 50 MG tablet Take 1 tablet by mouth every 24 hours

## 2021-11-21 ASSESSMENT — ENCOUNTER SYMPTOMS
TREMORS: 0
PARALYSIS: 0
HOARSE VOICE: 1
TROUBLE SWALLOWING: 0
SINUS PAIN: 0
TINGLING: 0
NECK MASS: 0
DISTURBANCES IN COORDINATION: 1
SINUS CONGESTION: 0
LOSS OF CONSCIOUSNESS: 0
MEMORY LOSS: 0
NUMBNESS: 0
DIZZINESS: 1
TASTE DISTURBANCE: 0
SPEECH CHANGE: 0
HEADACHES: 0
SMELL DISTURBANCE: 0
DECREASED CONCENTRATION: 0
NERVOUS/ANXIOUS: 1
PANIC: 1
INSOMNIA: 1
SORE THROAT: 0
WEAKNESS: 1
SEIZURES: 0
DEPRESSION: 1

## 2021-11-22 ENCOUNTER — TRANSFERRED RECORDS (OUTPATIENT)
Dept: HEALTH INFORMATION MANAGEMENT | Facility: CLINIC | Age: 86
End: 2021-11-22
Payer: MEDICARE

## 2021-11-23 ENCOUNTER — VIRTUAL VISIT (OUTPATIENT)
Dept: NEUROLOGY | Facility: CLINIC | Age: 86
End: 2021-11-23
Payer: MEDICARE

## 2021-11-23 DIAGNOSIS — E78.5 HYPERLIPIDEMIA, UNSPECIFIED HYPERLIPIDEMIA TYPE: ICD-10-CM

## 2021-11-23 DIAGNOSIS — I48.91 ATRIAL FIBRILLATION, UNSPECIFIED TYPE (H): ICD-10-CM

## 2021-11-23 DIAGNOSIS — F51.01 PRIMARY INSOMNIA: ICD-10-CM

## 2021-11-23 DIAGNOSIS — G20.A1 PARKINSON'S DISEASE (H): Primary | ICD-10-CM

## 2021-11-23 DIAGNOSIS — F41.9 ANXIETY: ICD-10-CM

## 2021-11-23 DIAGNOSIS — I63.441 CEREBROVASCULAR ACCIDENT (CVA) DUE TO EMBOLISM OF RIGHT CEREBELLAR ARTERY (H): ICD-10-CM

## 2021-11-23 DIAGNOSIS — H91.91 HEARING LOSS OF RIGHT EAR, UNSPECIFIED HEARING LOSS TYPE: Primary | ICD-10-CM

## 2021-11-23 DIAGNOSIS — G89.29 OTHER CHRONIC PAIN: ICD-10-CM

## 2021-11-23 PROCEDURE — 99214 OFFICE O/P EST MOD 30 MIN: CPT | Mod: 95 | Performed by: PSYCHIATRY & NEUROLOGY

## 2021-11-23 RX ORDER — ACETAMINOPHEN 500 MG
500 TABLET ORAL EVERY 6 HOURS PRN
COMMUNITY
Start: 2021-11-23 | End: 2024-04-23

## 2021-11-23 RX ORDER — IBUPROFEN 200 MG
TABLET ORAL
COMMUNITY
Start: 2021-11-23 | End: 2024-04-23

## 2021-11-23 RX ORDER — CARBIDOPA AND LEVODOPA 25; 100 MG/1; MG/1
TABLET ORAL
Qty: 360 TABLET | Refills: 3 | COMMUNITY
Start: 2021-11-23 | End: 2022-02-11

## 2021-11-23 RX ORDER — PRAVASTATIN SODIUM 20 MG
TABLET ORAL
Qty: 90 TABLET | Refills: 1 | COMMUNITY
Start: 2021-11-23 | End: 2022-01-27

## 2021-11-23 RX ORDER — GABAPENTIN 300 MG/1
CAPSULE ORAL
Qty: 270 CAPSULE | Refills: 3 | COMMUNITY
Start: 2021-11-23 | End: 2021-12-10

## 2021-11-23 RX ORDER — TRAZODONE HYDROCHLORIDE 50 MG/1
TABLET, FILM COATED ORAL
Qty: 90 TABLET | Refills: 3 | COMMUNITY
Start: 2021-11-23 | End: 2021-12-10

## 2021-11-23 RX ORDER — ZINC GLUCONATE 50 MG
TABLET ORAL
COMMUNITY
Start: 2021-11-23 | End: 2024-04-23

## 2021-11-23 RX ORDER — CHOLECALCIFEROL (VITAMIN D3) 50 MCG
TABLET ORAL
COMMUNITY
Start: 2021-11-23 | End: 2024-04-23

## 2021-11-23 RX ORDER — ANTACID TABLETS 500 MG/1
TABLET, CHEWABLE ORAL
COMMUNITY
Start: 2021-11-23 | End: 2024-04-23

## 2021-11-23 RX ORDER — METOPROLOL SUCCINATE 25 MG/1
TABLET, EXTENDED RELEASE ORAL
Qty: 90 TABLET | Refills: 3 | COMMUNITY
Start: 2021-11-23 | End: 2022-01-10

## 2021-11-23 NOTE — PROGRESS NOTES
Carmen is a 89 year old who is being evaluated via a billable video visit.      How would you like to obtain your AVS? Mychart  If the video visit is dropped, the invitation should be resent by: [phone]    Video Start Time:   Video-Visit Details    Type of service:  Video Visit    Video End Time:    Originating Location (pt. Location): Home    Distant Location (provider location):  [facility] NEUROLOGY [facility]     Platform used for Video Visit: [employer]

## 2021-11-23 NOTE — PATIENT INSTRUCTIONS
Atypical parkinsonism  Gait disorder/fall risk  Prior STroke  89 years old    Carbidopa/levodopa Sinemet 25/100  1.5 - 1.5 - 1     Review of diagnosis    Parkinsonism - p ossible vascular vs degenerative vs both    Avoidance of dopamine blockers   Not taking    Motor complication review     Review of Impulse control disorders     Review of surgical or medication options     Gait/Balance/Falls   Not falling every day with her monitoring    Uses scooter  Cannot walk like she  Use to   Walk with walker and needs gait belt    Was living alone  Daughter 5 minutes  Had walked with belt  Recumbent bike in the past    Exercise/Therapy performed/offered     Cognitive/Driving     Mood   Had episodes of anxiety  She had used someone else's clonazepam on an occasion - ? Reaction -  Like a drunken    Hallucinations/delusions   Denies     Sleep   Sleep disordered breathing; stop bang was 4/8  RLS symptoms - rare  Sleep behaviors - absent   Chronic insomnia - has problems staying asleep  Has a number of good hours and then wakes up at 3am or so  Trazodone 50mg x 2   She has a motorized scooter/wheelchair  Has a commode by her bed    Bladder     GI/Constipation/GERD   hemorrhoids    ENDO   Lipid disorder  Pravastatin pravachol 20mg  Calcium vitamin d daily  Vitamin D3 cholecalciferol 50mcg 2000 units    Cardio/heart   Non ischemic cardiomyopathy  Left atrial appendage closure device  Implantable loop recorder  Venous insufficiency  Metoprolol succinate ER Toprol XL 25mg 24 hr tablet    Vision   Wears glasses    Heme   Aspirin 81mg daily    Other:    Had covid    Hearing loss - needs aides  Will see audiologist and ent    Cervical fracture    Left hip joint pain that is not surgical  Steroids don't hurt    Acetaminophen 500mg at night   Ibuprofen advil/motrin 200mg x 2  night  Gabapentin neurontin 300mg 3/day     Zinc 50mg in the morning    Abnormal brain mri  Lacunar stroke  HEAD MRI from 10/5/2019  1.  Small acute cortical  infarcts in the knob of the right precentral gyrus.  2.  Small acute lacunar infarct in the right cerebellar hemisphere.  3.  No mass effect or hemorrhagic transformation.  4.  Chronic cortical infarcts in the right middle frontal gyrus.  5.  Moderate burden of chronic infarcts in the cerebellar hemispheres, left greater than right.  6.  Chronic type II odontoid fracture with surrounding pannus. This fracture was acute on 06/25/2018.      Medications     7/8 12/1230 5/530 8pm 9/10p   Acetaminophen tylenol 500mg          1   Aspirin 81mg  1           Calcium carbonate 500mg tums        1   Calcium carbonate vit D 600-400  1           Carbidopa/levodopa Sinemet 25/100 1.5  1.5 1       Diclofenac voltaren 1% gel Not using       Gabapentin neurontin 300mg 1 1  1    Ibuprofen advil/motrin 200mg     2   Metoprolol succinate ER Toprol XL 25mg 24 hr 1       Pravastatin pravachol 20mg         1    Trazodone desyrel 50mg            2   Vitamin D3 cholecalciferol 50mcg 2000 units 1       Zinc gluconate 50mg 1                                     Plan:    For hip pain may consider over the counter pain medication like aspercreme/patch vs diclofenac/voltaren 1% gel    She may not be officially home bound per her report    Needs new orders for therapy to mary ellen tan    Return to see Analisa Bolaños in 3-6 months

## 2021-11-23 NOTE — LETTER
11/23/2021       RE: ALEXA Luu  [address] [facility] [address]     Dear Colleague,    Thank you for referring your patient, ALEXA Luu, to the [facility] NEUROLOGY CLINIC [place] [facility]. Please see a copy of my visit note below.        VIDEO VISIT    Date of Visit: November 23, 2021  Name: ALEXA Luu  Date of Birth 6/19/1932    Address   [address] [facility] [address] Phone   [phone] (Home)   [phone] (Mobile) *Preferred* E-mail Address   [email or web address]     [phone]    Daughter lives across the street  Anisha [place] the [place]  Mariel lives 15 minutes away  [place] in [place]  Josias is in [facility]    Assessment:  Atypical parkinsonism  Gait disorder/fall risk  Prior STroke  89 years old    Carbidopa/levodopa Sinemet 25/100  1.5 - 1.5 - 1     Review of diagnosis    Parkinsonism - p ossible vascular vs degenerative vs both    Avoidance of dopamine blockers   Not taking    Motor complication review     Review of Impulse control disorders     Review of surgical or medication options     Gait/Balance/Falls   Not falling every day with her monitoring    Uses scooter  Cannot walk like she  Use to   Walk with walker and needs gait belt    Was living alone  Daughter 5 minutes  Had walked with belt  Recumbent bike in the past    Exercise/Therapy performed/offered     Cognitive/Driving     Mood   Had episodes of anxiety  She had used someone else's clonazepam on an occasion - ? Reaction -  Like a drunken    Hallucinations/delusions   Denies     Sleep   Sleep disordered breathing; stop bang was 4/8  RLS symptoms - rare  Sleep behaviors - absent   Chronic insomnia - has problems staying asleep  Has a number of good hours and then wakes up at 3am or so  Trazodone 50mg x 2   She has a motorized scooter/wheelchair  Has a commode by her bed    Bladder     GI/Constipation/GERD    hemorrhoids    ENDO   Lipid disorder  Pravastatin pravachol 20mg  Calcium vitamin d daily  Vitamin D3 cholecalciferol 50mcg 2000 units    Cardio/heart   Non ischemic cardiomyopathy  Left atrial appendage closure device  Implantable loop recorder  Venous insufficiency  Metoprolol succinate ER Toprol XL 25mg 24 hr tablet    Vision   Wears glasses    Heme   Aspirin 81mg daily    Other:    Had covid    Hearing loss - needs aides  Will see audiologist and ent    Cervical fracture    Left hip joint pain that is not surgical  Steroids don't hurt    Acetaminophen 500mg at night   Ibuprofen advil/motrin 200mg x 2  night  Gabapentin neurontin 300mg 3/day     Zinc 50mg in the morning    Abnormal brain mri  Lacunar stroke  HEAD MRI from 10/5/2019  1.  Small acute cortical infarcts in the knob of the right precentral gyrus.  2.  Small acute lacunar infarct in the right cerebellar hemisphere.  3.  No mass effect or hemorrhagic transformation.  4.  Chronic cortical infarcts in the right middle frontal gyrus.  5.  Moderate burden of chronic infarcts in the cerebellar hemispheres, left greater than right.  6.  Chronic type II odontoid fracture with surrounding pannus. This fracture was acute on 06/25/2018.      Medications     7/8 12/1230 5/530 8pm 9/10p   Acetaminophen tylenol 500mg          1   Aspirin 81mg  1           Calcium carbonate 500mg tums        1   Calcium carbonate vit D 600-400  1           Carbidopa/levodopa Sinemet 25/100 1.5  1.5 1       Diclofenac voltaren 1% gel Not using       Gabapentin neurontin 300mg 1 1  1    Ibuprofen advil/motrin 200mg     2   Metoprolol succinate ER Toprol XL 25mg 24 hr 1       Pravastatin pravachol 20mg         1    Trazodone desyrel 50mg            2   Vitamin D3 cholecalciferol 50mcg 2000 units 1       Zinc gluconate 50mg 1                                     Plan:    For hip pain may consider over the counter pain medication like aspercreme/patch vs diclofenac/voltaren 1% gel    She  may not be officially home bound per her report    Needs new orders for therapy to [facility]    Return to see Analisa Bolaños in 3-6 months                  Documentation of a Face-to-Face Physician Encounter November 23, 2021    ALEXA Luis [facility]  6/19/1932  [id number]    I certify that this patient is under my care and that I, or a nurse practitioner or physician's assistant working with me, had a face-to-face encounter that meets the physician face-to-face encounter requirements with this patient on: 11/23/2021.    The encounter with the patient was in whole, or in part, for the following medical condition, which is the primary reason for home health care:  Patient Active Problem List   Diagnosis     Atypical parkinsonism (H)     Disorder of bone and cartilage     Left ventricular hypertrophy     Hemorrhoids     Mixed hyperlipidemia     Other and unspecified diseases of appendix     Posterior capsular opacification     Prolapse of vaginal wall     Venous insufficiency     Abnormal brain MRI     Closed fracture of multiple ribs of both sides, initial encounter     Fall     Stroke (H)     Acute lacunar stroke (H)     Atrial fibrillation, unspecified type (H)     History of cervical fracture     Muscle weakness of upper extremity     Non-ischemic cardiomyopathy (H)     Tenderness of temporomandibular joint     Cryptogenic stroke (H)     Falls frequently     Balance problem     Chronic left hip pain     Gait instability     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 absences from home require considerable and taxing effort and are for medical reasons or Zoroastrianism services or infrequently or of short duration when for other reasons) because: parkinsonism      Physician signature ___________________________________   November 23, 2021  Physician name: Ed Mendoza MD    Fax ([phone]) or scan/email ([email or web address]) this completed document to [facility] within 24 hours of the referral date.  Questions: [phone].      Medical Decision Making:  #  Chronic progressive medical conditions addressed  Hip pain  Review and/or interpretation of unique test or documentation from a provider outside of neurology no   Independent historian provided additional details  yes   Prescription drug management and review of potential side effects and/or monitoring for side effects  yes   Health impacted by social determinants of health  Yes - home bound    I have reviewed the note as documented above.  This accurately captures the substance of my conversation with the patient and total time spent preparing for visit, executing visit and completing visit on the day of the visit:  30 minutes.     Face to face time: 810am - 843am    Ed Mendoza MD      ------------------------------------------------------------------------------------------------------------------------------------------------------------------------    Video-Visit Details    The patient has been notified of following:     \"After a review of the patient s situation, this visit was changed from an in-person visit to a video visit to reduce the risk of COVID-19 exposure.   The patient is being evaluated via a billable video visit.\"    \"This video visit will be conducted via a call between you and your physician/provider. We have found that certain health care needs can be provided without the need for an in-person physical exam.  This service lets us provide the care you need " "with a video conversation.  If a prescription is necessary we can send it directly to your pharmacy.  If lab work is needed we can place an order for that and you can then stop by our lab to have the test done at a later time.    If during the course of the call the physician/provider feels a video visit is not appropriate, you will not be charged for this service.\"     Patient has given verbal consent for Video visit? Yes    Patient would like the video invitation sent by:     Type of service:  Video Visit    Video Start Time:     Video End Time (time video stopped):     Duration:  minutes - see above    Originating Location (pt. Location):     Distant Location (provider location):  [facility] NEUROLOGY CLINIC [place]     Mode of Communication:  Video Conference via Hara (and if not possible then doximity)      Ed Mendoza MD      --------------------------------------------------------------------------------------------------------------    ALEXA Carmen Luu is a 89 year old female who is being evaluated via a billable video visit.      Charts reviewed  Consult from  Images reviewed      Answers for HPI/ROS submitted by the patient on 11/21/2021  General Symptoms: No  Skin Symptoms: No  HENT Symptoms: Yes  EYE SYMPTOMS: No  HEART SYMPTOMS: No  LUNG SYMPTOMS: No  INTESTINAL SYMPTOMS: No  URINARY SYMPTOMS: No  GYNECOLOGIC SYMPTOMS: No  BREAST SYMPTOMS: No  SKELETAL SYMPTOMS: No  BLOOD SYMPTOMS: No  NERVOUS SYSTEM SYMPTOMS: Yes  MENTAL HEALTH SYMPTOMS: Yes  Ear pain: No  Ear discharge: No  Hearing loss: Yes  Tinnitus: No  Nosebleeds: No  Congestion: No  Sinus pain: No  Trouble swallowing: No   Voice hoarseness: Yes  Mouth sores: No  Sore throat: No  Tooth pain: No  Gum tenderness: No  Bleeding gums: No  Change in taste: No  Change in sense of smell: No  Dry mouth: Yes  Hearing aid used: No  Neck lump: No  Trouble with coordination: Yes  Dizziness or trouble with balance: Yes  Fainting or " black-out spells: No  Memory loss: No  Headache: No  Seizures: No  Speech problems: No  Tingling: No  Tremor: No  Weakness: Yes  Difficulty walking: Yes  Paralysis: No  Numbness: No  Nervous or Anxious: Yes  Depression: Yes  Trouble sleeping: Yes  Trouble thinking or concentrating: No  Mood changes: Yes  Panic attacks: Yes          I have reviewed and updated the patient's Past Medical History, Social History, Family History and Medication List.    ALLERGIES  Codeine, Morphine, Penicillins, and Vicodin [hydrocodone-acetaminophen]    Lasts visit details if there was a last visit:       14 Review of systems  are negative except for   Patient Active Problem List   Diagnosis     Atypical parkinsonism (H)     Disorder of bone and cartilage     Left ventricular hypertrophy     Hemorrhoids     Mixed hyperlipidemia     Other and unspecified diseases of appendix     Posterior capsular opacification     Prolapse of vaginal wall     Venous insufficiency     Abnormal brain MRI     Closed fracture of multiple ribs of both sides, initial encounter     Fall     Stroke (H)     Acute lacunar stroke (H)     Atrial fibrillation, unspecified type (H)     History of cervical fracture     Muscle weakness of upper extremity     Non-ischemic cardiomyopathy (H)     Tenderness of temporomandibular joint     Cryptogenic stroke (H)     Falls frequently     Balance problem     Chronic left hip pain     Gait instability     Presence of left atrial appendage closure device composed of nickel-titanium alloy with polyethylene terephthalate membrane     Status post placement of implantable loop recorder        Allergies   Allergen Reactions     Codeine Other (See Comments)     Morphine Other (See Comments)     Made me feel really wierd     Penicillins Other (See Comments)     Tongue and throat tingling  Other reaction(s): Paresthesias  Tongue and throat tingling  Tingling on lips  Other reaction(s): Paresthesias  Tongue and throat tingling  Tingling  "on lips  Tongue and throat \"tingling\" when in 20s       Vicodin [Hydrocodone-Acetaminophen] Other (See Comments)     Weird feeling     Past Surgical History:   Procedure Laterality Date     ANKLE SURGERY Left     fracture     APPENDECTOMY       BREAST CYST ASPIRATION       C TOTAL ABDOM HYSTERECTOMY      Description: Hysterectomy;  Proc Date: 01/01/1988;  Comments: couldn't find her ovaries     EP THANIA CLOSURE N/A 2/20/2020    Procedure: EP THANIA Closure;  Surgeon: Javier Smith MD;  Location: [facility];  Service: Cardiology     EP LOOP RECORDER IMPLANT N/A 10/8/2019    Procedure: EP Loop Recorder Insertion;  Surgeon: Angel Hurd MD;  Location: [facility];  Service: Cardiology     EYE SURGERY Bilateral     cataract surgery 2000     HYSTERECTOMY      tahbso     HYSTERECTOMY  1982     KNEE SURGERY Right     staph infection     TONSILLECTOMY       Past Medical History:   Diagnosis Date     Abnormal brain MRI 3/11/2019    MR HEAD BRAIN WO3/8/2019 [facility] Other Result Information This result has an attachment that is not available. Result Narrative [facility] RADIOLOGY  EXAM: MR HEAD BRAIN WO LOCATION: [facility] DATE/TIME: 3/7/2019 5:18 PM  INDICATION: Atypical Parkinsonism (hc) COMPARISON: CT 06/25/2018 TECHNIQUE: Routine multiplanar multisequence head MRI without intravenou     Atrial fibrillation (H)     per H&P     Atypical parkinsonism (H) 2/2/2017     Breast cyst      CVA (cerebral vascular accident) (H)     per H&P     Finger fracture, left 02/27/2019    ring finger      Multiple rib fractures 02/27/2019     Parkinson disease (H) 1/20/2016     Social History     Socioeconomic History     Marital status:      Spouse name: Not on file     Number of children: Not on file     Years of education: Not on file     Highest education level: Not on file   Occupational History     Not on file   Tobacco Use     Smoking status: Never Smoker     " Smokeless tobacco: Never Used   Substance and Sexual Activity     Alcohol use: No     Drug use: Never     Sexual activity: Not on file   Other Topics Concern     Not on file   Social History Narrative    . lives in [place].     Anisha Little daughter    Retired nurse.     Various hospital    Gyn, intensive care,     School system    [place], [place]    Moved here in      x 2    First    = was 43 yrs old and had melanoma    Second   2013 had a stroke and  Was 87 yrs old.      Social Determinants of Health     Financial Resource Strain: Not on file   Food Insecurity: Not on file   Transportation Needs: Not on file   Physical Activity: Not on file   Stress: Not on file   Social Connections: Not on file   Intimate Partner Violence: Not on file   Housing Stability: Not on file     Family History   Problem Relation Age of Onset     Cerebrovascular Disease Mother      Heart Disease Mother      Other - See Comments Mother         Citizen of the [place]     Dementia Father         10 yrs.     Other - See Comments Father         [place]     Paranoid behavior Father      Other - See Comments Sister loretta [place]     Other - See Comments [place]         [place]     Other - See Comments Daughter         [place]     Other - See Comments Son         [place]     Other - See Comments Naveen [place], [place]     Dementia Paternal Aunt      Dementia Paternal Aunt      Dementia Paternal Uncle      Breast Cancer Maternal Aunt      Current Outpatient Medications   Medication Sig Dispense Refill     acetaminophen (TYLENOL) 500 MG tablet Take 500-1,000 mg by mouth 2 times daily as needed        aspirin (ASA) 81 MG EC tablet 81mg tab by mouth daily @8am 30 tablet 0     calcium carbonate 500 MG CHEW 2 tums by mouth nightly as needed @ 10/11pm       calcium carbonate 600 mg-vitamin D 400 units (CALTRATE) 600-400 MG-UNIT per tablet Take 1 tablet by mouth daily        carbidopa-levodopa (SINEMET)  MG tablet TAKE 1.5 TABLETs by mouth  AT  7  AM,  1.5 tablets at 11:30AM , AND 1 tablet at 4   tablet 3     gabapentin (NEURONTIN) 300 MG capsule Take 1 capsule (300 mg) by mouth 3 times daily 180 capsule 3     metoprolol succinate ER (TOPROL-XL) 25 MG 24 hr tablet Take 1 tablet (25 mg) by mouth daily 90 tablet 3     pravastatin (PRAVACHOL) 20 MG tablet 20mg tab by mouth nightly @8-10pm 30 tablet 0     traZODone (DESYREL) 50 MG tablet Take 1 tablet (50 mg) by mouth nightly as needed for sleep 90 tablet 3     zinc gluconate 50 MG tablet Take 1 tablet by mouth every 24 hours             Carmen is a 89 year old who is being evaluated via a billable video visit.      How would you like to obtain your AVS? Mychart  If the video visit is dropped, the invitation should be resent by: [phone]    Video Start Time:   Video-Visit Details    Type of service:  Video Visit    Video End Time:    Originating Location (pt. Location): Home    Distant Location (provider location):  [facility] NEUROLOGY [facility]     Platform used for Video Visit: Well        Again, thank you for allowing me to participate in the care of your patient.      Sincerely,    Ed Mendoza MD

## 2021-11-23 NOTE — LETTER
11/23/2021      RE: ALEXA Luis [facility]  [address] [facility] [address]           VIDEO VISIT    Date of Visit: November 23, 2021  Name: ALEXA Luu  Date of Birth 6/19/1932    Address   [address] [place] [address] Phone   [phone] (Home)   [phone] (Mobile) *Preferred* E-mail Address   [email or web address]     [phone]    Paris lives across the street  Anisha lives across the [place]  Mariel lives 15 minutes away  [place] in [place]  Josias is in [facility]    Assessment:  Atypical parkinsonism  Gait disorder/fall risk  Prior STroke  89 years old    Carbidopa/levodopa Sinemet 25/100  1.5 - 1.5 - 1     Review of diagnosis    Parkinsonism - p ossible vascular vs degenerative vs both    Avoidance of dopamine blockers   Not taking    Motor complication review     Review of Impulse control disorders     Review of surgical or medication options     Gait/Balance/Falls   Not falling every day with her monitoring    Uses scooter  Cannot walk like she  Use to   Walk with walker and needs gait belt    Was living alone  Daughter 5 minutes  Had walked with belt  Recumbent bike in the past    Exercise/Therapy performed/offered     Cognitive/Driving     Mood   Had episodes of anxiety  She had used someone else's clonazepam on an occasion - ? Reaction -  Like a drunken    Hallucinations/delusions   Denies     Sleep   Sleep disordered breathing; stop bang was 4/8  RLS symptoms - rare  Sleep behaviors - absent   Chronic insomnia - has problems staying asleep  Has a number of good hours and then wakes up at 3am or so  Trazodone 50mg x 2   She has a motorized scooter/wheelchair  Has a commode by her bed    Bladder     GI/Constipation/GERD   hemorrhoids    ENDO   Lipid disorder  Pravastatin pravachol 20mg  Calcium vitamin d daily  Vitamin D3 cholecalciferol 50mcg 2000 units    Cardio/heart   Non ischemic cardiomyopathy  Left atrial appendage closure device  Implantable loop  recorder  Venous insufficiency  Metoprolol succinate ER Toprol XL 25mg 24 hr tablet    Vision   Wears glasses    Heme   Aspirin 81mg daily    Other:    Had covid    Hearing loss - needs aides  Will see audiologist and ent    Cervical fracture    Left hip joint pain that is not surgical  Steroids don't hurt    Acetaminophen 500mg at night   Ibuprofen advil/motrin 200mg x 2  night  Gabapentin neurontin 300mg 3/day     Zinc 50mg in the morning    Abnormal brain mri  Lacunar stroke  HEAD MRI from 10/5/2019  1.  Small acute cortical infarcts in the knob of the right precentral gyrus.  2.  Small acute lacunar infarct in the right cerebellar hemisphere.  3.  No mass effect or hemorrhagic transformation.  4.  Chronic cortical infarcts in the right middle frontal gyrus.  5.  Moderate burden of chronic infarcts in the cerebellar hemispheres, left greater than right.  6.  Chronic type II odontoid fracture with surrounding pannus. This fracture was acute on 06/25/2018.      Medications     7/8 12/1230 5/530 8pm 9/10p   Acetaminophen tylenol 500mg          1   Aspirin 81mg  1           Calcium carbonate 500mg tums        1   Calcium carbonate vit D 600-400  1           Carbidopa/levodopa Sinemet 25/100 1.5  1.5 1       Diclofenac voltaren 1% gel Not using       Gabapentin neurontin 300mg 1 1  1    Ibuprofen advil/motrin 200mg     2   Metoprolol succinate ER Toprol XL 25mg 24 hr 1       Pravastatin pravachol 20mg         1    Trazodone desyrel 50mg            2   Vitamin D3 cholecalciferol 50mcg 2000 units 1       Zinc gluconate 50mg 1                                     Plan:    For hip pain may consider over the counter pain medication like aspercreme/patch vs diclofenac/voltaren 1% gel    She may not be officially home bound per her report    Needs new orders for therapy to [facility]    Return to see Analisa Bolaños in 3-6 months                  Documentation of a Face-to-Face Physician Encounter November 23,  2021    ALEXA Luis [facility]  6/19/1932  [id number]    I certify that this patient is under my care and that I, or a nurse practitioner or physician's assistant working with me, had a face-to-face encounter that meets the physician face-to-face encounter requirements with this patient on: 11/23/2021.    The encounter with the patient was in whole, or in part, for the following medical condition, which is the primary reason for home health care:  Patient Active Problem List   Diagnosis     Atypical parkinsonism (H)     Disorder of bone and cartilage     Left ventricular hypertrophy     Hemorrhoids     Mixed hyperlipidemia     Other and unspecified diseases of appendix     Posterior capsular opacification     Prolapse of vaginal wall     Venous insufficiency     Abnormal brain MRI     Closed fracture of multiple ribs of both sides, initial encounter     Fall     Stroke (H)     Acute lacunar stroke (H)     Atrial fibrillation, unspecified type (H)     History of cervical fracture     Muscle weakness of upper extremity     Non-ischemic cardiomyopathy (H)     Tenderness of temporomandibular joint     Cryptogenic stroke (H)     Falls frequently     Balance problem     Chronic left hip pain     Gait instability     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 absences from home require considerable and taxing effort and are for medical reasons or [facility] services or infrequently or of short duration when for other reasons) because:  "parkinsonism      Physician signature ___________________________________   November 23, 2021  Physician name: Ed Mendoza MD    Fax ([phone]) or scan/email ([email or web address]) this completed document to [facility] within 24 hours of the referral date.  Questions: [phone].      Medical Decision Making:  #  Chronic progressive medical conditions addressed  Hip pain  Review and/or interpretation of unique test or documentation from a provider outside of neurology no   Independent historian provided additional details  yes   Prescription drug management and review of potential side effects and/or monitoring for side effects  yes   Health impacted by social determinants of health  Yes - home bound    I have reviewed the note as documented above.  This accurately captures the substance of my conversation with the patient and total time spent preparing for visit, executing visit and completing visit on the day of the visit:  30 minutes.     Face to face time: 810am - 843am    Ed Mendoza MD      ------------------------------------------------------------------------------------------------------------------------------------------------------------------------    Video-Visit Details    The patient has been notified of following:     \"After a review of the patient s situation, this visit was changed from an in-person visit to a video visit to reduce the risk of COVID-19 exposure.   The patient is being evaluated via a billable video visit.\"    \"This video visit will be conducted via a call between you and your physician/provider. We have found that certain health care needs can be provided without the need for an in-person physical exam.  This service lets us provide the care you need with a video conversation.  If a prescription is necessary we can send it directly to your pharmacy.  If lab work is needed we can place an order for that and you can then stop by our lab to have the test done at a " "later time.    If during the course of the call the physician/provider feels a video visit is not appropriate, you will not be charged for this service.\"     Patient has given verbal consent for Video visit? Yes    Patient would like the video invitation sent by:     Type of service:  Video Visit    Video Start Time:     Video End Time (time video stopped):     Duration:  minutes - see above    Originating Location (pt. Location):     Distant Location (provider location):  [facility] NEUROLOGY [facility]     Mode of Communication:  Video Conference via [employer] (and if not possible then [facility])      Ed Mendoza MD      --------------------------------------------------------------------------------------------------------------    ALEXA Carmen Luu is a 89 year old female who is being evaluated via a billable video visit.      Charts reviewed  Consult from  Images reviewed      Answers for HPI/ROS submitted by the patient on 11/21/2021  General Symptoms: No  Skin Symptoms: No  HENT Symptoms: Yes  EYE SYMPTOMS: No  HEART SYMPTOMS: No  LUNG SYMPTOMS: No  INTESTINAL SYMPTOMS: No  URINARY SYMPTOMS: No  GYNECOLOGIC SYMPTOMS: No  BREAST SYMPTOMS: No  SKELETAL SYMPTOMS: No  BLOOD SYMPTOMS: No  NERVOUS SYSTEM SYMPTOMS: Yes  MENTAL HEALTH SYMPTOMS: Yes  Ear pain: No  Ear discharge: No  Hearing loss: Yes  Tinnitus: No  Nosebleeds: No  Congestion: No  Sinus pain: No  Trouble swallowing: No   Voice hoarseness: Yes  Mouth sores: No  Sore throat: No  Tooth pain: No  Gum tenderness: No  Bleeding gums: No  Change in taste: No  Change in sense of smell: No  Dry mouth: Yes  Hearing aid used: No  Neck lump: No  Trouble with coordination: Yes  Dizziness or trouble with balance: Yes  Fainting or black-out spells: No  Memory loss: No  Headache: No  Seizures: No  Speech problems: No  Tingling: No  Tremor: No  Weakness: Yes  Difficulty walking: Yes  Paralysis: No  Numbness: No  Nervous or Anxious: Yes  Depression: " "Yes  Trouble sleeping: Yes  Trouble thinking or concentrating: No  Mood changes: Yes  Panic attacks: Yes          I have reviewed and updated the patient's Past Medical History, Social History, Family History and Medication List.    ALLERGIES  Codeine, Morphine, Penicillins, and Vicodin [hydrocodone-acetaminophen]    Lasts visit details if there was a last visit:       14 Review of systems  are negative except for   Patient Active Problem List   Diagnosis     Atypical parkinsonism (H)     Disorder of bone and cartilage     Left ventricular hypertrophy     Hemorrhoids     Mixed hyperlipidemia     Other and unspecified diseases of appendix     Posterior capsular opacification     Prolapse of vaginal wall     Venous insufficiency     Abnormal brain MRI     Closed fracture of multiple ribs of both sides, initial encounter     Fall     Stroke (H)     Acute lacunar stroke (H)     Atrial fibrillation, unspecified type (H)     History of cervical fracture     Muscle weakness of upper extremity     Non-ischemic cardiomyopathy (H)     Tenderness of temporomandibular joint     Cryptogenic stroke (H)     Falls frequently     Balance problem     Chronic left hip pain     Gait instability     Presence of left atrial appendage closure device composed of nickel-titanium alloy with polyethylene terephthalate membrane     Status post placement of implantable loop recorder        Allergies   Allergen Reactions     Codeine Other (See Comments)     Morphine Other (See Comments)     Made me feel really wierd     Penicillins Other (See Comments)     Tongue and throat tingling  Other reaction(s): Paresthesias  Tongue and throat tingling  Tingling on lips  Other reaction(s): Paresthesias  Tongue and throat tingling  Tingling on lips  Tongue and throat \"tingling\" when in 20s       Vicodin [Hydrocodone-Acetaminophen] Other (See Comments)     Weird feeling     Past Surgical History:   Procedure Laterality Date     ANKLE SURGERY Left     " fracture     APPENDECTOMY       BREAST CYST ASPIRATION       C TOTAL ABDOM HYSTERECTOMY      Description: Hysterectomy;  Proc Date: 01/01/1988;  Comments: couldn't find her ovaries     EP THANIA CLOSURE N/A 2/20/2020    Procedure: EP THANIA Closure;  Surgeon: Javier Smith MD;  Location: [facility];  Service: Cardiology     EP LOOP RECORDER IMPLANT N/A 10/8/2019    Procedure: EP Loop Recorder Insertion;  Surgeon: Angel Hurd MD;  Location: [facility];  Service: Cardiology     EYE SURGERY Bilateral     cataract surgery 2000     HYSTERECTOMY      tahbso     HYSTERECTOMY  1982     KNEE SURGERY Right     staph infection     TONSILLECTOMY       Past Medical History:   Diagnosis Date     Abnormal brain MRI 3/11/2019    MR HEAD BRAIN WO3/8/2019 [facility] Other Result Information This result has an attachment that is not available. Result Narrative [facility] RADIOLOGY  EXAM: MR HEAD BRAIN WO LOCATION: [facility] DATE/TIME: 3/7/2019 5:18 PM  INDICATION: Atypical Parkinsonism (hc) COMPARISON: CT 06/25/2018 TECHNIQUE: Routine multiplanar multisequence head MRI without intravenou     Atrial fibrillation (H)     per H&P     Atypical parkinsonism (H) 2/2/2017     Breast cyst      CVA (cerebral vascular accident) (H)     per H&P     Finger fracture, left 02/27/2019    ring finger      Multiple rib fractures 02/27/2019     Parkinson disease (H) 1/20/2016     Social History     Socioeconomic History     Marital status:      Spouse name: Not on file     Number of children: Not on file     Years of education: Not on file     Highest education level: Not on file   Occupational History     Not on file   Tobacco Use     Smoking status: Never Smoker     Smokeless tobacco: Never Used   Substance and Sexual Activity     Alcohol use: No     Drug use: Never     Sexual activity: Not on file   Other Topics Concern     Not on file   Social History Narrative    .  lives in [place].     Anisha Little daughter    Retired nurse.     Various hospital    Gyn, intensive care,     School system    [place], [place]    Moved here in      x 2    First    = was 43 yrs old and had melanoma    Second    had a stroke and  Was 87 yrs old.      Social Determinants of Health     Financial Resource Strain: Not on file   Food Insecurity: Not on file   Transportation Needs: Not on file   Physical Activity: Not on file   Stress: Not on file   Social Connections: Not on file   Intimate Partner Violence: Not on file   Housing Stability: Not on file     Family History   Problem Relation Age of Onset     Cerebrovascular Disease Mother      Heart Disease Mother      Other - See Comments Mother         Israeli [place]orean     Dementia Father         10 yrs.     Other - See Comments Father         [place]cuadorean     Paranoid behavior Father      Other - See Comments Sister         bhavesh anne     Other - See Comments Daughter         kala     Other - See Comments Daughter         leonides lew     Other - See Comments Son         [place]     Other - See Comments Naveen [place], [place]     Dementia Paternal Aunt      Dementia Paternal Aunt      Dementia Paternal Uncle      Breast Cancer Maternal Aunt      Current Outpatient Medications   Medication Sig Dispense Refill     acetaminophen (TYLENOL) 500 MG tablet Take 500-1,000 mg by mouth 2 times daily as needed        aspirin (ASA) 81 MG EC tablet 81mg tab by mouth daily @8am 30 tablet 0     calcium carbonate 500 MG CHEW 2 tums by mouth nightly as needed @ 10/11pm       calcium carbonate 600 mg-vitamin D 400 units (CALTRATE) 600-400 MG-UNIT per tablet Take 1 tablet by mouth daily       carbidopa-levodopa (SINEMET)  MG tablet TAKE 1.5 TABLETs by mouth  AT  7  AM,  1.5 tablets at 11:30AM , AND 1 tablet at 4   tablet 3     gabapentin (NEURONTIN) 300 MG capsule Take 1 capsule (300 mg)  by mouth 3 times daily 180 capsule 3     metoprolol succinate ER (TOPROL-XL) 25 MG 24 hr tablet Take 1 tablet (25 mg) by mouth daily 90 tablet 3     pravastatin (PRAVACHOL) 20 MG tablet 20mg tab by mouth nightly @8-10pm 30 tablet 0     traZODone (DESYREL) 50 MG tablet Take 1 tablet (50 mg) by mouth nightly as needed for sleep 90 tablet 3     zinc gluconate 50 MG tablet Take 1 tablet by mouth every 24 hours         Ed Mendoza MD

## 2021-12-01 ENCOUNTER — MYC MEDICAL ADVICE (OUTPATIENT)
Dept: NEUROLOGY | Facility: CLINIC | Age: 86
End: 2021-12-01
Payer: MEDICARE

## 2021-12-10 ENCOUNTER — ANCILLARY PROCEDURE (OUTPATIENT)
Dept: CARDIOLOGY | Facility: CLINIC | Age: 86
End: 2021-12-10
Attending: INTERNAL MEDICINE
Payer: MEDICARE

## 2021-12-10 DIAGNOSIS — F41.9 ANXIETY: ICD-10-CM

## 2021-12-10 DIAGNOSIS — Z45.09 ENCOUNTER FOR LOOP RECORDER CHECK: ICD-10-CM

## 2021-12-10 DIAGNOSIS — I63.9 CVA (CEREBRAL VASCULAR ACCIDENT) (H): ICD-10-CM

## 2021-12-10 DIAGNOSIS — G89.29 OTHER CHRONIC PAIN: ICD-10-CM

## 2021-12-10 LAB
MDC_IDC_MSMT_BATTERY_STATUS: NORMAL
MDC_IDC_PG_IMPLANT_DTM: NORMAL
MDC_IDC_PG_MFG: NORMAL
MDC_IDC_PG_MODEL: NORMAL
MDC_IDC_PG_SERIAL: NORMAL
MDC_IDC_PG_TYPE: NORMAL
MDC_IDC_SESS_CLINIC_NAME: NORMAL
MDC_IDC_SESS_DTM: NORMAL
MDC_IDC_SESS_TYPE: NORMAL
MDC_IDC_SET_ZONE_DETECTION_INTERVAL: 2000 MS
MDC_IDC_SET_ZONE_DETECTION_INTERVAL: 3000 MS
MDC_IDC_SET_ZONE_DETECTION_INTERVAL: 420 MS
MDC_IDC_SET_ZONE_TYPE: NORMAL
MDC_IDC_STAT_AT_BURDEN_PERCENT: 0 %
MDC_IDC_STAT_AT_DTM_END: NORMAL
MDC_IDC_STAT_AT_DTM_START: NORMAL
MDC_IDC_STAT_EPISODE_RECENT_COUNT: 0
MDC_IDC_STAT_EPISODE_RECENT_COUNT: 1
MDC_IDC_STAT_EPISODE_RECENT_COUNT_DTM_END: NORMAL
MDC_IDC_STAT_EPISODE_RECENT_COUNT_DTM_START: NORMAL
MDC_IDC_STAT_EPISODE_TOTAL_COUNT: 0
MDC_IDC_STAT_EPISODE_TOTAL_COUNT: 1
MDC_IDC_STAT_EPISODE_TOTAL_COUNT: 34
MDC_IDC_STAT_EPISODE_TOTAL_COUNT: 6
MDC_IDC_STAT_EPISODE_TOTAL_COUNT_DTM_END: NORMAL
MDC_IDC_STAT_EPISODE_TOTAL_COUNT_DTM_START: NORMAL
MDC_IDC_STAT_EPISODE_TYPE: NORMAL

## 2021-12-10 PROCEDURE — G2066 INTER DEVC REMOTE 30D: HCPCS | Performed by: INTERNAL MEDICINE

## 2021-12-10 PROCEDURE — 93297 REM INTERROG DEV EVAL ICPMS: CPT | Performed by: INTERNAL MEDICINE

## 2021-12-10 RX ORDER — GABAPENTIN 300 MG/1
300 CAPSULE ORAL 4 TIMES DAILY
Qty: 360 CAPSULE | Refills: 3 | Status: SHIPPED | OUTPATIENT
Start: 2021-12-10 | End: 2022-01-10

## 2021-12-10 RX ORDER — TRAZODONE HYDROCHLORIDE 100 MG/1
100 TABLET ORAL AT BEDTIME
Qty: 90 TABLET | Refills: 3 | Status: SHIPPED | OUTPATIENT
Start: 2021-12-10 | End: 2022-01-10

## 2021-12-23 ENCOUNTER — TELEPHONE (OUTPATIENT)
Dept: OTOLARYNGOLOGY | Facility: CLINIC | Age: 86
End: 2021-12-23

## 2021-12-23 ENCOUNTER — OFFICE VISIT (OUTPATIENT)
Dept: OTOLARYNGOLOGY | Facility: CLINIC | Age: 86
End: 2021-12-23

## 2021-12-23 ENCOUNTER — OFFICE VISIT (OUTPATIENT)
Dept: AUDIOLOGY | Facility: CLINIC | Age: 86
End: 2021-12-23
Payer: MEDICARE

## 2021-12-23 DIAGNOSIS — H90.3 SENSORINEURAL HEARING LOSS (SNHL) OF BOTH EARS: Primary | ICD-10-CM

## 2021-12-23 DIAGNOSIS — H90.5 SENSORINEURAL HEARING LOSS OF LEFT EAR: ICD-10-CM

## 2021-12-23 DIAGNOSIS — H69.91 DYSFUNCTION OF RIGHT EUSTACHIAN TUBE: ICD-10-CM

## 2021-12-23 DIAGNOSIS — H90.3 SENSORINEURAL HEARING LOSS (SNHL) OF BOTH EARS: ICD-10-CM

## 2021-12-23 DIAGNOSIS — H65.01 NON-RECURRENT ACUTE SEROUS OTITIS MEDIA OF RIGHT EAR: Primary | ICD-10-CM

## 2021-12-23 DIAGNOSIS — H90.71 MIXED HEARING LOSS OF RIGHT EAR: Primary | ICD-10-CM

## 2021-12-23 DIAGNOSIS — H90.A31 MIXED CONDUCTIVE AND SENSORINEURAL HEARING LOSS OF RIGHT EAR WITH RESTRICTED HEARING OF LEFT EAR: ICD-10-CM

## 2021-12-23 DIAGNOSIS — H90.A22 SENSORINEURAL HEARING LOSS (SNHL) OF LEFT EAR WITH RESTRICTED HEARING OF RIGHT EAR: ICD-10-CM

## 2021-12-23 PROCEDURE — 99203 OFFICE O/P NEW LOW 30 MIN: CPT | Performed by: OTOLARYNGOLOGY

## 2021-12-23 PROCEDURE — 99207 PR NO CHARGE LOS: CPT | Performed by: AUDIOLOGIST

## 2021-12-23 PROCEDURE — 92567 TYMPANOMETRY: CPT | Performed by: AUDIOLOGIST

## 2021-12-23 PROCEDURE — 92557 COMPREHENSIVE HEARING TEST: CPT | Performed by: AUDIOLOGIST

## 2021-12-23 RX ORDER — PRAVASTATIN SODIUM 20 MG
20 TABLET ORAL
COMMUNITY
Start: 2021-11-23 | End: 2022-01-19

## 2021-12-23 NOTE — PROGRESS NOTES
AUDIOLOGY REPORT    SUMMARY: Audiology visit completed. See audiogram for results. Abuse screening not completed due to same day appt with ENT clinic, where this is addressed.      RECOMMENDATIONS: Follow-up with ENT.      Dorina Contreras, [facility]  [place] Licensed Audiologist [id number]

## 2021-12-23 NOTE — PROGRESS NOTES
HPI: This patient is an 89yo F who presents for evaluation of hearing at the request of Dr. Lovell. She had covid recently, but shortly thereafter felt the right ear plug up. Denies otalgia, otorrhea, vertigo, and other major medical issues. No history of this in the past or chronic ear issues. She went to [facility] to get hearing aids and they referred her for an asymmetry. The hearing has been improving spontaneously in the right. Does use nasal steroid sprays.    Past medical history, surgical history, social history, family history, medications, and allergies have been reviewed with the patient and are documented above.    Review of Systems: a 10-system review was performed. Pertinent positives are noted in the HPI and on a separate scanned document in the chart.    PHYSICAL EXAMINATION:  GEN: no acute distress, normocephalic  EYES: extraocular movements are intact, pupils are equal and round. Sclera clear.   EARS: auricles are normally formed. The external auditory canals are clear with minimal to no cerumen. Tympanic membranes are intact bilaterally with no signs of infection, retractions, or perforations. right serous effusion.  NOSE: anterior nares are patent. There are no masses or lesions. The septum is non-obstructing.  OC/OP: clear, dentition is in good repair. The tongue and palate are fully mobile and symmetric. No masses or lesions.  NECK: soft and supple. No lymphadenopathy or masses. Airway is midline.  NEURO: CN VII and XII symmetric. alert and oriented. No spontaneous nystagmus. Gait is normal.  PULM: breathing comfortably on room air, normal chest expansion with respiration  CARDS: no cyanosis or clubbing, normal carotid pulses    AUDIOGRAM: mild to moderate SNHL left, moderate MHL right. Type A tymp left, Type B tymp right.   AUDIOGRAM (outside): same left, severe MHL right.    MEDICAL DECISION-MAKING: This patient is an 89yo F with a right serous otitis media that is resolving spontaneously  as it should following covid. Recommended continuing her nasal steroid sprays and instructed her on autoinsufflation. Will have them return in 1 month for a recheck with audiogram. Medically clear for HAs.

## 2021-12-23 NOTE — LETTER
12/23/2021         RE: ALEXA Luu  [address] [facility] [address]        Dear Colleague,    Thank you for referring your patient, ALEXA Luu, to the [facility]. Please see a copy of my visit note below.    HPI: This patient is an 88yo F who presents for evaluation of hearing at the request of Dr. Lovell. She had covid recently, but shortly thereafter felt the right ear plug up. Denies otalgia, otorrhea, vertigo, and other major medical issues. No history of this in the past or chronic ear issues. She went to [facility] to get hearing aids and they referred her for an asymmetry. The hearing has been improving spontaneously in the right. Does use nasal steroid sprays.    Past medical history, surgical history, social history, family history, medications, and allergies have been reviewed with the patient and are documented above.    Review of Systems: a 10-system review was performed. Pertinent positives are noted in the HPI and on a separate scanned document in the chart.    PHYSICAL EXAMINATION:  GEN: no acute distress, normocephalic  EYES: extraocular movements are intact, pupils are equal and round. Sclera clear.   EARS: auricles are normally formed. The external auditory canals are clear with minimal to no cerumen. Tympanic membranes are intact bilaterally with no signs of infection, retractions, or perforations. right serous effusion.  NOSE: anterior nares are patent. There are no masses or lesions. The septum is non-obstructing.  OC/OP: clear, dentition is in good repair. The tongue and palate are fully mobile and symmetric. No masses or lesions.  NECK: soft and supple. No lymphadenopathy or masses. Airway is midline.  NEURO: CN VII and XII symmetric. alert and oriented. No spontaneous nystagmus. Gait is normal.  PULM: breathing comfortably on room air, normal chest expansion with respiration  CARDS: no cyanosis or clubbing, normal carotid  pulses    AUDIOGRAM: mild to moderate SNHL left, moderate MHL right. Type A tymp left, Type B tymp right.   AUDIOGRAM (outside): same left, severe MHL right.    MEDICAL DECISION-MAKING: This patient is an 89yo F with a right serous otitis media that is resolving spontaneously as it should following covid. Recommended continuing her nasal steroid sprays and instructed her on autoinsufflation. Will have them return in 1 month for a recheck with audiogram. Medically clear for HAs.         Again, thank you for allowing me to participate in the care of your patient.        Sincerely,        Amy Day MD

## 2022-01-10 DIAGNOSIS — I48.91 ATRIAL FIBRILLATION, UNSPECIFIED TYPE (H): ICD-10-CM

## 2022-01-10 DIAGNOSIS — F41.9 ANXIETY: ICD-10-CM

## 2022-01-10 DIAGNOSIS — G89.29 OTHER CHRONIC PAIN: ICD-10-CM

## 2022-01-10 RX ORDER — GABAPENTIN 300 MG/1
300 CAPSULE ORAL 4 TIMES DAILY
Qty: 120 CAPSULE | Refills: 0 | Status: SHIPPED | OUTPATIENT
Start: 2022-01-10 | End: 2022-02-28

## 2022-01-10 RX ORDER — TRAZODONE HYDROCHLORIDE 100 MG/1
100 TABLET ORAL AT BEDTIME
Qty: 30 TABLET | Refills: 0 | Status: SHIPPED | OUTPATIENT
Start: 2022-01-10 | End: 2022-03-10

## 2022-01-10 RX ORDER — METOPROLOL SUCCINATE 25 MG/1
TABLET, EXTENDED RELEASE ORAL
Qty: 30 TABLET | Refills: 0 | Status: SHIPPED | OUTPATIENT
Start: 2022-01-10 | End: 2022-03-01

## 2022-01-19 DIAGNOSIS — E78.5 HYPERLIPIDEMIA, UNSPECIFIED HYPERLIPIDEMIA TYPE: Primary | ICD-10-CM

## 2022-01-19 RX ORDER — PRAVASTATIN SODIUM 20 MG
20 TABLET ORAL AT BEDTIME
Qty: 90 TABLET | Refills: 1 | Status: SHIPPED | OUTPATIENT
Start: 2022-01-19 | End: 2022-06-20

## 2022-01-25 ENCOUNTER — OFFICE VISIT (OUTPATIENT)
Dept: OTOLARYNGOLOGY | Facility: CLINIC | Age: 87
End: 2022-01-25

## 2022-01-25 ENCOUNTER — OFFICE VISIT (OUTPATIENT)
Dept: AUDIOLOGY | Facility: CLINIC | Age: 87
End: 2022-01-25
Payer: MEDICARE

## 2022-01-25 DIAGNOSIS — H90.3 SENSORINEURAL HEARING LOSS, BILATERAL: Primary | ICD-10-CM

## 2022-01-25 DIAGNOSIS — H90.3 SENSORINEURAL HEARING LOSS (SNHL) OF BOTH EARS: ICD-10-CM

## 2022-01-25 DIAGNOSIS — H69.93 DYSFUNCTION OF BOTH EUSTACHIAN TUBES: ICD-10-CM

## 2022-01-25 DIAGNOSIS — H90.3 SENSORINEURAL HEARING LOSS (SNHL) OF BOTH EARS: Primary | ICD-10-CM

## 2022-01-25 PROCEDURE — 92567 TYMPANOMETRY: CPT | Performed by: AUDIOLOGIST

## 2022-01-25 PROCEDURE — 92557 COMPREHENSIVE HEARING TEST: CPT | Performed by: AUDIOLOGIST

## 2022-01-25 PROCEDURE — 99207 PR NO CHARGE LOS: CPT | Performed by: AUDIOLOGIST

## 2022-01-25 PROCEDURE — 99213 OFFICE O/P EST LOW 20 MIN: CPT | Performed by: OTOLARYNGOLOGY

## 2022-01-25 NOTE — PROGRESS NOTES
HPI: This patient is an 89yo F who presents for follow-up of a right MARTHA following covid infection. She had covid recently, but shortly thereafter felt the right ear plug up. Denies otalgia, otorrhea, vertigo, and other major medical issues. No history of this in the past or chronic ear issues. She went to [facility] to get hearing aids and they referred her for an asymmetry. The hearing was improving spontaneously in the right when she saw me last, but had not fully resolved. Now feels that the hearing is fully back to baseline. Does use nasal steroid sprays.     Past medical history, surgical history, social history, family history, medications, and allergies have been reviewed with the patient and are documented above.     Review of Systems: a 10-system review was performed. Pertinent positives are noted in the HPI and on a separate scanned document in the chart.     PHYSICAL EXAMINATION:  GEN: no acute distress, normocephalic  EYES: extraocular movements are intact, pupils are equal and round. Sclera clear.   EARS: auricles are normally formed. The external auditory canals are clear with minimal to no cerumen. Tympanic membranes are intact bilaterally with no signs of infection, effusions, retractions, or perforations.   NOSE: anterior nares are patent. There are no masses or lesions. The septum is non-obstructing.  NEURO: CN VII and XII symmetric. alert and oriented. No spontaneous nystagmus. Uses a wheelchair.  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 Type A tymps bilaterally.  AUDIOGRAM 12/23/21: mild to moderate SNHL left, moderate MHL right. Type A tymp left, Type B tymp right.   AUDIOGRAM (11/22/21 outside): same left, severe MHL right.     MEDICAL DECISION-MAKING: This patient is an 89yo F with a right serous otitis media that is now fully resolved.  Medically clear for HAs.

## 2022-01-25 NOTE — PROGRESS NOTES
AUDIOLOGY REPORT    SUMMARY: Audiology visit completed. See audiogram for results. Abuse screening not completed due to same day appt with ENT clinic, where this is addressed.      RECOMMENDATIONS: Follow-up with ENT.      Dorina Contreras, [facility]  [place] Licensed Audiologist [id number]

## 2022-01-25 NOTE — LETTER
1/25/2022         RE: ALEXA Luu  [address] [facility] [address]        Dear Colleague,    Thank you for referring your patient, ALEXA Luu, to the [facility]. Please see a copy of my visit note below.    HPI: This patient is an 88yo F who presents for follow-up of a right MARTHA following covid infection. She had covid recently, but shortly thereafter felt the right ear plug up. Denies otalgia, otorrhea, vertigo, and other major medical issues. No history of this in the past or chronic ear issues. She went to [facility] to get hearing aids and they referred her for an asymmetry. The hearing was improving spontaneously in the right when she saw me last, but had not fully resolved. Now feels that the hearing is fully back to baseline. Does use nasal steroid sprays.     Past medical history, surgical history, social history, family history, medications, and allergies have been reviewed with the patient and are documented above.     Review of Systems: a 10-system review was performed. Pertinent positives are noted in the HPI and on a separate scanned document in the chart.     PHYSICAL EXAMINATION:  GEN: no acute distress, normocephalic  EYES: extraocular movements are intact, pupils are equal and round. Sclera clear.   EARS: auricles are normally formed. The external auditory canals are clear with minimal to no cerumen. Tympanic membranes are intact bilaterally with no signs of infection, effusions, retractions, or perforations.   NOSE: anterior nares are patent. There are no masses or lesions. The septum is non-obstructing.  NEURO: CN VII and XII symmetric. alert and oriented. No spontaneous nystagmus. Uses a wheelchair.  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 Type A tymps bilaterally.  AUDIOGRAM  12/23/21: mild to moderate SNHL left, moderate MHL right. Type A tymp left, Type B tymp right.   AUDIOGRAM (11/22/21 outside): same left, severe MHL right.     MEDICAL DECISION-MAKING: This patient is an 88yo F with a right serous otitis media that is now fully resolved. Medically clear for HAs.       Again, thank you for allowing me to participate in the care of your patient.        Sincerely,        Amy Day MD

## 2022-01-27 ENCOUNTER — OFFICE VISIT (OUTPATIENT)
Dept: FAMILY MEDICINE | Facility: CLINIC | Age: 87
End: 2022-01-27
Payer: MEDICARE

## 2022-01-27 ENCOUNTER — MEDICAL CORRESPONDENCE (OUTPATIENT)
Dept: HEALTH INFORMATION MANAGEMENT | Facility: CLINIC | Age: 87
End: 2022-01-27

## 2022-01-27 VITALS
OXYGEN SATURATION: 96 % | WEIGHT: 124 LBS | HEART RATE: 72 BPM | BODY MASS INDEX: 20.66 KG/M2 | SYSTOLIC BLOOD PRESSURE: 146 MMHG | HEIGHT: 65 IN | DIASTOLIC BLOOD PRESSURE: 82 MMHG

## 2022-01-27 DIAGNOSIS — Z78.0 ASYMPTOMATIC MENOPAUSE: ICD-10-CM

## 2022-01-27 DIAGNOSIS — I63.441 CEREBROVASCULAR ACCIDENT (CVA) DUE TO EMBOLISM OF RIGHT CEREBELLAR ARTERY (H): ICD-10-CM

## 2022-01-27 DIAGNOSIS — I42.8 NON-ISCHEMIC CARDIOMYOPATHY (H): ICD-10-CM

## 2022-01-27 DIAGNOSIS — I48.91 ATRIAL FIBRILLATION, UNSPECIFIED TYPE (H): ICD-10-CM

## 2022-01-27 DIAGNOSIS — Z00.00 ENCOUNTER FOR MEDICARE ANNUAL WELLNESS EXAM: ICD-10-CM

## 2022-01-27 DIAGNOSIS — M25.552 CHRONIC LEFT HIP PAIN: ICD-10-CM

## 2022-01-27 DIAGNOSIS — H16.223 KERATOCONJUNCTIVITIS SICCA OF BOTH EYES: ICD-10-CM

## 2022-01-27 DIAGNOSIS — Z13.820 SCREENING FOR OSTEOPOROSIS: ICD-10-CM

## 2022-01-27 DIAGNOSIS — Z12.31 VISIT FOR SCREENING MAMMOGRAM: ICD-10-CM

## 2022-01-27 DIAGNOSIS — Z00.00 HEALTH CARE MAINTENANCE: Primary | ICD-10-CM

## 2022-01-27 DIAGNOSIS — E55.9 VITAMIN D DEFICIENCY: ICD-10-CM

## 2022-01-27 DIAGNOSIS — Z23 ENCOUNTER FOR IMMUNIZATION: ICD-10-CM

## 2022-01-27 DIAGNOSIS — G89.29 CHRONIC LEFT HIP PAIN: ICD-10-CM

## 2022-01-27 DIAGNOSIS — I63.81 ACUTE LACUNAR STROKE (H): ICD-10-CM

## 2022-01-27 DIAGNOSIS — G20.C ATYPICAL PARKINSONISM (H): ICD-10-CM

## 2022-01-27 DIAGNOSIS — I63.9 CRYPTOGENIC STROKE (H): ICD-10-CM

## 2022-01-27 DIAGNOSIS — Z00.00 HEALTHCARE MAINTENANCE: ICD-10-CM

## 2022-01-27 PROBLEM — K38.9 OTHER AND UNSPECIFIED DISEASES OF APPENDIX: Status: RESOLVED | Noted: 2019-01-14 | Resolved: 2022-01-27

## 2022-01-27 PROBLEM — R90.89 ABNORMAL BRAIN MRI: Status: RESOLVED | Noted: 2019-03-11 | Resolved: 2022-01-27

## 2022-01-27 PROBLEM — M62.81 MUSCLE WEAKNESS OF UPPER EXTREMITY: Status: RESOLVED | Noted: 2019-10-05 | Resolved: 2022-01-27
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
## 2022-01-27 PROBLEM — S22.43XA CLOSED FRACTURE OF MULTIPLE RIBS OF BOTH SIDES, INITIAL ENCOUNTER: Status: RESOLVED | Noted: 2019-02-27 | Resolved: 2022-01-27

## 2022-01-27 PROBLEM — Z87.81 HISTORY OF CERVICAL FRACTURE: Status: RESOLVED | Noted: 2019-10-05 | Resolved: 2022-01-27

## 2022-01-27 PROBLEM — M94.9 DISORDER OF BONE AND CARTILAGE: Status: RESOLVED | Noted: 2019-01-14 | Resolved: 2022-01-27

## 2022-01-27 PROBLEM — M89.9 DISORDER OF BONE AND CARTILAGE: Status: RESOLVED | Noted: 2019-01-14 | Resolved: 2022-01-27

## 2022-01-27 LAB
ALBUMIN SERPL-MCNC: 3.6 G/DL (ref 3.5–5)
ALP SERPL-CCNC: 63 U/L (ref 45–120)
ALT SERPL W P-5'-P-CCNC: <9 U/L (ref 0–45)
ANION GAP SERPL CALCULATED.3IONS-SCNC: 8 MMOL/L (ref 5–18)
AST SERPL W P-5'-P-CCNC: 11 U/L (ref 0–40)
BILIRUB SERPL-MCNC: 0.2 MG/DL (ref 0–1)
BUN SERPL-MCNC: 27 MG/DL (ref 8–28)
CALCIUM SERPL-MCNC: 9.2 MG/DL (ref 8.5–10.5)
CHLORIDE BLD-SCNC: 105 MMOL/L (ref 98–107)
CO2 SERPL-SCNC: 26 MMOL/L (ref 22–31)
CREAT SERPL-MCNC: 0.65 MG/DL (ref 0.6–1.1)
ERYTHROCYTE [DISTWIDTH] IN BLOOD BY AUTOMATED COUNT: 13.1 % (ref 10–15)
GFR SERPL CREATININE-BSD FRML MDRD: 84 ML/MIN/1.73M2
GLUCOSE BLD-MCNC: 95 MG/DL (ref 70–125)
HCT VFR BLD AUTO: 36.1 % (ref 35–47)
HGB BLD-MCNC: 11.7 G/DL (ref 11.7–15.7)
MCH RBC QN AUTO: 30.9 PG (ref 26.5–33)
MCHC RBC AUTO-ENTMCNC: 32.4 G/DL (ref 31.5–36.5)
MCV RBC AUTO: 95 FL (ref 78–100)
PLATELET # BLD AUTO: 278 10E3/UL (ref 150–450)
POTASSIUM BLD-SCNC: 3.9 MMOL/L (ref 3.5–5)
PROT SERPL-MCNC: 6.3 G/DL (ref 6–8)
RBC # BLD AUTO: 3.79 10E6/UL (ref 3.8–5.2)
SODIUM SERPL-SCNC: 139 MMOL/L (ref 136–145)
WBC # BLD AUTO: 8.5 10E3/UL (ref 4–11)

## 2022-01-27 PROCEDURE — G0439 PPPS, SUBSEQ VISIT: HCPCS | Performed by: FAMILY MEDICINE

## 2022-01-27 PROCEDURE — 91305 COVID-19,PF,PFIZER (12+ YRS): CPT | Performed by: FAMILY MEDICINE

## 2022-01-27 PROCEDURE — 82306 VITAMIN D 25 HYDROXY: CPT | Performed by: FAMILY MEDICINE

## 2022-01-27 PROCEDURE — 90662 IIV NO PRSV INCREASED AG IM: CPT | Performed by: FAMILY MEDICINE

## 2022-01-27 PROCEDURE — 36415 COLL VENOUS BLD VENIPUNCTURE: CPT | Performed by: FAMILY MEDICINE

## 2022-01-27 PROCEDURE — G0008 ADMIN INFLUENZA VIRUS VAC: HCPCS | Performed by: FAMILY MEDICINE

## 2022-01-27 PROCEDURE — 99213 OFFICE O/P EST LOW 20 MIN: CPT | Mod: 25 | Performed by: FAMILY MEDICINE

## 2022-01-27 PROCEDURE — 80053 COMPREHEN METABOLIC PANEL: CPT | Performed by: FAMILY MEDICINE

## 2022-01-27 PROCEDURE — 0054A COVID-19,PF,PFIZER (12+ YRS): CPT | Performed by: FAMILY MEDICINE

## 2022-01-27 PROCEDURE — 85027 COMPLETE CBC AUTOMATED: CPT | Performed by: FAMILY MEDICINE

## 2022-01-27 ASSESSMENT — ACTIVITIES OF DAILY LIVING (ADL)
CURRENT_FUNCTION: LAUNDRY REQUIRES ASSISTANCE
CURRENT_FUNCTION: TRANSPORTATION REQUIRES ASSISTANCE
CURRENT_FUNCTION: PREPARING MEALS REQUIRES ASSISTANCE
CURRENT_FUNCTION: HOUSEWORK REQUIRES ASSISTANCE
CURRENT_FUNCTION: MONEY MANAGEMENT REQUIRES ASSISTANCE

## 2022-01-27 ASSESSMENT — PATIENT HEALTH QUESTIONNAIRE - PHQ9
10. IF YOU CHECKED OFF ANY PROBLEMS, HOW DIFFICULT HAVE THESE PROBLEMS MADE IT FOR YOU TO DO YOUR WORK, TAKE CARE OF THINGS AT HOME, OR GET ALONG WITH OTHER PEOPLE: NOT DIFFICULT AT ALL
SUM OF ALL RESPONSES TO PHQ QUESTIONS 1-9: 2
SUM OF ALL RESPONSES TO PHQ QUESTIONS 1-9: 2

## 2022-01-27 ASSESSMENT — MIFFLIN-ST. JEOR: SCORE: 988.34

## 2022-01-27 NOTE — PATIENT INSTRUCTIONS
For pain:  - stop ibuprofen (this med can be used several times in one day if taken with food, or even 2-3 days in a row but must be stopped more than that); ideally would be taken no more than 2x per week    - try increasing gabapentin at night to 600 mg and see if there is ANY improvement    - try voltaren gel (diclofenac) at night specifically in the groin/hip area (not vulva)    - trial of holding trazodone and just taking gabapentin to help with sleep; ok to take trazodone 1/2 or 1 tablet for sleep if that doesn't work (2nd line agent)    - restart metamucil because reasons :) (it helps with everything)  Patient Education   Personalized Prevention Plan  You are due for the preventive services outlined below.  Your care team is available to assist you in scheduling these services.  If you have already completed any of these items, please share that information with your care team to update in your medical record.  Health Maintenance Due   Topic Date Due     COVID-19 Vaccine (3 - Booster for Pfizer series) 08/05/2021     Flu Vaccine (1) 09/01/2021     Your Health Risk Assessment indicates you feel you are not in good health    A healthy lifestyle helps keep the body fit and the mind alert. It helps protect you from disease, helps you fight disease, and helps prevent chronic disease (disease that doesn't go away) from getting worse. This is important as you get older and begin to notice twinges in muscles and joints and a decline in the strength and stamina you once took for granted. A healthy lifestyle includes good healthcare, good nutrition, weight control, recreation, and regular exercise. Avoid harmful substances and do what you can to keep safe. Another part of a healthy lifestyle is stay mentally active and socially involved.    Good healthcare     Have a wellness visit every year.     If you have new symptoms, let us know right away. Don't wait until the next checkup.     Take medicines exactly as  prescribed and keep your medicines in a safe place. Tell us if your medicine causes problems.   Healthy diet and weight control     Eat 3 or 4 small, nutritious, low-fat, high-fiber meals a day. Include a variety of fruits, vegetables, and whole-grain foods.     Make sure you get enough calcium in your diet. Calcium, vitamin D, and exercise help prevent osteoporosis (bone thinning).     If you live alone, try eating with others when you can. That way you get a good meal and have company while you eat it.     Try to keep a healthy weight. If you eat more calories than your body uses for energy, it will be stored as fat and you will gain weight.     Recreation   Recreation is not limited to sports and team events. It includes any activity that provides relaxation, interest, enjoyment, and exercise. Recreation provides an outlet for physical, mental, and social energy. It can give a sense of worth and achievement. It can help you stay healthy.    Mental Exercise and Social Involvement  Mental and emotional health is as important as physical health. Keep in touch with friends and family. Stay as active as possible. Continue to learn and challenge yourself.   Things you can do to stay mentally active are:    Learn something new, like a foreign language or musical instrument.     Play SCRABBLE or do crossword puzzles. If you cannot find people to play these games with you at home, you can play them with others on your computer through the Internet.     Join a games club--anything from card games to chess or checkers or lawn bowling.     Start a new hobby.     Go back to school.     Volunteer.     Read.   Keep up with world events.  Activities of Daily Living    Your Health Risk Assessment indicates you have difficulties with activities of daily living such as housework, bathing, preparing meals, taking medication, etc. Please make a follow up appointment for us to address this issue in more detail.    Signs of Hearing  Loss      Hearing much better with one ear can be a sign of hearing loss.   Hearing loss is a problem shared by many people. In fact, it is one of the most common health problems, particularly as people age. Most people age 65 and older have some hearing loss. By age 80, almost everyone does. Hearing loss often occurs slowly over the years. So you may not realize your hearing has gotten worse.  Have your hearing checked  Call your healthcare provider if you:    Have to strain to hear normal conversation    Have to watch other people s faces very carefully to follow what they re saying    Need to ask people to repeat what they ve said    Often misunderstand what people are saying    Turn the volume of the television or radio up so high that others complain    Feel that people are mumbling when they re talking to you    Find that the effort to hear leaves you feeling tired and irritated    Notice, when using the phone, that you hear better with one ear than the other  [employer] last reviewed this educational content on 1/1/2020 2000-2021 [employer]. All rights reserved. This information is not intended as a substitute for professional medical care. Always follow your healthcare professional's instructions.          Urinary Incontinence, Female (Adult)   Urinary incontinence means loss of bladder control. This problem affects many women, especially as they get older. If you have incontinence, you may be embarrassed to ask for help. But know that this problem can be treated.   Types of Incontinence  There are different types of incontinence. Two of the main types are described here. You can have more than one type.     Stress incontinence. With this type, urine leaks when pressure (stress) is put on the bladder. This may happen when you cough, sneeze, or laugh. Stress incontinence most often occurs because the pelvic floor muscles that support the bladder and urethra are weak. This can happen after  pregnancy and vaginal childbirth or a hysterectomy. It can also be due to excess body weight or hormone changes.    Urge incontinence (also called overactive bladder). With this type, a sudden urge to urinate is felt often. This may happen even though there may not be much urine in the bladder. The need to urinate often during the night is common. Urge incontinence most often occurs because of bladder spasms. This may be due to bladder irritation or infection. Damage to bladder nerves or pelvic muscles, constipation, and certain medicines can also lead to urge incontinence.  Treatment depends on the cause. Further evaluation is needed to find the type you have. This will likely include an exam and certain tests. Based on the results, you and your healthcare provider can then plan treatment. Until a diagnosis is made, the home care tips below can help ease symptoms.   Home care    Do pelvic floor muscle exercises, if they are prescribed. The pelvic floor muscles help support the bladder and urethra. Many women find that their symptoms improve when doing special exercises that strengthen these muscles. To do the exercises, contract the muscles you would use to stop your stream of urine. But do this when you re not urinating. Hold for 10 seconds, then relax. Repeat 10 to 20 times in a row, at least 3 times a day. Your healthcare provider may give you other instructions for how to do the exercises and how often.    Keep a bladder diary. This helps track how often and how much you urinate over a set period of time. Bring this diary with you to your next visit with the provider. The information can help your provider learn more about your bladder problem.    Lose weight, if advised to by your provider. Extra weight puts pressure on the bladder. Your provider can help you create a weight-loss plan that s right for you. This may include exercising more and making certain diet changes.    Don't have foods and drinks that may  irritate the bladder. These can include alcohol and caffeinated drinks.    Quit smoking. Smoking and other tobacco use can lead to a long-term (chronic) cough that strains the pelvic floor muscles. Smoking may also damage the bladder and urethra. Talk with your provider about treatments or methods you can use to quit smoking.    If drinking large amounts of fluid makes you have symptoms, you may be advised to limit your fluid intake. You may also be advised to drink most of your fluids during the day and to limit fluids at night.    If you re worried about urine leakage or accidents, you may wear absorbent pads to catch urine. Change the pads often. This helps reduce discomfort. It may also reduce the risk of skin or bladder infections.    Follow-up care  Follow up with your healthcare provider, or as directed. It may take some to find the right treatment for your problem. But healthy lifestyle changes can be made right away. These include such things as exercising on a regular basis, eating a healthy diet, losing weight (if needed), and quitting smoking. Your treatment plan may include special therapies or medicines. Certain procedures or surgery may also be options. Talk about any questions you have with your provider.   When to seek medical advice  Call the healthcare provider right away if any of these occur:    Fever of 100.4 F (38 C) or higher, or as directed by your provider    Bladder pain or fullness    Belly swelling    Nausea or vomiting    Back pain    Weakness, dizziness, or fainting  [employer] last reviewed this educational content on 1/1/2020 2000-2021 [employer]. All rights reserved. This information is not intended as a substitute for professional medical care. Always follow your healthcare professional's instructions.          Preventing Falls at Home  A person can fall for many reasons. Older adults may fall because reaction time slows down as we age. Your muscles and joints may  get stiff, weak, or less flexible because of illness, medicines, or a physical condition.   Other health problems that make falls more likely include:     Arthritis    Dizziness or lightheadedness when you stand up (orthostatic hypotension)    History of a stroke    Dizziness    Anemia    Certain medicines taken for mental illness or to control blood pressure.    Problems with balance or gait    Bladder or urinary problems    History of falling    Changes in vision (vision impairment)    Changes in thinking skills and memory (cognitive impairment)  Injuries from a fall can include serious injuries such as broken bones, dislocated joints, internal bleeding and cuts. Injuries like these can limit your independence.   Prevention tips  To help prevent falls and fall-related injuries, follow the tips below.    Floors  To make floors safer:     Put nonskid pads under area rugs.    Remove small rugs.    Replace worn floor coverings.    Tack carpets firmly to each step on carpeted stairs. Put nonskid strips on the edges of uncarpeted stairs.    Keep floors and stairs free of clutter and cords.    Arrange furniture so there are clear pathways.    Clean up any spills right away.  Bathrooms    To make bathrooms safer:     Install grab bars in the tub or shower.    Apply nonskid strips or put a nonskid rubber mat in the tub or shower.    Sit on a bath chair to bathe.    Use bathmats with nonskid backing.  Lighting  To improve visibility in your home:      Keep a flashlight in each room. Or put a lamp next to the bed within easy reach.    Put nightlights in the bedrooms, hallways, kitchen, and bathrooms.    Make sure all stairways have good lighting.    Take your time when going up and down stairs.    Put handrails on both sides of stairs and in walkways for more support. To prevent injury to your wrist or arm, don t use handrails to pull yourself up.    Install grab bars to pull yourself up.    Move or rearrange items that you  use often. This will make them easier to find or reach.    Look at your home to find any safety hazards. Especially look at doorways, walkways, and the driveway. Remove or repair any safety problems that you find.  Other changes to make    Look around to find any safety hazards. Look closely at doorways, walkways, and the driveway. Remove or repair any safety problems that you find.    Wear shoes that fit well.    Take your time when going up and down stairs.    Put handrails on both sides of stairs and in walkways for more support. To prevent injury to your wrist or arm, don t use handrails to pull yourself up.    Install grab bars wherever needed to pull yourself up.    Arrange items that you use often. This will make them easier to find or reach.    [employer] last reviewed this educational content on 3/1/2020    8160-3534 [employer]. All rights reserved. This information is not intended as a substitute for professional medical care. Always follow your healthcare professional's instructions.

## 2022-01-28 LAB — DEPRECATED CALCIDIOL+CALCIFEROL SERPL-MC: 34 UG/L (ref 30–80)

## 2022-01-28 ASSESSMENT — PATIENT HEALTH QUESTIONNAIRE - PHQ9: SUM OF ALL RESPONSES TO PHQ QUESTIONS 1-9: 2

## 2022-02-11 ENCOUNTER — TELEPHONE (OUTPATIENT)
Dept: NEUROLOGY | Facility: CLINIC | Age: 87
End: 2022-02-11
Payer: MEDICARE

## 2022-02-11 DIAGNOSIS — G20.A1 PARKINSON'S DISEASE (H): ICD-10-CM

## 2022-02-11 RX ORDER — CARBIDOPA AND LEVODOPA 25; 100 MG/1; MG/1
TABLET ORAL
Qty: 360 TABLET | Refills: 3 | Status: SHIPPED | OUTPATIENT
Start: 2022-02-11 | End: 2022-02-15

## 2022-02-11 NOTE — TELEPHONE ENCOUNTER
Rx Authorization:    Requested Medication/ Dose: Carbidopa/Levodopa 25-100MG tabs    Date last refill ordered: 11/23/21    Quantity ordered: 360 tabs    # refills: 3    Date of last clinic visit with ordering provider: 11/23/21    Date of next clinic visit with ordering provider: F/U 6 months    All pertinent protocol data (lab date/result):     Include pertinent information from patients message:

## 2022-02-14 ENCOUNTER — MEDICAL CORRESPONDENCE (OUTPATIENT)
Dept: HEALTH INFORMATION MANAGEMENT | Facility: CLINIC | Age: 87
End: 2022-02-14
Payer: MEDICARE

## 2022-02-14 NOTE — TELEPHONE ENCOUNTER
Patient called, per pt Rx orders sent on 2/11/2022 was not successful, please send new orders. Patient has enough medication for 2/16 Wednesday requesting an emergency refill to last her until she receives her regular medications. Please send refills to [facility] on [address], if any questions please call pt back.

## 2022-02-15 ENCOUNTER — TELEPHONE (OUTPATIENT)
Dept: NEUROLOGY | Facility: CLINIC | Age: 87
End: 2022-02-15
Payer: MEDICARE

## 2022-02-15 ENCOUNTER — MYC MEDICAL ADVICE (OUTPATIENT)
Dept: NEUROLOGY | Facility: CLINIC | Age: 87
End: 2022-02-15
Payer: MEDICARE

## 2022-02-15 DIAGNOSIS — G20.A1 PARKINSON'S DISEASE (H): ICD-10-CM

## 2022-02-15 RX ORDER — CARBIDOPA AND LEVODOPA 25; 100 MG/1; MG/1
TABLET ORAL
Qty: 30 TABLET | Refills: 0 | Status: SHIPPED | OUTPATIENT
Start: 2022-02-15 | End: 2022-05-02

## 2022-02-15 NOTE — TELEPHONE ENCOUNTER
Mohinder, pharmacy technician verified that they did receive the carbidopa/levodopa  prescription on 2/11 and they are working on it.

## 2022-02-15 NOTE — TELEPHONE ENCOUNTER
Health Call Center    Phone Message    May a detailed message be left on voicemail: yes     Reason for Call: Medication Question or concern regarding medication   Prescription Clarification  Name of Medication: carbidopa-levodopa (SINEMET)  MG tablet  Prescribing Provider: Dr. Mendoza   Pharmacy: [facility] [address]   What on the order needs clarification? Pt reporting that this pharmacy told her that this script was NOT received by them. Writer sees that it was confirmed by pharmacy on 2/11/22, but pt called pharmacy after that date and pharmacy says they do not have this script from Dr. Mendoza. Also pt is going to need this script by Wed 2/16/22, at least 1 week's worth of this script to get her thru until the [facility] mail order pharmacy get this medication to her.  Pt had reported yesterday that the [facility] on [address] in [facility] should be used to get an emergency supply of this medication for this pt. Please let pt know the status of her requests/concerns. Thank you.          Action Taken: Message routed to:  [facility] ([facility]    Travel Screening: Not Applicable

## 2022-02-23 ENCOUNTER — TELEPHONE (OUTPATIENT)
Dept: CARDIOLOGY | Facility: CLINIC | Age: 87
End: 2022-02-23
Payer: MEDICARE

## 2022-02-23 NOTE — TELEPHONE ENCOUNTER
MODIFIED COLLETTE SCALE   Timepoint: 2yr Post-LAAC    Previous score: 0    Score Description   0 No symptoms at all   1 No significant disability despite symptoms; able to carry out all usual duties and activities   2 Slight disability; unable to carry out all previous activities, but able to look after own affairs without assistance   3 Moderate disability; requiring some help, but able to walk without assistance   4 Moderately severe disability; unable to walk without assistance and unable to attend to own bodily needs without assistance   5 Severe disability; bedridden, incontinent and requiring constant nursing care and attention   6 Dead    Total score (0 - 6):  0    Change in score if s/p LAAC? No  If yes, notify implanting cardiologist.    Shanell Zepeda RN BSN  Structural Heart Coordinator   [facility]  [phone]

## 2022-02-23 NOTE — TELEPHONE ENCOUNTER
----- Message from Shanell Zepeda RN sent at 2022  4:24 PM CST -----  Regardin year f/u  Patient due for 2 year follow up. S/P LAAC 2020.    I will call for farhana. Thanks!Shanell

## 2022-02-24 ENCOUNTER — HOSPITAL ENCOUNTER (OUTPATIENT)
Dept: MAMMOGRAPHY | Facility: CLINIC | Age: 87
Discharge: HOME | End: 2022-02-24
Attending: FAMILY MEDICINE | Admitting: FAMILY MEDICINE
Payer: MEDICARE

## 2022-02-24 ENCOUNTER — ANCILLARY PROCEDURE (OUTPATIENT)
Dept: BONE DENSITY | Facility: CLINIC | Age: 87
End: 2022-02-24
Attending: FAMILY MEDICINE
Payer: MEDICARE

## 2022-02-24 DIAGNOSIS — Z78.0 ASYMPTOMATIC MENOPAUSE: ICD-10-CM

## 2022-02-24 DIAGNOSIS — Z13.820 SCREENING FOR OSTEOPOROSIS: ICD-10-CM

## 2022-02-24 DIAGNOSIS — Z12.31 VISIT FOR SCREENING MAMMOGRAM: ICD-10-CM

## 2022-02-24 DIAGNOSIS — Z00.00 HEALTH CARE MAINTENANCE: ICD-10-CM

## 2022-02-24 PROCEDURE — 77080 DXA BONE DENSITY AXIAL: CPT | Mod: TC | Performed by: RADIOLOGY

## 2022-02-24 PROCEDURE — 77067 SCR MAMMO BI INCL CAD: CPT

## 2022-02-26 ENCOUNTER — MYC MEDICAL ADVICE (OUTPATIENT)
Dept: FAMILY MEDICINE | Facility: CLINIC | Age: 87
End: 2022-02-26
Payer: MEDICARE

## 2022-02-26 DIAGNOSIS — F41.9 ANXIETY: ICD-10-CM

## 2022-02-26 DIAGNOSIS — I48.91 ATRIAL FIBRILLATION, UNSPECIFIED TYPE (H): ICD-10-CM

## 2022-02-26 DIAGNOSIS — G89.29 OTHER CHRONIC PAIN: ICD-10-CM

## 2022-02-28 ENCOUNTER — DOCUMENTATION ONLY (OUTPATIENT)
Dept: OTHER | Facility: CLINIC | Age: 87
End: 2022-02-28
Payer: MEDICARE

## 2022-02-28 RX ORDER — GABAPENTIN 300 MG/1
300-600 CAPSULE ORAL 3 TIMES DAILY
Qty: 500 CAPSULE | Refills: 3 | Status: SHIPPED | OUTPATIENT
Start: 2022-02-28 | End: 2022-03-04

## 2022-03-01 RX ORDER — METOPROLOL SUCCINATE 25 MG/1
TABLET, EXTENDED RELEASE ORAL
Qty: 30 TABLET | Refills: 0 | Status: SHIPPED | OUTPATIENT
Start: 2022-03-01 | End: 2022-03-25

## 2022-03-01 NOTE — TELEPHONE ENCOUNTER
"Routing refill request to provider for review/approval because:  Blood pressure elevated.     Last Written Prescription Date:  1/10/2022  Last Fill Quantity: 30,  # refills: 0   Last office visit provider:  1/27/2022     Requested Prescriptions   Pending Prescriptions Disp Refills     metoprolol succinate ER (TOPROL-XL) 25 MG 24 hr tablet [Pharmacy Med Name: METOPROLOL SUCCINATE ER 25 MG Tablet Extended Release 24 Hour] 30 tablet 0     Sig: TAKE 1 TABLET DAILY AT 8AM       Beta-Blockers Protocol Failed - 3/1/2022  8:36 AM        Failed - Blood pressure under 140/90 in past 12 months     BP Readings from Last 3 Encounters:   01/27/22 (!) 146/82   08/12/21 102/66   07/01/21 120/64                 Passed - Patient is age 6 or older        Passed - Recent (12 mo) or future (30 days) visit within the authorizing provider's specialty     Patient has had an office visit with the authorizing provider or a provider within the authorizing providers department within the previous 12 mos or has a future within next 30 days. See \"Patient Info\" tab in inbasket, or \"Choose Columns\" in Meds & Orders section of the refill encounter.              Passed - Medication is active on med list             Skye Germain RN 03/01/22 8:37 AM  "

## 2022-03-04 RX ORDER — GABAPENTIN 300 MG/1
300-600 CAPSULE ORAL 3 TIMES DAILY
Qty: 500 CAPSULE | Refills: 3 | Status: SHIPPED | OUTPATIENT
Start: 2022-03-04 | End: 2022-04-27

## 2022-03-10 ENCOUNTER — OFFICE VISIT (OUTPATIENT)
Dept: FAMILY MEDICINE | Facility: CLINIC | Age: 87
End: 2022-03-10
Payer: MEDICARE

## 2022-03-10 VITALS
HEART RATE: 83 BPM | WEIGHT: 122 LBS | OXYGEN SATURATION: 97 % | SYSTOLIC BLOOD PRESSURE: 110 MMHG | DIASTOLIC BLOOD PRESSURE: 70 MMHG | BODY MASS INDEX: 20.3 KG/M2

## 2022-03-10 DIAGNOSIS — M25.552 CHRONIC LEFT HIP PAIN: Primary | ICD-10-CM

## 2022-03-10 DIAGNOSIS — G89.29 CHRONIC LEFT HIP PAIN: Primary | ICD-10-CM

## 2022-03-10 DIAGNOSIS — M65.30 TRIGGER FINGER, ACQUIRED: ICD-10-CM

## 2022-03-10 PROCEDURE — 99213 OFFICE O/P EST LOW 20 MIN: CPT | Performed by: FAMILY MEDICINE

## 2022-03-10 RX ORDER — GABAPENTIN 600 MG/1
600-1200 TABLET ORAL 3 TIMES DAILY
Qty: 270 TABLET | Refills: 3 | Status: SHIPPED | OUTPATIENT
Start: 2022-03-10 | End: 2022-04-22

## 2022-03-14 ENCOUNTER — TRANSFERRED RECORDS (OUTPATIENT)
Dept: HEALTH INFORMATION MANAGEMENT | Facility: CLINIC | Age: 87
End: 2022-03-14
Payer: MEDICARE

## 2022-03-16 ENCOUNTER — TELEPHONE (OUTPATIENT)
Dept: FAMILY MEDICINE | Facility: CLINIC | Age: 87
End: 2022-03-16
Payer: MEDICARE

## 2022-03-16 NOTE — TELEPHONE ENCOUNTER
Emperatriz from [facility] in [place] calling to inform that patient had a fall today but no injuries. If there are additional questions/concerns contact Emperatriz at [phone]

## 2022-03-17 ENCOUNTER — HOSPITAL ENCOUNTER (EMERGENCY)
Facility: CLINIC | Age: 87
Discharge: NURSING, REHAB OR LONG-TERM CARE | End: 2022-03-17
Attending: EMERGENCY MEDICINE | Admitting: EMERGENCY MEDICINE
Payer: MEDICARE

## 2022-03-17 ENCOUNTER — APPOINTMENT (OUTPATIENT)
Dept: RADIOLOGY | Facility: CLINIC | Age: 87
End: 2022-03-17
Attending: EMERGENCY MEDICINE
Payer: MEDICARE

## 2022-03-17 ENCOUNTER — APPOINTMENT (OUTPATIENT)
Dept: CT IMAGING | Facility: CLINIC | Age: 87
End: 2022-03-17
Attending: EMERGENCY MEDICINE
Payer: MEDICARE

## 2022-03-17 VITALS
WEIGHT: 122 LBS | DIASTOLIC BLOOD PRESSURE: 78 MMHG | HEART RATE: 75 BPM | OXYGEN SATURATION: 95 % | BODY MASS INDEX: 20.83 KG/M2 | RESPIRATION RATE: 18 BRPM | SYSTOLIC BLOOD PRESSURE: 155 MMHG | TEMPERATURE: 97.5 F | HEIGHT: 64 IN

## 2022-03-17 DIAGNOSIS — N39.0 URINARY TRACT INFECTION WITHOUT HEMATURIA, SITE UNSPECIFIED: ICD-10-CM

## 2022-03-17 DIAGNOSIS — G20.A1 PARKINSON DISEASE (H): ICD-10-CM

## 2022-03-17 LAB
ALBUMIN SERPL-MCNC: 3.8 G/DL (ref 3.5–5)
ALBUMIN UR-MCNC: NEGATIVE MG/DL
ALP SERPL-CCNC: 71 U/L (ref 45–120)
ALT SERPL W P-5'-P-CCNC: <9 U/L (ref 0–45)
ANION GAP SERPL CALCULATED.3IONS-SCNC: 8 MMOL/L (ref 5–18)
APPEARANCE UR: CLEAR
AST SERPL W P-5'-P-CCNC: 11 U/L (ref 0–40)
ATRIAL RATE - MUSE: 86 BPM
BACTERIA #/AREA URNS HPF: ABNORMAL /HPF
BASOPHILS # BLD AUTO: 0 10E3/UL (ref 0–0.2)
BASOPHILS NFR BLD AUTO: 0 %
BILIRUB DIRECT SERPL-MCNC: <0.1 MG/DL
BILIRUB SERPL-MCNC: 0.3 MG/DL (ref 0–1)
BILIRUB UR QL STRIP: NEGATIVE
BUN SERPL-MCNC: 24 MG/DL (ref 8–28)
CALCIUM SERPL-MCNC: 9 MG/DL (ref 8.5–10.5)
CHLORIDE BLD-SCNC: 106 MMOL/L (ref 98–107)
CO2 SERPL-SCNC: 24 MMOL/L (ref 22–31)
COLOR UR AUTO: ABNORMAL
CREAT SERPL-MCNC: 0.79 MG/DL (ref 0.6–1.1)
DIASTOLIC BLOOD PRESSURE - MUSE: 84 MMHG
EOSINOPHIL # BLD AUTO: 0.1 10E3/UL (ref 0–0.7)
EOSINOPHIL NFR BLD AUTO: 0 %
ERYTHROCYTE [DISTWIDTH] IN BLOOD BY AUTOMATED COUNT: 13.6 % (ref 10–15)
GFR SERPL CREATININE-BSD FRML MDRD: 71 ML/MIN/1.73M2
GLUCOSE BLD-MCNC: 160 MG/DL (ref 70–125)
GLUCOSE UR STRIP-MCNC: NEGATIVE MG/DL
HCT VFR BLD AUTO: 38.7 % (ref 35–47)
HGB BLD-MCNC: 12.7 G/DL (ref 11.7–15.7)
HGB UR QL STRIP: NEGATIVE
IMM GRANULOCYTES # BLD: 0.1 10E3/UL
IMM GRANULOCYTES NFR BLD: 1 %
INTERPRETATION ECG - MUSE: NORMAL
KETONES UR STRIP-MCNC: NEGATIVE MG/DL
LEUKOCYTE ESTERASE UR QL STRIP: ABNORMAL
LYMPHOCYTES # BLD AUTO: 2.1 10E3/UL (ref 0.8–5.3)
LYMPHOCYTES NFR BLD AUTO: 17 %
MAGNESIUM SERPL-MCNC: 2 MG/DL (ref 1.8–2.6)
MCH RBC QN AUTO: 29.8 PG (ref 26.5–33)
MCHC RBC AUTO-ENTMCNC: 32.8 G/DL (ref 31.5–36.5)
MCV RBC AUTO: 91 FL (ref 78–100)
MONOCYTES # BLD AUTO: 1 10E3/UL (ref 0–1.3)
MONOCYTES NFR BLD AUTO: 8 %
NEUTROPHILS # BLD AUTO: 9 10E3/UL (ref 1.6–8.3)
NEUTROPHILS NFR BLD AUTO: 74 %
NITRATE UR QL: POSITIVE
NRBC # BLD AUTO: 0 10E3/UL
NRBC BLD AUTO-RTO: 0 /100
P AXIS - MUSE: 24 DEGREES
PH UR STRIP: 6 [PH] (ref 5–7)
PLATELET # BLD AUTO: 309 10E3/UL (ref 150–450)
POTASSIUM BLD-SCNC: 4 MMOL/L (ref 3.5–5)
PR INTERVAL - MUSE: 156 MS
PROT SERPL-MCNC: 6.7 G/DL (ref 6–8)
QRS DURATION - MUSE: 112 MS
QT - MUSE: 356 MS
QTC - MUSE: 426 MS
R AXIS - MUSE: -38 DEGREES
RBC # BLD AUTO: 4.26 10E6/UL (ref 3.8–5.2)
RBC URINE: 1 /HPF
SODIUM SERPL-SCNC: 138 MMOL/L (ref 136–145)
SP GR UR STRIP: 1.01 (ref 1–1.03)
SQUAMOUS EPITHELIAL: <1 /HPF
SYSTOLIC BLOOD PRESSURE - MUSE: 172 MMHG
T AXIS - MUSE: 93 DEGREES
TROPONIN I SERPL-MCNC: <0.01 NG/ML (ref 0–0.29)
UROBILINOGEN UR STRIP-MCNC: <2 MG/DL
VENTRICULAR RATE- MUSE: 86 BPM
WBC # BLD AUTO: 12.3 10E3/UL (ref 4–11)
WBC URINE: 5 /HPF

## 2022-03-17 PROCEDURE — 96365 THER/PROPH/DIAG IV INF INIT: CPT

## 2022-03-17 PROCEDURE — 81001 URINALYSIS AUTO W/SCOPE: CPT | Performed by: EMERGENCY MEDICINE

## 2022-03-17 PROCEDURE — 84484 ASSAY OF TROPONIN QUANT: CPT | Performed by: EMERGENCY MEDICINE

## 2022-03-17 PROCEDURE — 83735 ASSAY OF MAGNESIUM: CPT | Performed by: EMERGENCY MEDICINE

## 2022-03-17 PROCEDURE — 93005 ELECTROCARDIOGRAM TRACING: CPT | Performed by: EMERGENCY MEDICINE

## 2022-03-17 PROCEDURE — 85025 COMPLETE CBC W/AUTO DIFF WBC: CPT | Performed by: EMERGENCY MEDICINE

## 2022-03-17 PROCEDURE — 70450 CT HEAD/BRAIN W/O DYE: CPT

## 2022-03-17 PROCEDURE — 99285 EMERGENCY DEPT VISIT HI MDM: CPT | Mod: 25

## 2022-03-17 PROCEDURE — 87086 URINE CULTURE/COLONY COUNT: CPT | Performed by: EMERGENCY MEDICINE

## 2022-03-17 PROCEDURE — 36415 COLL VENOUS BLD VENIPUNCTURE: CPT | Performed by: EMERGENCY MEDICINE

## 2022-03-17 PROCEDURE — 71045 X-RAY EXAM CHEST 1 VIEW: CPT

## 2022-03-17 PROCEDURE — 82248 BILIRUBIN DIRECT: CPT | Performed by: EMERGENCY MEDICINE

## 2022-03-17 PROCEDURE — 250N000011 HC RX IP 250 OP 636: Performed by: EMERGENCY MEDICINE

## 2022-03-17 RX ORDER — CEFDINIR 300 MG/1
300 CAPSULE ORAL 2 TIMES DAILY
Qty: 14 CAPSULE | Refills: 0 | Status: SHIPPED | OUTPATIENT
Start: 2022-03-17 | End: 2022-03-24

## 2022-03-17 RX ORDER — CEFTRIAXONE 1 G/1
1 INJECTION, POWDER, FOR SOLUTION INTRAMUSCULAR; INTRAVENOUS ONCE
Status: COMPLETED | OUTPATIENT
Start: 2022-03-17 | End: 2022-03-17

## 2022-03-17 RX ADMIN — CEFTRIAXONE 1 G: 1 INJECTION, POWDER, FOR SOLUTION INTRAMUSCULAR; INTRAVENOUS at 20:54

## 2022-03-17 ASSESSMENT — ENCOUNTER SYMPTOMS
JOINT SWELLING: 0
CONFUSION: 0
CHILLS: 0
NAUSEA: 0
VOMITING: 0
FACIAL SWELLING: 0
ROS GI COMMENTS: NEGATIVE FOR MELENA.
WEAKNESS: 0
SPEECH DIFFICULTY: 1
BLOOD IN STOOL: 0
FEVER: 0
ABDOMINAL PAIN: 0
SORE THROAT: 0
HEADACHES: 0
SHORTNESS OF BREATH: 1
HEMATURIA: 0
NUMBNESS: 0
COUGH: 0
DYSURIA: 0
TROUBLE SWALLOWING: 0
DIARRHEA: 0
DIZZINESS: 0

## 2022-03-18 NOTE — ED NOTES
Patient returns from radiology.  Aware of approximate wait time for results.  Will continue to monitor.

## 2022-03-18 NOTE — DISCHARGE INSTRUCTIONS
Take antibiotic as directed until gone and follow up closely with primary clinic. Return for new/worsening concerns including fevers, persistent breathing difficulty or other changes/concerns.

## 2022-03-18 NOTE — ED NOTES
Pt assisted with dressing.  Assist of 2 to wheelchair.  Assisted into vehicle.  Daughter bringing patient back to LTC facility.

## 2022-03-18 NOTE — ED NOTES
Pt and daughter updated on plan for IV antibiotics, awaiting Radiology results.  No other needs at this time.

## 2022-03-18 NOTE — ED PROVIDER NOTES
Emergency Department Encounter     Evaluation Date & Time:   3/17/2022  7:33 PM    CHIEF COMPLAINT:  Dysphagia      Triage Note:Pt presents to ED via EMS with complaints of difficulty swallowing that occurred about 1.5 hours PTA.  Pt was eating at the dinner table when she noted difficulty swallowing and difficulty talking.  States throat felt weird a little thick and sore.  Feeling short of breath when trying to talk. Thought she could be having an allergic reaction.  Was requesting benadryl from EMS.  Pt has hx of stroke x2 years ago.  Pt recently moved to long term care at [facility] due to frequent falls from Parkinsons disease.  Fall yesterday in her bathroom onto her buttocks.  Denies LOC or hitting head.  Also a fall on Saturday 3/13 while still at home, reports hit head on towel bar.  Again denies LOC or hitting head.  Pt states she tried drinking some water and it came up.             ED COURSE & MEDICAL DECISION MAKING:     ED Course as of 03/17/22 2342   Thu Mar 17, 2022   2021 Nonspecific leukocytosis.     2030 UA consistent with infection.  Will treat empirically with initial dose of IV rocephin, likely home with cefdinir.   2047 Rest of labs thus far unremarkable.   2137 CT head showing chronic changes, nothing acute.  CXR without acute pathology as well.     Pt a somewhat difficult historian. She has a history of Parkinson disease, lives in assisted living and was eating pot pie when she suddenly felt like she couldn't catch her breath and talk.  Pt denies any actual choking or inability to swallow.  She is now largely resolved. Denies any illness leading up to this or chest pain.  Pt with no hypoxia, no dysphagia here and no hypoxia.  EKG unchanged from previous.  Will get labs, monitor, reassess.    8:00 PM I met with the patient to gather history and to perform my initial exam. We discussed plans for the ED course, including diagnostic testing and treatment.   9:43 PM Rechecked and updated the  patient. I discussed plans for discharge with the patient, which she was agreeable to. We discussed supportive cares at home and reasons for return to the ER including new or worsening symptoms. All questions and concerns were addressed. Patient to be discharged by RN.      At the conclusion of the encounter I discussed the results of all the tests and the disposition. The questions were answered. The patient or family acknowledged understanding and was agreeable with the care plan.      MEDICATIONS GIVEN IN THE EMERGENCY DEPARTMENT:  Medications   cefTRIAXone (ROCEPHIN) 1 g vial to attach to  mL bag for ADULTS or NS 50 mL bag for PEDS (0 g Intravenous Stopped 3/17/22 2124)       NEW PRESCRIPTIONS STARTED AT TODAY'S ED VISIT:  Discharge Medication List as of 3/17/2022 10:02 PM      START taking these medications    Details   cefdinir (OMNICEF) 300 MG capsule Take 1 capsule (300 mg) by mouth 2 times daily for 7 days, Disp-14 capsule, R-0, Local Print             HPI   HPI     M Carmen Luu is a 89 year old female with a pertinent medical history of Parkinson's disease, atypical parkinsonism, CVA, acute lacunar stroke, cryptogenic stroke, atrial fibrillation, venous insufficiency, cardiomyopathy, posterior capsular opacification who presents to this ED via EMS for evaluation of shortness of breath.    Patient reports that approximately 1.5 hours prior to arrival she was eating dinner when she had a sudden onset of shortness of breath. She mentions that she also felt that she could not talk while she was breathing at that time. She mentions that within the ED both her shortness of breath and difficulty speaking have entirely resolved. She denies any choking. She denies any new weakness or numbness to one side. She denies any facial swelling, trouble swallowing, chest pain, headache, vision changes, fever, cough, melena, blood in stool, or any other complications at this time. She notes that prior to this  episode she was feeling entirely well and in her normal state of health.        REVIEW OF SYSTEMS:  Review of Systems   Constitutional: Negative for chills and fever.   HENT: Negative for facial swelling, sore throat and trouble swallowing.    Eyes: Negative for visual disturbance.   Respiratory: Positive for shortness of breath. Negative for cough.    Cardiovascular: Negative for chest pain.   Gastrointestinal: Negative for abdominal pain, blood in stool, diarrhea, nausea and vomiting.        Negative for melena.   Endocrine: Negative for polyuria.   Genitourinary: Negative for dysuria and hematuria.        - urinary changes     Musculoskeletal: Negative for joint swelling.   Skin: Negative for rash.   Neurological: Positive for speech difficulty. Negative for dizziness, weakness, numbness and headaches.   Psychiatric/Behavioral: Negative for confusion.   All other systems reviewed and are negative.        Medical History     Past Medical History:   Diagnosis Date     Abnormal brain MRI 3/11/2019     Atrial fibrillation (H)      Atypical parkinsonism (H) 2/2/2017     Breast cyst      CVA (cerebral vascular accident) (H)      Finger fracture, left 02/27/2019     Multiple rib fractures 02/27/2019     Parkinson disease (H) 1/20/2016       Past Surgical History:   Procedure Laterality Date     ANKLE SURGERY Left     fracture     APPENDECTOMY       BREAST CYST ASPIRATION       EP THANIA CLOSURE N/A 2/20/2020    Procedure: EP THANIA Closure;  Surgeon: Javier Smith MD;  Location: [facility];  Service: Cardiology     EP LOOP RECORDER IMPLANT N/A 10/8/2019    Procedure: EP Loop Recorder Insertion;  Surgeon: Angel Hurd MD;  Location: [facility];  Service: Cardiology     EYE SURGERY Bilateral     cataract surgery 2000     HYSTERECTOMY      tahbso     HYSTERECTOMY  1982     KNEE SURGERY Right     staph infection     TONSILLECTOMY       ZZC TOTAL ABDOM HYSTERECTOMY      Description: Hysterectomy;   "Proc Date: 01/01/1988;  Comments: couldn't find her ovaries       Family History   Problem Relation Age of Onset     Cerebrovascular Disease Mother      Heart Disease Mother      Other - See Comments Mother         Frisian Lao     Dementia Father         10 yrs.     Other - See Comments Father         Lao     Paranoid behavior Father      Other - See Comments Sister sean     Other - See Comments [place] arm[place]     Other - See Comments Daughter         [place]     Other - See Comments Son         [place]     Other - See Comments Naveen Pearl, [place]     Dementia Paternal Aunt      Dementia Paternal Aunt      Dementia Paternal Uncle      Breast Cancer Maternal Aunt        Social History     Tobacco Use     Smoking status: Never Smoker     Smokeless tobacco: Never Used   Substance Use Topics     Alcohol use: No     Drug use: Never       cefdinir (OMNICEF) 300 MG capsule  acetaminophen (TYLENOL) 500 MG tablet  aspirin (ASA) 81 MG EC tablet  calcium carbonate 500 MG CHEW  calcium carbonate 600 mg-vitamin D 400 units (CALTRATE) 600-400 MG-UNIT per tablet  carbidopa-levodopa (SINEMET)  MG tablet  gabapentin (NEURONTIN) 300 MG capsule  gabapentin (NEURONTIN) 600 MG tablet  ibuprofen (ADVIL/MOTRIN) 200 MG tablet  metoprolol succinate ER (TOPROL-XL) 25 MG 24 hr tablet  pravastatin (PRAVACHOL) 20 MG tablet  psyllium (METAMUCIL/KONSYL) Packet  vitamin D3 (CHOLECALCIFEROL) 50 mcg (2000 units) tablet  zinc gluconate 50 MG tablet        Physical Exam     Vitals:  BP (!) 155/78   Pulse 75   Temp 97.5  F (36.4  C) (Oral)   Resp 18   Ht 1.626 m (5' 4\")   Wt 55.3 kg (122 lb)   SpO2 95%   BMI 20.94 kg/m      PHYSICAL EXAM:   Physical Exam  Vitals and nursing note reviewed.   Constitutional:       General: She is not in acute distress.     Appearance: Normal appearance.   HENT:      Head: Normocephalic and atraumatic.      Mouth/Throat:      Mouth: " Mucous membranes are moist.      Comments: Patient is swallowing without difficulty.   Eyes:      Extraocular Movements: Extraocular movements intact.      Pupils: Pupils are equal, round, and reactive to light.      Comments: No vertical or bidirectional nystagmus   Neck:      Comments: No JVD present  Cardiovascular:      Rate and Rhythm: Normal rate and regular rhythm.   Pulmonary:      Effort: Pulmonary effort is normal. No respiratory distress.      Breath sounds: Normal breath sounds.   Abdominal:      General: There is no distension.      Palpations: Abdomen is soft.      Tenderness: There is no abdominal tenderness.   Musculoskeletal:         General: Normal range of motion.      Cervical back: Normal range of motion.   Skin:     General: Skin is warm.      Capillary Refill: Capillary refill takes less than 2 seconds.   Neurological:      Mental Status: She is alert.      Cranial Nerves: Cranial nerves are intact.      Sensory: Sensation is intact.      Motor: Motor function is intact.      Comments: Fluent speech, no facial asymmetry or pronator drift, 5/5 strength b/l UE/LE, normal finger to nose b/l           Results     LAB:  All pertinent labs reviewed and interpreted  Labs Ordered and Resulted from Time of ED Arrival to Time of ED Departure   BASIC METABOLIC PANEL - Abnormal       Result Value    Sodium 138      Potassium 4.0      Chloride 106      Carbon Dioxide (CO2) 24      Anion Gap 8      Urea Nitrogen 24      Creatinine 0.79      Calcium 9.0      Glucose 160 (*)     GFR Estimate 71     ROUTINE UA WITH MICROSCOPIC REFLEX TO CULTURE - Abnormal    Color Urine Light Yellow      Appearance Urine Clear      Glucose Urine Negative      Bilirubin Urine Negative      Ketones Urine Negative      Specific Gravity Urine 1.009      Blood Urine Negative      pH Urine 6.0      Protein Albumin Urine Negative      Urobilinogen Urine <2.0      Nitrite Urine Positive (*)     Leukocyte Esterase Urine 250 Balta/uL (*)      Bacteria Urine Moderate (*)     RBC Urine 1      WBC Urine 5      Squamous Epithelials Urine <1     CBC WITH PLATELETS AND DIFFERENTIAL - Abnormal    WBC Count 12.3 (*)     RBC Count 4.26      Hemoglobin 12.7      Hematocrit 38.7      MCV 91      MCH 29.8      MCHC 32.8      RDW 13.6      Platelet Count 309      % Neutrophils 74      % Lymphocytes 17      % Monocytes 8      % Eosinophils 0      % Basophils 0      % Immature Granulocytes 1      NRBCs per 100 WBC 0      Absolute Neutrophils 9.0 (*)     Absolute Lymphocytes 2.1      Absolute Monocytes 1.0      Absolute Eosinophils 0.1      Absolute Basophils 0.0      Absolute Immature Granulocytes 0.1      Absolute NRBCs 0.0     TROPONIN I - Normal    Troponin I <0.01     MAGNESIUM - Normal    Magnesium 2.0     HEPATIC FUNCTION PANEL - Normal    Bilirubin Total 0.3      Bilirubin Direct <0.1      Protein Total 6.7      Albumin 3.8      Alkaline Phosphatase 71      AST 11      ALT <9     URINE CULTURE       RADIOLOGY:  XR Chest Port 1 View   Final Result   IMPRESSION: Minimal basilar opacities suggestive of atelectasis or scarring. Remaining lungs are clear. No significant pleural effusion. No pneumothorax. Upper normal heart size. No pulmonary edema. Aortic atherosclerosis. Left atrial appendage occluder    device. Cardiac record device over the anterior left chest wall.            CT Head w/o Contrast   Final Result   IMPRESSION:   1.  No acute intracranial abnormality.   2.  Stable mild to moderate age-related changes.   3.  Several small chronic lacunar infarcts noted in the cerebellar hemispheres stable from prior head CT.    4.  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                   ECG:  NSR, rate 86, no acute changes from previous  I have independently reviewed and interpreted the EKG(s) documented above    PROCEDURES:  Procedures:  None.      FINAL IMPRESSION:    ICD-10-CM    1.  Urinary tract infection without hematuria, site unspecified  N39.0    2. Parkinson disease (H)  G20        0 minutes of critical care time      I, Neymar Villarreal, am serving as a scribe to document services personally performed by Dr. Jonathan Barlow, based on my observations and the provider's statements to me. I, Jonathan Barlow, DO attest that Neymar Villarreal is acting in a scribe capacity, has observed my performance of the services and has documented them in accordance with my direction.      Jonathan Barlow, DO  Emergency Medicine  [facility] EMERGENCY ROOM  3/17/2022  8:08 PM              Jonathan Barlow MD  03/17/22 [id number]

## 2022-03-18 NOTE — ED TRIAGE NOTES
Pt presents to ED via EMS with complaints of difficulty swallowing that occurred about 1.5 hours PTA.  Pt was eating at the dinner table when she noted difficulty swallowing and difficulty talking.  States throat felt weird a little thick and sore.  Feeling short of breath when trying to talk. Thought she could be having an allergic reaction.  Was requesting benadryl from EMS.  Pt has hx of stroke x2 years ago.  Pt recently moved to long term care at [facility] due to frequent falls from Parkinsons disease.  Fall yesterday in her bathroom onto her buttocks.  Denies LOC or hitting head.  Also a fall on Saturday 3/13 while still at home, reports hit head on towel bar.  Again denies LOC or hitting head.  Pt states she tried drinking some water and it came up.

## 2022-03-18 NOTE — ED NOTES
"Pt still feels her voice isn't quite back to normal, but when asked further questions, she says \"it really feels fine\".  Updated on plan of care.  Awaiting provider discharge.  "

## 2022-03-18 NOTE — ED NOTES
AIDET performed, white board updated for rounding. Patient updated on plan of care. Patient's pain assessed. Call light within reach, bed in low position, side rails up. Visitor at bedside: daughter.

## 2022-03-19 LAB
BACTERIA UR CULT: ABNORMAL
BACTERIA UR CULT: ABNORMAL

## 2022-03-21 ENCOUNTER — TELEPHONE (OUTPATIENT)
Dept: FAMILY MEDICINE | Facility: CLINIC | Age: 87
End: 2022-03-21
Payer: MEDICARE

## 2022-03-21 NOTE — TELEPHONE ENCOUNTER
Daughter calling today to check on the status of orders that was faxed from [facility].   After reviewing pt's chart, I did not see any documentation of orders. I verbally talked with Ester, who handles the forms, and she did not see anything but will check again.    Also, I reached out to Emperatriz at [facility] today and we spoke. I asked Emper[employer]riz to send order forms directly to me. Waiting on the paperwork and I will give to Dr. Lovell.

## 2022-03-22 ENCOUNTER — TRANSFERRED RECORDS (OUTPATIENT)
Dept: HEALTH INFORMATION MANAGEMENT | Facility: CLINIC | Age: 87
End: 2022-03-22
Payer: MEDICARE

## 2022-03-22 DIAGNOSIS — I48.91 ATRIAL FIBRILLATION, UNSPECIFIED TYPE (H): ICD-10-CM

## 2022-03-22 NOTE — TELEPHONE ENCOUNTER
[facility] calling, they received the fax yesterday but 2 of the forms were not signed, home pt eval and for the bed.   Fax to Emperatriz at [phone]

## 2022-03-23 NOTE — TELEPHONE ENCOUNTER
"Routing refill request to provider for review/approval because:  BP not in range.    Last Written Prescription Date:  3/1/22  Last Fill Quantity: 30,  # refills: 0   Last office visit provider:  3/10/22     Requested Prescriptions   Pending Prescriptions Disp Refills     metoprolol succinate ER (TOPROL-XL) 25 MG 24 hr tablet [Pharmacy Med Name: METOPROLOL SUCCINATE ER 25 MG Tablet Extended Release 24 Hour] 30 tablet 0     Sig: TAKE 1 TABLET  DAILY  AT  8AM       Beta-Blockers Protocol Failed - 3/23/2022  7:53 AM        Failed - Blood pressure under 140/90 in past 12 months     BP Readings from Last 3 Encounters:   03/17/22 (!) 155/78   03/10/22 110/70   01/27/22 (!) 146/82                 Passed - Patient is age 6 or older        Passed - Recent (12 mo) or future (30 days) visit within the authorizing provider's specialty     Patient has had an office visit with the authorizing provider or a provider within the authorizing providers department within the previous 12 mos or has a future within next 30 days. See \"Patient Info\" tab in inbasket, or \"Choose Columns\" in Meds & Orders section of the refill encounter.              Passed - Medication is active on med list             Alvin Francis RN 03/23/22 7:53 AM  "

## 2022-03-23 NOTE — TELEPHONE ENCOUNTER
Emperatriz calling from [employer], states their whole phone system went ifeanyi and they need this re-faxed.    Please fax to [phone]

## 2022-03-25 ENCOUNTER — MEDICAL CORRESPONDENCE (OUTPATIENT)
Dept: HEALTH INFORMATION MANAGEMENT | Facility: CLINIC | Age: 87
End: 2022-03-25
Payer: MEDICARE

## 2022-03-25 RX ORDER — METOPROLOL SUCCINATE 25 MG/1
TABLET, EXTENDED RELEASE ORAL
Qty: 30 TABLET | Refills: 0 | Status: SHIPPED | OUTPATIENT
Start: 2022-03-25 | End: 2022-04-18

## 2022-03-28 ENCOUNTER — ANCILLARY PROCEDURE (OUTPATIENT)
Dept: CARDIOLOGY | Facility: CLINIC | Age: 87
End: 2022-03-28
Attending: INTERNAL MEDICINE
Payer: MEDICARE

## 2022-03-28 DIAGNOSIS — Z45.09 ENCOUNTER FOR LOOP RECORDER CHECK: ICD-10-CM

## 2022-03-28 DIAGNOSIS — I63.9 CVA (CEREBRAL VASCULAR ACCIDENT) (H): ICD-10-CM

## 2022-03-28 PROCEDURE — 93297 REM INTERROG DEV EVAL ICPMS: CPT | Performed by: INTERNAL MEDICINE

## 2022-03-28 PROCEDURE — G2066 INTER DEVC REMOTE 30D: HCPCS | Performed by: INTERNAL MEDICINE

## 2022-03-30 ENCOUNTER — VIRTUAL VISIT (OUTPATIENT)
Dept: FAMILY MEDICINE | Facility: CLINIC | Age: 87
End: 2022-03-30
Payer: MEDICARE

## 2022-03-30 ENCOUNTER — TELEPHONE (OUTPATIENT)
Dept: FAMILY MEDICINE | Facility: CLINIC | Age: 87
End: 2022-03-30

## 2022-03-30 DIAGNOSIS — R29.6 RECURRENT FALLS: Primary | ICD-10-CM

## 2022-03-30 DIAGNOSIS — I63.9 CRYPTOGENIC STROKE (H): ICD-10-CM

## 2022-03-30 DIAGNOSIS — G20.C ATYPICAL PARKINSONISM (H): ICD-10-CM

## 2022-03-30 PROCEDURE — 99213 OFFICE O/P EST LOW 20 MIN: CPT | Mod: 95 | Performed by: STUDENT IN AN ORGANIZED HEALTH CARE EDUCATION/TRAINING PROGRAM

## 2022-03-30 NOTE — PROGRESS NOTES
Carmen is a 89 year old who is being evaluated via a billable video visit.      How would you like to obtain your AVS? MyChart  If the video visit is dropped, the invitation should be resent by: Text to cell phone: [phone]  Will anyone else be joining your video visit? No      Video Start Time: 6:02 PM    Assessment & Plan     89-year-old female with a history of recurrent falls.  Presents via video visit with request of home physical therapy referral.  Certainly appropriate for this and placed order again for this    1. Recurrent falls  2. Cryptogenic stroke (H)  3. Atypical parkinsonism (H)  - Home Care Referral    Nura Gonzalez MD  [facility]    Chioma Morales is a 89 year old who presents for the following health issues     History of Present Illness       Reason for visit:  Post ER  visit    She eats 2-3 servings of fruits and vegetables daily.She consumes 0 sweetened beverage(s) daily.She exercises with enough effort to increase her heart rate 10 to 19 minutes per day.  She exercises with enough effort to increase her heart rate 4 days per week.   She is taking medications regularly.       Patient had a recent ER visit for urinary symptoms.  Worked up and unremarkable.  Mentioned that she is having recurrent falls there.  Suggested home PT.  She lives at an assisted living facility and they have a home PT company the use opted.  They sent me a note detailing the requirements she has to do this.  She certainly meets these requirements as documented below.  1.  I would like her to have home physical therapy sessions at least once weekly but prefer twice weekly due to recurrent falls and balance issues with Parkinson's disease  2.  Services need to be skilled due to patient complexity  3.  Patient is homebound due to her Parkinson's and cannot drive or navigate public transportation  4.  I will be following this patient during her care.        Objective           Vitals:  No  vitals were obtained today due to virtual visit.    Physical Exam   GENERAL: Healthy, alert and no distress  EYES: Eyes grossly normal to inspection.  No discharge or erythema, or obvious scleral/conjunctival abnormalities.  RESP: No audible wheeze, cough, or visible cyanosis.  No visible retractions or increased work of breathing.    SKIN: Visible skin clear. No significant rash, abnormal pigmentation or lesions.  NEURO: Cranial nerves grossly intact.  Mentation and speech appropriate for age.  PSYCH: Mentation appears normal, affect normal/bright, judgement and insight intact, normal speech and appearance well-groomed.        Video-Visit Details    Type of service:  Video Visit    Video End Time:6:12 PM    Originating Location (pt. Location): Home    Distant Location (provider location):  [facility]     Platform used for Video Visit: pluriSelect

## 2022-04-07 LAB
MDC_IDC_MSMT_BATTERY_STATUS: NORMAL
MDC_IDC_PG_IMPLANT_DTM: NORMAL
MDC_IDC_PG_MFG: NORMAL
MDC_IDC_PG_MODEL: NORMAL
MDC_IDC_PG_SERIAL: NORMAL
MDC_IDC_PG_TYPE: NORMAL
MDC_IDC_SESS_CLINIC_NAME: NORMAL
MDC_IDC_SESS_DTM: NORMAL
MDC_IDC_SESS_TYPE: NORMAL
MDC_IDC_SET_ZONE_DETECTION_INTERVAL: 2000 MS
MDC_IDC_SET_ZONE_DETECTION_INTERVAL: 3000 MS
MDC_IDC_SET_ZONE_DETECTION_INTERVAL: 420 MS
MDC_IDC_SET_ZONE_TYPE: NORMAL
MDC_IDC_STAT_AT_BURDEN_PERCENT: 0 %
MDC_IDC_STAT_AT_DTM_END: NORMAL
MDC_IDC_STAT_AT_DTM_START: NORMAL
MDC_IDC_STAT_EPISODE_RECENT_COUNT: 0
MDC_IDC_STAT_EPISODE_RECENT_COUNT: 4
MDC_IDC_STAT_EPISODE_RECENT_COUNT_DTM_END: NORMAL
MDC_IDC_STAT_EPISODE_RECENT_COUNT_DTM_START: NORMAL
MDC_IDC_STAT_EPISODE_TOTAL_COUNT: 0
MDC_IDC_STAT_EPISODE_TOTAL_COUNT: 1
MDC_IDC_STAT_EPISODE_TOTAL_COUNT: 38
MDC_IDC_STAT_EPISODE_TOTAL_COUNT: 6
MDC_IDC_STAT_EPISODE_TOTAL_COUNT_DTM_END: NORMAL
MDC_IDC_STAT_EPISODE_TOTAL_COUNT_DTM_START: NORMAL
MDC_IDC_STAT_EPISODE_TYPE: NORMAL

## 2022-04-13 ENCOUNTER — LAB REQUISITION (OUTPATIENT)
Dept: LAB | Facility: CLINIC | Age: 87
End: 2022-04-13
Payer: MEDICARE

## 2022-04-13 ENCOUNTER — NURSE TRIAGE (OUTPATIENT)
Dept: NURSING | Facility: CLINIC | Age: 87
End: 2022-04-13

## 2022-04-13 ENCOUNTER — TRANSFERRED RECORDS (OUTPATIENT)
Dept: HEALTH INFORMATION MANAGEMENT | Facility: CLINIC | Age: 87
End: 2022-04-13

## 2022-04-13 DIAGNOSIS — R41.0 DISORIENTATION, UNSPECIFIED: ICD-10-CM

## 2022-04-13 DIAGNOSIS — N30.00 ACUTE CYSTITIS WITHOUT HEMATURIA: Primary | ICD-10-CM

## 2022-04-13 LAB
ALBUMIN UR-MCNC: NEGATIVE MG/DL
APPEARANCE UR: CLEAR
BACTERIA #/AREA URNS HPF: ABNORMAL /HPF
BILIRUB UR QL STRIP: NEGATIVE
COLOR UR AUTO: YELLOW
GLUCOSE UR STRIP-MCNC: NEGATIVE MG/DL
HGB UR QL STRIP: NEGATIVE
KETONES UR STRIP-MCNC: ABNORMAL MG/DL
LEUKOCYTE ESTERASE UR QL STRIP: ABNORMAL
MUCOUS THREADS #/AREA URNS LPF: PRESENT /LPF
NITRATE UR QL: POSITIVE
PH UR STRIP: 6 [PH] (ref 5–7)
RBC URINE: <1 /HPF
SP GR UR STRIP: 1.02 (ref 1–1.03)
SQUAMOUS EPITHELIAL: 1 /HPF
TRANSITIONAL EPI: 1 /HPF
UROBILINOGEN UR STRIP-MCNC: <2 MG/DL
WBC URINE: 22 /HPF

## 2022-04-13 PROCEDURE — 81001 URINALYSIS AUTO W/SCOPE: CPT | Mod: ORL | Performed by: STUDENT IN AN ORGANIZED HEALTH CARE EDUCATION/TRAINING PROGRAM

## 2022-04-13 PROCEDURE — 87186 SC STD MICRODIL/AGAR DIL: CPT | Mod: ORL | Performed by: STUDENT IN AN ORGANIZED HEALTH CARE EDUCATION/TRAINING PROGRAM

## 2022-04-13 RX ORDER — NITROFURANTOIN 25; 75 MG/1; MG/1
100 CAPSULE ORAL 2 TIMES DAILY
Qty: 14 CAPSULE | Refills: 0 | Status: SHIPPED | OUTPATIENT
Start: 2022-04-13 | End: 2022-04-20

## 2022-04-13 NOTE — TELEPHONE ENCOUNTER
Call from [facility] bringing attention to U/A result from earlier today   If RX is ordered plea sent to:     [facility] - [place], [place] - [address]    And notify family;  Anisha [phone] that RX is available    Routing to PCP and team  With HP;     Additional Information    Nursing judgment    Protocols used: INFORMATION ONLY CALL - NO TRIAGE-A-OH

## 2022-04-13 NOTE — TELEPHONE ENCOUNTER
1730 Daughter notified  Genna Quezada RN  FNA      Placed a course for Macrobid. Please call patient and inform. Should come to clinic if symptoms persist.     Nura Waite MD

## 2022-04-14 LAB — BACTERIA UR CULT: ABNORMAL

## 2022-04-14 NOTE — RESULT ENCOUNTER NOTE
I called the patient's daughter but no answer. Left message explaining recent clinic results and next steps. Advised to call clinic or send [employer] message with questions. Did call patient and inform of results    Dr. Nura Waite

## 2022-04-16 DIAGNOSIS — I48.91 ATRIAL FIBRILLATION, UNSPECIFIED TYPE (H): ICD-10-CM

## 2022-04-18 RX ORDER — METOPROLOL SUCCINATE 25 MG/1
TABLET, EXTENDED RELEASE ORAL
Qty: 30 TABLET | Refills: 0 | Status: SHIPPED | OUTPATIENT
Start: 2022-04-18 | End: 2022-05-12

## 2022-04-18 NOTE — TELEPHONE ENCOUNTER
"Routing refill request to provider for review/approval because:  BP not in range.    Last Written Prescription Date:  3/25/22  Last Fill Quantity: 30,  # refills: 0   Last office visit provider:  3/30/22     Requested Prescriptions   Pending Prescriptions Disp Refills     metoprolol succinate ER (TOPROL-XL) 25 MG 24 hr tablet [Pharmacy Med Name: METOPROLOL SUCCINATE ER 25 MG Tablet Extended Release 24 Hour] 30 tablet 0     Sig: TAKE 1 TABLET DAILY AT 8AM       Beta-Blockers Protocol Failed - 4/18/2022  8:06 AM        Failed - Blood pressure under 140/90 in past 12 months     BP Readings from Last 3 Encounters:   03/17/22 (!) 155/78   03/10/22 110/70   01/27/22 (!) 146/82                 Passed - Patient is age 6 or older        Passed - Recent (12 mo) or future (30 days) visit within the authorizing provider's specialty     Patient has had an office visit with the authorizing provider or a provider within the authorizing providers department within the previous 12 mos or has a future within next 30 days. See \"Patient Info\" tab in inbasket, or \"Choose Columns\" in Meds & Orders section of the refill encounter.              Passed - Medication is active on med list             Alvin Francis RN 04/18/22 8:06 AM  "

## 2022-04-21 ENCOUNTER — TELEPHONE (OUTPATIENT)
Dept: FAMILY MEDICINE | Facility: CLINIC | Age: 87
End: 2022-04-21
Payer: MEDICARE

## 2022-04-21 ENCOUNTER — MYC MEDICAL ADVICE (OUTPATIENT)
Dept: FAMILY MEDICINE | Facility: CLINIC | Age: 87
End: 2022-04-21
Payer: MEDICARE

## 2022-04-21 DIAGNOSIS — M25.552 CHRONIC LEFT HIP PAIN: ICD-10-CM

## 2022-04-21 DIAGNOSIS — G89.29 CHRONIC LEFT HIP PAIN: ICD-10-CM

## 2022-04-21 DIAGNOSIS — G20.A1 PARKINSON'S DISEASE (H): ICD-10-CM

## 2022-04-21 NOTE — TELEPHONE ENCOUNTER
Reason for Call:  Other Medical question.    Detailed comments: Rox @ [facility] call. Caller states patient had 2 UTI in the past month and just finished with antibiotic medication. Caller states patient daughter would like to know if Dr. Lovell would recommend for patient to have urine check since patient had finished with antibiotic. Please call Rox back.     Please advise.    Phone Number Patient can be reached at: Home number on file [phone] (home)    Best Time: Anytime    Can we leave a detailed message on this number? YES    Call taken on 4/21/2022 at 12:43 PM by Haley Coronado

## 2022-04-22 ENCOUNTER — MYC MEDICAL ADVICE (OUTPATIENT)
Dept: FAMILY MEDICINE | Facility: CLINIC | Age: 87
End: 2022-04-22
Payer: MEDICARE

## 2022-04-22 DIAGNOSIS — N39.0 RECURRENT UTI: Primary | ICD-10-CM

## 2022-04-22 RX ORDER — GABAPENTIN 600 MG/1
600-1200 TABLET ORAL 3 TIMES DAILY
Qty: 270 TABLET | Refills: 3 | Status: SHIPPED | OUTPATIENT
Start: 2022-04-22 | End: 2022-12-12

## 2022-04-22 NOTE — TELEPHONE ENCOUNTER
We typically do not recommend retesting - I do not see a reason that it is needed for pt.    Thanks  Dr Osman

## 2022-04-25 ENCOUNTER — TRANSFERRED RECORDS (OUTPATIENT)
Dept: HEALTH INFORMATION MANAGEMENT | Facility: CLINIC | Age: 87
End: 2022-04-25
Payer: MEDICARE

## 2022-04-26 ENCOUNTER — LAB REQUISITION (OUTPATIENT)
Dept: LAB | Facility: CLINIC | Age: 87
End: 2022-04-26
Payer: MEDICARE

## 2022-04-26 ENCOUNTER — TELEPHONE (OUTPATIENT)
Dept: FAMILY MEDICINE | Facility: CLINIC | Age: 87
End: 2022-04-26
Payer: MEDICARE

## 2022-04-26 DIAGNOSIS — N39.8 OTHER SPECIFIED DISORDERS OF URINARY SYSTEM: ICD-10-CM

## 2022-04-26 NOTE — TELEPHONE ENCOUNTER
Reason for Call:  Other forms    Detailed comments: Please look out for forms for this patient that is coming through for Dr Lovell to fill out. - It is for [employer] and her long term care from [facility]. They faxed it 04/18/22 & I had them refax it again. Please fill out and return when able.    Phone Number Patient can be reached at: Home number on file [phone] (home)    Best Time: any    Can we leave a detailed message on this number? Not Applicable    Call taken on 4/26/2022 at 10:44 AM by Robbin Long

## 2022-04-27 ENCOUNTER — LAB REQUISITION (OUTPATIENT)
Dept: LAB | Facility: CLINIC | Age: 87
End: 2022-04-27
Payer: MEDICARE

## 2022-04-27 ENCOUNTER — TELEPHONE (OUTPATIENT)
Dept: FAMILY MEDICINE | Facility: CLINIC | Age: 87
End: 2022-04-27

## 2022-04-27 ENCOUNTER — TRANSFERRED RECORDS (OUTPATIENT)
Dept: HEALTH INFORMATION MANAGEMENT | Facility: CLINIC | Age: 87
End: 2022-04-27

## 2022-04-27 DIAGNOSIS — N39.0 URINARY TRACT INFECTION, SITE NOT SPECIFIED: ICD-10-CM

## 2022-04-27 DIAGNOSIS — N39.0 ACUTE UTI: Primary | ICD-10-CM

## 2022-04-27 LAB
ALBUMIN UR-MCNC: NEGATIVE MG/DL
APPEARANCE UR: CLEAR
BACTERIA #/AREA URNS HPF: ABNORMAL /HPF
BILIRUB UR QL STRIP: NEGATIVE
COLOR UR AUTO: ABNORMAL
GLUCOSE UR STRIP-MCNC: NEGATIVE MG/DL
HGB UR QL STRIP: NEGATIVE
KETONES UR STRIP-MCNC: NEGATIVE MG/DL
LEUKOCYTE ESTERASE UR QL STRIP: ABNORMAL
MUCOUS THREADS #/AREA URNS LPF: PRESENT /LPF
NITRATE UR QL: POSITIVE
PH UR STRIP: 6.5 [PH] (ref 5–7)
RBC URINE: 0 /HPF
SP GR UR STRIP: 1.01 (ref 1–1.03)
SQUAMOUS EPITHELIAL: 1 /HPF
UROBILINOGEN UR STRIP-MCNC: <2 MG/DL
WBC URINE: 7 /HPF

## 2022-04-27 PROCEDURE — 81001 URINALYSIS AUTO W/SCOPE: CPT | Mod: ORL | Performed by: STUDENT IN AN ORGANIZED HEALTH CARE EDUCATION/TRAINING PROGRAM

## 2022-04-27 PROCEDURE — 87186 SC STD MICRODIL/AGAR DIL: CPT | Mod: ORL | Performed by: STUDENT IN AN ORGANIZED HEALTH CARE EDUCATION/TRAINING PROGRAM

## 2022-04-27 RX ORDER — SULFAMETHOXAZOLE/TRIMETHOPRIM 800-160 MG
1 TABLET ORAL 2 TIMES DAILY
Qty: 6 TABLET | Refills: 0 | Status: SHIPPED | OUTPATIENT
Start: 2022-04-27 | End: 2022-04-30

## 2022-04-27 NOTE — TELEPHONE ENCOUNTER
Routing to MD. Patient's daughter requesting antibiotics to avoid confusion related to UTI.     Please review/advise.    Thank you,    Sia HERRMANN RN

## 2022-04-27 NOTE — TELEPHONE ENCOUNTER
Pts daughter is calling regarding the UA and if there can be an antibiotic sent into [facility]. Please review and advise as soon as possible. Daughter states that Pts confusion increase significantly last time with UTI and they would like to avoid that if possible.

## 2022-04-28 NOTE — TELEPHONE ENCOUNTER
Routing to covering provider, Dr. Hurley, to send in prescription on Dr. Waite's behalf per [employer] message thread.    Thank you,    Sia HERRMANN RN

## 2022-04-28 NOTE — TELEPHONE ENCOUNTER
A prescription for an antibiotic was sent to the pharmacy yesterday.  A note was sent yesterday to Dr. Gonzalez's pool to contact patient and notify them that this was sent in.

## 2022-04-29 ENCOUNTER — TELEPHONE (OUTPATIENT)
Dept: FAMILY MEDICINE | Facility: CLINIC | Age: 87
End: 2022-04-29
Payer: MEDICARE

## 2022-04-29 NOTE — TELEPHONE ENCOUNTER
----- Message from Cinthya Hurley MD sent at 4/27/2022  5:04 PM CDT -----  Please notify patient or family member that a prescription for sulfamethoxazole-trimethoprim will be sent to pharmacy as it looks like she has a urinary tract infection.

## 2022-04-29 NOTE — TELEPHONE ENCOUNTER
The culture indicates that the bacteria identified is susceptible to the antibiotic prescribed.  If there are further questions please let me know.

## 2022-05-02 DIAGNOSIS — G20.A1 PARKINSON'S DISEASE (H): ICD-10-CM

## 2022-05-02 RX ORDER — CARBIDOPA AND LEVODOPA 25; 100 MG/1; MG/1
TABLET ORAL
Qty: 360 TABLET | Refills: 2 | Status: SHIPPED | OUTPATIENT
Start: 2022-05-02 | End: 2022-05-23

## 2022-05-02 NOTE — TELEPHONE ENCOUNTER
Rx Authorization:  Requested Medication/ Dose CARBIDOPA/LEVODOPA 25-100MG TABS  Date last refill ordered: 2/15/22  Quantity ordered: 30 tabs  # refills: 0  Date of last clinic visit with ordering provider: 11/23/21  Date of next clinic visit with ordering provider: 5/23/22  All pertinent protocol data (lab date/result):   Include pertinent information from patients message:

## 2022-05-04 LAB
BACTERIA UR CULT: ABNORMAL
BACTERIA UR CULT: ABNORMAL

## 2022-05-10 DIAGNOSIS — I48.91 ATRIAL FIBRILLATION, UNSPECIFIED TYPE (H): ICD-10-CM

## 2022-05-12 ENCOUNTER — TELEPHONE (OUTPATIENT)
Dept: FAMILY MEDICINE | Facility: CLINIC | Age: 87
End: 2022-05-12
Payer: MEDICARE

## 2022-05-12 DIAGNOSIS — R30.0 DYSURIA: Primary | ICD-10-CM

## 2022-05-12 RX ORDER — METOPROLOL SUCCINATE 25 MG/1
TABLET, EXTENDED RELEASE ORAL
Qty: 30 TABLET | Refills: 0 | Status: SHIPPED | OUTPATIENT
Start: 2022-05-12 | End: 2022-07-05

## 2022-05-12 NOTE — TELEPHONE ENCOUNTER
"Routing refill request to provider for review/approval because:  BP not in range.    Last Written Prescription Date:  4/18/22  Last Fill Quantity: 30,  # refills: 0   Last office visit provider:  3/30/22     Requested Prescriptions   Pending Prescriptions Disp Refills     metoprolol succinate ER (TOPROL XL) 25 MG 24 hr tablet [Pharmacy Med Name: METOPROLOL SUCCINATE ER 25 MG Tablet Extended Release 24 Hour] 30 tablet 0     Sig: TAKE 1 TABLET DAILY AT 8AM       Beta-Blockers Protocol Failed - 5/12/2022  8:24 AM        Failed - Blood pressure under 140/90 in past 12 months     BP Readings from Last 3 Encounters:   03/17/22 (!) 155/78   03/10/22 110/70   01/27/22 (!) 146/82                 Passed - Patient is age 6 or older        Passed - Recent (12 mo) or future (30 days) visit within the authorizing provider's specialty     Patient has had an office visit with the authorizing provider or a provider within the authorizing providers department within the previous 12 mos or has a future within next 30 days. See \"Patient Info\" tab in inbasket, or \"Choose Columns\" in Meds & Orders section of the refill encounter.              Passed - Medication is active on med list             Alvin Francis RN 05/12/22 8:24 AM  "

## 2022-05-12 NOTE — TELEPHONE ENCOUNTER
Reason for Call: Request for an order or referral:    Order or referral being requested:       UA       Date needed: as soon as possible      Additional comments:     Pt has been having UTI off and on  Pt is getting confusion that she gets when she gets a UTI  Requesting to have order for a urine sample   Fax: [phone]  [facility]  [place]         Call taken on 5/12/2022 at 3:53 PM by Gemma Wyman

## 2022-05-13 ENCOUNTER — LAB REQUISITION (OUTPATIENT)
Dept: LAB | Facility: CLINIC | Age: 87
End: 2022-05-13
Payer: MEDICARE

## 2022-05-13 ENCOUNTER — TRANSFERRED RECORDS (OUTPATIENT)
Dept: HEALTH INFORMATION MANAGEMENT | Facility: CLINIC | Age: 87
End: 2022-05-13

## 2022-05-13 DIAGNOSIS — R41.0 DISORIENTATION, UNSPECIFIED: ICD-10-CM

## 2022-05-13 LAB
ALBUMIN UR-MCNC: NEGATIVE MG/DL
APPEARANCE UR: CLEAR
BACTERIA #/AREA URNS HPF: ABNORMAL /HPF
BILIRUB UR QL STRIP: NEGATIVE
COLOR UR AUTO: ABNORMAL
GLUCOSE UR STRIP-MCNC: NEGATIVE MG/DL
HGB UR QL STRIP: NEGATIVE
KETONES UR STRIP-MCNC: NEGATIVE MG/DL
LEUKOCYTE ESTERASE UR QL STRIP: ABNORMAL
MUCOUS THREADS #/AREA URNS LPF: PRESENT /LPF
NITRATE UR QL: POSITIVE
PH UR STRIP: 5.5 [PH] (ref 5–7)
RBC URINE: 2 /HPF
SP GR UR STRIP: 1.02 (ref 1–1.03)
SQUAMOUS EPITHELIAL: 4 /HPF
UROBILINOGEN UR STRIP-MCNC: <2 MG/DL
WBC URINE: 8 /HPF

## 2022-05-13 PROCEDURE — 81001 URINALYSIS AUTO W/SCOPE: CPT | Mod: ORL | Performed by: FAMILY MEDICINE

## 2022-05-13 PROCEDURE — 87086 URINE CULTURE/COLONY COUNT: CPT | Mod: ORL | Performed by: FAMILY MEDICINE

## 2022-05-15 DIAGNOSIS — N39.0 ACUTE UTI: ICD-10-CM

## 2022-05-15 DIAGNOSIS — R30.0 DYSURIA: Primary | ICD-10-CM

## 2022-05-15 RX ORDER — SULFAMETHOXAZOLE/TRIMETHOPRIM 800-160 MG
1 TABLET ORAL 2 TIMES DAILY
Qty: 6 TABLET | Refills: 0 | Status: SHIPPED | OUTPATIENT
Start: 2022-05-15 | End: 2022-05-18

## 2022-05-17 ENCOUNTER — OFFICE VISIT (OUTPATIENT)
Dept: FAMILY MEDICINE | Facility: CLINIC | Age: 87
End: 2022-05-17
Payer: MEDICARE

## 2022-05-17 VITALS — SYSTOLIC BLOOD PRESSURE: 124 MMHG | DIASTOLIC BLOOD PRESSURE: 78 MMHG | HEART RATE: 76 BPM

## 2022-05-17 DIAGNOSIS — K62.89 RECTAL MASS: Primary | ICD-10-CM

## 2022-05-17 DIAGNOSIS — N39.0 URINARY TRACT INFECTION WITHOUT HEMATURIA, SITE UNSPECIFIED: ICD-10-CM

## 2022-05-17 DIAGNOSIS — F41.9 ANXIETY: ICD-10-CM

## 2022-05-17 LAB
BACTERIA UR CULT: ABNORMAL
BACTERIA UR CULT: ABNORMAL

## 2022-05-17 PROCEDURE — 99214 OFFICE O/P EST MOD 30 MIN: CPT | Performed by: FAMILY MEDICINE

## 2022-05-17 NOTE — PROGRESS NOTES
ASSESSMENT/PLAN:  ALEXA Morales was seen today for rectal problem, discuss frequent uti and anxiety.    Diagnoses and all orders for this visit:    Rectal mass  -     Colorectal Surgery Referral; Future  We will refer to colorectal surgery for further management    Urinary tract infection without hematuria, site unspecified  She started Bactrim.  Continue with Bactrim and finish the course.  Recommend a urine culture for test of cure.  She may benefit from a daily antibiotics for UTI prophylaxis.  However, I will defer this to her PCP    Anxiety  Continue with her current medications including that of gabapentin.  I recommend a visit with PCP to discuss anxiety management.  She is to finish the Bactrim course as described above.  She is to make an appointment with colorectal as described above.    SUBJECTIVE:    ALEXA Luu is a 89 year old female who came in today     89-year-old female who is accompanied by her daughter.  For many years she has been noted to insert toilet paper between the butt crack along the anal canal.  For the last few months now she has noted a mass and some clear pink discharge and staining on the tissue paper.  She has not noted any pain.  She has been having bowel movements of 1-2 episodes per day.  She has not had any unintentional weight changes.  She has Parkinson's and is wheelchair-bound.  She lives in assisted living.    She has had about 4 episodes of urinary tract infection over the last couple months.  She just started on another antibiotic yesterday based on a urine culture that came back positive for urinary tract infection.  I reviewed the charts.    Her daughter course questioning about starting her on anxiety medication.  She is already taking gabapentin 3 times a day.    Review of Systems (except those mentioned above)  Constitutional: Negative.   HENT: Negative.   Eyes: Negative.   Respiratory: Negative.   Cardiovascular: Negative.   Gastrointestinal: Negative.    Endocrine: Negative.   Genitourinary: Negative.   Musculoskeletal: Negative.   Skin: Negative.   Allergic/Immunologic: Negative.   Neurological: Negative.   Hematological: Negative.   Psychiatric/Behavioral: Negative.     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  Implanted 2/20/20 - 27 mm Watchman device  Formatting of this note might be different from the original.  Implanted 2/20/20 - 27 mm Watchman device       Status post placement of implantable loop recorder 01/09/2020     Priority: Medium     Formatting of this note might be different from the original.  Due to cryptogenic stroke       Atrial fibrillation, unspecified type (H) 11/11/2019     Priority: Medium     Non-ischemic cardiomyopathy (H) 11/11/2019     Priority: Medium     Falls frequently 10/31/2019     Priority: Medium     Cryptogenic stroke (H) 10/16/2019     Priority: Medium     Stroke (H) 10/09/2019     Priority: Medium     Acute lacunar stroke (H) 10/05/2019     Priority: Medium     Left ventricular hypertrophy 01/14/2019     Priority: Medium     Overview:   Created by Conversion    Created by Conversion       Hemorrhoids 01/14/2019     Priority: Medium     Overview:   Created by Conversion    Replacement Utility updated for latest IMO load       Mixed hyperlipidemia 01/14/2019     Priority: Medium     Overview:   Created by Conversion       Venous insufficiency 01/14/2019     Priority: Medium     Overview:   Created by Conversion    Replacement Utility updated for latest  "IMO load       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     Priority: Medium     Prolapse of vaginal wall 08/18/2015     Priority: Medium     Overview:   Created by Conversion    Replacement Utility updated for latest IMO load       Posterior capsular opacification 01/02/2012     Priority: Medium     Allergies   Allergen Reactions     Codeine Other (See Comments)     Morphine Other (See Comments)     Made me feel really wierd     Penicillins Other (See Comments)     Tongue and throat tingling  Other reaction(s): Paresthesias  Tongue and throat tingling  Tingling on lips  Other reaction(s): Paresthesias  Tongue and throat tingling  Tingling on lips  Tongue and throat \"tingling\" when in 20s       Vicodin [Hydrocodone-Acetaminophen] Other (See Comments)     Weird feeling     Current Outpatient Medications   Medication Sig Dispense Refill     acetaminophen (TYLENOL) 500 MG tablet 500mg by mouth nightly at 9/10pm       aspirin (ASA) 81 MG EC tablet 81mg tab by mouth daily @8am 30 tablet 0     calcium carbonate 500 MG CHEW 1 tums by mouth nightly @ 10/11pm       calcium carbonate 600 mg-vitamin D 400 units (CALTRATE) 600-400 MG-UNIT per tablet 1 tab by mouth daily at 8am       carbidopa-levodopa (SINEMET)  MG tablet TAKE 1 AND 1/2 TABLETS BY MOUTH TWICE DAILY AT 7 AM-8 AM, 12 PM-12:30 PM AND 1 TABLET 5-5:30 PM EQUALS 4 TABLETS IN A  tablet 2     gabapentin (NEURONTIN) 600 MG tablet Take 1-2 tablets (600-1,200 mg) by mouth 3 times daily 270 tablet 3     ibuprofen (ADVIL/MOTRIN) 200 MG tablet 2 x 200mg tab by mouth nightly at 9/10pm       metoprolol succinate ER (TOPROL XL) 25 MG 24 hr tablet TAKE 1 TABLET DAILY AT 8AM 30 tablet 0     pravastatin (PRAVACHOL) 20 MG tablet Take 1 tablet (20 mg) by mouth At Bedtime 90 tablet 1     psyllium (METAMUCIL/KONSYL) Packet By mouth in liquid daily as needed for constipation       sulfamethoxazole-trimethoprim (BACTRIM DS) 800-160 MG tablet Take 1 tablet by mouth 2 times daily " for 3 days 6 tablet 0     vitamin D3 (CHOLECALCIFEROL) 50 mcg (2000 units) tablet Vitamin d3 by mouth daily at 8am       zinc gluconate 50 MG tablet 50mg tab by mouth every morning at 8am       Past Medical History:   Diagnosis Date     Abnormal brain MRI 3/11/2019    MR HEAD BRAIN WO3/8/2019 [facility] Other Result Information This result has an attachment that is not available. Result Narrative [facility] RADIOLOGY  EXAM: MR HEAD BRAIN WO LOCATION: [facility] DATE/TIME: 3/7/2019 5:18 PM  INDICATION: Atypical Parkinsonism (hc) COMPARISON: CT 06/25/2018 TECHNIQUE: Routine multiplanar multisequence head MRI without intravenou     Atrial fibrillation (H)     per H&P     Atypical parkinsonism (H) 2/2/2017     Breast cyst      CVA (cerebral vascular accident) (H)     per H&P     Finger fracture, left 02/27/2019    ring finger      Multiple rib fractures 02/27/2019     Parkinson disease (H) 1/20/2016     Past Surgical History:   Procedure Laterality Date     ANKLE SURGERY Left     fracture     APPENDECTOMY       BREAST CYST ASPIRATION       EP THANIA CLOSURE N/A 2/20/2020    Procedure: EP THANIA Closure;  Surgeon: Javier Smith MD;  Location: [facility] Cath Lab;  Service: Cardiology     EP LOOP RECORDER IMPLANT N/A 10/8/2019    Procedure: EP Loop Recorder Insertion;  Surgeon: Angel Hurd MD;  Location: [facility] Cath Lab;  Service: Cardiology     EYE SURGERY Bilateral     cataract surgery 2000     HYSTERECTOMY      tahbso     HYSTERECTOMY  1982     KNEE SURGERY Right     staph infection     TONSILLECTOMY       ZZC TOTAL ABDOM HYSTERECTOMY      Description: Hysterectomy;  Proc Date: 01/01/1988;  Comments: couldn't find her ovaries     Social History     Socioeconomic History     Marital status:      Spouse name: None     Number of children: None     Years of education: None     Highest education level: None   Tobacco Use     Smoking status: Never Smoker     Smokeless  tobacco: Never Used   Substance and Sexual Activity     Alcohol use: No     Drug use: Never   Social History Narrative    . lives in [place].     Anisha Little daughter    Retired nurse.     Various hospital    Gyn, intensive care,     School system    [place], [place]    Moved here in      x 2    First    = was 43 yrs old and had melanoma    Second    had a stroke and  Was 87 yrs old.      Family History   Problem Relation Age of Onset     Cerebrovascular Disease Mother      Heart Disease Mother      Other - See Comments Mother         Persian Algerian     Dementia Father         10 yrs.     Other - See Comments Father         Algerian     Paranoid behavior Father      Other - See Comments Sister         bhavesh anne     Other - See Comments Daughter         kala     Other - See Comments Daughter         leonides lew     Other - See Comments Son         [place]     Other - See Comments Son         Dae [place]     Dementia Paternal Aunt      Dementia Paternal Aunt      Dementia Paternal Uncle      Breast Cancer Maternal Aunt          OBJECTIVE:    Vitals:    22 1318   BP: 124/78   Pulse: 76     There is no height or weight on file to calculate BMI.    Physical Exam:  Constitutional: Patient is oriented to person, place, and time. Patient appears well-developed and well-nourished. No distress.   Head: Normocephalic and atraumatic.   Right Ear: External ear normal.   Left Ear: External ear normal.   Eyes: Conjunctivae and EOM are normal. Right eye exhibits no discharge. Left eye exhibits no discharge. No scleral icterus.   Neurological: Patient is alert and oriented to person, place, and time.  Skin: No rash noted. Patient is not diaphoretic. No erythema. No pallor.  Rectal: Red fleshy mass at the anal canal.  No bleeding.

## 2022-05-18 ENCOUNTER — MEDICAL CORRESPONDENCE (OUTPATIENT)
Dept: HEALTH INFORMATION MANAGEMENT | Facility: CLINIC | Age: 87
End: 2022-05-18
Payer: MEDICARE

## 2022-05-18 ENCOUNTER — TELEPHONE (OUTPATIENT)
Dept: SURGERY | Facility: CLINIC | Age: 87
End: 2022-05-18
Payer: MEDICARE

## 2022-05-18 ENCOUNTER — TELEPHONE (OUTPATIENT)
Dept: FAMILY MEDICINE | Facility: CLINIC | Age: 87
End: 2022-05-18
Payer: MEDICARE

## 2022-05-18 NOTE — TELEPHONE ENCOUNTER
"Rectal Mass triage note:     I spoke with Anisha, her daughter. She states that Carmen has had issues with hemorrhoids for a couple years. She places toilet paper and pads in her underwear for bleeding. Recently she complained about \"a sore on her cheek\" and so she saw her doctor. She reports a smooth walnut sized mass that comes out of her anus. This typically comes out after having a BM. No pain or unintentional weight loss. Having 1-2 BMs daily. Scheduled visit with Hannah Doyle NP     "

## 2022-05-18 NOTE — TELEPHONE ENCOUNTER
BRENDANTCB-- Received UA results from [place]. This is pt's fourth UTI since 3/17/22. Per Dr. Osman--Pt should be seen to discuss.,  LM with daughter to schedule appt (can be virtual) to discuss recurrent UTI's. Please schedule appt if daughter calls back.

## 2022-05-18 NOTE — TELEPHONE ENCOUNTER
Diagnosis, Referred by & from: Rectal Mass, possible rectal prolapse or prolapsing internal hemorrhoid   Appt date: 6/6/2022   NOTES STATUS DETAILS   OFFICE NOTE from referring provider Internal [facility]:  5/17/22 - [facility] OV with Dr. Cheri Wise   OFFICE NOTE from other specialist N/A    DISCHARGE SUMMARY from hospital N/A    DISCHARGE REPORT from the ER N/A    OPERATIVE REPORT N/A    MEDICATION LIST Internal    LABS N/A    DIAGNOSTIC PROCEDURES     COLONOSCOPY Internal [facility]:  11/23/11 - Colonoscopy   IMAGING (DISC & REPORT)      CT [facility]:  4/18/19 - CT Chest/Abd/Pelvis  2/27/19 - CT Chest/Abd/Pelvis   MRI Received [facility]:  1/6/22 - MRI Hip     Records Requested  05/18/22    Facility  [facility]  Fax: [phone]   Outcome * 5/18/22 2:33 PM Faxed req to [facility] for images to be pushed to [place] PACs. - Jessica    * 5/19/22 9:51 AM Images received from [facility] and attached to the patient in PACs. - Jessica

## 2022-05-18 NOTE — TELEPHONE ENCOUNTER
[facility] Call Center    Phone Message    May a detailed message be left on voicemail: yes     Reason for Call: Appointment Intake    Referring Provider Name: Bhumi Perez MD in [facility] FAMILY MEDICINE/OB  Diagnosis and/or Symptoms: Rectal mass     Action Taken: Message routed to:  [facility] Colorectal    Travel Screening: Not Applicable

## 2022-05-23 ENCOUNTER — VIRTUAL VISIT (OUTPATIENT)
Dept: NEUROLOGY | Facility: CLINIC | Age: 87
End: 2022-05-23
Payer: MEDICARE

## 2022-05-23 ENCOUNTER — TELEPHONE (OUTPATIENT)
Dept: FAMILY MEDICINE | Facility: CLINIC | Age: 87
End: 2022-05-23
Payer: MEDICARE

## 2022-05-23 DIAGNOSIS — G20.A1 PARKINSON'S DISEASE (H): ICD-10-CM

## 2022-05-23 PROCEDURE — 99215 OFFICE O/P EST HI 40 MIN: CPT | Mod: 95 | Performed by: NURSE PRACTITIONER

## 2022-05-23 RX ORDER — CARBIDOPA AND LEVODOPA 50; 200 MG/1; MG/1
1 TABLET, EXTENDED RELEASE ORAL AT BEDTIME
Qty: 90 TABLET | Refills: 3 | Status: SHIPPED | OUTPATIENT
Start: 2022-05-23 | End: 2024-04-23

## 2022-05-23 RX ORDER — CARBIDOPA AND LEVODOPA 25; 100 MG/1; MG/1
TABLET ORAL
Qty: 495 TABLET | Refills: 3 | Status: SHIPPED | OUTPATIENT
Start: 2022-05-23 | End: 2024-04-23

## 2022-05-23 NOTE — PROGRESS NOTES
Carmen is a 89 year old who is being evaluated via a billable video visit.      How would you like to obtain your AVS? MyChart  If the video visit is dropped, the invitation should be resent by: Send to e-mail at: [email or web address]  Will anyone else be joining your video visit? No      Video-Visit Details    Type of service:  Video Visit    Video Start Time: 05/23/2022 01:00 pm  Video End Time:05/23/2022 01:38 pm  38 minutes    Originating Location (pt. Location): Home     Distant Location (provider location):  [facility] NEUROLOGY [facility]     Platform used for Video Visit: AmWell    --------------------------------------------------------------------------------------------------------------------------------------------------------------------------      ASSESSMENT:    1)  Parkinson's Disease: Needs improvement.    2) Recurrent UTI: Ongoing problem.  Increased confusion when she gets UTI. Daughter thinks that pt might be having another UTI.    3) Chronic groin pain with radiculopathy:  Bothersome. Affecting mobility and quality of life.     4) Anxiety: Intermittently present and when it occurs, bothersome.       PLAN:    I had a long discussion about various symptoms and answered queions. Since starting 2 new medications or making to two medication changes is not recommended, pt was asked to decide the most bothersome symptom she would like treated today (anxiety vs PD). The following patient instruction provided: -     __  Will increase your daytime Sinemet dose as below: -     __  You may start the increased dose right away.    PD Medications 7-8 am 12 - 12:30 pm 5:30 pm   Sinemet 25/100 mg  2 2 1.5     __  Monitor for dyskinesias, daytime sleepiness, dizziness, or nausea.      __  Wait for 2 weeks. If you have no side effects from the increased dose of Sinemet, start taking the bedtime Sinemet 50/200 mg 1 tablet around 8-9 pm to help with nighttime mobility.    __  Continue to exercise  regularly and stay active.    __  It would be reasonable to try the Lyrica like your doctor suggested. If it doesn't work, you can go back to taking the Gabapentin.    __  Consider seeing you PCP to get checked for UTI. It might be work exploring underlying cause, like post void residual urin in your bladder.    __  Virtual visit in 6 weeks to address Anxiety.  You may return sooner as needed.    __  Schedulers will call and set up your next visit.        MOVEMENT DISORDERS CLINIC           PATIENT: LAEXA Luu    : 1932    DATE: May 23, 2022    REASON FOR VISIT: Parkinsonism follow up.    HPI: Ms. ALEXA Luu is an 89 year old who is seen via video visit for a follow up visit.      During today's video visit pt and her daughter, Anisha, were present.     6 weeks ago, she moved to [facility] in [place]. Mostly, she is on motorized w/c. Walks daily with a gait belt using walker with one of the aids for 5 - 10 minutes. Last year this time, she walked a few blocks.due to pain her mobility is limited.  Twice a week she goes to some exercise class at the [facility]. Her daughter takes her out about twice a week.    She has balance problems. Last fall was 5 weeks ago. Since she has moved to the [facility], she had 5 falls. She has alarm system to call staff to help her.  One time staff had to use a cruz lift to get her off the bathroom floor.  At times she is able to get herself up from the fall.  Her falls are generally when transferring from her motorized wheelchair to the toilet or recliner.    She had 4 UTIs in the last 4 - 5 weeks. Her PCP has recommended prophylactic antibiotic.  She was seen by a urologist a long time ago.  When she has UTI, she gets confused.  Her last antibiotic treatment was about a week ago.  Anisha thinks that patient might have UTI.    She has pain from left groin sciatica and arthritis. Seeing an ortho doctor and PCP.  She takes Gabapentin 1200 mg  TID.  The gabapentin helps the pain but she still has pain when walking and in the middle of the night. NICOLLE Lancaster reports that patient has been seen by the pain clinic and has tried alternative therapy.  Patient is asking if switching from Gabapentin to Lyrica would be beneficial, which was recommended by her Ortho doctor.    She has anxiety.  When she gets anxious, she feels like she wants to crawl out of her skin. Doesn't feel right. Can't communicate.     She reports increased stiffness before the taking her next dose. She expressed concern about dyskinesias if the levodopa dose was increased.    PD Medications 7-8 am 12 - 12:30 pm 5:30 pm   Sinemet 25/100 mg  1.5 1.5 1     MEDICATIONS:   Outpatient Medications Marked as Taking for the 5/23/22 encounter (Virtual Visit) with Bossman Bolaños APRN CNP   Medication Sig     acetaminophen (TYLENOL) 500 MG tablet 500mg by mouth nightly at 9/10pm     aspirin (ASA) 81 MG EC tablet 81mg tab by mouth daily @8am     calcium carbonate 500 MG CHEW 1 tums by mouth nightly @ 10/11pm     calcium carbonate 600 mg-vitamin D 400 units (CALTRATE) 600-400 MG-UNIT per tablet 1 tab by mouth daily at 8am     carbidopa-levodopa (SINEMET)  MG tablet TAKE 1 AND 1/2 TABLETS BY MOUTH TWICE DAILY AT 7 AM-8 AM, 12 PM-12:30 PM AND 1 TABLET 5-5:30 PM EQUALS 4 TABLETS IN A DAY     gabapentin (NEURONTIN) 600 MG tablet Take 1-2 tablets (600-1,200 mg) by mouth 3 times daily     ibuprofen (ADVIL/MOTRIN) 200 MG tablet 2 x 200mg tab by mouth nightly at 9/10pm     metoprolol succinate ER (TOPROL XL) 25 MG 24 hr tablet TAKE 1 TABLET DAILY AT 8AM     pravastatin (PRAVACHOL) 20 MG tablet Take 1 tablet (20 mg) by mouth At Bedtime     psyllium (METAMUCIL/KONSYL) Packet By mouth in liquid daily as needed for constipation     vitamin D3 (CHOLECALCIFEROL) 50 mcg (2000 units) tablet Vitamin d3 by mouth daily at 8am     zinc gluconate 50 MG tablet 50mg tab by mouth every morning at 8am     I spent 51  minutes caring for the patient today including video time, reviewing records, answering questions, refilling/ordering medication, and documentation.    CRYSTAL Lang,  CNP  [facility]

## 2022-05-23 NOTE — LETTER
5/23/2022       RE: ALEXA Luu  [address] [facility] [address]     Dear Colleague,    Thank you for referring your patient, ALEXA Luu, to the [facility] NEUROLOGY [facility]INIC [place] at [facility]. Please see a copy of my visit note below.      ASSESSMENT:    1)  Parkinson's Disease: Needs improvement.    2) Recurrent UTI: Ongoing problem.  Increased confusion when she gets UTI. Daughter thinks that pt might be having another UTI.    3) Chronic groin pain with radiculopathy:  Bothersome. Affecting mobility and quality of life.     4) Anxiety: Intermittently present and when it occurs, bothersome.       PLAN:    I had a long discussion about various symptoms and answered queions. Since starting 2 new medications or making to two medication changes is not recommended, pt was asked to decide the most bothersome symptom she would like treated today (anxiety vs PD). The following patient instruction provided: -     __  Will increase your daytime Sinemet dose as below: -     __  You may start the increased dose right away.    PD Medications 7-8 am 12 - 12:30 pm 5:30 pm   Sinemet 25/100 mg  2 2 1.5     __  Monitor for dyskinesias, daytime sleepiness, dizziness, or nausea.      __  Wait for 2 weeks. If you have no side effects from the increased dose of Sinemet, start taking the bedtime Sinemet 50/200 mg 1 tablet around 8-9 pm to help with nighttime mobility.    __  Continue to exercise regularly and stay active.    __  It would be reasonable to try the Lyrica like your doctor suggested. If it doesn't work, you can go back to taking the Gabapentin.    __  Consider seeing you PCP to get checked for UTI. It might be work exploring underlying cause, like post void residual urin in your bladder.    __  Virtual visit in 6 weeks to address Anxiety.  You may return sooner as needed.    __  Schedulers will call and set up your next visit.         MOVEMENT DISORDERS CLINIC           PATIENT: ALEXA Luu    : 1932    DATE: May 23, 2022    REASON FOR VISIT: Parkinsonism follow up.    HPI: Ms. ALEXA Luu is an 89 year old who is seen via video visit for a follow up visit.      During today's video visit pt and her daughter, Anisha, were present.     6 weeks ago, she moved to Assisted Living in [place]. Mostly, she is on motorized w/c. Walks daily with a gait belt using walker with one of the aids for 5 - 10 minutes. Last year this time, she walked a few blocks.due to pain her mobility is limited.  Twice a week she goes to some exercise class at the [facility]. Her daughter takes her out about twice a week.    She has balance problems. Last fall was 5 weeks ago. Since she has moved to the assisted living, she had 5 falls. She has alarm system to call staff to help her.  One time staff had to use a cruz lift to get her off the bathroom floor.  At times she is able to get herself up from the fall.  Her falls are generally when transferring from her motorized wheelchair to the toilet or recliner.    She had 4 UTIs in the last 4 - 5 weeks. Her PCP has recommended prophylactic antibiotic.  She was seen by a urologist a long time ago.  When she has UTI, she gets confused.  Her last antibiotic treatment was about a week ago.  Anisha thinks that patient might have UTI.    She has pain from left groin sciatica and arthritis. Seeing an ortho doctor and PCP.  She takes Gabapentin 1200 mg TID.  The gabapentin helps the pain but she still has pain when walking and in the middle of the night. NICOLLE Lancaster reports that patient has been seen by the pain clinic and has tried alternative therapy.  Patient is asking if switching from Gabapentin to Lyrica would be beneficial, which was recommended by her Ortho doctor.    She has anxiety.  When she gets anxious, she feels like she wants to crawl out of her skin. Doesn't feel right. Can't  communicate.     She reports increased stiffness before the taking her next dose. She expressed concern about dyskinesias if the levodopa dose was increased.    PD Medications 7-8 am 12 - 12:30 pm 5:30 pm   Sinemet 25/100 mg  1.5 1.5 1     MEDICATIONS:   Outpatient Medications Marked as Taking for the 5/23/22 encounter (Virtual Visit) with Bossman Bolaños APRN CNP   Medication Sig     acetaminophen (TYLENOL) 500 MG tablet 500mg by mouth nightly at 9/10pm     aspirin (ASA) 81 MG EC tablet 81mg tab by mouth daily @8am     calcium carbonate 500 MG CHEW 1 tums by mouth nightly @ 10/11pm     calcium carbonate 600 mg-vitamin D 400 units (CALTRATE) 600-400 MG-UNIT per tablet 1 tab by mouth daily at 8am     carbidopa-levodopa (SINEMET)  MG tablet TAKE 1 AND 1/2 TABLETS BY MOUTH TWICE DAILY AT 7 AM-8 AM, 12 PM-12:30 PM AND 1 TABLET 5-5:30 PM EQUALS 4 TABLETS IN A DAY     gabapentin (NEURONTIN) 600 MG tablet Take 1-2 tablets (600-1,200 mg) by mouth 3 times daily     ibuprofen (ADVIL/MOTRIN) 200 MG tablet 2 x 200mg tab by mouth nightly at 9/10pm     metoprolol succinate ER (TOPROL XL) 25 MG 24 hr tablet TAKE 1 TABLET DAILY AT 8AM     pravastatin (PRAVACHOL) 20 MG tablet Take 1 tablet (20 mg) by mouth At Bedtime     psyllium (METAMUCIL/KONSYL) Packet By mouth in liquid daily as needed for constipation     vitamin D3 (CHOLECALCIFEROL) 50 mcg (2000 units) tablet Vitamin d3 by mouth daily at 8am     zinc gluconate 50 MG tablet 50mg tab by mouth every morning at 8am     I spent 51 minutes caring for the patient today including video time, reviewing records, answering questions, refilling/ordering medication, and documentation.      CRYSTAL Lang,  CNP  [facility]

## 2022-05-23 NOTE — PATIENT INSTRUCTIONS
Dear Ms. ALEXA Luis [facility],    Thank you for coming today.  During your visit, we have discussed the following:     __  Will increase your daytime Sinemet dose as below: -     __  You may start the increased dose right away.    PD Medications 7-8 am 12 - 12:30 pm 5:30 pm   Sinemet 25/100 mg  2 2 1.5     __  Monitor for dyskinesias, daytime sleepiness, dizziness, or nausea.      __  Wait for 2 weeks. If you have no side effects from the increased dose of Sinemet, start taking the bedtime Sinemet 50/200 mg 1 tablet around 8-9 pm to help with nighttime mobility.    __  Continue to exercise regularly and stay active.    __  It would be reasonable to try the Lyrica like your doctor suggested. If it doesn't work, you can go back to taking the Gabapentin.    __  Consider seeing you PCP to get checked for UTI. It might be work exploring underlying cause, like post void residual urin in your bladder.    __  Virtual visit in 6 weeks to address Anxiety.  You may return sooner as needed.    __  Schedulers will call and set up your next visit.      For questions, you may send us a Jobaline message or call [phone]    Fax number: [phone]    CRYSTAL Lang, CNP  [facility]

## 2022-05-23 NOTE — TELEPHONE ENCOUNTER
Nurse called wanting ok for UA order due to recurrent UTIs. Verbal OK given by Dr. Ulrich on behalf of Dr. Lovell.

## 2022-05-24 ENCOUNTER — LAB REQUISITION (OUTPATIENT)
Dept: LAB | Facility: CLINIC | Age: 87
End: 2022-05-24
Payer: MEDICARE

## 2022-05-24 ENCOUNTER — MYC MEDICAL ADVICE (OUTPATIENT)
Dept: NEUROLOGY | Facility: CLINIC | Age: 87
End: 2022-05-24

## 2022-05-24 ENCOUNTER — TRANSFERRED RECORDS (OUTPATIENT)
Dept: HEALTH INFORMATION MANAGEMENT | Facility: CLINIC | Age: 87
End: 2022-05-24

## 2022-05-24 DIAGNOSIS — R41.0 DISORIENTATION, UNSPECIFIED: ICD-10-CM

## 2022-05-24 PROCEDURE — 81001 URINALYSIS AUTO W/SCOPE: CPT | Mod: ORL | Performed by: FAMILY MEDICINE

## 2022-05-24 PROCEDURE — 87086 URINE CULTURE/COLONY COUNT: CPT | Mod: ORL | Performed by: FAMILY MEDICINE

## 2022-05-25 DIAGNOSIS — N30.00 ACUTE CYSTITIS WITHOUT HEMATURIA: Primary | ICD-10-CM

## 2022-05-25 RX ORDER — NITROFURANTOIN 25; 75 MG/1; MG/1
100 CAPSULE ORAL 2 TIMES DAILY
Qty: 14 CAPSULE | Refills: 0 | Status: SHIPPED | OUTPATIENT
Start: 2022-05-25 | End: 2022-06-01

## 2022-05-26 LAB — BACTERIA UR CULT: NORMAL

## 2022-05-31 ENCOUNTER — MEDICAL CORRESPONDENCE (OUTPATIENT)
Dept: HEALTH INFORMATION MANAGEMENT | Facility: CLINIC | Age: 87
End: 2022-05-31
Payer: MEDICARE

## 2022-05-31 PROBLEM — Z90.710 S/P VH (VAGINAL HYSTERECTOMY): Status: ACTIVE | Noted: 2022-05-31

## 2022-05-31 PROBLEM — K76.89 HEPATIC CYST: Status: ACTIVE | Noted: 2022-05-31

## 2022-05-31 PROBLEM — N95.2 VAGINAL ATROPHY: Status: ACTIVE | Noted: 2022-05-31

## 2022-05-31 PROBLEM — M85.80 OSTEOPENIA: Status: ACTIVE | Noted: 2022-05-31

## 2022-05-31 NOTE — PROGRESS NOTES
Assessment/Plan:    Recurrent UTI  ***       Follow up: ***    Jami Osman MD  [facility]    Subjective:   ALEXA Luu is a 89 year old female is here today for ***    -***      Patient Active Problem List   Diagnosis     Atypical parkinsonism (H)     Left ventricular hypertrophy     Hemorrhoids     Mixed hyperlipidemia     Posterior capsular opacification     Prolapse of vaginal wall     Venous insufficiency     Stroke (H)     Acute lacunar stroke (H)     Atrial fibrillation, unspecified type (H)     Non-ischemic cardiomyopathy (H)     Cryptogenic stroke (H)     Falls frequently     Balance problem     Chronic left hip pain     Gait instability     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 Result Narrative [facility] RADIOLOGY  EXAM: MR HEAD BRAIN WO LOCATION: [facility] DATE/TIME: 3/7/2019 5:18 PM  INDICATION: Atypical Parkinsonism (hc) COMPARISON: CT 06/25/2018 TECHNIQUE: Routine multiplanar multisequence head MRI without intravenou     Atrial fibrillation (H)     per H&P     Atypical parkinsonism (H) 2/2/2017     Breast cyst      CVA (cerebral vascular accident) (H)     per H&P     Finger fracture, left 02/27/2019    ring finger      Multiple rib fractures 02/27/2019     Parkinson disease (H) 1/20/2016     Past Surgical History:   Procedure Laterality Date     ANKLE SURGERY Left     fracture     APPENDECTOMY       BREAST CYST ASPIRATION       EP THANIA CLOSURE N/A 2/20/2020    Procedure: EP THANIA Closure;  Surgeon: Javier Smith MD;  Location: [facility];  Service:  "Cardiology     EP LOOP RECORDER IMPLANT N/A 10/8/2019    Procedure: EP Loop Recorder Insertion;  Surgeon: Angel Hurd MD;  Location: [facility];  Service: Cardiology     EYE SURGERY Bilateral     cataract surgery 2000     HYSTERECTOMY      tahbso     HYSTERECTOMY  1982     KNEE SURGERY Right     staph infection     TONSILLECTOMY       ZZC TOTAL ABDOM HYSTERECTOMY      Description: Hysterectomy;  Proc Date: 01/01/1988;  Comments: couldn't find her ovaries     Current Outpatient Medications   Medication     acetaminophen (TYLENOL) 500 MG tablet     aspirin (ASA) 81 MG EC tablet     calcium carbonate 500 MG CHEW     calcium carbonate 600 mg-vitamin D 400 units (CALTRATE) 600-400 MG-UNIT per tablet     carbidopa-levodopa (SINEMET CR)  MG CR tablet     carbidopa-levodopa (SINEMET)  MG tablet     gabapentin (NEURONTIN) 600 MG tablet     ibuprofen (ADVIL/MOTRIN) 200 MG tablet     metoprolol succinate ER (TOPROL XL) 25 MG 24 hr tablet     nitroFURantoin macrocrystal-monohydrate (MACROBID) 100 MG capsule     pravastatin (PRAVACHOL) 20 MG tablet     psyllium (METAMUCIL/KONSYL) Packet     vitamin D3 (CHOLECALCIFEROL) 50 mcg (2000 units) tablet     zinc gluconate 50 MG tablet     No current facility-administered medications for this visit.     Allergies   Allergen Reactions     Codeine Other (See Comments)     Morphine Other (See Comments)     Made me feel really wierd     Penicillins Other (See Comments)     Tongue and throat tingling  Other reaction(s): Paresthesias  Tongue and throat tingling  Tingling on lips  Other reaction(s): Paresthesias  Tongue and throat tingling  Tingling on lips  Tongue and throat \"tingling\" when in 20s       Vicodin [Hydrocodone-Acetaminophen] Other (See Comments)     Weird feeling     Social History     Socioeconomic History     Marital status:      Spouse name: Not on file     Number of children: Not on file     Years of education: Not on file     Highest " education level: Not on file   Occupational History     Not on file   Tobacco Use     Smoking status: Never Smoker     Smokeless tobacco: Never Used   Substance and Sexual Activity     Alcohol use: No     Drug use: Never     Sexual activity: Not on file   Other Topics Concern     Not on file   Social History Narrative    . lives in [place].     Anisha Little daughter    Retired nurse.     Various hospital    Gyn, intensive care,     School system    [place], [place]    Moved here in      x 2    First    = was 43 yrs old and had melanoma    Second    had a stroke and  Was 87 yrs old.      Social Determinants of Health     Financial Resource Strain: Not on file   Food Insecurity: Not on file   Transportation Needs: Not on file   Physical Activity: Not on file   Stress: Not on file   Social Connections: Not on file   Intimate Partner Violence: Not on file   Housing Stability: Not on file     Family History   Problem Relation Age of Onset     Cerebrovascular Disease Mother      Heart Disease Mother      Other - See Comments Mother         [place]alander Macanese     Dementia Father         10 yrs.     Other - See Comments Father         Macanese     Paranoid behavior Father      Other - See Comments Sister         bhavesh anne     Other - See Comments Daughter         [place]     Other - See Comments Daughter         [place]     Other - See Comments Son         [place]     Other - See Comments Son         Dae, [place]     Dementia Paternal Aunt      Dementia Paternal Aunt      Dementia Paternal Uncle      Breast Cancer Maternal Aunt      Review of systems is as stated in HPI, and the remainder of system review is otherwise negative.    Objective:     There were no vitals taken for this visit.    General appearance: awake, NAD  HEENT: atraumatic, normocephalic, PERRL, no scleral icterus or injection, ears and nose grossly normal, moist mucous  membranes  Neck: supple, no lymphadenopathy, normal ROM  CV: RRR, no murmurs/rubs/gallops, normal S1 and S2  Lungs: CTAB, no wheezes or crackles, breathing comfortably on room air  Abd: active bowel sounds, soft, non-distended, non-tender  Back: no CVA tenderness  Extremities: no LE edema bilaterally, moving all extremities, strong peripheral pulses bilaterally  Skin: no rashes or lesions  Neuro: alert, oriented x3, CNs grossly intact, no focal deficits appreciated, normal tone/strength/ROM/sensation, gait not tested  Psych: normal mood/affect/behavior, answering questions appropriately, linear thought process  ***

## 2022-06-01 ENCOUNTER — VIRTUAL VISIT (OUTPATIENT)
Dept: FAMILY MEDICINE | Facility: CLINIC | Age: 87
End: 2022-06-01
Payer: MEDICARE

## 2022-06-01 ENCOUNTER — TELEPHONE (OUTPATIENT)
Dept: NEUROLOGY | Facility: CLINIC | Age: 87
End: 2022-06-01
Payer: MEDICARE

## 2022-06-01 ENCOUNTER — TELEPHONE (OUTPATIENT)
Dept: FAMILY MEDICINE | Facility: CLINIC | Age: 87
End: 2022-06-01

## 2022-06-01 DIAGNOSIS — N39.0 RECURRENT UTI: Primary | ICD-10-CM

## 2022-06-01 PROCEDURE — 99213 OFFICE O/P EST LOW 20 MIN: CPT | Mod: 95 | Performed by: FAMILY MEDICINE

## 2022-06-01 NOTE — PROGRESS NOTES
Carmen is a 89 year old who is being evaluated via a billable video visit.      How would you like to obtain your AVS? MyChart  If the video visit is dropped, the invitation should be resent by: Text to cell phone: see demographics  Will anyone else be joining your video visit? Yes: daughter Mariel. How would they like to receive their invitation? Text to cell phone: n/a present with pt      Video Start Time: 5:21 PM    Assessment/Plan:    Recurrent UTI  Recurrent UTIs, no clear trigger. Pt reports no sexual activity. Recommend US evaluation; likely urology referral. If sx recur they can send mychart message and I will just send antibiotic in as cultures have all been pansensitive.   - US Renal Complete       Follow up: pending US result    Jami Osman MD  [facility]      Subjective:   ALEXA Painter is a 89 year old female being seen today via video visit for recurrent UTI    -per review of chart/urine cultures pt has had infections: 5/24, 5/13, 4/27, 4/13, 3/17  -no urinary symptoms but confusion is the main symptom  -no issues right now - missed one dose of antibiotic  -no fevers  -states no sexual activity - did discuss last urine sample said positive sperm      Patient Active Problem List   Diagnosis     Atypical parkinsonism (H)     Left ventricular hypertrophy     Hemorrhoids     Mixed hyperlipidemia     Posterior capsular opacification     Prolapse of vaginal wall     Venous insufficiency     Stroke (H)     Acute lacunar stroke (H)     Atrial fibrillation, unspecified type (H)     Non-ischemic cardiomyopathy (H)     Cryptogenic stroke (H)     Falls frequently     Balance problem     Chronic left hip pain     Gait instability     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     Breast cyst      CVA (cerebral vascular accident) (H)     per H&P     Finger fracture, left 02/27/2019    ring finger      Multiple rib fractures 02/27/2019     Parkinson disease (H) 1/20/2016     Past Surgical History:   Procedure Laterality Date     ANKLE SURGERY Left     fracture     APPENDECTOMY       BREAST CYST ASPIRATION       EP THANIA CLOSURE N/A 2/20/2020    Procedure: EP THANIA Closure;  Surgeon: Javier Smith MD;  Location: [facility];  Service: Cardiology     EP LOOP RECORDER IMPLANT N/A 10/8/2019    Procedure: EP Loop Recorder Insertion;  Surgeon: Angel Hurd MD;  Location: [facility];  Service: Cardiology     EYE SURGERY Bilateral     cataract surgery 2000     HYSTERECTOMY      tahbso     HYSTERECTOMY  1982     KNEE SURGERY Right     staph infection     TONSILLECTOMY       ZZC TOTAL ABDOM HYSTERECTOMY      Description: Hysterectomy;  Proc Date: 01/01/1988;  Comments: couldn't find her ovaries     Current Outpatient Medications   Medication     acetaminophen (TYLENOL) 500 MG tablet     aspirin (ASA) 81 MG EC tablet     calcium carbonate 500 MG CHEW     calcium carbonate 600 mg-vitamin D 400 units (CALTRATE) 600-400 MG-UNIT per tablet     carbidopa-levodopa (SINEMET CR)  MG CR tablet     carbidopa-levodopa (SINEMET)  MG tablet     gabapentin (NEURONTIN)  "600 MG tablet     ibuprofen (ADVIL/MOTRIN) 200 MG tablet     metoprolol succinate ER (TOPROL XL) 25 MG 24 hr tablet     nitroFURantoin macrocrystal-monohydrate (MACROBID) 100 MG capsule     pravastatin (PRAVACHOL) 20 MG tablet     psyllium (METAMUCIL/KONSYL) Packet     vitamin D3 (CHOLECALCIFEROL) 50 mcg (2000 units) tablet     zinc gluconate 50 MG tablet     No current facility-administered medications for this visit.     Allergies   Allergen Reactions     Codeine Other (See Comments)     Morphine Other (See Comments)     Made me feel really wierd     Penicillins Other (See Comments)     Tongue and throat tingling  Other reaction(s): Paresthesias  Tongue and throat tingling  Tingling on lips  Other reaction(s): Paresthesias  Tongue and throat tingling  Tingling on lips  Tongue and throat \"tingling\" when in 20s       Vicodin [Hydrocodone-Acetaminophen] Other (See Comments)     Weird feeling     Social History     Socioeconomic History     Marital status:      Spouse name: Not on file     Number of children: Not on file     Years of education: Not on file     Highest education level: Not on file   Occupational History     Not on file   Tobacco Use     Smoking status: Never Smoker     Smokeless tobacco: Never Used   Substance and Sexual Activity     Alcohol use: No     Drug use: Never     Sexual activity: Not on file   Other Topics Concern     Not on file   Social History Narrative    . lives in [place].     Anisha Little daughter    Retired nurse.     Various hospital    Gyn, intensive care,     School system    [place], [place]    Moved here in      x 2    First    = was 43 yrs old and had melanoma    Second    had a stroke and  Was 87 yrs old.      Social Determinants of Health     Financial Resource Strain: Not on file   Food Insecurity: Not on file   Transportation Needs: Not on file   Physical Activity: Not on file   Stress: Not on file   Social Connections: " Not on file   Intimate Partner Violence: Not on file   Housing Stability: Not on file     Family History   Problem Relation Age of Onset     Cerebrovascular Disease Mother      Heart Disease Mother      Other - See Comments Mother         [place]ijani [place]     Dementia Father         10 yrs.     Other - See Comments Father         [place]     Paranoid behavior Father      Other - See Comments Sister         bhavesh anne     Other - See Comments [place]     Other - See Comments Daughter         [place]     Other - See Comments Son         [place]     Other - See Comments Son         Dae, [place]     Dementia Paternal Aunt      Dementia Paternal Aunt      Dementia Paternal Uncle      Breast Cancer Maternal Aunt      Review of systems is as stated in HPI, and the remainder of system review is otherwise negative.    Objective:     There were no vitals taken for this visit.    General appearance: awake, NAD  HEENT: atraumatic, normocephalic  Lungs: breathing comfortably on room air, no cough  Neuro: alert, oriented x3, CNs grossly intact, no focal deficits appreciated  Psych: normal mood/affect/behavior, answering questions appropriately, linear thought process      Video-Visit Details    Type of service:  Video Visit    Video End Time:5:39 PM    Originating Location (pt. Location): Home    Distant Location (provider location):  [facility]     Platform used for Video Visit: [employer]

## 2022-06-01 NOTE — TELEPHONE ENCOUNTER
Reason for Call:  Home Health Care    [facility] with [facility] called regarding (reason for call): verbal orders     Orders are needed for this patient. OT     OT: 1x a week for 1 week, 2x a week for 1 week including eval that was completed 5/31. For cognitive testing and power wheel chair safety assessment.     Pt Provider: Dr. Lovell    Phone Number Homecare Nurse can be reached at: [phone]    Can we leave a detailed message on this number? YES    Call taken on 6/1/2022 at 9:15 AM by Adelita Jean

## 2022-06-02 ENCOUNTER — MYC MEDICAL ADVICE (OUTPATIENT)
Dept: FAMILY MEDICINE | Facility: CLINIC | Age: 87
End: 2022-06-02
Payer: MEDICARE

## 2022-06-02 ENCOUNTER — MYC MEDICAL ADVICE (OUTPATIENT)
Dept: SURGERY | Facility: CLINIC | Age: 87
End: 2022-06-02
Payer: MEDICARE

## 2022-06-02 LAB
ALBUMIN UR-MCNC: 30 MG/DL
APPEARANCE UR: CLEAR
BACTERIA #/AREA URNS HPF: ABNORMAL /HPF
BILIRUB UR QL STRIP: NEGATIVE
COLOR UR AUTO: YELLOW
GLUCOSE UR STRIP-MCNC: NEGATIVE MG/DL
HGB UR QL STRIP: ABNORMAL
KETONES UR STRIP-MCNC: NEGATIVE MG/DL
LEUKOCYTE ESTERASE UR QL STRIP: ABNORMAL
MUCOUS THREADS #/AREA URNS LPF: PRESENT /LPF
NITRATE UR QL: NEGATIVE
PH UR STRIP: 6.5 [PH] (ref 5–7)
RBC URINE: 4 /HPF
SP GR UR STRIP: 1.02 (ref 1–1.03)
SPERM #/AREA URNS HPF: ABNORMAL /[HPF]
SQUAMOUS EPITHELIAL: 14 /HPF
UROBILINOGEN UR STRIP-MCNC: <2 MG/DL
WBC URINE: 18 /HPF

## 2022-06-03 NOTE — TELEPHONE ENCOUNTER
To pcp to advise on daughters questions. Please advise  Patient question: Do you think we should do another round of antibiotics again? Prior to ultrasound?   Last office note- Recurrent UTI  Recurrent UTIs, no clear trigger. Pt reports no sexual activity. Recommend US evaluation; likely urology referral. If sx recur they can send mychart message and I will just send antibiotic in as cultures have all been pansensitive.   - US Renal Complete    Darlene Landa RN on 6/3/2022 at 2:29 PM

## 2022-06-06 ENCOUNTER — PRE VISIT (OUTPATIENT)
Dept: SURGERY | Facility: CLINIC | Age: 87
End: 2022-06-06
Payer: MEDICARE

## 2022-06-06 ENCOUNTER — OFFICE VISIT (OUTPATIENT)
Dept: SURGERY | Facility: CLINIC | Age: 87
End: 2022-06-06
Payer: MEDICARE

## 2022-06-06 VITALS
DIASTOLIC BLOOD PRESSURE: 77 MMHG | OXYGEN SATURATION: 96 % | BODY MASS INDEX: 20.94 KG/M2 | HEART RATE: 71 BPM | SYSTOLIC BLOOD PRESSURE: 128 MMHG | HEIGHT: 64 IN

## 2022-06-06 DIAGNOSIS — K64.8 HEMORRHOID PROLAPSE: Primary | ICD-10-CM

## 2022-06-06 PROCEDURE — 99213 OFFICE O/P EST LOW 20 MIN: CPT | Mod: 25 | Performed by: NURSE PRACTITIONER

## 2022-06-06 PROCEDURE — 46221 LIGATION OF HEMORRHOID(S): CPT | Performed by: NURSE PRACTITIONER

## 2022-06-06 ASSESSMENT — PAIN SCALES - GENERAL: PAINLEVEL: NO PAIN (0)

## 2022-06-06 NOTE — NURSING NOTE
"Chief Complaint   Patient presents with     New Patient     Rectal mass       Vitals:    06/06/22 1409   BP: 128/77   BP Location: Right arm   Patient Position: Sitting   Cuff Size: Adult Regular   Pulse: 71   SpO2: 96%   Height: 5' 4\"       Body mass index is 20.94 kg/m .                          Jania Logan, EMT    "

## 2022-06-06 NOTE — TELEPHONE ENCOUNTER
Routing to Dr. Osman to advise on patient's request for anti anxiety medications. Was briefly discussed during the last virtual visit according to patient.    Sia Lowry RN on 6/6/2022 at 2:24 PM

## 2022-06-06 NOTE — PROGRESS NOTES
"Colon and Rectal Surgery Consult Clinic Note    Date: 2022     Referring provider:  Bhumi Wise MD  [address] [place] [address]     RE: ALEXA Luu  : 1932  RAMOS: 2022    ALEXA Luu is a very pleasant 89 year old female who presents today for hemorrhoids      HPI:  Has something that is always on the outside. It is not painful but does bleed. Used to be difficult to have a bowel movement but no issue currently.   Colonoscopy in  was normal.   PMH of Parkinsons, CVA, history of falls, history of atrial fibrillation with baby ASA only and has a Watchman device.    Physical Examination:  /77 (BP Location: Right arm, Patient Position: Sitting, Cuff Size: Adult Regular)   Pulse 71   Ht 5' 4\"   SpO2 96%   BMI 20.94 kg/m    General: alert, oriented, in no acute distress, sitting comfortably  HEENT: mucous membranes moist    Perianal external examination: Exam was chaperoned by Jania Logan, EMT   Perianal skin: Intact with no excoriation or lichenification.  Lesions: No evidence of an external lesion, nodularity, or induration in the perianal region.  Eversion of buttocks: There was not evidence of an anal fissure. Details: N/A.  Skin tags or external hemorrhoids: Yes: large prolapsing hemorrhoid in the anterior position without active bleeding. Some overlying excoriation without bleeding.  Digital rectal examination: Was performed.   Sphincter tone: Good.  Palpable lesions: No.  Other: None.  Bimanual examination: was not performed    Anoscopy: Was performed.   Hemorrhoids: Yes. Grade 3 prolapsing hemorrhoid in the anterior position  Lesions: No    Procedures:  After discussing the risks and benefits, the patient agreed to proceed with internal hemorrhoidal banding.    Prior to the start of the procedure and with procedural staff participation, I verbally confirmed the patient s identity using two indicators, relevant allergies, that the " procedure was appropriate and matched the consent or emergent situation, and that the correct equipment/implants were available. Immediately prior to starting the procedure I conducted the Time Out with the procedural staff and re-confirmed the patient s name, procedure, and site/side. (The Joint Commission universal protocol was followed.)  Yes    Sedation (Moderate or Deep): None    A suction hemorrhoidal  was used to place a total of 1 band(s) in the anterior position(s).    There was no significant bleeding. The patient tolerated the procedure well.    This procedure was performed under a collaborative privileging agreement with Dr. Eder Quiros, Chief Division of Colon and Rectal Surgery    Assessment/Plan: 89 year old female who presents today for hemorrhoid prolapse. Large prolapsing hemorrhoid in the anterior position. We discussed options including surgical hemorrhoidectomy versus serial hemorrhoid banding. She would like to avoid surgery if possible. Discussed that due to the size, she may need serial banding and possible eventual surgical hemorrhoidectomy if we cannot improve symptoms with banding. She states an understanding of this and wished to proceed with banding. She tolerated this well today. Will have her start on a daily fiber supplement and follow up in one month for any recurrent symptoms. Patient's questions were answered to her stated satisfaction and she is in agreement with this plan.     Medical history:  Past Medical History:   Diagnosis Date     Abnormal brain MRI 3/11/2019    [facility] Result Information This result has an attachment that is not available. 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     Breast cyst      CVA (cerebral vascular accident) (H)     per H&P     Finger fracture, left 02/27/2019    ring finger      Multiple rib fractures 02/27/2019     Parkinson disease (H) 1/20/2016       Surgical history:  Past Surgical History:   Procedure Laterality Date     ANKLE SURGERY Left     fracture     APPENDECTOMY       BREAST CYST ASPIRATION       EP THANIA CLOSURE N/A 2/20/2020    Procedure: EP THANIA Closure;  Surgeon: Javier Smith MD;  Location: [facility] Cath [facility];  Service: Cardiology     EP LOOP RECORDER IMPLANT N/A 10/8/2019    Procedure: EP Loop Recorder Insertion;  Surgeon: Angel Hurd MD;  Location: [facility] Cath Lab;  Service: Cardiology     EYE SURGERY Bilateral     cataract surgery 2000     HYSTERECTOMY      tahbso     HYSTERECTOMY  1982     KNEE SURGERY Right     staph infection     TONSILLECTOMY       ZZC TOTAL ABDOM HYSTERECTOMY      Description: Hysterectomy;  Proc Date: 01/01/1988;  Comments: couldn't find her ovaries       Problem list:  Patient Active Problem List    Diagnosis Date Noted     Osteopenia 05/31/2022     Priority: Medium     Formatting of this note might be different from the original.  lowest score -2.5       S/P VH (vaginal hysterectomy) 05/31/2022     Priority: Medium     Formatting of this note might be different from the original.  secondary to prolapse, ovaries remain       Hepatic cyst 05/31/2022     Priority: Medium     Formatting of this note might be different from the original.  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  Implanted 2/20/20 - 27 mm Watchman device  Formatting of this note might be different from the original.  Implanted 2/20/20 - 27 mm Watchman device       Status post placement of implantable loop recorder 01/09/2020     Priority: Medium     Formatting of this note might be different from the original.  Due to cryptogenic stroke       Atrial fibrillation, unspecified type (H) 11/11/2019     Priority: Medium     Non-ischemic cardiomyopathy (H) 11/11/2019     Priority: Medium     Falls frequently 10/31/2019     Priority: Medium     Cryptogenic stroke (H) 10/16/2019     Priority: Medium     Stroke (H) 10/09/2019     Priority: Medium     Acute lacunar stroke (H) 10/05/2019     Priority: Medium     Left ventricular hypertrophy 01/14/2019     Priority: Medium     Overview:   Created by Conversion    Created by Conversion       Hemorrhoids 01/14/2019     Priority: Medium     Overview:   Created by Conversion    Replacement Utility updated for latest IMO load       Mixed hyperlipidemia 01/14/2019     Priority: Medium     Overview:   Created by Conversion       Venous insufficiency 01/14/2019     Priority: Medium     Overview:   Created by Conversion    Replacement Utility updated for latest IMO load       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     Priority: Medium     Prolapse of vaginal wall 08/18/2015     Priority: Medium     Overview:   Created by Conversion    Replacement Utility updated for latest IMO load       Posterior capsular opacification 01/02/2012     Priority: Medium       Medications:  Current Outpatient Medications   Medication Sig Dispense Refill     acetaminophen (TYLENOL) 500 MG tablet 500mg by mouth nightly at 9/10pm       aspirin (ASA) 81 MG EC tablet 81mg tab by mouth daily @8am 30 tablet 0     calcium carbonate 500 MG CHEW 1 tums by mouth nightly @ 10/11pm        "calcium carbonate 600 mg-vitamin D 400 units (CALTRATE) 600-400 MG-UNIT per tablet 1 tab by mouth daily at 8am       carbidopa-levodopa (SINEMET CR)  MG CR tablet Take 1 tablet by mouth At Bedtime 90 tablet 3     carbidopa-levodopa (SINEMET)  MG tablet Take 2 tabs by mouth at 7 - 8 AM, 12 -12:30 PM, AND 1.5 tabs at 5-5:30 PM = 5.5 Tabs 495 tablet 3     gabapentin (NEURONTIN) 600 MG tablet Take 1-2 tablets (600-1,200 mg) by mouth 3 times daily 270 tablet 3     ibuprofen (ADVIL/MOTRIN) 200 MG tablet 2 x 200mg tab by mouth nightly at 9/10pm       metoprolol succinate ER (TOPROL XL) 25 MG 24 hr tablet TAKE 1 TABLET DAILY AT 8AM 30 tablet 0     pravastatin (PRAVACHOL) 20 MG tablet Take 1 tablet (20 mg) by mouth At Bedtime 90 tablet 1     psyllium (METAMUCIL/KONSYL) Packet By mouth in liquid daily as needed for constipation       vitamin D3 (CHOLECALCIFEROL) 50 mcg (2000 units) tablet Vitamin d3 by mouth daily at 8am       zinc gluconate 50 MG tablet 50mg tab by mouth every morning at 8am         Allergies:  Allergies   Allergen Reactions     Codeine Other (See Comments)     Morphine Other (See Comments)     Made me feel really wierd     Penicillins Other (See Comments)     Tongue and throat tingling  Other reaction(s): Paresthesias  Tongue and throat tingling  Tingling on lips  Other reaction(s): Paresthesias  Tongue and throat tingling  Tingling on lips  Tongue and throat \"tingling\" when in 20s       Vicodin [Hydrocodone-Acetaminophen] Other (See Comments)     Weird feeling       Family history:  Family History   Problem Relation Age of Onset     Cerebrovascular Disease Mother      Heart Disease Mother      Other - See Comments Mother         Mozambican Tunisian     Dementia Father         10 yrs.     Other - See Comments Father         Tunisian     Paranoid behavior Father      Other - See Comments Sister         bhavesh anne     Other - See Comments [place]     Other - See Comments " "Daughter         [place]     Other - See Comments Son         [place]     Other - See Comments Son         Dae, [place]     Dementia Paternal Aunt      Dementia Paternal Aunt      Dementia Paternal Uncle      Breast Cancer Maternal Aunt        Social history:  Social History     Tobacco Use     Smoking status: Never Smoker     Smokeless tobacco: Never Used   Substance Use Topics     Alcohol use: No    Marital status: .    Nursing Notes:   Jania Logan, EMT  6/6/2022  2:12 PM  Signed  Chief Complaint   Patient presents with     New Patient     Rectal mass       Vitals:    06/06/22 1409   BP: 128/77   BP Location: Right arm   Patient Position: Sitting   Cuff Size: Adult Regular   Pulse: 71   SpO2: 96%   Height: 5' 4\"       Body mass index is 20.94 kg/m .                          Jania Logan, JESUS         20 minutes spent on the date of encounter (excluding time performing procedures) performing chart review, history and exam, documentation and further activities as noted above with an additional 2 minutes for banding and 5 minutes for banding.     CRYSTAL Kwong, NP-C  Colon and Rectal Surgery   [facility]    This note was created using speech recognition software and may contain unintended word substitutions.    "

## 2022-06-06 NOTE — TELEPHONE ENCOUNTER
Routing to Dr. Ulrich, covering provider for PCP, to advise on request for anti anxiety medications.    Sia Lowry RN on 6/6/2022 at 8:57 AM

## 2022-06-06 NOTE — LETTER
"2022       RE: ALEXA Luu  [address]  [facility] [facility] [place] [facility] [address]     Dear Colleague,    Thank you for referring your patient, ALEXA Luu, to the [facility] COLON AND RECTAL SURGERY CLINIC [place] [facility]. Please see a copy of my visit note below.    Colon and Rectal Surgery Consult Clinic Note    Date: 2022     Referring provider:  Bhumi Wise MD  [address] [place] [address]     RE: ALEXA Luu  : 1932  RAMOS: 2022    ALEXA Luu is a very pleasant 89 year old female who presents today for hemorrhoids      HPI:  Has something that is always on the outside. It is not painful but does bleed. Used to be difficult to have a bowel movement but no issue currently.   Colonoscopy in  was normal.   PMH of Parkinsons, CVA, history of falls, history of atrial fibrillation with baby ASA only and has a Watchman device.    Physical Examination:  /77 (BP Location: Right arm, Patient Position: Sitting, Cuff Size: Adult Regular)   Pulse 71   Ht 5' 4\"   SpO2 96%   BMI 20.94 kg/m    General: alert, oriented, in no acute distress, sitting comfortably  HEENT: mucous membranes moist    Perianal external examination: Exam was chaperoned by JESUS Diaz   Perianal skin: Intact with no excoriation or lichenification.  Lesions: No evidence of an external lesion, nodularity, or induration in the perianal region.  Eversion of buttocks: There was not evidence of an anal fissure. Details: N/A.  Skin tags or external hemorrhoids: Yes: large prolapsing hemorrhoid in the anterior position without active bleeding. Some overlying excoriation without bleeding.  Digital rectal examination: Was performed.   Sphincter tone: Good.  Palpable lesions: No.  Other: None.  Bimanual examination: was not performed    Anoscopy: Was performed.   Hemorrhoids: Yes. Grade 3 prolapsing " hemorrhoid in the anterior position  Lesions: No    Procedures:  After discussing the risks and benefits, the patient agreed to proceed with internal hemorrhoidal banding.    Prior to the start of the procedure and with procedural staff participation, I verbally confirmed the patient s identity using two indicators, relevant allergies, that the procedure was appropriate and matched the consent or emergent situation, and that the correct equipment/implants were available. Immediately prior to starting the procedure I conducted the Time Out with the procedural staff and re-confirmed the patient s name, procedure, and site/side. (The Joint Commission universal protocol was followed.)  Yes    Sedation (Moderate or Deep): None    A suction hemorrhoidal  was used to place a total of 1 band(s) in the anterior position(s).    There was no significant bleeding. The patient tolerated the procedure well.    This procedure was performed under a collaborative privileging agreement with Dr. Eder Quiros, Chief Division of Colon and Rectal Surgery    Assessment/Plan: 89 year old female who presents today for hemorrhoid prolapse. Large prolapsing hemorrhoid in the anterior position. We discussed options including surgical hemorrhoidectomy versus serial hemorrhoid banding. She would like to avoid surgery if possible. Discussed that due to the size, she may need serial banding and possible eventual surgical hemorrhoidectomy if we cannot improve symptoms with banding. She states an understanding of this and wished to proceed with banding. She tolerated this well today. Will have her start on a daily fiber supplement and follow up in one month for any recurrent symptoms. Patient's questions were answered to her stated satisfaction and she is in agreement with this plan.     Medical history:  Past Medical History:   Diagnosis Date     Abnormal brain MRI 3/11/2019    MR HEAD BRAIN WO3/8/2019 [facility] Other Result Information This result has an attachment that is not available. Result Narrative 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     Breast cyst      CVA (cerebral vascular accident) (H)     per H&P     Finger fracture, left 02/27/2019    ring finger      Multiple rib fractures 02/27/2019     Parkinson disease (H) 1/20/2016       Surgical history:  Past Surgical History:   Procedure Laterality Date     ANKLE SURGERY Left     fracture     APPENDECTOMY       BREAST CYST ASPIRATION       EP THANIA CLOSURE N/A 2/20/2020    Procedure: EP THANIA Closure;  Surgeon: Javier Smith MD;  Location: [facility];  Service: Cardiology     EP LOOP RECORDER IMPLANT N/A 10/8/2019    Procedure: EP Loop Recorder Insertion;  Surgeon: Angel Hurd MD;  Location: [facility];  Service: Cardiology     EYE SURGERY Bilateral     cataract surgery 2000     HYSTERECTOMY      tahbso     HYSTERECTOMY  1982     KNEE SURGERY Right     staph infection     TONSILLECTOMY       ZZC TOTAL ABDOM HYSTERECTOMY      Description: Hysterectomy;  Proc Date: 01/01/1988;  Comments: couldn't find her ovaries       Problem list:  Patient Active Problem List    Diagnosis Date Noted     Osteopenia 05/31/2022     Priority: Medium     Formatting of this note might be different from the original.  lowest score -2.5       S/P VH (vaginal hysterectomy) 05/31/2022     Priority: Medium     Formatting of this note might be different from the original.  secondary to prolapse, ovaries remain       Hepatic cyst 05/31/2022     Priority: Medium     Formatting of this note might be different from the original.  being followed by CT       Vaginal atrophy 05/31/2022      Priority: Medium     Keratoconjunctivitis sicca of both eyes (H) 01/27/2022     Priority: Medium     Balance problem 04/29/2021     Priority: Medium     Gait instability 04/29/2021     Priority: Medium     Chronic left hip pain 01/15/2021     Priority: Medium     Presence of left atrial appendage closure device composed of nickel-titanium alloy with polyethylene terephthalate membrane 02/20/2020     Priority: Medium     Formatting of this note might be different from the original.  Implanted 2/20/20 - 27 mm Watchman device  Formatting of this note might be different from the original.  Implanted 2/20/20 - 27 mm Watchman device       Status post placement of implantable loop recorder 01/09/2020     Priority: Medium     Formatting of this note might be different from the original.  Due to cryptogenic stroke       Atrial fibrillation, unspecified type (H) 11/11/2019     Priority: Medium     Non-ischemic cardiomyopathy (H) 11/11/2019     Priority: Medium     Falls frequently 10/31/2019     Priority: Medium     Cryptogenic stroke (H) 10/16/2019     Priority: Medium     Stroke (H) 10/09/2019     Priority: Medium     Acute lacunar stroke (H) 10/05/2019     Priority: Medium     Left ventricular hypertrophy 01/14/2019     Priority: Medium     Overview:   Created by Conversion    Created by Conversion       Hemorrhoids 01/14/2019     Priority: Medium     Overview:   Created by Conversion    Replacement Utility updated for latest IMO load       Mixed hyperlipidemia 01/14/2019     Priority: Medium     Overview:   Created by Conversion       Venous insufficiency 01/14/2019     Priority: Medium     Overview:   Created by Conversion    Replacement Utility updated for latest IMO load       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     Priority: Medium     Prolapse of vaginal wall 08/18/2015     Priority: Medium     Overview:   Created by Conversion    Replacement Utility updated for latest IMO load       Posterior capsular opacification  "01/02/2012     Priority: Medium       Medications:  Current Outpatient Medications   Medication Sig Dispense Refill     acetaminophen (TYLENOL) 500 MG tablet 500mg by mouth nightly at 9/10pm       aspirin (ASA) 81 MG EC tablet 81mg tab by mouth daily @8am 30 tablet 0     calcium carbonate 500 MG CHEW 1 tums by mouth nightly @ 10/11pm       calcium carbonate 600 mg-vitamin D 400 units (CALTRATE) 600-400 MG-UNIT per tablet 1 tab by mouth daily at 8am       carbidopa-levodopa (SINEMET CR)  MG CR tablet Take 1 tablet by mouth At Bedtime 90 tablet 3     carbidopa-levodopa (SINEMET)  MG tablet Take 2 tabs by mouth at 7 - 8 AM, 12 -12:30 PM, AND 1.5 tabs at 5-5:30 PM = 5.5 Tabs 495 tablet 3     gabapentin (NEURONTIN) 600 MG tablet Take 1-2 tablets (600-1,200 mg) by mouth 3 times daily 270 tablet 3     ibuprofen (ADVIL/MOTRIN) 200 MG tablet 2 x 200mg tab by mouth nightly at 9/10pm       metoprolol succinate ER (TOPROL XL) 25 MG 24 hr tablet TAKE 1 TABLET DAILY AT 8AM 30 tablet 0     pravastatin (PRAVACHOL) 20 MG tablet Take 1 tablet (20 mg) by mouth At Bedtime 90 tablet 1     psyllium (METAMUCIL/KONSYL) Packet By mouth in liquid daily as needed for constipation       vitamin D3 (CHOLECALCIFEROL) 50 mcg (2000 units) tablet Vitamin d3 by mouth daily at 8am       zinc gluconate 50 MG tablet 50mg tab by mouth every morning at 8am         Allergies:  Allergies   Allergen Reactions     Codeine Other (See Comments)     Morphine Other (See Comments)     Made me feel really wierd     Penicillins Other (See Comments)     Tongue and throat tingling  Other reaction(s): Paresthesias  Tongue and throat tingling  Tingling on lips  Other reaction(s): Paresthesias  Tongue and throat tingling  Tingling on lips  Tongue and throat \"tingling\" when in 20s       Vicodin [Hydrocodone-Acetaminophen] Other (See Comments)     Weird feeling       Family history:  Family History   Problem Relation Age of Onset     Cerebrovascular Disease " "Mother      Heart Disease Mother      Other - See Comments Mother         Korean Nigerien     Dementia Father         10 yrs.     Other - See Comments Father         Nigerien     Paranoid behavior Father      Other - See Comments Sister         bhavesh anne     Other - See Comments Daughter         kala     Other - See Comments Daughter         [place]     Other - See Comments Son         [place]     Other - See Comments Son         Dae, [place]     Dementia Paternal Aunt      Dementia Paternal Aunt      Dementia Paternal Uncle      Breast Cancer Maternal Aunt        Social history:  Social History     Tobacco Use     Smoking status: Never Smoker     Smokeless tobacco: Never Used   Substance Use Topics     Alcohol use: No    Marital status: .    Nursing Notes:   Jania Logan, EMT  6/6/2022  2:12 PM  Signed  Chief Complaint   Patient presents with     New Patient     Rectal mass       Vitals:    06/06/22 1409   BP: 128/77   BP Location: Right arm   Patient Position: Sitting   Cuff Size: Adult Regular   Pulse: 71   SpO2: 96%   Height: 5' 4\"       Body mass index is 20.94 kg/m .      Jania Logan, EMT      20 minutes spent on the date of encounter (excluding time performing procedures) performing chart review, history and exam, documentation and further activities as noted above with an additional 2 minutes for banding and 5 minutes for banding.       CRYSTAL Kwong, NP-C  Colon and Rectal Surgery   [facility]      This note was created using speech recognition software and may contain unintended word substitutions.  "

## 2022-06-07 ENCOUNTER — TELEPHONE (OUTPATIENT)
Dept: NURSING | Facility: CLINIC | Age: 87
End: 2022-06-07
Payer: MEDICARE

## 2022-06-07 NOTE — TELEPHONE ENCOUNTER
The Home Care/Assisted Living/Nursing Facility is calling regarding an established patient.  Has the patient seen Home Care in the past or is currently residing in Assisted Living or Nursing Facility? Yes.     AG De calling from [facility] requesting the following orders that are within the Home Care, Assisted Living or Nursing Home Eval and Treatment standing order and can be signed as standing order signature required by RN.    Preferred Call Back Number: [phone]    PT/OT/Speech Therapy    Any additional Orders:  Are there any orders requested, not stated above, that are outside of the standing order and must be routed to a licensed practitioner for approval?    No    Writer has verified [employer] will send fax to have orders signed.      Bernice Long RN, BSN on 6/7/2022 at 11:55 AM  [facility]

## 2022-06-08 ENCOUNTER — HOSPITAL ENCOUNTER (OUTPATIENT)
Dept: ULTRASOUND IMAGING | Facility: CLINIC | Age: 87
Discharge: HOME | End: 2022-06-08
Attending: FAMILY MEDICINE | Admitting: FAMILY MEDICINE
Payer: MEDICARE

## 2022-06-08 DIAGNOSIS — N39.0 RECURRENT UTI: ICD-10-CM

## 2022-06-08 PROCEDURE — 76770 US EXAM ABDO BACK WALL COMP: CPT

## 2022-06-08 NOTE — TELEPHONE ENCOUNTER
Advised patient via [employer] message that next available appointment is on June 17 with Dr. Osman.    Sia Lowry RN on 6/8/2022 at 7:31 AM

## 2022-06-10 DIAGNOSIS — G20.C ATYPICAL PARKINSONISM (H): Primary | ICD-10-CM

## 2022-06-11 ENCOUNTER — OFFICE VISIT (OUTPATIENT)
Dept: FAMILY MEDICINE | Facility: CLINIC | Age: 87
End: 2022-06-11
Payer: MEDICARE

## 2022-06-11 VITALS
RESPIRATION RATE: 16 BRPM | TEMPERATURE: 98.4 F | SYSTOLIC BLOOD PRESSURE: 150 MMHG | HEART RATE: 72 BPM | DIASTOLIC BLOOD PRESSURE: 81 MMHG | WEIGHT: 125.7 LBS | BODY MASS INDEX: 21.58 KG/M2 | OXYGEN SATURATION: 96 %

## 2022-06-11 DIAGNOSIS — R41.0 CONFUSION: ICD-10-CM

## 2022-06-11 DIAGNOSIS — N30.00 ACUTE CYSTITIS WITHOUT HEMATURIA: Primary | ICD-10-CM

## 2022-06-11 LAB
ALBUMIN UR-MCNC: NEGATIVE MG/DL
APPEARANCE UR: ABNORMAL
BACTERIA #/AREA URNS HPF: ABNORMAL /HPF
BILIRUB UR QL STRIP: NEGATIVE
COLOR UR AUTO: YELLOW
GLUCOSE UR STRIP-MCNC: NEGATIVE MG/DL
HGB UR QL STRIP: ABNORMAL
KETONES UR STRIP-MCNC: NEGATIVE MG/DL
LEUKOCYTE ESTERASE UR QL STRIP: ABNORMAL
NITRATE UR QL: POSITIVE
PH UR STRIP: 6.5 [PH] (ref 5–8)
RBC #/AREA URNS AUTO: ABNORMAL /HPF
SP GR UR STRIP: 1.02 (ref 1–1.03)
SQUAMOUS #/AREA URNS AUTO: ABNORMAL /LPF
UROBILINOGEN UR STRIP-ACNC: 0.2 E.U./DL
WBC #/AREA URNS AUTO: ABNORMAL /HPF

## 2022-06-11 PROCEDURE — 99214 OFFICE O/P EST MOD 30 MIN: CPT | Performed by: FAMILY MEDICINE

## 2022-06-11 PROCEDURE — 81001 URINALYSIS AUTO W/SCOPE: CPT | Performed by: FAMILY MEDICINE

## 2022-06-11 PROCEDURE — 87086 URINE CULTURE/COLONY COUNT: CPT | Performed by: FAMILY MEDICINE

## 2022-06-11 PROCEDURE — 87186 SC STD MICRODIL/AGAR DIL: CPT | Performed by: FAMILY MEDICINE

## 2022-06-11 RX ORDER — SULFAMETHOXAZOLE/TRIMETHOPRIM 800-160 MG
1 TABLET ORAL 2 TIMES DAILY
Qty: 20 TABLET | Refills: 0 | Status: SHIPPED | OUTPATIENT
Start: 2022-06-11 | End: 2022-06-20

## 2022-06-11 NOTE — PATIENT INSTRUCTIONS
Bactim twice a day for 10 days    Take with food    Caution in the sun.     Recheck if no better in 2-3 days    Urine culture is pending - we will call if a change in treatment is needed

## 2022-06-12 LAB
BACTERIA UR CULT: ABNORMAL
BACTERIA UR CULT: ABNORMAL

## 2022-06-12 NOTE — PROGRESS NOTES
Assessment/Plan:   Confusion  Acute cystitis without hematuria, recurrent  Recurrent UTI - 5 times in 3 months. Positive urine cultures. Treatments have been with nitrofurantoin for 7 days twice, Bactrim for 3 days twice and the initial time cefdinir after rocephin in the ED. Seems to do best with Bactrim but it is very short lived.   UA suggests infection.   Will treat with Bactrim twice daily for 10 days pending culture and urology follow up.   Recheck sooner if worse or no better.   - UA macro with reflex to Microscopic and Culture - Clinc Collect  - Urine Microscopic Exam  - Urine Culture  - sulfamethoxazole-trimethoprim (BACTRIM DS) 800-160 MG tablet; Take 1 tablet by mouth 2 times daily for 10 days  Dispense: 20 tablet; Refill: 0    I discussed red flag symptoms, return precautions to clinic/ER and follow up care with patient/parent.  Expected clinical course, symptomatic cares advised. Questions answered. Patient/parent amenable with plan.    Bactim twice a day for 10 days    Take with food    Caution in the sun.     Recheck if no better in 2-3 days    Urine culture is pending - we will call if a change in treatment is needed     Follow up with urology as planned.     Subjective:     ALEXA Luu is a 89 year old female with parkinson's who presents with her daughter for evaluation of possible recurrent UTI. She has been very confused today and that is a typical symptoms of her UTI. She does not usually have any actual urinary symptoms or fever. Just mild overall weakness and confusion. She has had recurrent UTI every 3 weeks or so since March when she was seen in the ED. She has been treated 5 times and improves and then symptoms come right back. Cultures have been positive every time - for E.coli, sometimes two strains - except the last culture which grew >100,000 urogenital melvi. She was treated with cefdinir the first time and while it worked, her daughter noted increased confusion when the UTI  "started coming back, much worse than usual. She has otherwise been treated with macrobid for 7 days on two occasions and Bactrim for only 3 days on two occasions.   She seems to do best with Bactrim according to her daughter.   No fever. Did not eat well today.  She has an appointment with urology this week but they didn't think they could wait that long.   They are wondering about a prophylactic antibiotic and will discuss with urology.   She has been diagosed with a rectal mass and will be seeing colorectal surgery for that.        Allergies   Allergen Reactions     Codeine Other (See Comments)     Morphine Other (See Comments)     Made me feel really wierd     Penicillins Other (See Comments)     Tongue and throat tingling  Other reaction(s): Paresthesias  Tongue and throat tingling  Tingling on lips  Other reaction(s): Paresthesias  Tongue and throat tingling  Tingling on lips  Tongue and throat \"tingling\" when in 20s       Vicodin [Hydrocodone-Acetaminophen] Other (See Comments)     Weird feeling     Current Outpatient Medications   Medication     aspirin (ASA) 81 MG EC tablet     calcium carbonate 500 MG CHEW     calcium carbonate 600 mg-vitamin D 400 units (CALTRATE) 600-400 MG-UNIT per tablet     carbidopa-levodopa (SINEMET CR)  MG CR tablet     carbidopa-levodopa (SINEMET)  MG tablet     gabapentin (NEURONTIN) 600 MG tablet     metoprolol succinate ER (TOPROL XL) 25 MG 24 hr tablet     pravastatin (PRAVACHOL) 20 MG tablet     psyllium (METAMUCIL/KONSYL) Packet     sulfamethoxazole-trimethoprim (BACTRIM DS) 800-160 MG tablet     vitamin D3 (CHOLECALCIFEROL) 50 mcg (2000 units) tablet     zinc gluconate 50 MG tablet     acetaminophen (TYLENOL) 500 MG tablet     ibuprofen (ADVIL/MOTRIN) 200 MG tablet     No current facility-administered medications for this visit.     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   Lungs: Clear to auscultation bilaterally, respirations unlabored  Heart: Regular rate and rhythm  Abdomen: Soft, non-tender  Back: no CVA tenderness  Extremities: No lower extremity edema  Psychiatric: Patient has a normal mood and affect.       Results for orders placed or performed in visit on 06/11/22   UA macro with reflex to Microscopic and Culture - Clinc Collect     Status: Abnormal    Specimen: Urine, Midstream   Result Value Ref Range    Color Urine Yellow Colorless, Straw, Light Yellow, Yellow    Appearance Urine Cloudy (A) Clear    Glucose Urine Negative Negative mg/dL    Bilirubin Urine Negative Negative    Ketones Urine Negative Negative mg/dL    Specific Gravity Urine 1.020 1.005 - 1.030    Blood Urine Trace (A) Negative    pH Urine 6.5 5.0 - 8.0    Protein Albumin Urine Negative Negative mg/dL    Urobilinogen Urine 0.2 0.2, 1.0 E.U./dL    Nitrite Urine Positive (A) Negative    Leukocyte Esterase Urine Large (A) Negative   Urine  Microscopic Exam     Status: Abnormal   Result Value Ref Range    Bacteria Urine Many (A) None Seen /HPF    RBC Urine 0-2 0-2 /HPF /HPF    WBC Urine 5-10 (A) 0-5 /HPF /HPF    Squamous Epithelials Urine Few (A) None Seen /LPF

## 2022-06-13 NOTE — TELEPHONE ENCOUNTER
Routing to Dr. Osman's covering provider, Dr. Guzman, to advise on patient's medication request for antibiotics.    Please review/advise.     Thank you,    Sia Lowry RN on 6/13/2022 at 7:11 AM

## 2022-06-17 ENCOUNTER — TELEPHONE (OUTPATIENT)
Dept: FAMILY MEDICINE | Facility: CLINIC | Age: 87
End: 2022-06-17

## 2022-06-17 NOTE — TELEPHONE ENCOUNTER
I signed a number of form this morning, I suspect this was included - forms all given to Sol for faxing    Dr Osman

## 2022-06-17 NOTE — TELEPHONE ENCOUNTER
Darrel from [facility] called to check the status of referrals sent this month. Paperwork found and given to Dr. Osman for signatures.

## 2022-06-20 ENCOUNTER — VIRTUAL VISIT (OUTPATIENT)
Dept: PHARMACY | Facility: CLINIC | Age: 87
End: 2022-06-20
Attending: NURSE PRACTITIONER
Payer: COMMERCIAL

## 2022-06-20 DIAGNOSIS — K21.00 GASTROESOPHAGEAL REFLUX DISEASE WITH ESOPHAGITIS, UNSPECIFIED WHETHER HEMORRHAGE: ICD-10-CM

## 2022-06-20 DIAGNOSIS — I48.91 ATRIAL FIBRILLATION, UNSPECIFIED TYPE (H): ICD-10-CM

## 2022-06-20 DIAGNOSIS — G20.C ATYPICAL PARKINSONISM (H): Primary | ICD-10-CM

## 2022-06-20 DIAGNOSIS — E78.2 MIXED HYPERLIPIDEMIA: ICD-10-CM

## 2022-06-20 DIAGNOSIS — F32.A ANXIETY AND DEPRESSION: ICD-10-CM

## 2022-06-20 DIAGNOSIS — F41.9 ANXIETY AND DEPRESSION: ICD-10-CM

## 2022-06-20 DIAGNOSIS — R52 PAIN: ICD-10-CM

## 2022-06-20 DIAGNOSIS — E78.5 HYPERLIPIDEMIA, UNSPECIFIED HYPERLIPIDEMIA TYPE: ICD-10-CM

## 2022-06-20 DIAGNOSIS — Z78.9 TAKES DIETARY SUPPLEMENTS: ICD-10-CM

## 2022-06-20 DIAGNOSIS — N39.0 FREQUENT UTI: ICD-10-CM

## 2022-06-20 PROCEDURE — 99607 MTMS BY PHARM ADDL 15 MIN: CPT | Performed by: PHARMACIST

## 2022-06-20 PROCEDURE — 99605 MTMS BY PHARM NP 15 MIN: CPT | Performed by: PHARMACIST

## 2022-06-20 RX ORDER — SERTRALINE HYDROCHLORIDE 25 MG/1
25 TABLET, FILM COATED ORAL DAILY
Qty: 30 TABLET | Refills: 3 | Status: SHIPPED | OUTPATIENT
Start: 2022-06-20 | End: 2022-09-02

## 2022-06-20 RX ORDER — ASCORBIC ACID 100 MG
1 TABLET,CHEWABLE ORAL DAILY
COMMUNITY
End: 2024-04-23

## 2022-06-20 RX ORDER — MAGNESIUM 200 MG
1000 TABLET ORAL DAILY
COMMUNITY
End: 2022-12-12

## 2022-06-20 RX ORDER — PRAVASTATIN SODIUM 20 MG
TABLET ORAL
Qty: 90 TABLET | Refills: 1 | Status: SHIPPED | OUTPATIENT
Start: 2022-06-20

## 2022-06-20 NOTE — PROGRESS NOTES
Medication Therapy Management (MTM) Encounter    ASSESSMENT:                            Medication Adherence/Access: No issues identified    Parkinson's Disease:  Appears stable; reviewed interaction between protein and medication     Pain: stable. Last GFR was ~71 mL/min (3/17/22) so no dose adjustment needed for gabapentin at this time, but I did recommend that she continue monitoring for side effects (drowsiness, cognitive effects, dizziness) and reduce the dose if side effects occur given her older age     Anxiety: patient would benefit from taking a low dose selective serotonin reuptake inhibitor for her mood and anxiety. Her daughter takes Celexa which would be reasonable to try but given the risk of QT prolongation in older adults, zoloft may be preferred. She agreed to try low-dose Zoloft instead. Last QTc was 426 on 3/17/22.    Frequent UTIs: defer to urology to discuss appropriateness of long-term antibiotic therapy for UTI prevention     Afib: stable     Hyperlipidemia: stable     GERD: stable     Vitamins/supplements: stable     PLAN:                            1. Start sertraline (Zoloft) 25 mg daily for anxiety/depression. This medication can take 6-8 weeks to start working.     2. Take carbidopa-levodopa at least 30 minutes before or at least 90 minutes after eating high-protein meals.     3. It's okay to take carbidopa-levodopa at the same time as gabapentin. I would recommend not increasing gabapentin any further (maximum of 3600 mg/day).     4. Discuss with your urologist about whether it would be appropriate for you to take low-dose antibiotics long-term for UTI prevention.     Follow-up: with Analisa on 7/8/22 and with me as needed     SUBJECTIVE/OBJECTIVE:                          ALEXA Luis [facility] is a 89 year old female contacted via secure video for an initial visit. She was referred to me from CRYSTAL Thompson CNP. Patient was accompanied by daughter, Mariel.   Reason for visit:  "medication review; would like help with medication timing and discuss anxiety    Allergies/ADRs: Reviewed in chart  Past Medical History: Reviewed in chart  Tobacco: She reports that she has never smoked. She has never used smokeless tobacco.  Alcohol: not currently using    Medication Adherence/Access: no issues reported; she has 2 meals per day, about 8 am and mid-afternoon     Parkinson's Disease:  Current medications are listed below. Dose of carbidopa-levodopa (Sinemet) was recently increased and she is not sure it's been helpful but she has not had any side effects either. No dyskinesia reported. She takes carbidopa-levodopa 30 minutes before meals.     PD Medications 7:30 am 1 pm 6 pm 10 pm   Sinemet 25/100 mg IR 2 2 1.5     Sinemet  mg CR    1   Gabapentin 600 mg 2 2 2      Pain: Currently taking gabapentin 1200 mg 3 times/day (7:30 am, 1 pm, and 6 pm). She is also taking ibuprofen 1-2 times daily as needed and acetaminophen periodically. She reports her pain is well controlled with this dose of gabapentin and she would prefer to not lower the dosage. She does not think she has side effects from gabapentin.     Anxiety: Not currently taking any medications. Reports she she been much more anxious and feeling \"blue\" lately. Frequent UTIs are contributing to her mood. She has not had trouble with anxiety in the past but more recently has \"spinning thoughts\" and daughters are concerned about her. She is willing to take a medication. Her daughter takes Celexa and would be interested in trying this drug.     Frequent UTIs: Finishing up a course of Bactrim today. Reports she has had UTIs frequently over the last couple months. She had a ultrasound yesterday and will be seeing a urologist soon. Wonders about being on long-term antibiotics. Family knows when she has a UTI because she starts getting confused.    Afib: Patient is currently taking aspirin 81 mg for anticoagulation. Patient reports no current " concerns of bruising or bleeding. Patient is also taking metoprolol XL 25 mg daily. Patient reports no current medication side effects.     Hyperlipidemia: Current therapy includes pravastatin 20 mg daily.  Patient reports no significant myalgias or other side effects.  The ASCVD Risk score (Sydneyruben ABAD Jr., et al., 2013) failed to calculate for the following reasons:    The 2013 ASCVD risk score is only valid for ages 40 to 79    The patient has a prior MI or stroke diagnosis  Recent Labs   Lab Test 01/14/21  1050 10/06/19  0500   CHOL 162 175   HDL 51 55   LDL 85 101   TRIG 128 94     GERD: Currently taking Tums most nights which helps.     Vitamins/supplements: Taking calcium, vitamin C, vitamin D3, zinc, vitamin B12    Today's Vitals: There were no vitals taken for this visit.  ----------------    I spent 30 minutes with this patient today. All changes were made via collaborative practice agreement with Analisa Bolaños. A copy of the visit note was provided to the patient's provider(s).    The patient was sent via [employer] a summary of these recommendations.     Jailene Ansari, Pharm.D.  Medication Therapy Management Pharmacist  [facility]    Telemedicine Visit Details  Type of service:  Video Conference via [employer]  Start Time: 3:02 PM  End Time: 3:32 PM  Originating Location (patient location): Home  Distant Location (provider location):  [facility]     Medication Therapy Recommendations  Anxiety and depression    Rationale: Untreated condition - Needs additional medication therapy - Indication   Recommendation: Start Medication - sertraline 25 MG tablet   Status: Accepted per CPA

## 2022-06-20 NOTE — PATIENT INSTRUCTIONS
"Recommendations from today's MTM visit:                                                    MTM (medication therapy management) is a service provided by a clinical pharmacist designed to help you get the most of out of your medicines.      1. Start sertraline (Zoloft) 25 mg daily for anxiety/depression. This medication can take 6-8 weeks to start working.     2. Take carbidopa-levodopa at least 30 minutes before or at least 90 minutes after eating high-protein meals.     3. It's okay to take carbidopa-levodopa at the same time as gabapentin. I would recommend not increasing gabapentin any further (maximum of 3600 mg/day).     4. Discuss with your urologist about whether it would be appropriate for you to take low-dose antibiotics long-term for UTI prevention.     Follow-up: with Analisa on 7/8/22 and with me as needed     It was great speaking with you today.  I value your experience and would be very thankful for your time in providing feedback in our clinic survey. In the next few days, you may receive an email or text message from [employer] with a link to a survey related to your  clinical pharmacist.\"     To schedule another MTM appointment, please call the clinic directly or you may call the MTM scheduling line at [phone] or toll-free at [phone].     My Clinical Pharmacist's contact information:                                                      Please feel free to contact me with any questions or concerns you have.      Jailene Ansari, Pharm.D.  Medication Therapy Management Pharmacist  [facility]     "

## 2022-06-20 NOTE — Clinical Note
Carmen Hubbard is ready to start an anti-anxiety medication so we went with sertraline 25 mg daily. Hope that's okay! She will see you for follow up in July and with me as needed.   Jailene Ansari, Pharm.D. Medication Therapy Management Pharmacist [facility]

## 2022-06-23 ENCOUNTER — TELEPHONE (OUTPATIENT)
Dept: FAMILY MEDICINE | Facility: CLINIC | Age: 87
End: 2022-06-23

## 2022-06-23 ENCOUNTER — MYC MEDICAL ADVICE (OUTPATIENT)
Dept: FAMILY MEDICINE | Facility: CLINIC | Age: 87
End: 2022-06-23

## 2022-06-23 DIAGNOSIS — N30.00 ACUTE CYSTITIS WITHOUT HEMATURIA: Primary | ICD-10-CM

## 2022-06-23 PROCEDURE — 81001 URINALYSIS AUTO W/SCOPE: CPT | Mod: ORL | Performed by: STUDENT IN AN ORGANIZED HEALTH CARE EDUCATION/TRAINING PROGRAM

## 2022-06-23 RX ORDER — SULFAMETHOXAZOLE/TRIMETHOPRIM 800-160 MG
1 TABLET ORAL 2 TIMES DAILY
Qty: 14 TABLET | Refills: 0 | Status: SHIPPED | OUTPATIENT
Start: 2022-06-23 | End: 2022-07-05

## 2022-06-23 NOTE — TELEPHONE ENCOUNTER
Reason for Call: Request for orders    Order being requested: UA with UC    Date needed: as soon as possible    Has the patient been seen by the PCP for this problem? YES    Additional comments: Per - orders are placed. I faxed them to [employer]. I requested that the results be sent as soon as they are in.     Prescription pending for provider to sign.     Phone number Patient can be reached at:  Home number on file [phone] (home)    Best Time:  any    Can we leave a detailed message on this number?  YES    Call taken on 6/23/2022 at 4:43 PM by Christine Garcia

## 2022-06-24 ENCOUNTER — TELEPHONE (OUTPATIENT)
Dept: FAMILY MEDICINE | Facility: CLINIC | Age: 87
End: 2022-06-24

## 2022-06-24 ENCOUNTER — LAB REQUISITION (OUTPATIENT)
Dept: LAB | Facility: CLINIC | Age: 87
End: 2022-06-24
Payer: MEDICARE

## 2022-06-24 DIAGNOSIS — R41.0 DISORIENTATION, UNSPECIFIED: ICD-10-CM

## 2022-06-24 LAB
ALBUMIN UR-MCNC: NEGATIVE MG/DL
APPEARANCE UR: CLEAR
BILIRUB UR QL STRIP: NEGATIVE
COLOR UR AUTO: ABNORMAL
GLUCOSE UR STRIP-MCNC: NEGATIVE MG/DL
HGB UR QL STRIP: NEGATIVE
KETONES UR STRIP-MCNC: NEGATIVE MG/DL
LEUKOCYTE ESTERASE UR QL STRIP: ABNORMAL
MUCOUS THREADS #/AREA URNS LPF: PRESENT /LPF
NITRATE UR QL: NEGATIVE
PH UR STRIP: 6 [PH] (ref 5–7)
RBC URINE: 1 /HPF
SP GR UR STRIP: 1.01 (ref 1–1.03)
SQUAMOUS EPITHELIAL: 1 /HPF
UROBILINOGEN UR STRIP-MCNC: <2 MG/DL
WBC URINE: 3 /HPF

## 2022-06-24 NOTE — TELEPHONE ENCOUNTER
Pt's daughter called again requesting an antibiotic. Confirmed pharmacy and that UA/UC was collected on 6/23.

## 2022-06-24 NOTE — TELEPHONE ENCOUNTER
. Lab results are in for UA.     Please review and let me know if anything further needs to be done.

## 2022-06-24 NOTE — TELEPHONE ENCOUNTER
Patient had UA done yesterday, 06/23, that are not yet resulted by a provider.    Waiting for provider to advise to send message about medication request.    Sia Lowry RN on 6/24/2022 at 8:34 AM

## 2022-06-27 ENCOUNTER — TELEPHONE (OUTPATIENT)
Dept: CARDIOLOGY | Facility: CLINIC | Age: 87
End: 2022-06-27

## 2022-06-27 DIAGNOSIS — R60.9 EDEMA: Primary | ICD-10-CM

## 2022-06-27 NOTE — TELEPHONE ENCOUNTER
Spoke with daughter Anisha, discussed symptoms. Expressed only symptom has been ankle edema. Discussed need for annual follow up with Dr. Kothari, continues low sodium and cardiac diet, compression socks. Pt has had 7 uti's in past few months. Has virtual visit with pcp in 1 week. Will be seen in rac due to severity of edema. Pt unable to put shoes on or ambulate. Transferred to scheduling/ -

## 2022-06-27 NOTE — TELEPHONE ENCOUNTER
[facility] Call Center    Phone Message    May a detailed message be left on voicemail: yes     Reason for Call: Other: Anisha is calling to report her mothers swollen ankles. Please call Anisha back to discuss further, thank you.     Action Taken: Message routed to:  Other: Cardiology    Travel Screening: Not Applicable

## 2022-07-05 ENCOUNTER — OFFICE VISIT (OUTPATIENT)
Dept: CARDIOLOGY | Facility: CLINIC | Age: 87
End: 2022-07-05
Payer: MEDICARE

## 2022-07-05 VITALS
WEIGHT: 123.6 LBS | BODY MASS INDEX: 21.1 KG/M2 | RESPIRATION RATE: 16 BRPM | HEIGHT: 64 IN | SYSTOLIC BLOOD PRESSURE: 124 MMHG | HEART RATE: 68 BPM | DIASTOLIC BLOOD PRESSURE: 62 MMHG

## 2022-07-05 DIAGNOSIS — R60.0 LOWER EXTREMITY EDEMA: ICD-10-CM

## 2022-07-05 DIAGNOSIS — G20.A1 PARKINSON DISEASE (H): ICD-10-CM

## 2022-07-05 DIAGNOSIS — I42.8 NON-ISCHEMIC CARDIOMYOPATHY (H): ICD-10-CM

## 2022-07-05 DIAGNOSIS — Z95.818 PRESENCE OF WATCHMAN LEFT ATRIAL APPENDAGE CLOSURE DEVICE: ICD-10-CM

## 2022-07-05 DIAGNOSIS — I63.9 CEREBROVASCULAR ACCIDENT (CVA), UNSPECIFIED MECHANISM (H): ICD-10-CM

## 2022-07-05 DIAGNOSIS — I48.0 PAROXYSMAL ATRIAL FIBRILLATION (H): Primary | ICD-10-CM

## 2022-07-05 PROCEDURE — 99214 OFFICE O/P EST MOD 30 MIN: CPT | Performed by: INTERNAL MEDICINE

## 2022-07-05 NOTE — PATIENT INSTRUCTIONS
It was a pleasure to meet with you today.      Below is a summary of your visit.   Continue your medications without changes.  I encourage walking regularly. As much as you can tolerate safely is good for you.  Let me know if you develop swelling that does not go away and progressively worsens.  Follow up with me in a year or sooner if needed.     Please do not hesitate to call the [facility] clinic with any questions or concerns at [phone].     Sincerely,

## 2022-07-05 NOTE — LETTER
7/5/2022    Juhi Lovell MD  [address]  [facility] [address]    RE: ALEXA Luu       Dear Colleague,     I had the pleasure of seeing ALEXA Luu in the [facility].    [facility]   [address], [place] [address]   [email or web address]   OFFICE: [phone]     CARDIOLOGY CLINIC NOTE     Thank you, Juhi Foss, for asking the [facility] Care team to see Ms. ALEXA Luu to  Follow Up (Afib, LE swelling)         Assessment/Recommendations   Assessment:    1. 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 She has a history of multiple small infarct areas since 2015 when she was diagnosed with Parkinson's disease  2. Paroxysmal atrial fibrillation - identified on implantable loop recorder. Asymptomatic. In sinus rhythm today. With low burden of afib   3. Non-ischemic cardiomyopathy - with recovered LVEF. No symptoms of heart failure  4. Parkinson's disease - with gait instability and limited mobility. Currently living in assisted living.  5. Lower extremity edema - no swelling today. Likely venous insufficiency and related to the heat/humidity.       Plan:  1. Continue medications without changes.  2. Encouraged walking as able.  3. Follow up in 1 year or sooner if needed.         History of Present Illness   Ms. ALEXA Luu is a 89 year old female with a significant past history of parkinson's disease, multiple falls, and prior CVA who presents for cardiology follow-up.     Mrs. Janelle Luu was hospitalized for a CVA in fall 2019.  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  An implantable loop recorder was placed. Atrial fibrillation was identified on the implantable loop recorder and due to fall risk and high risk for bleeding complications on anticoagulation due to Parkinson's disease she was referred for  Watchman THANIA closure implantation, which was placed in April, 2020.    Carmen is feeling generally well today. She is now living in assisted living due to her Parkinson's disease. She has noted some intermittent swelling in her ankles over the past few weeks that generally resolves overnight. On one occasion it took a couple days to resolve. She denies any shortness of breath or other heart failure symptoms. She is walking only about 15 minutes per day with assistance and is unable to walk more than that due to balance issues. No symptoms of afib.    Other than noted above, Ms. Janelle Luu denies any chest pain/pressure/tightness, shortness of breath at rest or with exertion, light headedness/dizziness, pre-syncope, syncope, lower extremity swelling, palpitations, paroxysmal nocturnal dyspnea (PND), or orthopnea.     Cardiac Problems and Cardiac Diagnostics     Most Recent Cardiac testing:  ECG dated 3/17/22 (personaly reviewed and interpreted): sinus rhythm with left axis deviation and LVH.    ILR interrogation 3/29/22  Type: routine remote loop recorder transmission.  Presenting rhythm: ventricular sensing, appears sinus 100 bpm.  Battery status: OK.  Arrhythmias: since 12/10/21; 4 AF episodes, longest lasting 4 minutes, recordings appear to be sinus tachy 130-140 bpm with intermittent W-wave undersensing.   Comments: normal loop recorder function. ANIVAL Churchill, Device Specialist    MARITA 5/15/2020    Normal left ventricular size and systolic function.    Left ventricle ejection fraction is normal. The estimated left ventricular ejection fraction is 60%.    Normal right ventricular size and systolic function.    No hemodynamically significant valve disease is identified.    Watchman left atrial appendage occluder device is present with no thrombosis on device and no significant Doppler color flow (<4mm).    When compared to the previous study dated 2/20/2020, no significant change.    ECHO (report reviewed):   TTE  10/6/19    Normal left ventricular size.The estimated left ventricular ejection fraction is 45%. This represents a mildly decreased ejection fraction. Mild hypertrophy noted.    Normal right ventricular size and systolic function.    Abnormal septal motion consistent with left bundle branch block.    Estimated central venous pressure equal to 3 mmHg.    No pulmonary hypertension present. The estimated systolic pulmonary artery pressure is 23 mmhg.    When compared to the previous study dated 4/14/2014, no significant change.    Nuclear stress test 11/29/2019     The nuclear stress test is negative for inducible myocardial ischemia or infarction.     The left ventricular ejection fraction at stress is 66%.     The patient is at a low risk of future cardiac ischemic events.     There is no prior study for comparison.       Medications  Allergies   Current Outpatient Medications   Medication Sig Dispense Refill     acetaminophen (TYLENOL) 500 MG tablet Take 500 mg by mouth every 6 hours as needed (Occasionally)       Ascorbic Acid (VITAMIN C) 100 MG CHEW Take 1 tablet by mouth daily       aspirin (ASA) 81 MG EC tablet 81mg tab by mouth daily @8am 30 tablet 0     calcium carbonate 500 MG CHEW 1 tums by mouth nightly @ 10/11pm       calcium carbonate 600 mg-vitamin D 400 units (CALTRATE) 600-400 MG-UNIT per tablet 1 tab by mouth daily at 8am       carbidopa-levodopa (SINEMET CR)  MG CR tablet Take 1 tablet by mouth At Bedtime 90 tablet 3     carbidopa-levodopa (SINEMET)  MG tablet Take 2 tabs by mouth at 7 - 8 AM, 12 -12:30 PM, AND 1.5 tabs at 5-5:30 PM = 5.5 Tabs 495 tablet 3     cyanocobalamin (CYANOCOBALAMIN) 1000 MCG SUBL sublingual tablet Place 1,000 mcg under the tongue daily       gabapentin (NEURONTIN) 600 MG tablet Take 1-2 tablets (600-1,200 mg) by mouth 3 times daily 270 tablet 3     ibuprofen (ADVIL/MOTRIN) 200 MG tablet 2 x 200mg tab by mouth nightly at 9/10pm       metoprolol succinate ER  "(TOPROL XL) 25 MG 24 hr tablet TAKE 1 TABLET EVERY DAY AT 8AM 60 tablet 3     pravastatin (PRAVACHOL) 20 MG tablet TAKE 1 TABLET AT BEDTIME 90 tablet 1     psyllium (METAMUCIL/KONSYL) Packet By mouth in liquid daily as needed for constipation       sertraline (ZOLOFT) 25 MG tablet Take 1 tablet (25 mg) by mouth daily 30 tablet 3     vitamin D3 (CHOLECALCIFEROL) 50 mcg (2000 units) tablet Vitamin d3 by mouth daily at 8am       zinc gluconate 50 MG tablet 50mg tab by mouth every morning at 8am        Allergies   Allergen Reactions     Codeine Other (See Comments)     Morphine Other (See Comments)     Made me feel really wierd     Penicillins Other (See Comments)     Tongue and throat tingling  Other reaction(s): Paresthesias  Tongue and throat tingling  Tingling on lips  Other reaction(s): Paresthesias  Tongue and throat tingling  Tingling on lips  Tongue and throat \"tingling\" when in 20s       Vicodin [Hydrocodone-Acetaminophen] Other (See Comments)     Weird feeling        Physical Examination Review of Systems   Vitals: /62 (BP Location: Left arm, Patient Position: Sitting, Cuff Size: Adult Regular)   Pulse 68   Resp 16   Ht 1.626 m (5' 4\")   Wt 56.1 kg (123 lb 9.6 oz)   BMI 21.22 kg/m    BMI= Body mass index is 21.22 kg/m .  Wt Readings from Last 3 Encounters:   07/05/22 56.1 kg (123 lb 9.6 oz)   06/11/22 57 kg (125 lb 11.2 oz)   03/17/22 55.3 kg (122 lb)       General Appearance:   Pleasant female, appears stated age. no acute distress, normal body habitus   ENT/Mouth: membranes moist, no apparent gingival bleeding.      EYES:  no scleral icterus, normal conjunctivae   Neck: no carotid bruits. supple   Respiratory:   lungs are clear to auscultation, no rales or wheezing, equal chest wall expansion    Cardiovascular:   Regular rhythm, normal rate. Normal first and second heart sounds with no murmurs, rubs, or gallops; Jugular venous pressure normal, no edema bilaterally    Abdomen/GI:  Soft, non-tender "   Extremities: no cyanosis or clubbing   Skin: no xanthelasma, warm.    Heme/lymph/ Immunology No apparent bleeding noted.   Neurologic: Alert and oriented. normal gait, no tremors   Psychiatric: Pleasant, calm, appropriate affect.         Please refer above for cardiac ROS details.       Past History   Past Medical History:   Past Medical History:   Diagnosis Date     Abnormal brain MRI 3/11/2019    MR HEAD BRAIN WO3/8/2019 [facility] Other Result Information This result has an attachment that is not available. 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     Breast cyst      CVA (cerebral vascular accident) (H)     per H&P     Finger fracture, left 02/27/2019    ring finger      Multiple rib fractures 02/27/2019     Parkinson disease (H) 1/20/2016        Past Surgical History:   Past Surgical History:   Procedure Laterality Date     ANKLE SURGERY Left     fracture     APPENDECTOMY       BREAST CYST ASPIRATION       EP THANIA CLOSURE N/A 2/20/2020    Procedure: EP THANIA Closure;  Surgeon: Javier Smith MD;  Location: [facility] Cath Lab;  Service: Cardiology     EP LOOP RECORDER IMPLANT N/A 10/8/2019    Procedure: EP Loop Recorder Insertion;  Surgeon: Angel Hurd MD;  Location: [facility] Cath Lab;  Service: Cardiology     EYE SURGERY Bilateral     cataract surgery 2000     HYSTERECTOMY      tahbso     HYSTERECTOMY  1982     KNEE SURGERY Right     staph infection     TONSILLECTOMY       ZZC TOTAL ABDOM HYSTERECTOMY      Description: Hysterectomy;  Proc Date: 01/01/1988;  Comments: couldn't find her ovaries        Family History:   Family History   Problem Relation Age of Onset      Cerebrovascular Disease Mother      Heart Disease Mother      Other - See Comments Mother         Gibraltarian Samoan     Dementia Father         10 yrs.     Other - See Comments Father         Samoan     Paranoid behavior Father      Other - See Comments Sister         bhavesh anne     Other - See Comments Daughter         kala     Other - See Comments Daughter         leonides lew     Other - See Comments Son         [place]     Other - See Comments Son         Dae, [place]     Dementia Paternal Aunt      Dementia Paternal Aunt      Dementia Paternal Uncle      Breast Cancer Maternal Aunt         Social History:   Social History     Socioeconomic History     Marital status:      Spouse name: Not on file     Number of children: Not on file     Years of education: Not on file     Highest education level: Not on file   Occupational History     Not on file   Tobacco Use     Smoking status: Never Smoker     Smokeless tobacco: Never Used   Substance and Sexual Activity     Alcohol use: No     Drug use: Never     Sexual activity: Not on file   Other Topics Concern     Not on file   Social History Narrative    . lives in [place].     Anisha Little daughter    Retired nurse.     Various hospital    Gyn, intensive care,     School system    [place], [place]    Moved here in      x 2    First    = was 43 yrs old and had melanoma    Second    had a stroke and  Was 87 yrs old.      Social Determinants of Health     Financial Resource Strain: Not on file   Food Insecurity: Not on file   Transportation Needs: Not on file   Physical Activity: Not on file   Stress: Not on file   Social Connections: Not on file   Intimate Partner Violence: Not on file   Housing Stability: Not on file            Lab Results    Chemistry/lipid CBC Cardiac Enzymes/BNP/TSH/INR   Lab Results   Component Value Date    CHOL 162 2021    HDL 51 2021    TRIG 128  01/14/2021    BUN 24 03/17/2022     03/17/2022    CO2 24 03/17/2022    Lab Results   Component Value Date    WBC 12.3 (H) 03/17/2022    HGB 12.7 03/17/2022    HCT 38.7 03/17/2022    MCV 91 03/17/2022     03/17/2022    Lab Results   Component Value Date    TROPONINI <0.01 03/17/2022    INR 1.15 (H) 02/21/2020                Thank you for allowing me to participate in the care of your patient.      Sincerely,     Rolando Kothari MD     [facility]  cc:   Rolando Kothari MD  [address] [place] [address]

## 2022-07-08 ENCOUNTER — VIRTUAL VISIT (OUTPATIENT)
Dept: NEUROLOGY | Facility: CLINIC | Age: 87
End: 2022-07-08
Payer: MEDICARE

## 2022-07-08 DIAGNOSIS — G20.A1 PARKINSON'S DISEASE (H): Primary | ICD-10-CM

## 2022-07-08 DIAGNOSIS — F41.9 ANXIETY: ICD-10-CM

## 2022-07-08 PROCEDURE — 99215 OFFICE O/P EST HI 40 MIN: CPT | Mod: 95 | Performed by: NURSE PRACTITIONER

## 2022-07-08 NOTE — LETTER
2022       RE: ALEXA Luu  [address] [address]     Dear Colleague,    Thank you for referring your patient, ALEXA Luu, to the [facility] [place] at [facility]. Please see a copy of my visit note below.    ASSESSMENT:    1)  Parkinson's Disease: Improved after med adjustment.     2)  Anxiety: Improved. However, still gets the occasional panic attacks.       PLAN:    __  Stay on the same dose of Sinemet 25/100 mg and Sinemet 50/200 mg.     __  Continue with Zoloft 25 mg.    __  Referral to Psychologist for management of anxiety and learn about tools to use when the anxiety suddenly comes.     They will call you to set up an appointment.  If you don't hear back from them, these are some of out psychologists that you can call and set up an appointment: -    Muna Mcgee, Ph.D., L.P.  [phone]        Virgilio Guerrero, Ph.D.,  L.P. [phone]  Blanca Hernandez, Ph.D., L.P. [phone]              Braeden Velazco, Ph.D., A.B.P.P., L.P. [phone]   Pearl Pablo PsyD, LP:  [phone]    __  Continue to walk daily with staff.    __  Return in 3 - 4 months for a face to face assessment. You may return sooner as needed.         MOVEMENT DISORDERS CLINIC           PATIENT: ALEXA Luu    : 1932    DATE: 2022    REASON FOR VISIT: Parkinson's disease and Anxiety follow up.    HPI: Ms. ALEXA Luu is a 89 year old who is seen via video visit for a follow up visit.      During today's video visit pt and Mariel, were present.     PD motor symptoms ae stable. She reports no dyskinesias. Sometimes, she sleeps hard. Bed mobility has improved after the long acting Sinemet was added.     The UTI is under control. Last antibiotic Tx was a month ago. She was treated with Bactrim for 10 days. No issues since then.      When she wakes up from a nap, she gets  confused. But, after a few minutes things clear up.      Anxiety hasn't been too bad. She was seen by Jailene Ansari, Pharm.D on 6/20/2022. and was started on Sertraline 25 mg daily. She occasionally get the panic attacks that overwhelm her.  She hasn't done counseling. At night, when/if she wakes up and her mind races, she meditates on scripture, which helps her relax and fall back to sleep.     She has continued to live by herself in assisted living. She gets assistance with weekly showers. She goes down for meals. She uses a wheelchair for mobility to go to the dining room. She walks daily using walker and a staff assisting with walking belt.     I spent 40 minutes caring for the patient today including video time, reviewing records, answering questions, placing referral, and documentation.      CRYSTAL Lnag,  CNP  [facility]

## 2022-07-08 NOTE — PROGRESS NOTES
Carmen is a 89 year old who is being evaluated via a billable video visit.      How would you like to obtain your AVS? MyChart  If the video visit is dropped, the invitation should be resent by: Send to e-mail at: [email or web address]  Will anyone else be joining your video visit? No        Video-Visit Details    Type of service:  Video Visit    Start: 2022 01:59 pm  Stop: 2022 02:21 pm  22 minutes      Originating Location (pt. Location): Home    Distant Location (provider location):  [facility] [place]     Platform used for Video Visit: Codi    Chief Complaint   Patient presents with     PAUL Callejas      -------------------------------------------------------------------------------------------------------------------------------------------------------------    ASSESSMENT:    1)  Parkinson's Disease: Improved after med adjustment.     2)  Anxiety: Improved. However, still gets the occasional panic attacks.       PLAN:    __  Stay on the same dose of Sinemet 25/100 mg and Sinemet 50/200 mg.     __  Continue with Zoloft 25 mg.    __  Referral to Psychologist for management of anxiety and learn about tools to use when the anxiety suddenly comes.     They will call you to set up an appointment.  If you don't hear back from them, these are some of out psychologists that you can call and set up an appointment: -    Muna Mcgee, Ph.D., L.P.  [phone]        Virgilio Guerrero, Ph.D.,  L.P. [phone]  Blanca Hernandez, Ph.D., L.P. [phone]              Braeden Velazco, Ph.D., A.B.P.P., L.P. [phone]   Pearl Pablo PsyD, LP:  [phone]    __  Continue to walk daily with staff.    __  Return in 3 - 4 months for a face to face assessment. You may return sooner as needed.         MOVEMENT DISORDERS CLINIC           PATIENT: ALEXA Luis [facility]    : 1932    DATE: 2022    REASON FOR VISIT: Parkinson's disease and Anxiety follow  up.    HPI: Ms. ALEXA Luu is a 89 year old who is seen via video visit for a follow up visit.      During today's video visit pt and Mariel, were present.     PD motor symptoms ae stable. She reports no dyskinesias. Sometimes, she sleeps hard. Bed mobility has improved after the long acting Sinemet was added.     The UTI is under control. Last antibiotic Tx was a month ago. She was treated with Bactrim for 10 days. No issues since then.      When she wakes up from a nap, she gets confused. But, after a few minutes things clear up.      Anxiety hasn't been too bad. She was seen by Jailene Ansari, Pharm.D on 6/20/2022. and was started on Sertraline 25 mg daily. She occasionally get the panic attacks that overwhelm her.  She hasn't done counseling. At night, when/if she wakes up and her mind races, she meditates on scripture, which helps her relax and fall back to sleep.     She has continued to live by herself in assisted living. She gets assistance with weekly showers. She goes down for meals. She uses a wheelchair for mobility to go to the dining room. She walks daily using walker and a staff assisting with walking belt.     I spent 40 minutes caring for the patient today including video time, reviewing records, answering questions, placing referral, and documentation.    CRYSTAL Lang,  CNP  [facility]

## 2022-07-08 NOTE — PATIENT INSTRUCTIONS
Dear Ms. ALEXA Luis [facility],    Thank you for coming today.  During your visit, we have discussed the following:     __  Stay on the same dose of Sinemet 25/100 mg and Sinemet 50/200 mg.     __  Continue with Zoloft 25 mg.    __  Referral to Psychologist for management of anxiety and learn about tools to use when the anxiety suddenly comes.     They will call you to set up an appointment.  If you don't hear back from them, these are some of out psychologists that you can call and set up an appointment: -    Muna Mcgee, Ph.D., L.P.  [phone]        Virgilio Guerrero, Ph.D.,  L.P. [phone]  Blanca Hernandez, Ph.D., L.P. [phone]              Braeden Velazco, Ph.D., A.B.P.P., L.P. [phone]   Pearl Pablo PsyD, LP:  [phone]    __  Continue to walk daily with staff.    __  Return in 3 - 4 months for a face to face assessment. You may return sooner as needed.    For questions, you may send us a [employer] message or call [phone]    Fax number: [phone]    CRYSTAL Lang, CNP  [facility]

## 2022-07-11 ENCOUNTER — TELEPHONE (OUTPATIENT)
Dept: FAMILY MEDICINE | Facility: CLINIC | Age: 87
End: 2022-07-11

## 2022-07-11 NOTE — TELEPHONE ENCOUNTER
Emperatriz HO form [facility] calling to report pt had a fall last night 7/10. Pt reports she fell and hit her head but has no pain or vision complaints. She is being monitored today under their fall protocol.  Any questions Emperatriz can be reached at [phone].

## 2022-07-13 ENCOUNTER — ANCILLARY PROCEDURE (OUTPATIENT)
Dept: CARDIOLOGY | Facility: CLINIC | Age: 87
End: 2022-07-13
Attending: INTERNAL MEDICINE
Payer: MEDICARE

## 2022-07-13 DIAGNOSIS — I63.9 CRYPTOGENIC STROKE (H): ICD-10-CM

## 2022-07-13 DIAGNOSIS — Z98.890 HISTORY OF LOOP RECORDER: ICD-10-CM

## 2022-07-13 LAB
MDC_IDC_MSMT_BATTERY_STATUS: NORMAL
MDC_IDC_PG_IMPLANT_DTM: NORMAL
MDC_IDC_PG_MFG: NORMAL
MDC_IDC_PG_MODEL: NORMAL
MDC_IDC_PG_SERIAL: NORMAL
MDC_IDC_PG_TYPE: NORMAL
MDC_IDC_SESS_CLINIC_NAME: NORMAL
MDC_IDC_SESS_DTM: NORMAL
MDC_IDC_SESS_TYPE: NORMAL
MDC_IDC_SET_ZONE_DETECTION_INTERVAL: 2000 MS
MDC_IDC_SET_ZONE_DETECTION_INTERVAL: 3000 MS
MDC_IDC_SET_ZONE_DETECTION_INTERVAL: 420 MS
MDC_IDC_SET_ZONE_TYPE: NORMAL
MDC_IDC_STAT_AT_BURDEN_PERCENT: 0 %
MDC_IDC_STAT_AT_DTM_END: NORMAL
MDC_IDC_STAT_AT_DTM_START: NORMAL
MDC_IDC_STAT_EPISODE_RECENT_COUNT: 0
MDC_IDC_STAT_EPISODE_RECENT_COUNT_DTM_END: NORMAL
MDC_IDC_STAT_EPISODE_RECENT_COUNT_DTM_START: NORMAL
MDC_IDC_STAT_EPISODE_TOTAL_COUNT: 0
MDC_IDC_STAT_EPISODE_TOTAL_COUNT: 1
MDC_IDC_STAT_EPISODE_TOTAL_COUNT: 38
MDC_IDC_STAT_EPISODE_TOTAL_COUNT: 6
MDC_IDC_STAT_EPISODE_TOTAL_COUNT_DTM_END: NORMAL
MDC_IDC_STAT_EPISODE_TOTAL_COUNT_DTM_START: NORMAL
MDC_IDC_STAT_EPISODE_TYPE: NORMAL

## 2022-07-13 PROCEDURE — G2066 INTER DEVC REMOTE 30D: HCPCS | Performed by: INTERNAL MEDICINE

## 2022-07-13 PROCEDURE — 93297 REM INTERROG DEV EVAL ICPMS: CPT | Performed by: INTERNAL MEDICINE

## 2022-07-14 NOTE — PROGRESS NOTES
"    [facility]: Integrated Behavioral Health   Provider Name:  Theresa Hartley     Credentials:  MSW, LICSW    PATIENT'S NAME: ALEXA Luu  PREFERRED NAME: Carmen  PRONOUNS:       MRN: [id number]  : 1932  ADDRESS: [address] [address]  ACCT. NUMBER:  [id number]  DATE OF SERVICE: 7/15/22  START TIME: 11:00am  END TIME: 11:40am  PREFERRED PHONE: [phone]  May we leave a program related message: Yes  SERVICE MODALITY:  Video Visit:      Provider verified identity through the following two step process.  Patient provided:  Patient photo and Patient     Telemedicine Visit: The patient's condition can be safely assessed and treated via synchronous audio and visual telemedicine encounter.      Reason for Telemedicine Visit: Patient has requested telehealth visit    Originating Site (Patient Location): Patient's home    Distant Site (Provider Location): [facility]    Consent:  The patient/guardian has verbally consented to: the potential risks and benefits of telemedicine (video visit) versus in person care; bill my insurance or make self-payment for services provided; and responsibility for payment of non-covered services.     Patient would like the video invitation sent by:  My Chart    Mode of Communication:  Video Conference via [employer]    As the provider I attest to compliance with applicable laws and regulations related to telemedicine.    UNIVERSAL ADULT Mental Health DIAGNOSTIC ASSESSMENT    Identifying Information:  Patient is a 89 year old,  individual. Patient was referred for an assessment by referring provider.  Patient attended the session alone.    Chief Complaint:   The reason for seeking services at this time is: \"Anxiety\".  The problem(s) began 7/15/2022.    Met with pt for initial visit. She reports anxiety has started several months ago and has become increasingly worse since. Reports no " "history of anxiety in her life. She reports \"sometimes I want to just tear my hair out for no reason.\" She experiences hot and cold flashes, tingling in her hands and a panic feeling. She is often restless and unable to sit still.  She has been having inconsistent sleep. Often feels more down and sad when anxiety is high. She is not able to identify cause or reason for anxiety. \"It comes out of the blue.\"  She does not notice more anxiety in social situations.     Pt has recently moved to an assisted living as her Parkinson's Disease has advanced (3-4 months ago). She was living independently prior at home with her dtr living across the street. Her dtr provided a lot of care for her and she felt to be a burden to her dtr. She felt she was imposing on her dtr. Since moving she feels disconnected a bit from family although she still sees them frequently. She does feel the move to the assisted living was the best decision. Reports anxiety appears to have an effect on her Parkinson's as she feels more restricted throughout her body. She struggles with the advancement of her Parkinson's Disease as it is \"taking things away from me.\" She does not feel she can do the things she loves anymore. She has also been struggling with significant hip pain, even considering a hip replacement. She has been using a motorized scooter as a result.      She hopes by seeing a [facility], she can learn ways of coping with the anxiety. She wants to learn active things to do to decrease the anxiety.     Patient has not attempted to resolve these concerns in the past.    Social/Family History:  Patient reported they grew up in [place]. They were raised by biological parents. Parents were always together. Patient reported that their childhood was safe and very good. Patient described their current relationships with family of origin as a good and solid relationship. Both parents have passed and she has a yournger sister by 8 years. They talk " on the phone and FaceTime frequently.       The patient describes their cultural background as . Cultural influences and impact on patient's life structure, values, norms, and healthcare: No.  Contextual influences on patient's health include: Individual Factors health being a primary factor and changes in her life required by her health.  These factors will be addressed in the Preliminary Treatment plan. Patient identified their preferred language to be English. Patient reported they does not need the assistance of an  or other support involved in therapy.     Patient reported had no significant delays in developmental tasks. Patient's highest education level was college graduate . Patient identified the following learning problems: none reported. Modifications will not be used to assist communication in therapy. Patient reports they are  able to understand written materials.    Patient reported the following relationship history: she has been  twice. Both husbands passed away. Patient's current relationship status is . Patient identified their sexual orientation as heterosexual. Patient reported having 4 child(rocío). Patient identified adult child as part of their support system. Patient identified the quality of these relationships as stable and meaningful. She has 2 dtrs and 2 sons. She feels her relationships with her children are very good.      Patient's current living/housing situation involves living in an assisted living. They report that housing is stable.    Patient is currently retired. Patient reports their finances are obtained through other. Patient does not identify finances as a current stressor.      Patient reported that they have not been involved with the legal system. Patient does not report being under probation/ parole/ jurisdiction. They are not under any current court jurisdiction. .    Patient's Strengths and Limitations:  Patient identified the following  strengths or resources that will help them succeed in treatment: commitment to health and well being, exercise routine, friends / good social support, family support, insight, intelligence and motivation. Things that may interfere with the patient's success in treatment include: physical health concerns and transitions with health and living environment.     Assessments:  The following assessments were completed by patient for this visit:  PHQ2:   PHQ-2 ( 1999 [employer]) 7/15/2022 3/30/2022 1/27/2022 4/16/2019 4/16/2019   Q1: Little interest or pleasure in doing things 0 1 1 0 1   Q2: Feeling down, depressed or hopeless 0 0 0 0 0   PHQ-2 Score 0 1 1 0 1   PHQ-2 Total Score (12-17 Years)- Positive if 3 or more points; Administer PHQ-A if positive - - - 0 1   Q1: Little interest or pleasure in doing things Not at all Several days Several days Several days -   Q2: Feeling down, depressed or hopeless Not at all Not at all Not at all Not at all -   PHQ-2 Score 0 1 1 1 -     PHQ9:   PHQ-9 SCORE 11/9/2021 1/27/2022   PHQ-9 Total Score MyChart - 2 (Minimal depression)   PHQ-9 Total Score 16 2     GAD2:   OMEGA-2 7/15/2022   Feeling nervous, anxious, or on edge 2   Not being able to stop or control worrying 0   OMEGA-2 Total Score 2     GAD7:   OMEGA-7 SCORE 11/9/2021   Total Score 21     CAGE-AID:   CAGE-AID Total Score 7/15/2022   Total Score 0   Total Score MyChart 0 (A total score of 2 or greater is considered clinically significant)     PROMIS 10-Global Health (all questions and answers displayed):   PROMIS 10 7/15/2022   In general, would you say your health is: Poor   In general, would you say your quality of life is: Fair   In general, how would you rate your physical health? Poor   In general, how would you rate your mental health, including your mood and your ability to think? Good   In general, how would you rate your satisfaction with your social activities and relationships? Good   In general, please rate how well you  carry out your usual social activities and roles Good   To what extent are you able to carry out your everyday physical activities such as walking, climbing stairs, carrying groceries, or moving a chair? Not at all   How often have you been bothered by emotional problems such as feeling anxious, depressed or irritable? Sometimes   How would you rate your fatigue on average? Moderate   How would you rate your pain on average?   0 = No Pain  to  10 = Worst Imaginable Pain 8   In general, would you say your health is: 1   In general, would you say your quality of life is: 2   In general, how would you rate your physical health? 1   In general, how would you rate your mental health, including your mood and your ability to think? 3   In general, how would you rate your satisfaction with your social activities and relationships? 3   In general, please rate how well you carry out your usual social activities and roles. (This includes activities at home, at work and in your community, and responsibilities as a parent, child, spouse, employee, friend, etc.) 3   To what extent are you able to carry out your everyday physical activities such as walking, climbing stairs, carrying groceries, or moving a chair? 1   In the past 7 days, how often have you been bothered by emotional problems such as feeling anxious, depressed, or irritable? 3   In the past 7 days, how would you rate your fatigue on average? 3   In the past 7 days, how would you rate your pain on average, where 0 means no pain, and 10 means worst imaginable pain? 8   Global Mental Health Score 11   Global Physical Health Score 7   PROMIS TOTAL - SUBSCORES 18   Some recent data might be hidden     PROMIS 10-Global Health (only subscores and total score):   PROMIS-10 Scores Only 7/15/2022   Global Mental Health Score 11   Global Physical Health Score 7   PROMIS TOTAL - SUBSCORES 18     [place] Suicide Severity Rating Scale (Lifetime/Recent)  [place] Suicide Severity  Rating (Lifetime/Recent) 7/15/2022   1. Wish to be Dead (Lifetime) 0   2. Non-Specific Active Suicidal Thoughts (Lifetime) 0   Actual Attempt (Lifetime) 0   Has subject engaged in non-suicidal self-injurious behavior? (Lifetime) 0   Interrupted Attempts (Lifetime) 0   Aborted or Self-Interrupted Attempt (Lifetime) 0   Preparatory Acts or Behavior (Lifetime) 0   Actual Lethality/Medical Damage Code (Most Lethal Attempt) 0   Potential Lethality Code (Most Lethal Attempt) 0   Calculated C-SSRS Risk Score (Lifetime/Recent) No Risk Indicated       Personal and Family Medical History:  Patient does not report a family history of mental health concerns. Patient reports family history includes Breast Cancer in her maternal aunt; Cerebrovascular Disease in her mother; Dementia in her father, paternal aunt, paternal aunt, and paternal uncle; Heart Disease in her mother; Other - See Comments in her daughter, daughter, father, mother, sister, son, and son; Paranoid behavior in her father..     Patient does not report Mental Health Diagnosis and/or Treatment. Patient has not received mental health services in the past: none. Psychiatric Hospitalizations: None. Patient denies a history of civil commitment. Patient is not receiving other mental health services. These include none.         Patient has had a physical exam to rule out medical causes for current symptoms. Date of last physical exam was within the past year. Client was encouraged to follow up with PCP if symptoms were to develop. The patient has a [place] Primary Care Provider, who is named Juhi Lovell. Patient reports the following current medical concerns: advancement of Parkinson's Disease and hip pain. Patient reports pain concerns including hip and legs.  Patient does not want help addressing pain concerns. There are not significant appetite / nutritional concerns / weight changes. Patient does report a history of head injury / trauma / cognitive  "impairment.  She has struggled with frequent falling. On several occasions she has fallen and hit her head very hard resulting in an admission to the hospital. Additionally she has had several strokes.    Patient reports current meds as:   Current Outpatient Medications   Medication     acetaminophen (TYLENOL) 500 MG tablet     Ascorbic Acid (VITAMIN C) 100 MG CHEW     aspirin (ASA) 81 MG EC tablet     calcium carbonate 500 MG CHEW     calcium carbonate 600 mg-vitamin D 400 units (CALTRATE) 600-400 MG-UNIT per tablet     carbidopa-levodopa (SINEMET CR)  MG CR tablet     carbidopa-levodopa (SINEMET)  MG tablet     cyanocobalamin (CYANOCOBALAMIN) 1000 MCG SUBL sublingual tablet     gabapentin (NEURONTIN) 600 MG tablet     ibuprofen (ADVIL/MOTRIN) 200 MG tablet     metoprolol succinate ER (TOPROL XL) 25 MG 24 hr tablet     pravastatin (PRAVACHOL) 20 MG tablet     psyllium (METAMUCIL/KONSYL) Packet     sertraline (ZOLOFT) 25 MG tablet     vitamin D3 (CHOLECALCIFEROL) 50 mcg (2000 units) tablet     zinc gluconate 50 MG tablet     No current facility-administered medications for this visit.       Medication Adherence:  Patient reports taking.  taking prescribed medications as prescribed.    Patient Allergies:    Allergies   Allergen Reactions     Codeine Other (See Comments)     Morphine Other (See Comments)     Made me feel really wierd     Penicillins Other (See Comments)     Tongue and throat tingling  Other reaction(s): Paresthesias  Tongue and throat tingling  Tingling on lips  Other reaction(s): Paresthesias  Tongue and throat tingling  Tingling on lips  Tongue and throat \"tingling\" when in 20s       Vicodin [Hydrocodone-Acetaminophen] Other (See Comments)     Weird feeling       Medical History:    Past Medical History:   Diagnosis Date     Abnormal brain MRI 3/11/2019    MR HEAD BRAIN WO3/8/2019 [facility] Other Result Information This result has an attachment that is " not available. 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 Unable to assess.     Eye Contact:  unable to assess   Psychomotor:  Normal       Gait / station:  unsteady and currently struggling with walking and using a motorized scooter   Attitude / Demeanor: Cooperative   Speech      Rate / Production: Normal/ Responsive      Volume:  Normal  volume      Language:  intact  Mood:   Anxious  Depressed   Affect:   Blunted    Thought Content: Clear   Thought Process: Coherent  Logical       Associations: No loosening of associations  Insight:   Fair   Judgment:  Intact   Orientation:  All  Attention/concentration: Fair      Substance Use:  Patient did not report a family history of substance use concerns; see medical history section for details. Patient has not received chemical dependency treatment in the past. Patient has not ever been to detox.      Patient is not currently receiving any chemical dependency treatment.           Substance History of use Age of first use Date of last use     Pattern and duration of use (include amounts and frequency)   Alcohol never used       REPORTS SUBSTANCE USE: N/A   Cannabis   never used     REPORTS SUBSTANCE USE: N/A     Amphetamines   never used     REPORTS SUBSTANCE USE: N/A   Cocaine/crack    never used       REPORTS SUBSTANCE USE: N/A   Hallucinogens never used         REPORTS SUBSTANCE USE: N/A   Inhalants never used          REPORTS SUBSTANCE USE: N/A   Heroin never used         REPORTS SUBSTANCE USE: N/A   Other Opiates never used     REPORTS SUBSTANCE USE: N/A   Benzodiazepine   never used     REPORTS SUBSTANCE USE: N/A   Barbiturates never used     REPORTS SUBSTANCE USE: N/A   Over the counter meds never used     REPORTS SUBSTANCE USE: N/A   Caffeine currently use 18   REPORTS SUBSTANCE USE: reports using substance 1 times per day and has 1 cup at a time.   Patient reports heaviest use was years ago..   Nicotine  never used     REPORTS SUBSTANCE USE: N/A   Other substances not listed above:  Identify:  never used     REPORTS SUBSTANCE USE: N/A     Patient reported the following problems as a result of their substance use: no problems, not applicable.    Substance Use: No symptoms    Based on the negative CAGE score and clinical interview there are not indications of drug or alcohol abuse.      Significant Losses / Trauma / Abuse / Neglect Issues:   Patient did not serve in the .  There are indications or report of significant loss, trauma, abuse or neglect issues related to: death of both husbands passed. Feels comfortable with the grief she has experienced with their passing's.   Concerns for possible neglect are not present.     Safety Assessment:   Patient denies current homicidal ideation and behaviors.  Patient denies current self-injurious ideation and behaviors.    Patient denied risk behaviors associated with substance use.  Patient denies any high risk behaviors associated with mental health symptoms.  Patient reports the following current concerns for their personal safety: None.  Patient reports there are not firearms in the house.  There are no firearms in the home..    History of Safety Concerns:  Patient denied a history of homicidal ideation.     Patient denied a history of personal safety concerns.    Patient denied a history of assaultive behaviors.    Patient denied a history of sexual assault  behaviors.     Patient denied a history of risk behaviors associated with substance use.  Patient denies any history of high risk behaviors associated with mental health symptoms.  Patient reports the following protective factors: dedication to family or friends    Risk Plan:  See Recommendations for Safety and Risk Management Plan    Review of Symptoms per patient report:  Depression: Change in sleep, Lack of interest, Excessive or inappropriate guilt, Change in energy level, Feelings of hopelessness, Feelings of helplessness, Low self-worth, Ruminations, Feeling sad, down, or depressed and Withdrawn  Cinthya:  No Symptoms  Psychosis: No Symptoms  Anxiety: Excessive worry, Nervousness, Physical complaints, such as headaches, stomachaches, muscle tension, Sleep disturbance and Ruminations  Panic:  Tremors, Tingling, Numbness and Hot or cold flashes  Post Traumatic Stress Disorder:  No Symptoms   Eating Disorder: No Symptoms  ADD / ADHD:  No symptoms  Conduct Disorder: No symptoms  Autism Spectrum Disorder: No symptoms  Obsessive Compulsive Disorder: No Symptoms    Patient reports the following compulsive behaviors and treatment history: NA.      Diagnostic Criteria:   Generalized Anxiety Disorder  A. Excessive anxiety and worry about a number of events or activities (such as work or school performance).   B. The person finds it difficult to control the worry.  C. Select 3 or more symptoms (required for diagnosis). Only one item is required in children.   - Restlessness or feeling keyed up or on edge.    - Being easily fatigued.    - Muscle tension.    - Sleep disturbance (difficulty falling or staying asleep, or restless unsatisfying sleep).   D. The focus of the anxiety and worry is not confined to features of an Axis I disorder.  E. The anxiety, worry, or physical symptoms cause clinically significant distress or impairment in social, occupational, or other important areas of functioning.   F. The disturbance is not  "due to the direct physiological effects of a substance (e.g., a drug of abuse, a medication) or a general medical condition (e.g., hyperthyroidism) and does not occur exclusively during a Mood Disorder, a Psychotic Disorder, or a Pervasive Developmental Disorder.  Panic Disorder, A.Recurrent unexpected panic attacks. A panic attack is an abrupt surge of intense fear or intense discomfort that reaches a peak within minutes, and during which time four (or more) of the following symptoms occur: Chill / hot flashes, Feeling dizzy, unsteady, light-headed, or faint, Fear of losing control or \"going crazy\", Sweating, Trembling / shaking and tingling and numbness in hands, B.At least one of the attacks has been followed by 1 month (or more) of Persistent concern or worry about additional panic attacks or their consequences, C.The disturbance is not attributable to the physiological effects of a substance (e.g., a drug of abuse, a medication) or another medical condition (e.g., hyperthyroidism, cardiopulmonary disorders). and D.The disturbance is not better explained by another mental disorder    Functional Status:  Patient reports the following functional impairments:  home life with quality of life.     Nonprogrammatic care:  Patient is requesting basic services to address current mental health concerns.    Clinical Summary:  1. Reason for assessment: new and increasing anxiety sx.  2. Psychosocial, Cultural and Contextual Factors: Health conditions increasing and worsening. Moving out of her own home to an assisted living. Being further from family.  3. Principal DSM5 Diagnoses  (Sustained by DSM5 Criteria Listed Above):   300.01 (F41.0) Panic Disorder  300.02 (F41.1) Generalized Anxiety Disorder.  4. Other Diagnoses that is relevant to services:  NA.  5. 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  6. Prognosis: Expect Improvement.  7. Likely consequences of symptoms if not treated: Continued or worsening of sx.  8. Client strengths include:  educated, goal-focused, insightful, intelligent, motivated, open to learning, open to suggestions / feedback, support of family, friends and providers, wants to learn, willing to ask questions and willing to relate to others .     Recommendations:     1. Plan for Safety and Risk Management:   Safety and Risk: Recommended that patient call 911 or go to the local ED should there be a change in any of these risk factors..          Report to child / adult protection services was NA.     2. Patient's identified health concerns will be addressed in therapy to discuss coping with the advancement of her Parkinson's Disease.     3. Initial Treatment will focus on:    Depressed Mood - loss of interest in things once loved, feeling more down or hopeless, and sleep  Anxiety - physical sx associated with the anxiety, identifying anxious situations, and coping with anxiety through useful tech..     4. Resources/Service Plan:    services are not indicated.   Modifications to assist communication are not indicated.   Additional disability accommodations are not indicated.      5. Collaboration:   Collaboration / coordination of treatment will be initiated with the following support professionals: primary care physician.      6.  Referrals:   The following referral(s) will be initiated: none at this time. Next Scheduled Appointment: 7/28/22.     A Release of Information has been obtained for the following: NA.     Emergency Contact: currently in chart.      7. DIANE:    DIANE:  Discussed the general effects of drugs and alcohol on health and well-being. Provider gave patient printed information about the effects of chemical use on their health and well being. Recommendations:  Continue to not use drugs and  alcohol. Limit caffeine intake.     8. Records:   These were not available for review at time of assessment.   Information in this assessment was obtained from the medical record and provided by patient who is a good historian.    Patient will have open access to their mental health medical record.        Provider Name/ Credentials:  Theresa DIOP [facility]  July 15, 2022

## 2022-07-15 ENCOUNTER — VIRTUAL VISIT (OUTPATIENT)
Dept: BEHAVIORAL HEALTH | Facility: CLINIC | Age: 87
End: 2022-07-15
Attending: NURSE PRACTITIONER
Payer: MEDICARE

## 2022-07-15 DIAGNOSIS — F41.1 GENERALIZED ANXIETY DISORDER: ICD-10-CM

## 2022-07-15 DIAGNOSIS — F41.9 ANXIETY: ICD-10-CM

## 2022-07-15 DIAGNOSIS — G20.A1 PARKINSON'S DISEASE (H): Primary | ICD-10-CM

## 2022-07-15 PROCEDURE — 90791 PSYCH DIAGNOSTIC EVALUATION: CPT | Mod: 95 | Performed by: SOCIAL WORKER

## 2022-07-15 ASSESSMENT — COLUMBIA-SUICIDE SEVERITY RATING SCALE - C-SSRS
TOTAL  NUMBER OF ABORTED OR SELF INTERRUPTED ATTEMPTS SINCE LAST CONTACT: NO
ATTEMPT LIFETIME: NO
6. HAVE YOU EVER DONE ANYTHING, STARTED TO DO ANYTHING, OR PREPARED TO DO ANYTHING TO END YOUR LIFE?: NO
2. HAVE YOU ACTUALLY HAD ANY THOUGHTS OF KILLING YOURSELF?: NO
LETHALITY/MEDICAL DAMAGE CODE MOST LETHAL POTENTIAL ATTEMPT: BEHAVIOR NOT LIKELY TO RESULT IN INJURY
1. HAVE YOU WISHED YOU WERE DEAD OR WISHED YOU COULD GO TO SLEEP AND NOT WAKE UP?: NO
6. HAVE YOU EVER DONE ANYTHING, STARTED TO DO ANYTHING, OR PREPARED TO DO ANYTHING TO END YOUR LIFE?: NO
2. HAVE YOU ACTUALLY HAD ANY THOUGHTS OF KILLING YOURSELF?: NO
TOTAL  NUMBER OF INTERRUPTED ATTEMPTS LIFETIME: NO
TOTAL  NUMBER OF ABORTED OR SELF INTERRUPTED ATTEMPTS LIFETIME: NO
TOTAL  NUMBER OF INTERRUPTED ATTEMPTS SINCE LAST CONTACT: NO
SUICIDE, SINCE LAST CONTACT: NO
ATTEMPT SINCE LAST CONTACT: NO
LETHALITY/MEDICAL DAMAGE CODE MOST LETHAL ACTUAL ATTEMPT: NO PHYSICAL DAMAGE OR VERY MINOR PHYSICAL DAMAGE
1. SINCE LAST CONTACT, HAVE YOU WISHED YOU WERE DEAD OR WISHED YOU COULD GO TO SLEEP AND NOT WAKE UP?: NO

## 2022-07-20 NOTE — PROGRESS NOTES
Assessment & Plan     Chronic left hip pain  This is likely at least partially secondary to arthritis.  It is radiating to the groin and patient wonders about peripheral nerve stimulation.  I know very little about this but think that a referral to the interventional pain clinic may be appropriate.  This was placed today.  We also increase patient's gabapentin.  - Pain Management Referral  - gabapentin (NEURONTIN) 600 MG tablet  Dispense: 270 tablet; Refill: 3    Trigger finger, acquired  Patient has had bilateral trigger finger treated in the past with both surgery and injections.  It is now bothering her even more and I referred her back to Ortho.  - Orthopedic  Referral      No follow-ups on file.    Juhi Lovell MD  [facility] ENMA Morales is a 89 year old who presents for the following health issues     HPI     Patient is interested in peripheral nerve stimulation as an implant to help with groin pain. She wants to exercise.   Patient has bilateral trigger fingers. She has had surgery on the right and it has returned. She has also had some injections.   Gabapentin: Very rarely causes any fatigue at this point but also does not help significantly with the pain.  Wonders about an increase in dose.    Review of Systems   Endorses: double visions, hoarseness, joint pain and arthritis  Constitutional, HEENT, cardiovascular, pulmonary, GI, , musculoskeletal, neuro, skin, endocrine and psych systems are negative, except as otherwise noted.      Objective    /70   Pulse 83   Wt 55.3 kg (122 lb)   SpO2 97%   BMI 20.30 kg/m    Body mass index is 20.3 kg/m .  Physical Exam   Gen: NAD  Psych: Normal affect, no hallucinations or delusions, not tearful          
172.72

## 2022-07-20 NOTE — PROGRESS NOTES
"[facility]  July 21, 2022      Behavioral Health Clinician Progress Note    Patient Name: ALEXA Luis [facility]           Service Type:  Individual      Service Location:   MyChart / Email (patient reached)     Session Start Time: 1:30pm  Session End Time: 1:58pm      Session Length: 38 - 52      Attendees: Patient     Service Modality:  Phone Visit:      Provider verified identity through the following two step process.  Patient provided:  Patient is known previously to provider    The patient has been notified of the following:      \"We have found that certain health care needs can be provided without the need for a face to face visit.  This service lets us provide the care you need with a phone conversation.       I will have full access to your [facility] medical record during this entire phone call.   I will be taking notes for your medical record.      Since this is like an office visit, we will bill your insurance company for this service.       There are potential benefits and risks of telephone visits (e.g. limits to patient confidentiality) that differ from in-person visits.?Confidentiality still applies for telephone services, and nobody will record the visit.  It is important to be in a quiet, private space that is free of distractions (including cell phone or other devices) during the visit.??      If during the course of the call I believe a telephone visit is not appropriate, you will not be charged for this service\"     Consent has been obtained for this service by care team member: Yes     Visit Activities (Refresh list every visit): [facility] Only    Diagnostic Assessment Date: 7/15/22 by Theresa DIOP [facility]  Treatment Plan Review Date: to be completed  See Flowsheets for today's PHQ-9 and OMEGA-7 results  Previous PHQ-9:   PHQ-9 SCORE 11/9/2021 1/27/2022   PHQ-9 Total Score MyChart - 2 (Minimal depression)   PHQ-9 Total Score 16 2 " "    Previous OMEGA-7:   OMEGA-7 SCORE 11/9/2021   Total Score 21       NIMA LEVEL:  No flowsheet data found.    DATA  Extended Session (60+ minutes): No  Interactive Complexity: No  Crisis: No  [facility] Patient: No    Treatment Objective(s) Addressed in This Session:  Target Behavior(s): Anxiety     Anxiety: will experience a reduction in anxiety, will develop more effective coping skills to manage anxiety symptoms, will develop healthy cognitive patterns and beliefs and will increase ability to function adaptively    Current Stressors / Issues:  Pt discusses feeling really good for the past 2 days. She has found herself more busy with less idle time. She has several concerts to go to, has been visiting with more people, sitting outside and reading a good book. She reports feeling shaky today which tends to be associated to her anxiety and increased worry. She reports being anxious about appt with [facility] [place] today. She also knows that she becomes more anxious when needing to go for her daily walks. She often fears falling. She has a person always assisting her, however sometimes feels they would not be strong enough to catch her or worried they do not have a good hold on her arm. Discussed changing what can be changed. She plans to ask them to hang on a bit tighter to her arm for a sense of security. Another trigger for anxiety is worry when leading up to events. Discussed grounding techniques as a possible activity which she was very receptive. This week she will do one grounding tech per day.     7/15/22 DA  Met with pt for initial visit. She reports anxiety has started several months ago and has become increasingly worse since. Reports no history of anxiety in her life. She reports \"sometimes I want to just tear my hair out for no reason.\" She experiences hot and cold flashes, tingling in her hands and a panic feeling. She is often restless and unable to sit still.  She has been having inconsistent sleep. Often feels more down " "and sad when anxiety is high. She is not able to identify cause or reason for anxiety. \"It comes out of the blue.\"  She does not notice more anxiety in social situations.      Pt has recently moved to an assisted living as her Parkinson's Disease has advanced (3-4 months ago). She was living independently prior at home with her dtr living across the street. Her dtr provided a lot of care for her and she felt to be a burden to her dtr. She felt she was imposing on her dtr. Since moving she feels disconnected a bit from family although she still sees them frequently. She does feel the move to the assisted living was the best decision. Reports anxiety appears to have an effect on her Parkinson's as she feels more restricted throughout her body. She struggles with the advancement of her Parkinson's Disease as it is \"taking things away from me.\" She does not feel she can do the things she loves anymore. She has also been struggling with significant hip pain, even considering a hip replacement. She has been using a motorized scooter as a result.       She hopes by seeing a [facility], she can learn ways of coping with the anxiety. She wants to learn active things to do to decrease the anxiety.     Progress on Treatment Objective(s) / Homework:  Minimal progress - PRECONTEMPLATION (Not seeing need for change); Intervened by educating the patient about the effects of current behavior on health.  Evoked information about reasons to continue behavior, express concern / recommendations, and explored any change talk Ask aids to hold on to her tighter when walking. Do one grounding tech per day when feeling anxious.     Motivational Interviewing    MI Intervention: Expressed Empathy/Understanding, Supported Autonomy, Collaboration, Evocation, Permission to raise concern or advise, Open-ended questions, Reflections: simple and complex, Change talk (evoked) and Reframe     Change Talk Expressed by the Patient: Desire to change Ability to " change Reasons to change Need to change    Provider Response to Change Talk: E - Evoked more info from patient about behavior change, A - Affirmed patient's thoughts, decisions, or attempts at behavior change, R - Reflected patient's change talk and S - Summarized patient's change talk statements      Care Plan review completed: No    Medication Review:  No changes to current psychiatric medication(s)    Medication Compliance:  Yes    Changes in Health Issues:   None reported    Chemical Use Review:   Substance Use: Chemical use reviewed, no active concerns identified      Tobacco Use: No current tobacco use.      Assessment: Current Emotional / Mental Status (status of significant symptoms):  Risk status (Self / Other harm or suicidal ideation)  Patient denies a history of suicidal ideation, suicide attempts, self-injurious behavior, homicidal ideation, homicidal behavior and and other safety concerns  Patient denies current fears or concerns for personal safety.  Patient denies current or recent suicidal ideation or behaviors.  Patient denies current or recent homicidal ideation or behaviors.  Patient denies current or recent self injurious behavior or ideation.  Patient denies other safety concerns.  A safety and risk management plan has not been developed at this time, however patient was encouraged to call [place] [phone] should there be a change in any of these risk factors.    Appearance:   Appropriate  unable to assess due to telephone visit   Eye Contact:   unable to assess due to telephone visit   Psychomotor Behavior: Normal   Attitude:   Cooperative   Orientation:   All  Speech   Rate / Production: Normal/ Responsive Normal    Volume:  Normal   Mood:    Anxious   Affect:    Appropriate   Thought Content:  Clear   Thought Form:  Coherent  Logical   Insight:    Fair     Diagnoses:  1. Generalized anxiety disorder        Collateral Reports Completed:  Not Applicable    Plan: (Homework, other):  Patient  was given information about behavioral services and encouraged to schedule a follow up appointment with the clinic [facility] [place] in 1 week.  She was also given information about mental health symptoms and treatment options .  CD Recommendations: No indications of CD issues.  Theresa Hartley MSW LICSW      ______________________________________________________________________    Integrated Primary Care Behavioral Health Treatment Plan    Patient's Name: ALEXA Luis [facility]  YOB: 1932    Date of Creation: 7/21/22  Date Treatment Plan Last Reviewed/Revised: 7/21/22    DSM5 Diagnoses: 300.02 (F41.1) Generalized Anxiety Disorder  Psychosocial / Contextual Factors: Contextual influences on patient's health include: Individual Factors health being a primary factor and changes in her life required by her health.  PROMIS (reviewed every 90 days): 10/15/22    Referral / Collaboration:  Referral to another professional/service is not indicated at this time..    Anticipated number of session for this episode of care: 5-8 sessions   Anticipation frequency of session: Weekly  Anticipated Duration of each session: 38-52 minutes  Treatment plan will be reviewed in 90 days or when goals have been changed.       MeasurableTreatment Goal(s) related to diagnosis / functional impairment(s)  Goal 1: Patient will work with provider to manage symptoms    I will know I've met my goal when having less anxiety.      Objective #A (Patient Action)    Patient will attend all sessions.  Status: New - Date: 7/21/22     Intervention(s)  Therapist will teach patient of treatment options, clarify concerns, work with pt to overcome any resistance to compliance.    Objective #B  Patient will comply with medications.  Status: New - Date: 7/21/22     Intervention(s)  Therapist will  teach educate patient on treatment options, clarify concerns, work with pt to overcome any resistance to compliance.      Goal 2: Patient will work to manage daily anxiety  symptoms    I will know I've met my goal when feeling more control over anxiety.      Objective #A (Patient Action)    Status: New - Date: 7/21/22     Patient will identify currently unknown fears / thoughts that contribute to feeling anxious.    Intervention(s)  Therapist will  teach educate patient on treatment options, clarify concerns, work with pt to overcome any resistance to compliance.    Objective #B  Patient will identify multiple initial signs or symptoms of anxiety.    Status: New - Date: 7/21/22     Intervention(s)  Therapist will  teach educate patient on treatment options, clarify concerns, work with pt to overcome any resistance to compliance.    Objective #C  Patient will use at least 1 coping skills for anxiety management in the next 2 weeks.  Status: New - Date: 7/21/22     Intervention(s)  Therapist will  teach educate patient on treatment options, clarify concerns, work with pt to overcome any resistance to compliance.    Patient has reviewed and agreed to the above plan.      DERIK Payne  July 21, 2022

## 2022-07-21 ENCOUNTER — VIRTUAL VISIT (OUTPATIENT)
Dept: BEHAVIORAL HEALTH | Facility: CLINIC | Age: 87
End: 2022-07-21
Payer: MEDICARE

## 2022-07-21 DIAGNOSIS — F41.1 GENERALIZED ANXIETY DISORDER: Primary | ICD-10-CM

## 2022-07-21 PROCEDURE — 90832 PSYTX W PT 30 MINUTES: CPT | Mod: 95 | Performed by: SOCIAL WORKER

## 2022-08-01 NOTE — PROGRESS NOTES
"[facility]  August 4, 2022      Behavioral Health Clinician Progress Note    Patient Name: ALEXA Luis [facility]           Service Type:  Individual      Service Location:   MyChart / Email (patient reached)     Session Start Time: 1:00pm  Session End Time: 1:25pm      Session Length: 38 - 52      Attendees: Patient     Service Modality:  Phone Visit:      Provider verified identity through the following two step process.  Patient provided:  Patient is known previously to provider    The patient has been notified of the following:      \"We have found that certain health care needs can be provided without the need for a face to face visit.  This service lets us provide the care you need with a phone conversation.       I will have full access to your [facility] medical record during this entire phone call.   I will be taking notes for your medical record.      Since this is like an office visit, we will bill your insurance company for this service.       There are potential benefits and risks of telephone visits (e.g. limits to patient confidentiality) that differ from in-person visits.?Confidentiality still applies for telephone services, and nobody will record the visit.  It is important to be in a quiet, private space that is free of distractions (including cell phone or other devices) during the visit.??      If during the course of the call I believe a telephone visit is not appropriate, you will not be charged for this service\"     Consent has been obtained for this service by care team member: Yes     Visit Activities (Refresh list every visit): [facility] Only    Diagnostic Assessment Date: 7/15/22 by Theresa DIOP [facility]  Treatment Plan Review Date: to be completed  See Flowsheets for today's PHQ-9 and OMEGA-7 results  Previous PHQ-9:   PHQ-9 SCORE 11/9/2021 1/27/2022   PHQ-9 Total Score MyChart - 2 (Minimal depression)   PHQ-9 Total Score 16 2 "     Previous OMEGA-7:   OMEGA-7 SCORE 11/9/2021   Total Score 21       NIMA LEVEL:  No flowsheet data found.    DATA  Extended Session (60+ minutes): No  Interactive Complexity: No  Crisis: No  [facility] Patient: No    Treatment Objective(s) Addressed in This Session:  Target Behavior(s): Anxiety     Anxiety: will experience a reduction in anxiety, will develop more effective coping skills to manage anxiety symptoms, will develop healthy cognitive patterns and beliefs and will increase ability to function adaptively    Current Stressors / Issues:  Sometimes feeling ok but a lot of the time feeling anxious. Tried going through her 5 senses and sort of helped. Going outside has helped decrease her anxiety. She has been trying to read more and get out of her head. She is not able to pin point her worry or what is going through her head at the time when she is feeling anxious. She has been less anxious about walking since last session. She tends to have less anxiety when she is socializing with others. Being alone appears to be difficult for her. She reports feeling safe when alone. She is frustrated with her anxiety and not knowing what is the cause of it.      7/21/22  Pt discusses feeling really good for the past 2 days. She has found herself more busy with less idle time. She has several concerts to go to, has been visiting with more people, sitting outside and reading a good book. She reports feeling shaky today which tends to be associated to her anxiety and increased worry. She reports being anxious about appt with [facility] today. She also knows that she becomes more anxious when needing to go for her daily walks. She often fears falling. She has a person always assisting her, however sometimes feels they would not be strong enough to catch her or worried they do not have a good hold on her arm. Discussed changing what can be changed. She plans to ask them to hang on a bit tighter to her arm for a sense of security. Another  "trigger for anxiety is worry when leading up to events. Discussed grounding techniques as a possible activity which she was very receptive. This week she will do one grounding tech per day.     7/15/22 DA  Met with pt for initial visit. She reports anxiety has started several months ago and has become increasingly worse since. Reports no history of anxiety in her life. She reports \"sometimes I want to just tear my hair out for no reason.\" She experiences hot and cold flashes, tingling in her hands and a panic feeling. She is often restless and unable to sit still.  She has been having inconsistent sleep. Often feels more down and sad when anxiety is high. She is not able to identify cause or reason for anxiety. \"It comes out of the blue.\"  She does not notice more anxiety in social situations.      Pt has recently moved to an assisted living as her Parkinson's Disease has advanced (3-4 months ago). She was living independently prior at home with her dtr living across the street. Her dtr provided a lot of care for her and she felt to be a burden to her dtr. She felt she was imposing on her dtr. Since moving she feels disconnected a bit from family although she still sees them frequently. She does feel the move to the assisted living was the best decision. Reports anxiety appears to have an effect on her Parkinson's as she feels more restricted throughout her body. She struggles with the advancement of her Parkinson's Disease as it is \"taking things away from me.\" She does not feel she can do the things she loves anymore. She has also been struggling with significant hip pain, even considering a hip replacement. She has been using a motorized scooter as a result.       She hopes by seeing a [facility], she can learn ways of coping with the anxiety. She wants to learn active things to do to decrease the anxiety.     Progress on Treatment Objective(s) / Homework:  Minimal progress - PRECONTEMPLATION (Not seeing need for " change); Intervened by educating the patient about the effects of current behavior on health.  Evoked information about reasons to continue behavior, express concern / recommendations, and explored any change talk Ask aids to hold on to her tighter when walking. Do one grounding tech per day when feeling anxious.     Motivational Interviewing    MI Intervention: Expressed Empathy/Understanding, Supported Autonomy, Collaboration, Evocation, Permission to raise concern or advise, Open-ended questions, Reflections: simple and complex, Change talk (evoked) and Reframe     Change Talk Expressed by the Patient: Desire to change Ability to change Reasons to change Need to change    Provider Response to Change Talk: E - Evoked more info from patient about behavior change, A - Affirmed patient's thoughts, decisions, or attempts at behavior change, R - Reflected patient's change talk and S - Summarized patient's change talk statements      Care Plan review completed: No    Medication Review:  No changes to current psychiatric medication(s)    Medication Compliance:  Yes    Changes in Health Issues:   None reported    Chemical Use Review:   Substance Use: Chemical use reviewed, no active concerns identified      Tobacco Use: No current tobacco use.      Assessment: Current Emotional / Mental Status (status of significant symptoms):  Risk status (Self / Other harm or suicidal ideation)  Patient denies a history of suicidal ideation, suicide attempts, self-injurious behavior, homicidal ideation, homicidal behavior and and other safety concerns  Patient denies current fears or concerns for personal safety.  Patient denies current or recent suicidal ideation or behaviors.  Patient denies current or recent homicidal ideation or behaviors.  Patient denies current or recent self injurious behavior or ideation.  Patient denies other safety concerns.  A safety and risk management plan has not been developed at this time, however  patient was encouraged to call [place] [phone] should there be a change in any of these risk factors.    Appearance:   Appropriate  unable to assess due to telephone visit   Eye Contact:   unable to assess due to telephone visit   Psychomotor Behavior: Normal   Attitude:   Cooperative   Orientation:   All  Speech   Rate / Production: Normal/ Responsive Normal    Volume:  Normal   Mood:    Anxious   Affect:    Appropriate   Thought Content:  Clear   Thought Form:  Coherent  Logical   Insight:    Fair     Diagnoses:  1. Generalized anxiety disorder        Collateral Reports Completed:  Not Applicable    Plan: (Homework, other):  Patient was given information about behavioral services and encouraged to schedule a follow up appointment with the clinic [facility] in 1 week.  She was also given information about mental health symptoms and treatment options .  CD Recommendations: No indications of CD issues.  Theresa Salvadorefer MSW LICSW      ______________________________________________________________________    Integrated Primary Care Behavioral Health Treatment Plan    [facility]  YOB: 1932    Date of Creation: 7/21/22  Date Treatment Plan Last Reviewed/Revised: 7/21/22    DSM5 Diagnoses: 300.02 (F41.1) Generalized Anxiety Disorder  Psychosocial / Contextual Factors: Contextual influences on patient's health include: Individual Factors health being a primary factor and changes in her life required by her health.  PROMIS (reviewed every 90 days): 10/15/22    Referral / Collaboration:  Referral to another professional/service is not indicated at this time..    Anticipated number of session for this episode of care: 5-8 sessions   Anticipation frequency of session: Weekly  Anticipated Duration of each session: 38-52 minutes  Treatment plan will be reviewed in 90 days or when goals have been changed.       MeasurableTreatment Goal(s) related to diagnosis / functional impairment(s)  Goal 1:  Patient will work with provider to manage symptoms    I will know I've met my goal when having less anxiety.      Objective #A (Patient Action)    Patient will attend all sessions.  Status: New - Date: 7/21/22     Intervention(s)  Therapist will teach patient of treatment options, clarify concerns, work with pt to overcome any resistance to compliance.    Objective #B  Patient will comply with medications.  Status: New - Date: 7/21/22     Intervention(s)  Therapist will  teach educate patient on treatment options, clarify concerns, work with pt to overcome any resistance to compliance.      Goal 2: Patient will work to manage daily anxiety symptoms    I will know I've met my goal when feeling more control over anxiety.      Objective #A (Patient Action)    Status: New - Date: 7/21/22     Patient will identify currently unknown fears / thoughts that contribute to feeling anxious.    Intervention(s)  Therapist will  teach educate patient on treatment options, clarify concerns, work with pt to overcome any resistance to compliance.    Objective #B  Patient will identify multiple initial signs or symptoms of anxiety.    Status: New - Date: 7/21/22     Intervention(s)  Therapist will  teach educate patient on treatment options, clarify concerns, work with pt to overcome any resistance to compliance.    Objective #C  Patient will use at least 1 coping skills for anxiety management in the next 2 weeks.  Status: New - Date: 7/21/22     Intervention(s)  Therapist will  teach educate patient on treatment options, clarify concerns, work with pt to overcome any resistance to compliance.    Patient has reviewed and agreed to the above plan.      Theresa Hartley, DERIK  August 4, 2022

## 2022-08-03 ENCOUNTER — TELEPHONE (OUTPATIENT)
Dept: FAMILY MEDICINE | Facility: CLINIC | Age: 87
End: 2022-08-03

## 2022-08-03 NOTE — TELEPHONE ENCOUNTER
Evelia from [facility] called to report a fall of the patient. This morning patient was attempting to transfer from the bed into her chair, and states she didn't have a good enough grasp and fell. Patient denied any injuries.     However, it was not reported to Evelia, until she was called to see patient after her fall that last night the patient had hit her head on a towel rack when she was getting ready for bed. Unaware if related, but needed to be reported.    Nas pt. Sending to covering provider.

## 2022-08-03 NOTE — TELEPHONE ENCOUNTER
Please confirm with caller that patient does not have any serious injuries requiring further evaluation.  If there is any question please send another message further information.

## 2022-08-03 NOTE — TELEPHONE ENCOUNTER
I spoke to VIC Awan with [place]. Emperatriz reports that patient did not lose consciousness. Patient denies headache, vomiting and nausea. Emperatriz reports that Carmen does not appear to have any brushing or marks on her forehead.      Daughter was advised by VIC Awan to bring Carmen in to be evaluated if symptoms appear.

## 2022-08-04 ENCOUNTER — VIRTUAL VISIT (OUTPATIENT)
Dept: BEHAVIORAL HEALTH | Facility: CLINIC | Age: 87
End: 2022-08-04
Payer: MEDICARE

## 2022-08-04 DIAGNOSIS — F41.1 GENERALIZED ANXIETY DISORDER: Primary | ICD-10-CM

## 2022-08-04 PROCEDURE — 90832 PSYTX W PT 30 MINUTES: CPT | Mod: 95 | Performed by: SOCIAL WORKER

## 2022-08-18 ENCOUNTER — TELEPHONE (OUTPATIENT)
Dept: FAMILY MEDICINE | Facility: CLINIC | Age: 87
End: 2022-08-18

## 2022-08-18 NOTE — TELEPHONE ENCOUNTER
Order/Referral Request    Who is requesting: daughter    Orders being requested: request for replacement parts for motorized wheelchar    Reason service is needed/diagnosis: needs wheelcahir fixed and [facility] needs order for armrest    When are orders needed by: asap    Has this been discussed with Provider: No, but originally was ordered by Dr Lovell    Does patient have a preference on a Group/Provider/Facility? [facility]    Does patient have an appointment scheduled?: No    Where to send orders: Fax-[facility]    Could we send this information to you in [facility] or would you prefer to receive a phone call?:   Patient would prefer a phone call   Okay to leave a detailed message?: Yes at Other phone number:  joanna [phone]

## 2022-08-18 NOTE — TELEPHONE ENCOUNTER
"Daughter calling to report medical records can't find anything regarding the wheelchair necessity. [facility] is requesting medical records stating pt needs continued use of wheelchair. Wheelchair needs repair; pt is currently having \"metal scraping her arms\" per daughter report.  "

## 2022-08-19 NOTE — TELEPHONE ENCOUNTER
There is an order under media tab for this dated 3/13/22 - should be able to use/fax this    Dr Osman

## 2022-09-02 DIAGNOSIS — F32.A ANXIETY AND DEPRESSION: ICD-10-CM

## 2022-09-02 DIAGNOSIS — F41.9 ANXIETY AND DEPRESSION: ICD-10-CM

## 2022-09-02 RX ORDER — SERTRALINE HYDROCHLORIDE 25 MG/1
TABLET, FILM COATED ORAL
Qty: 90 TABLET | Refills: 3 | Status: SHIPPED | OUTPATIENT
Start: 2022-09-02 | End: 2022-10-26

## 2022-09-02 NOTE — TELEPHONE ENCOUNTER
Rx Authorization:  Requested Medication/ Dose: Sertraline HCL 25MG tabs  Date last refill ordered: 6/20/22  Quantity ordered: 30 tabs  # refills: 3  Date of last clinic visit with ordering provider: 7/8/22  Date of next clinic visit with ordering provider: F/U 1 year  All pertinent protocol data (lab date/result):   Include pertinent information from patients message:

## 2022-09-12 ENCOUNTER — MYC MEDICAL ADVICE (OUTPATIENT)
Dept: SURGERY | Facility: CLINIC | Age: 87
End: 2022-09-12

## 2022-09-13 ENCOUNTER — OFFICE VISIT (OUTPATIENT)
Dept: SURGERY | Facility: CLINIC | Age: 87
End: 2022-09-13
Payer: MEDICARE

## 2022-09-13 VITALS
DIASTOLIC BLOOD PRESSURE: 72 MMHG | HEIGHT: 64 IN | HEART RATE: 74 BPM | BODY MASS INDEX: 21.22 KG/M2 | SYSTOLIC BLOOD PRESSURE: 116 MMHG | OXYGEN SATURATION: 94 %

## 2022-09-13 DIAGNOSIS — K64.8 HEMORRHOID PROLAPSE: ICD-10-CM

## 2022-09-13 DIAGNOSIS — K62.5 RECTAL BLEEDING: Primary | ICD-10-CM

## 2022-09-13 PROCEDURE — 99213 OFFICE O/P EST LOW 20 MIN: CPT | Mod: 25 | Performed by: NURSE PRACTITIONER

## 2022-09-13 PROCEDURE — 46221 LIGATION OF HEMORRHOID(S): CPT | Performed by: NURSE PRACTITIONER

## 2022-09-13 ASSESSMENT — PAIN SCALES - GENERAL: PAINLEVEL: NO PAIN (0)

## 2022-09-13 NOTE — LETTER
"2022       RE: ALEXA Luu  [address] [facility] [id number]     Dear Colleague,    Thank you for referring your patient, ALEXA Luu, to the [facility] [place] at [facility]. Please see a copy of my visit note below.    Colon and Rectal Surgery Consult Clinic Note     Referring provider:  Bhumi Wise MD  [address] [place] [address]     RE: ALEXA Luu  : 1932  RAMOS: 22    ALEXA Luu is a very pleasant 89 year old female who presents today for follow up of hemorrhoids      HPI:  I saw Carmen in  with hemorrhoid prolapse and some rectal bleeding.  I performed hemorrhoid banding at that time.  She reports that her symptoms are very minimal now but she does continue to have a very small amount of bleeding with bowel movements.  She does not notice any prolapsing tissue anymore.  Colonoscopy in  was normal.   PMH of Parkinsons, CVA, history of falls, history of atrial fibrillation with baby ASA only and has a Watchman device.    Physical Examination:  /72 (BP Location: Right arm, Patient Position: Sitting, Cuff Size: Adult Regular)   Pulse 74   Ht 5' 4\"   SpO2 94%   BMI 21.22 kg/m    General: alert, oriented, in no acute distress, sitting comfortably  HEENT: mucous membranes moist    Perianal external examination: Exam was chaperoned by Charlie Sue, EMT-P   Perianal skin: Intact with no excoriation or lichenification.  Lesions: No evidence of an external lesion, nodularity, or induration in the perianal region.  Eversion of buttocks: There was not evidence of an anal fissure. Details: N/A.  Skin tags or external hemorrhoids: Yes: large prolapsing hemorrhoid in the anterior position without active bleeding. Some overlying excoriation without bleeding.  Digital rectal examination: Was performed.   Sphincter tone: " Good.  Palpable lesions: No.  Other: None.  Bimanual examination: was not performed    Anoscopy: Was performed.   Hemorrhoids: Yes. Grade 3 prolapsing hemorrhoid in the anterior position  Lesions: No    Procedures:  After discussing the risks and benefits, the patient agreed to proceed with internal hemorrhoidal banding.    Prior to the start of the procedure and with procedural staff participation, I verbally confirmed the patient s identity using two indicators, relevant allergies, that the procedure was appropriate and matched the consent or emergent situation, and that the correct equipment/implants were available. Immediately prior to starting the procedure I conducted the Time Out with the procedural staff and re-confirmed the patient s name, procedure, and site/side. (The [facility] universal protocol was followed.)  Yes    Sedation (Moderate or Deep): None    A suction hemorrhoidal  was used to place a total of 2 band(s) in the anterior and posterior    There was no significant bleeding. The patient tolerated the procedure well.    This procedure was performed under a collaborative privileging agreement with Dr. Eder Quiros, Chief Division of Colon and Rectal Surgery    Assessment/Plan: 89 year old female who presents today for hemorrhoid prolapse. Large prolapsing hemorrhoid in the anterior position.  Her symptoms have improved with hemorrhoid banding so we did discuss doing this again.  Discussed that we only need to band for symptoms and once her bleeding stops and if her hemorrhoids are not bothersome, we do not need to do any further banding.  She is no longer bothered by the prolapse and only has minimal bleeding.  She tolerated banding well today and I would like to see her back anytime after 1 month for any persistent symptoms. Patient's questions were answered to her stated satisfaction and she is in agreement with this plan.     Medical history:  Past Medical History:   Diagnosis  Date     Abnormal brain MRI 3/11/2019    [facility]2019 [facility] Other Result Information This result has an attachment that is not available. Result Narrative 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     Breast cyst      CVA (cerebral vascular accident) (H)     per H&P     Finger fracture, left 02/27/2019    ring finger      Multiple rib fractures 02/27/2019     Parkinson disease (H) 1/20/2016       Surgical history:  Past Surgical History:   Procedure Laterality Date     ANKLE SURGERY Left     fracture     APPENDECTOMY       BREAST CYST ASPIRATION       EP THANIA CLOSURE N/A 2/20/2020    Procedure: EP THANIA Closure;  Surgeon: Javier Smith MD;  Location: [facility] Cath Lab;  Service: Cardiology     EP LOOP RECORDER IMPLANT N/A 10/8/2019    Procedure: EP Loop Recorder Insertion;  Surgeon: Angel Hurd MD;  Location: [facility] Cath Lab;  Service: Cardiology     EYE SURGERY Bilateral     cataract surgery 2000     HYSTERECTOMY      tahbso     HYSTERECTOMY  1982     KNEE SURGERY Right     staph infection     TONSILLECTOMY       ZZC TOTAL ABDOM HYSTERECTOMY      Description: Hysterectomy;  Proc Date: 01/01/1988;  Comments: couldn't find her ovaries       Problem list:    Patient Active Problem List    Diagnosis Date Noted     Osteopenia 05/31/2022     Priority: Medium     Formatting of this note might be different from the original.  lowest score -2.5       S/P VH (vaginal hysterectomy) 05/31/2022     Priority: Medium     Formatting of this note might be different from the original.  secondary to prolapse, ovaries remain       Hepatic cyst 05/31/2022     Priority: Medium     Formatting of this  note might be different from the original.  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  Implanted 2/20/20 - 27 mm Watchman device  Formatting of this note might be different from the original.  Implanted 2/20/20 - 27 mm Watchman device       Status post placement of implantable loop recorder 01/09/2020     Priority: Medium     Formatting of this note might be different from the original.  Due to cryptogenic stroke       Atrial fibrillation, unspecified type (H) 11/11/2019     Priority: Medium     Non-ischemic cardiomyopathy (H) 11/11/2019     Priority: Medium     Falls frequently 10/31/2019     Priority: Medium     Cryptogenic stroke (H) 10/16/2019     Priority: Medium     Stroke (H) 10/09/2019     Priority: Medium     Acute lacunar stroke (H) 10/05/2019     Priority: Medium     Left ventricular hypertrophy 01/14/2019     Priority: Medium     Overview:   Created by Conversion    Created by Conversion       Hemorrhoids 01/14/2019     Priority: Medium     Overview:   Created by Conversion    Replacement Utility updated for latest IMO load       Mixed hyperlipidemia 01/14/2019     Priority: Medium     Overview:   Created by Conversion       Venous insufficiency 01/14/2019     Priority: Medium     Overview:   Created by Conversion    Replacement Utility updated for latest IMO load       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     Priority: Medium     Prolapse of vaginal wall 08/18/2015     Priority: Medium     Overview:   Created  by Conversion    Replacement Utility updated for latest IMO load       Posterior capsular opacification 01/02/2012     Priority: Medium       Medications:  Current Outpatient Medications   Medication Sig Dispense Refill     Ascorbic Acid (VITAMIN C) 100 MG CHEW Take 1 tablet by mouth daily       aspirin (ASA) 81 MG EC tablet 81mg tab by mouth daily @8am 30 tablet 0     calcium carbonate 500 MG CHEW 1 tums by mouth nightly @ 10/11pm       calcium carbonate 600 mg-vitamin D 400 units (CALTRATE) 600-400 MG-UNIT per tablet 1 tab by mouth daily at 8am       carbidopa-levodopa (SINEMET CR)  MG CR tablet Take 1 tablet by mouth At Bedtime 90 tablet 3     carbidopa-levodopa (SINEMET)  MG tablet Take 2 tabs by mouth at 7 - 8 AM, 12 -12:30 PM, AND 1.5 tabs at 5-5:30 PM = 5.5 Tabs 495 tablet 3     cyanocobalamin (CYANOCOBALAMIN) 1000 MCG SUBL sublingual tablet Place 1,000 mcg under the tongue daily       gabapentin (NEURONTIN) 600 MG tablet Take 1-2 tablets (600-1,200 mg) by mouth 3 times daily 270 tablet 3     ibuprofen (ADVIL/MOTRIN) 200 MG tablet 2 x 200mg tab by mouth nightly at 9/10pm       metoprolol succinate ER (TOPROL XL) 25 MG 24 hr tablet TAKE 1 TABLET EVERY DAY AT 8AM 60 tablet 3     pravastatin (PRAVACHOL) 20 MG tablet TAKE 1 TABLET AT BEDTIME 90 tablet 1     psyllium (METAMUCIL/KONSYL) Packet By mouth in liquid daily as needed for constipation       sertraline (ZOLOFT) 25 MG tablet TAKE 1 TABLET EVERY DAY 90 tablet 3     vitamin D3 (CHOLECALCIFEROL) 50 mcg (2000 units) tablet Vitamin d3 by mouth daily at 8am       zinc gluconate 50 MG tablet 50mg tab by mouth every morning at 8am       acetaminophen (TYLENOL) 500 MG tablet Take 500 mg by mouth every 6 hours as needed (Occasionally) (Patient not taking: Reported on 9/13/2022)         Allergies:  Allergies   Allergen Reactions     Codeine Other (See Comments)     Morphine Other (See Comments)     Made me feel really wierd     Penicillins Other (See  "Comments)     Tongue and throat tingling  Other reaction(s): Paresthesias  Tongue and throat tingling  Tingling on lips  Other reaction(s): Paresthesias  Tongue and throat tingling  Tingling on lips  Tongue and throat \"tingling\" when in 20s       Vicodin [Hydrocodone-Acetaminophen] Other (See Comments)     Weird feeling       Family history:  Family History   Problem Relation Age of Onset     Cerebrovascular Disease Mother      Heart Disease Mother      Other - See Comments Mother         Upper sorbian Macedonian     Dementia Father         10 yrs.     Other - See Comments Father         Macedonian     Paranoid behavior Father      Other - See Comments Sister sean     Other - See Comments Daughter         kala     Other - See Comments Daughter         leonides lew     Other - See Comments Son         [place]     Other - See Comments Son         Dae, [place]     Dementia Paternal Aunt      Dementia Paternal Aunt      Dementia Paternal Uncle      Breast Cancer Maternal Aunt        Social history:  Social History     Tobacco Use     Smoking status: Never Smoker     Smokeless tobacco: Never Used   Substance Use Topics     Alcohol use: No    Marital status: .    Nursing Notes:   Charlie Sue, EMT  9/13/2022  3:13 PM  Signed  Chief Complaint   Patient presents with     Follow Up     Follow up for hemorrhoid banding       Vitals:    09/13/22 1507   BP: 116/72   BP Location: Right arm   Patient Position: Sitting   Cuff Size: Adult Regular   Pulse: 74   SpO2: 94%   Height: 5' 4\"       Body mass index is 21.22 kg/m .    Charlie Sue EMT-P         10 minutes spent on the date of encounter (excluding time performing procedures) performing chart review, history and exam, documentation and further activities as noted above with an additional 2 minutes for banding and 5 minutes for banding.     CRYSTAL Kwong, NP-C  Colon and Rectal Surgery   [facility] " [facility]    This note was created using speech recognition software and may contain unintended word substitutions.

## 2022-09-13 NOTE — PROGRESS NOTES
"Colon and Rectal Surgery Consult Clinic Note     Referring provider:  Bhumi Wise MD  [address] [place] [address]     RE: ALEXA Luu  : 1932  RAMOS: 22    ALEXA Luu is a very pleasant 89 year old female who presents today for follow up of hemorrhoids      HPI:  I saw Carmen in  with hemorrhoid prolapse and some rectal bleeding.  I performed hemorrhoid banding at that time.  She reports that her symptoms are very minimal now but she does continue to have a very small amount of bleeding with bowel movements.  She does not notice any prolapsing tissue anymore.  Colonoscopy in  was normal.   PMH of Parkinsons, CVA, history of falls, history of atrial fibrillation with baby ASA only and has a Watchman device.    Physical Examination:  /72 (BP Location: Right arm, Patient Position: Sitting, Cuff Size: Adult Regular)   Pulse 74   Ht 5' 4\"   SpO2 94%   BMI 21.22 kg/m    General: alert, oriented, in no acute distress, sitting comfortably  HEENT: mucous membranes moist    Perianal external examination: Exam was chaperoned by Charlie Sue, EMT-P   Perianal skin: Intact with no excoriation or lichenification.  Lesions: No evidence of an external lesion, nodularity, or induration in the perianal region.  Eversion of buttocks: There was not evidence of an anal fissure. Details: N/A.  Skin tags or external hemorrhoids: Yes: large prolapsing hemorrhoid in the anterior position without active bleeding. Some overlying excoriation without bleeding.  Digital rectal examination: Was performed.   Sphincter tone: Good.  Palpable lesions: No.  Other: None.  Bimanual examination: was not performed    Anoscopy: Was performed.   Hemorrhoids: Yes. Grade 3 prolapsing hemorrhoid in the anterior position  Lesions: No    Procedures:  After discussing the risks and benefits, the patient agreed to proceed with internal hemorrhoidal banding.    Prior to the start of the " procedure and with procedural staff participation, I verbally confirmed the patient s identity using two indicators, relevant allergies, that the procedure was appropriate and matched the consent or emergent situation, and that the correct equipment/implants were available. Immediately prior to starting the procedure I conducted the Time Out with the procedural staff and re-confirmed the patient s name, procedure, and site/side. (The Joint Commission universal protocol was followed.)  Yes    Sedation (Moderate or Deep): None    A suction hemorrhoidal  was used to place a total of 2 band(s) in the anterior and posterior    There was no significant bleeding. The patient tolerated the procedure well.    This procedure was performed under a collaborative privileging agreement with Dr. Eder Quiros, Chief Division of Colon and Rectal Surgery    Assessment/Plan: 89 year old female who presents today for hemorrhoid prolapse. Large prolapsing hemorrhoid in the anterior position.  Her symptoms have improved with hemorrhoid banding so we did discuss doing this again.  Discussed that we only need to band for symptoms and once her bleeding stops and if her hemorrhoids are not bothersome, we do not need to do any further banding.  She is no longer bothered by the prolapse and only has minimal bleeding.  She tolerated banding well today and I would like to see her back anytime after 1 month for any persistent symptoms. Patient's questions were answered to her stated satisfaction and she is in agreement with this plan.     Medical history:  Past Medical History:   Diagnosis Date     Abnormal brain MRI 3/11/2019    MR HEAD BRAIN WO3/8/2019 [facility] Other Result Information This result has an attachment that is not available. Result Narrative [facility] RADIOLOGY  EXAM: MR HEAD BRAIN WO LOCATION: 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     Breast cyst      CVA (cerebral vascular accident) (H)     per H&P     Finger fracture, left 02/27/2019    ring finger      Multiple rib fractures 02/27/2019     Parkinson disease (H) 1/20/2016       Surgical history:  Past Surgical History:   Procedure Laterality Date     ANKLE SURGERY Left     fracture     APPENDECTOMY       BREAST CYST ASPIRATION       EP THANIA CLOSURE N/A 2/20/2020    Procedure: EP THANIA Closure;  Surgeon: Javier Smith MD;  Location: [facility] Cath Lab;  Service: Cardiology     EP LOOP RECORDER IMPLANT N/A 10/8/2019    Procedure: EP Loop Recorder Insertion;  Surgeon: Angel Hurd MD;  Location: [facility] Cath Lab;  Service: Cardiology     EYE SURGERY Bilateral     cataract surgery 2000     HYSTERECTOMY      tahbso     HYSTERECTOMY  1982     KNEE SURGERY Right     staph infection     TONSILLECTOMY       ZZC TOTAL ABDOM HYSTERECTOMY      Description: Hysterectomy;  Proc Date: 01/01/1988;  Comments: couldn't find her ovaries       Problem list:    Patient Active Problem List    Diagnosis Date Noted     Osteopenia 05/31/2022     Priority: Medium     Formatting of this note might be different from the original.  lowest score -2.5       S/P VH (vaginal hysterectomy) 05/31/2022     Priority: Medium     Formatting of this note might be different from the original.  secondary to prolapse, ovaries remain       Hepatic cyst 05/31/2022     Priority: Medium     Formatting of this note might be different from the original.  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  Implanted 2/20/20 - 27 mm Watchman device  Formatting of this note might be different from the original.  Implanted 2/20/20 - 27 mm Watchman device       Status post placement of implantable loop recorder 01/09/2020     Priority: Medium     Formatting of this note might be different from the original.  Due to cryptogenic stroke       Atrial fibrillation, unspecified type (H) 11/11/2019     Priority: Medium     Non-ischemic cardiomyopathy (H) 11/11/2019     Priority: Medium     Falls frequently 10/31/2019     Priority: Medium     Cryptogenic stroke (H) 10/16/2019     Priority: Medium     Stroke (H) 10/09/2019     Priority: Medium     Acute lacunar stroke (H) 10/05/2019     Priority: Medium     Left ventricular hypertrophy 01/14/2019     Priority: Medium     Overview:   Created by Conversion    Created by Conversion       Hemorrhoids 01/14/2019     Priority: Medium     Overview:   Created by Conversion    Replacement Utility updated for latest IMO load       Mixed hyperlipidemia 01/14/2019     Priority: Medium     Overview:   Created by Conversion       Venous insufficiency 01/14/2019     Priority: Medium     Overview:   Created by Conversion    Replacement Utility updated for latest IMO load       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     Priority: Medium     Prolapse of vaginal wall 08/18/2015     Priority: Medium     Overview:   Created by Conversion    Replacement Utility updated for latest IMO load       Posterior capsular opacification 01/02/2012     Priority: Medium       Medications:  Current Outpatient Medications   Medication Sig Dispense Refill     Ascorbic Acid (VITAMIN C) 100 MG CHEW Take 1 tablet by mouth daily       aspirin (ASA) 81 MG EC tablet 81mg tab by mouth  "daily @8am 30 tablet 0     calcium carbonate 500 MG CHEW 1 tums by mouth nightly @ 10/11pm       calcium carbonate 600 mg-vitamin D 400 units (CALTRATE) 600-400 MG-UNIT per tablet 1 tab by mouth daily at 8am       carbidopa-levodopa (SINEMET CR)  MG CR tablet Take 1 tablet by mouth At Bedtime 90 tablet 3     carbidopa-levodopa (SINEMET)  MG tablet Take 2 tabs by mouth at 7 - 8 AM, 12 -12:30 PM, AND 1.5 tabs at 5-5:30 PM = 5.5 Tabs 495 tablet 3     cyanocobalamin (CYANOCOBALAMIN) 1000 MCG SUBL sublingual tablet Place 1,000 mcg under the tongue daily       gabapentin (NEURONTIN) 600 MG tablet Take 1-2 tablets (600-1,200 mg) by mouth 3 times daily 270 tablet 3     ibuprofen (ADVIL/MOTRIN) 200 MG tablet 2 x 200mg tab by mouth nightly at 9/10pm       metoprolol succinate ER (TOPROL XL) 25 MG 24 hr tablet TAKE 1 TABLET EVERY DAY AT 8AM 60 tablet 3     pravastatin (PRAVACHOL) 20 MG tablet TAKE 1 TABLET AT BEDTIME 90 tablet 1     psyllium (METAMUCIL/KONSYL) Packet By mouth in liquid daily as needed for constipation       sertraline (ZOLOFT) 25 MG tablet TAKE 1 TABLET EVERY DAY 90 tablet 3     vitamin D3 (CHOLECALCIFEROL) 50 mcg (2000 units) tablet Vitamin d3 by mouth daily at 8am       zinc gluconate 50 MG tablet 50mg tab by mouth every morning at 8am       acetaminophen (TYLENOL) 500 MG tablet Take 500 mg by mouth every 6 hours as needed (Occasionally) (Patient not taking: Reported on 9/13/2022)         Allergies:  Allergies   Allergen Reactions     Codeine Other (See Comments)     Morphine Other (See Comments)     Made me feel really wierd     Penicillins Other (See Comments)     Tongue and throat tingling  Other reaction(s): Paresthesias  Tongue and throat tingling  Tingling on lips  Other reaction(s): Paresthesias  Tongue and throat tingling  Tingling on lips  Tongue and throat \"tingling\" when in 20s       Vicodin [Hydrocodone-Acetaminophen] Other (See Comments)     Weird feeling       Family " "history:  Family History   Problem Relation Age of Onset     Cerebrovascular Disease Mother      Heart Disease Mother      Other - See Comments Mother         Swedish Moldovan     Dementia Father         10 yrs.     Other - See Comments Father         Moldovan     Paranoid behavior Father      Other - See Comments Sister         bhavesh anne     Other - See Comments Daughter         kala     Other - See Comments Daughter         leonides lew     Other - See Comments Son         [place]     Other - See Comments Son         Dae, [place]     Dementia Paternal Aunt      Dementia Paternal Aunt      Dementia Paternal Uncle      Breast Cancer Maternal Aunt        Social history:  Social History     Tobacco Use     Smoking status: Never Smoker     Smokeless tobacco: Never Used   Substance Use Topics     Alcohol use: No    Marital status: .    Nursing Notes:   Charlie Sue, EMT  9/13/2022  3:13 PM  Signed  Chief Complaint   Patient presents with     Follow Up     Follow up for hemorrhoid banding       Vitals:    09/13/22 1507   BP: 116/72   BP Location: Right arm   Patient Position: Sitting   Cuff Size: Adult Regular   Pulse: 74   SpO2: 94%   Height: 5' 4\"       Body mass index is 21.22 kg/m .    Charlie Sue EMT-P         10 minutes spent on the date of encounter (excluding time performing procedures) performing chart review, history and exam, documentation and further activities as noted above with an additional 2 minutes for banding and 5 minutes for banding.     CRYSTAL Kwong, NP-C  Colon and Rectal Surgery   [facility]    This note was created using speech recognition software and may contain unintended word substitutions.  "

## 2022-09-13 NOTE — NURSING NOTE
"Chief Complaint   Patient presents with     Follow Up     Follow up for hemorrhoid banding       Vitals:    09/13/22 1507   BP: 116/72   BP Location: Right arm   Patient Position: Sitting   Cuff Size: Adult Regular   Pulse: 74   SpO2: 94%   Height: 5' 4\"       Body mass index is 21.22 kg/m .    Charlie Sue EMT-P    "

## 2022-09-14 ENCOUNTER — NURSE TRIAGE (OUTPATIENT)
Dept: NURSING | Facility: CLINIC | Age: 87
End: 2022-09-14

## 2022-09-14 NOTE — TELEPHONE ENCOUNTER
Calling to report to patient's provider that patient had a fall this afternoon @ 3pm. Dr NOEL Lovell @ [facility].   She has a little bump on her head= the size of a quarter, red hayder. She slid out of the electric wheelchair while bending over to  a mirror from the floor and hit her head. She takes a baby ASA daily. No other blood thinners. No LOC. BP=90/58 P91 @ 3pm, rechecked @ 3:15pm 93/59. After giving fluids 4:15pm =111/78, P79. RX Metoprolol succinate for afib. No change in patient's condition noted. RN thinks patient may have been a little dehydrated. Fluid intake encouraged.     Triaged to a disposition of go to office now with PCP approval, or UC or ER. Nurse from [facility] will contact daughter now and discuss this plan. (office will be closing shortly, so patient will go either to UC or ER).      Lisandra Case RN Triage Nurse Advisor 4:38 PM 9/14/2022  Reason for Disposition    Age over 65 years with an area of head swelling or bruise    [1] Systolic BP  AND [2] taking blood pressure medications AND [3] NOT dizzy, lightheaded or weak    Additional Information    Negative: ACUTE NEUROLOGIC SYMPTOM and symptom present now    Negative: Knocked out (unconscious) > 1 minute    Negative: Seizure (convulsion) occurred  (Exception: Prior history of seizures and now alert and without Acute Neurologic Symptoms.)    Negative: Neck pain after dangerous injury (e.g., MVA, diving, trampoline, contact sports, fall > 10 feet or 3 meters)  (Exception: Neck pain began > 1 hour after injury.)    Negative: Major bleeding (actively dripping or spurting) that can't be stopped    Negative: Penetrating head injury (e.g., knife, gunshot wound, metal object)    Negative: Sounds like a life-threatening emergency to the triager    Negative: Diagnosed with a concussion within last 14 days    Negative: Can't remember what happened (amnesia)    Negative: Vomiting once or more    Negative: Watery or blood-tinged  "fluid dripping from the nose or ears    Negative: ACUTE NEUROLOGIC SYMPTOM and now fine    Negative: Knocked out (unconscious) < 1 minute and now fine    Negative: Severe headache    Negative: Dangerous injury (e.g., MVA, diving, trampoline, contact sports, fall > 10 feet or 3 meters) or severe blow from hard object (e.g., golf club or baseball bat)    Negative: Large swelling or bruise (> 2 inches or 5 cm)    Negative: Skin is split open or gaping (length > 1/2 inch or 12 mm)    Negative: Bleeding won't stop after 10 minutes of direct pressure (using correct technique)    Negative: One or two 'black eyes' (bruising, purple color of eyelids), and onset within 24 hours of head injury    Negative: Taking Coumadin (warfarin) or other strong blood thinner, or known bleeding disorder (e.g., thrombocytopenia)    Negative: Started suddenly after an allergic medicine, an allergic food, or bee sting    Negative: Shock suspected (e.g., cold/pale/clammy skin, too weak to stand, low BP, rapid pulse)    Negative: Difficult to awaken or acting confused (e.g., disoriented, slurred speech)    Negative: Fainted    Negative: [1] Systolic BP < 90 AND [2] dizzy, lightheaded, or weak    Negative: Chest pain    Negative: Bleeding (e.g., vomiting blood, rectal bleeding or tarry stools, severe vaginal bleeding)(Exception: fainted from sight of small amount of blood; small cut or abrasion)    Negative: Extra heart beats or heart is beating fast  (i.e., \"palpitations\")    Negative: Sounds like a life-threatening emergency to the triager    Negative: [1] Systolic BP < 80 AND [2] NOT dizzy, lightheaded or weak    Negative: Abdominal pain    Negative: Fever > 100.4 F (38.0 C)    Negative: Major surgery in the past month    Negative: [1] Drinking very little AND [2] dehydration suspected (e.g., no urine > 12 hours, very dry mouth, very lightheaded)    Negative: [1] Fall in systolic BP > 20 mm Hg from normal AND [2] dizzy, lightheaded, or weak    " Negative: Patient sounds very sick or weak to the triager    Negative: [1] Systolic BP < 90 AND [2] NOT dizzy, lightheaded or weak    Negative: [1] Systolic BP  AND [2] taking blood pressure medications AND [3] dizzy, lightheaded or weak    Protocols used: HEAD INJURY-A-OH, LOW BLOOD PRESSURE-A-AH

## 2022-10-05 ENCOUNTER — TELEPHONE (OUTPATIENT)
Dept: FAMILY MEDICINE | Facility: CLINIC | Age: 87
End: 2022-10-05

## 2022-10-05 NOTE — TELEPHONE ENCOUNTER
Call from [facility]. Pt showed the prison staff her MyChart message that states she is due for a flu shot and she had one in Jan and Mar of 2022. They are asking for verification. Pt had Fluzone Jan 2022, not in March. Pt will be getting flu vaccine today, staff doesn't know which one yet. They will notify clinic when they know which one she gets.    Sylvia Whittaker RN on 10/5/2022 at 8:33 AM

## 2022-10-06 ENCOUNTER — TELEPHONE (OUTPATIENT)
Dept: FAMILY MEDICINE | Facility: CLINIC | Age: 87
End: 2022-10-06

## 2022-10-06 NOTE — TELEPHONE ENCOUNTER
Pt had an unwitnessed fall overnight. Slid out of her chair. Found by CNA. RN reports pt is fine, no injuries.

## 2022-10-21 ENCOUNTER — ANCILLARY PROCEDURE (OUTPATIENT)
Dept: CARDIOLOGY | Facility: CLINIC | Age: 87
End: 2022-10-21
Attending: INTERNAL MEDICINE
Payer: MEDICARE

## 2022-10-21 DIAGNOSIS — I63.9 CRYPTOGENIC STROKE (H): ICD-10-CM

## 2022-10-21 DIAGNOSIS — Z98.890 HISTORY OF LOOP RECORDER: ICD-10-CM

## 2022-10-21 PROCEDURE — 93297 REM INTERROG DEV EVAL ICPMS: CPT | Performed by: INTERNAL MEDICINE

## 2022-10-21 PROCEDURE — G2066 INTER DEVC REMOTE 30D: HCPCS | Performed by: INTERNAL MEDICINE

## 2022-10-24 ENCOUNTER — TRANSFERRED RECORDS (OUTPATIENT)
Dept: HEALTH INFORMATION MANAGEMENT | Facility: CLINIC | Age: 87
End: 2022-10-24

## 2022-10-25 ENCOUNTER — OFFICE VISIT (OUTPATIENT)
Dept: NEUROLOGY | Facility: CLINIC | Age: 87
End: 2022-10-25
Payer: MEDICARE

## 2022-10-25 VITALS — SYSTOLIC BLOOD PRESSURE: 110 MMHG | DIASTOLIC BLOOD PRESSURE: 71 MMHG | OXYGEN SATURATION: 96 % | HEART RATE: 77 BPM

## 2022-10-25 DIAGNOSIS — F32.A ANXIETY AND DEPRESSION: ICD-10-CM

## 2022-10-25 DIAGNOSIS — F41.9 ANXIETY AND DEPRESSION: ICD-10-CM

## 2022-10-25 DIAGNOSIS — G89.29 CHRONIC LEFT HIP PAIN: ICD-10-CM

## 2022-10-25 DIAGNOSIS — G20.A1 PARKINSON'S DISEASE (H): Primary | ICD-10-CM

## 2022-10-25 DIAGNOSIS — M25.552 CHRONIC LEFT HIP PAIN: ICD-10-CM

## 2022-10-25 PROCEDURE — 99215 OFFICE O/P EST HI 40 MIN: CPT | Performed by: NURSE PRACTITIONER

## 2022-10-25 RX ORDER — ESTRADIOL 0.1 MG/G
CREAM VAGINAL
COMMUNITY
End: 2022-12-12

## 2022-10-25 ASSESSMENT — PAIN SCALES - GENERAL: PAINLEVEL: MODERATE PAIN (4)

## 2022-10-25 NOTE — PROGRESS NOTES
ASSESSMENT:    Parkinson's Disease:  Stable.     Anxiety: Not well controlled. Exacerbated by upcoming surgery.       Left Hip Pain:  Pre-op and total hip replacement has been scheduled at [facility]. Pt is undecided and apprehensive about the surgery.     PLAN:    Most of today's visit was spent by answering patient's questions around her upcoming surgery and how the recovery might be due to PD. All questions answered. I encouraged pt and her daughter to reach out to us with questions. Since anxiety plays to her difficulty making decision, I offered increasing the Zoloft and discussing with a counselor. Since making decision was hard for the pt, she was asked to think about it. After the visit, her daughter called and pt wanted in increase the Zoloft. Zoloft was increased from 25 mg to 50 mg daily.     __  The following patient instructions provided: -      __  Stay on the same antiparkinsonian medications.     __  If you go through the hip surgery, good luck.  Keep us posted if anything changed.    __  Do the rehab after your surgery.     __  Okay to take Ibuprofen twice a day - Take it with food.     __  Virtual visit in 3 months. You may return sooner as needed.      MOVEMENT DISORDERS CLINIC           PATIENT: ALEXA Luu    : 1932    RAMOS: 2022    REASON FOR VISIT: Parkinson's disease (PD) and Anxiety follow up.    HPI: Ms. ALEXA Luu is a 89 year old who came to the [facility] neurology clinic accompanied by her daughter,  for a follow up visit.      Records Reviewed:  ER visit on 10/7/2022    I was pt last via video visit on 2022. During that visit, she was referred to a psychologist for anxiety management.      Today, her main concern is the decision she has to make about getting a left hip replacement surgery. She discussed her dilemma. She is scheduled for the pre-op and surgery the fist week of November at the [facility]. She asked what to expect after surgery and if I  have recommendations. She has left hip pain. At times the pain is severe that she can't sit. She can only walk short distances due to pain. She is taking Gabapentin 1200 mg TID. She asked if her Gabapentin dose is a lot.     Pt asked if she could take Ibuprofen for arthritis pain, and if it causes GI ulcers.     From PD stand point, motor symptoms are stable. Since she had a stroke, her gait hasn't been stable. She can only walk for 10 minutes with gait belt and walker.     PD Medications 7-8 am 12-12:30 pm 5-5:30 pm 10-10:30 pm   Sinemet 25/100 mg  2 2 2    Sinemet 50/200 mg CR    1     She continues having anxiety. When it comes, she feels hot and cold. She manages the anxiety attacks by distracting herself. She reads or change the environment. She is taking Zoloft 25 mg.    When she takes a nap, she has a hard time reorienting herself.       MEDICATIONS:   Outpatient Medications Marked as Taking for the 10/25/22 encounter (Office Visit) with Bossman Bolaños APRN CNP   Medication Sig     acetaminophen (TYLENOL) 500 MG tablet Take 500 mg by mouth every 6 hours as needed (Occasionally)     Ascorbic Acid (VITAMIN C) 100 MG CHEW Take 1 tablet by mouth daily     aspirin (ASA) 81 MG EC tablet 81mg tab by mouth daily @8am     calcium carbonate 500 MG CHEW 1 tums by mouth nightly @ 10/11pm     calcium carbonate 600 mg-vitamin D 400 units (CALTRATE) 600-400 MG-UNIT per tablet 1 tab by mouth daily at 8am     carbidopa-levodopa (SINEMET CR)  MG CR tablet Take 1 tablet by mouth At Bedtime     carbidopa-levodopa (SINEMET)  MG tablet Take 2 tabs by mouth at 7 - 8 AM, 12 -12:30 PM, AND 1.5 tabs at 5-5:30 PM = 5.5 Tabs     gabapentin (NEURONTIN) 600 MG tablet Take 1-2 tablets (600-1,200 mg) by mouth 3 times daily     ibuprofen (ADVIL/MOTRIN) 200 MG tablet 2 x 200mg tab by mouth nightly at 9/10pm     metoprolol succinate ER (TOPROL XL) 25 MG 24 hr tablet TAKE 1 TABLET EVERY DAY AT 8AM     pravastatin (PRAVACHOL)  20 MG tablet TAKE 1 TABLET AT BEDTIME     psyllium (METAMUCIL/KONSYL) Packet By mouth in liquid daily as needed for constipation     sertraline (ZOLOFT) 25 MG tablet TAKE 1 TABLET EVERY DAY     vitamin D3 (CHOLECALCIFEROL) 50 mcg (2000 units) tablet Vitamin d3 by mouth daily at 8am       PHYSICAL EXAM:    VITAL SIGNS:  /71   Pulse 77   SpO2 96% ,     GENERAL:  Ms. Janelle Luu is a pleasant  female who is well-groomed and well-developed sitting comfortably on a wheelchair without any distress.  Affect is appropriate.    MOVEMENT DISORDERS ASSESSMENT: (Last Sinemet was about 30 minutes ago)  Speech: Normal.  Facial Expression: Slight, abnormal diminution of facial expression.  Rest Tremor: Absent.   Finger Taps: Mild bilaterally.   Hand opening & closing:  Mild bilaterally.   Hand pronation & supination:  Mild bilaterally.   Toe Tapping:   Mild bilaterally.    Leg Agility: Mild bilaterally.   Body Bradykinesia: Mild.  Dyskinesias:  Absent.      Today I spent 43 minutes caring for the patient. Time was spent with reviewing records, meeting with the patient, answering questions, examining, refilling/ordering medication, and documentation.    Analisa Bolaños APRN,  CNP  [facility]

## 2022-10-25 NOTE — LETTER
Date:October 28, 2022      Provider requested that no letter be sent. Do not send.       [facility]

## 2022-10-25 NOTE — LETTER
10/25/2022       RE: ALEXA Luu  [address] [place] [facility] [facility] [address]     Dear Colleague,    Thank you for referring your patient, ALEXA Luu, to the [facility] [place] at [facility]. Please see a copy of my visit note below.      ASSESSMENT:    Parkinson's Disease:  Stable.     Anxiety: Not well controlled. Exacerbated by upcoming surgery.       Left Hip Pain:  Pre-op and total hip replacement has been scheduled at [place]. Pt is undecided and apprehensive about the surgery.     PLAN:    Most of today's visit was spent by answering patient's questions around her upcoming surgery and how the recovery might be due to PD. All questions answered. I encouraged pt and her daughter to reach out to us with questions. Since anxiety plays to her difficulty making decision, I offered increasing the Zoloft and discussing with a counselor. Since making decision was hard for the pt, she was asked to think about it. After the visit, her daughter called and pt wanted in increase the Zoloft. Zoloft was increased from 25 mg to 50 mg daily.     __  The following patient instructions provided: -      __  Stay on the same antiparkinsonian medications.     __  If you go through the hip surgery, good luck.  Keep us posted if anything changed.    __  Do the rehab after your surgery.     __  Okay to take Ibuprofen twice a day - Take it with food.     __  Virtual visit in 3 months. You may return sooner as needed.      MOVEMENT DISORDERS CLINIC           PATIENT: ALEXA Luu    : 1932    RAMOS: 2022    REASON FOR VISIT: Parkinson's disease (PD) and Anxiety follow up.    HPI: Ms. ALEXA Luu is a 89 year old who came to the [facility] neurology clinic accompanied by her daughter,  for a follow up visit.      Records Reviewed:  ER visit on 10/7/2022    I was pt last via video visit on 2022. During that visit,  she was referred to a psychologist for anxiety management.      Today, her main concern is the decision she has to make about getting a left hip replacement surgery. She discussed her dilemma. She is scheduled for the pre-op and surgery the fist week of November at the [facility]. She asked what to expect after surgery and if I have recommendations. She has left hip pain. At times the pain is severe that she can't sit. She can only walk short distances due to pain. She is taking Gabapentin 1200 mg TID. She asked if her Gabapentin dose is a lot.     Pt asked if she could take Ibuprofen for arthritis pain, and if it causes GI ulcers.     From PD stand point, motor symptoms are stable. Since she had a stroke, her gait hasn't been stable. She can only walk for 10 minutes with gait belt and walker.     PD Medications 7-8 am 12-12:30 pm 5-5:30 pm 10-10:30 pm   Sinemet 25/100 mg  2 2 2    Sinemet 50/200 mg CR    1     She continues having anxiety. When it comes, she feels hot and cold. She manages the anxiety attacks by distracting herself. She reads or change the environment. She is taking Zoloft 25 mg.    When she takes a nap, she has a hard time reorienting herself.       MEDICATIONS:   Outpatient Medications Marked as Taking for the 10/25/22 encounter (Office Visit) with Bossman Bolaños APRN CNP   Medication Sig     acetaminophen (TYLENOL) 500 MG tablet Take 500 mg by mouth every 6 hours as needed (Occasionally)     Ascorbic Acid (VITAMIN C) 100 MG CHEW Take 1 tablet by mouth daily     aspirin (ASA) 81 MG EC tablet 81mg tab by mouth daily @8am     calcium carbonate 500 MG CHEW 1 tums by mouth nightly @ 10/11pm     calcium carbonate 600 mg-vitamin D 400 units (CALTRATE) 600-400 MG-UNIT per tablet 1 tab by mouth daily at 8am     carbidopa-levodopa (SINEMET CR)  MG CR tablet Take 1 tablet by mouth At Bedtime     carbidopa-levodopa (SINEMET)  MG tablet Take 2 tabs by mouth at 7 - 8 AM, 12 -12:30 PM, AND  1.5 tabs at 5-5:30 PM = 5.5 Tabs     gabapentin (NEURONTIN) 600 MG tablet Take 1-2 tablets (600-1,200 mg) by mouth 3 times daily     ibuprofen (ADVIL/MOTRIN) 200 MG tablet 2 x 200mg tab by mouth nightly at 9/10pm     metoprolol succinate ER (TOPROL XL) 25 MG 24 hr tablet TAKE 1 TABLET EVERY DAY AT 8AM     pravastatin (PRAVACHOL) 20 MG tablet TAKE 1 TABLET AT BEDTIME     psyllium (METAMUCIL/KONSYL) Packet By mouth in liquid daily as needed for constipation     sertraline (ZOLOFT) 25 MG tablet TAKE 1 TABLET EVERY DAY     vitamin D3 (CHOLECALCIFEROL) 50 mcg (2000 units) tablet Vitamin d3 by mouth daily at 8am       PHYSICAL EXAM:    VITAL SIGNS:  /71   Pulse 77   SpO2 96% ,     GENERAL:  Ms. Janelle Luu is a pleasant  female who is well-groomed and well-developed sitting comfortably on a wheelchair without any distress.  Affect is appropriate.    MOVEMENT DISORDERS ASSESSMENT: (Last Sinemet was about 30 minutes ago)  Speech: Normal.  Facial Expression: Slight, abnormal diminution of facial expression.  Rest Tremor: Absent.   Finger Taps: Mild bilaterally.   Hand opening & closing:  Mild bilaterally.   Hand pronation & supination:  Mild bilaterally.   Toe Tapping:   Mild bilaterally.    Leg Agility: Mild bilaterally.   Body Bradykinesia: Mild.  Dyskinesias:  Absent.      Today I spent 43 minutes caring for the patient. Time was spent with reviewing records, meeting with the patient, answering questions, examining, refilling/ordering medication, and documentation.    CRYSTAL Lang,  CNP  [facility]        Again, thank you for allowing me to participate in the care of your patient.      Sincerely,    CRYSTAL Andrews CNP

## 2022-10-25 NOTE — PATIENT INSTRUCTIONS
Dear Ms. ALEXA Luis [facility],    Thank you for coming today.  During your visit, we have discussed the following:     __  Stay on the same antiparkinsonian medications.     __  If you go through the hip surgery, good luck.  Keep us posted if anything changed.    __  Do the rehab after your surgery.     __  Okay to take Ibuprofen twice a day - Take it with food.     __  Virtual visit in 3 months. You may return sooner as needed.      For questions, you may send us a [employer] message or call [phone]    Fax number: [phone]    CRYSTAL Lagn, CNP  [facility]

## 2022-10-26 RX ORDER — SERTRALINE HYDROCHLORIDE 25 MG/1
50 TABLET, FILM COATED ORAL DAILY
Qty: 180 TABLET | Refills: 1 | Status: SHIPPED | OUTPATIENT
Start: 2022-10-26 | End: 2024-04-23

## 2022-10-30 ENCOUNTER — HEALTH MAINTENANCE LETTER (OUTPATIENT)
Age: 87
End: 2022-10-30

## 2022-11-08 ENCOUNTER — DOCUMENTATION ONLY (OUTPATIENT)
Dept: NEUROLOGY | Facility: CLINIC | Age: 87
End: 2022-11-08

## 2022-11-08 ENCOUNTER — MEDICAL CORRESPONDENCE (OUTPATIENT)
Dept: HEALTH INFORMATION MANAGEMENT | Facility: CLINIC | Age: 87
End: 2022-11-08

## 2022-11-08 NOTE — PROGRESS NOTES
Received PT order from [facility] rehab, order was fax to [phone], and sent to be scanned into patient chart

## 2022-11-16 ENCOUNTER — DOCUMENTATION ONLY (OUTPATIENT)
Dept: NEUROLOGY | Facility: CLINIC | Age: 87
End: 2022-11-16

## 2022-11-25 ENCOUNTER — LAB REQUISITION (OUTPATIENT)
Dept: LAB | Facility: CLINIC | Age: 87
End: 2022-11-25
Payer: MEDICARE

## 2022-11-25 DIAGNOSIS — R41.0 DISORIENTATION, UNSPECIFIED: ICD-10-CM

## 2022-11-25 LAB
ALBUMIN UR-MCNC: NEGATIVE MG/DL
APPEARANCE UR: ABNORMAL
BACTERIA #/AREA URNS HPF: ABNORMAL /HPF
BILIRUB UR QL STRIP: NEGATIVE
COLOR UR AUTO: YELLOW
GLUCOSE UR STRIP-MCNC: NEGATIVE MG/DL
HGB UR QL STRIP: NEGATIVE
KETONES UR STRIP-MCNC: NEGATIVE MG/DL
LEUKOCYTE ESTERASE UR QL STRIP: ABNORMAL
MUCOUS THREADS #/AREA URNS LPF: PRESENT /LPF
NITRATE UR QL: NEGATIVE
PH UR STRIP: 6.5 [PH] (ref 5–7)
RBC URINE: 4 /HPF
SP GR UR STRIP: 1.02 (ref 1–1.03)
SQUAMOUS EPITHELIAL: 1 /HPF
TRANSITIONAL EPI: 1 /HPF
UROBILINOGEN UR STRIP-MCNC: NORMAL MG/DL
WBC URINE: 13 /HPF

## 2022-11-25 PROCEDURE — 87088 URINE BACTERIA CULTURE: CPT | Mod: ORL | Performed by: NURSE PRACTITIONER

## 2022-11-25 PROCEDURE — 81001 URINALYSIS AUTO W/SCOPE: CPT | Mod: ORL | Performed by: NURSE PRACTITIONER

## 2022-11-27 LAB — BACTERIA UR CULT: ABNORMAL

## 2022-12-03 ENCOUNTER — LAB REQUISITION (OUTPATIENT)
Dept: LAB | Facility: CLINIC | Age: 87
End: 2022-12-03
Payer: MEDICARE

## 2022-12-03 DIAGNOSIS — E03.9 HYPOTHYROIDISM, UNSPECIFIED: ICD-10-CM

## 2022-12-03 DIAGNOSIS — D64.9 ANEMIA, UNSPECIFIED: ICD-10-CM

## 2022-12-03 DIAGNOSIS — I10 ESSENTIAL (PRIMARY) HYPERTENSION: ICD-10-CM

## 2022-12-03 DIAGNOSIS — E55.9 VITAMIN D DEFICIENCY, UNSPECIFIED: ICD-10-CM

## 2022-12-03 DIAGNOSIS — D51.9 VITAMIN B12 DEFICIENCY ANEMIA, UNSPECIFIED: ICD-10-CM

## 2022-12-05 LAB
ANION GAP SERPL CALCULATED.3IONS-SCNC: 14 MMOL/L (ref 7–15)
BUN SERPL-MCNC: 29.6 MG/DL (ref 8–23)
CALCIUM SERPL-MCNC: 9.4 MG/DL (ref 8.2–9.6)
CHLORIDE SERPL-SCNC: 105 MMOL/L (ref 98–107)
CREAT SERPL-MCNC: 0.89 MG/DL (ref 0.51–0.95)
DEPRECATED CALCIDIOL+CALCIFEROL SERPL-MC: 30 UG/L (ref 20–75)
DEPRECATED HCO3 PLAS-SCNC: 22 MMOL/L (ref 22–29)
ERYTHROCYTE [DISTWIDTH] IN BLOOD BY AUTOMATED COUNT: 14.5 % (ref 10–15)
GFR SERPL CREATININE-BSD FRML MDRD: 61 ML/MIN/1.73M2
GLUCOSE SERPL-MCNC: 82 MG/DL (ref 70–99)
HCT VFR BLD AUTO: 41.4 % (ref 35–47)
HGB BLD-MCNC: 12.7 G/DL (ref 11.7–15.7)
MCH RBC QN AUTO: 30.5 PG (ref 26.5–33)
MCHC RBC AUTO-ENTMCNC: 30.7 G/DL (ref 31.5–36.5)
MCV RBC AUTO: 100 FL (ref 78–100)
PLATELET # BLD AUTO: 353 10E3/UL (ref 150–450)
POTASSIUM SERPL-SCNC: 4.4 MMOL/L (ref 3.4–5.3)
RBC # BLD AUTO: 4.16 10E6/UL (ref 3.8–5.2)
SODIUM SERPL-SCNC: 141 MMOL/L (ref 136–145)
TSH SERPL DL<=0.005 MIU/L-ACNC: 1.18 UIU/ML (ref 0.3–4.2)
VIT B12 SERPL-MCNC: 629 PG/ML (ref 232–1245)
WBC # BLD AUTO: 9.2 10E3/UL (ref 4–11)

## 2022-12-05 PROCEDURE — 85027 COMPLETE CBC AUTOMATED: CPT | Mod: ORL | Performed by: NURSE PRACTITIONER

## 2022-12-05 PROCEDURE — 82607 VITAMIN B-12: CPT | Mod: ORL | Performed by: NURSE PRACTITIONER

## 2022-12-05 PROCEDURE — P9604 ONE-WAY ALLOW PRORATED TRIP: HCPCS | Mod: ORL | Performed by: NURSE PRACTITIONER

## 2022-12-05 PROCEDURE — 84443 ASSAY THYROID STIM HORMONE: CPT | Mod: ORL | Performed by: NURSE PRACTITIONER

## 2022-12-05 PROCEDURE — 82306 VITAMIN D 25 HYDROXY: CPT | Mod: ORL | Performed by: NURSE PRACTITIONER

## 2022-12-05 PROCEDURE — 36415 COLL VENOUS BLD VENIPUNCTURE: CPT | Mod: ORL | Performed by: NURSE PRACTITIONER

## 2022-12-05 PROCEDURE — 80048 BASIC METABOLIC PNL TOTAL CA: CPT | Mod: ORL | Performed by: NURSE PRACTITIONER

## 2022-12-09 ENCOUNTER — MYC MEDICAL ADVICE (OUTPATIENT)
Dept: NEUROLOGY | Facility: CLINIC | Age: 87
End: 2022-12-09

## 2022-12-09 DIAGNOSIS — G89.29 CHRONIC LEFT HIP PAIN: ICD-10-CM

## 2022-12-09 DIAGNOSIS — M25.552 CHRONIC LEFT HIP PAIN: ICD-10-CM

## 2022-12-12 RX ORDER — GABAPENTIN 600 MG/1
600-1200 TABLET ORAL 3 TIMES DAILY
Qty: 270 TABLET | Refills: 3 | Status: SHIPPED | OUTPATIENT
Start: 2022-12-12 | End: 2024-04-23

## 2022-12-14 ENCOUNTER — DOCUMENTATION ONLY (OUTPATIENT)
Dept: NEUROLOGY | Facility: CLINIC | Age: 87
End: 2022-12-14

## 2022-12-14 ENCOUNTER — MEDICAL CORRESPONDENCE (OUTPATIENT)
Dept: HEALTH INFORMATION MANAGEMENT | Facility: CLINIC | Age: 87
End: 2022-12-14

## 2022-12-14 NOTE — PROGRESS NOTES
Received PT order from [facility], order was signed by provider and fax to [phone] and sent to be scanned into patient chart

## 2022-12-14 NOTE — PROGRESS NOTES
[facility] medical requesting office note for repair on wheelchair, note was fax to [phone] and sent to be scanned into patient chart

## 2023-01-06 ENCOUNTER — TRANSFERRED RECORDS (OUTPATIENT)
Dept: HEALTH INFORMATION MANAGEMENT | Facility: CLINIC | Age: 88
End: 2023-01-06

## 2023-01-10 ENCOUNTER — DOCUMENTATION ONLY (OUTPATIENT)
Dept: NEUROLOGY | Facility: CLINIC | Age: 88
End: 2023-01-10

## 2023-01-10 NOTE — PROGRESS NOTES
Received PT plan, order was signed and fax to [phone], and sent to be scanned into patient chart

## 2023-01-19 ENCOUNTER — ANCILLARY PROCEDURE (OUTPATIENT)
Dept: CARDIOLOGY | Facility: CLINIC | Age: 88
End: 2023-01-19
Attending: INTERNAL MEDICINE
Payer: COMMERCIAL

## 2023-01-19 ENCOUNTER — TELEPHONE (OUTPATIENT)
Dept: CARDIOLOGY | Facility: CLINIC | Age: 88
End: 2023-01-19

## 2023-01-19 DIAGNOSIS — Z98.890 HISTORY OF LOOP RECORDER: ICD-10-CM

## 2023-01-19 DIAGNOSIS — I63.9 CRYPTOGENIC STROKE (H): ICD-10-CM

## 2023-01-19 LAB
MDC_IDC_MSMT_BATTERY_DTM: NORMAL
MDC_IDC_MSMT_BATTERY_STATUS: NORMAL
MDC_IDC_PG_IMPLANT_DTM: NORMAL
MDC_IDC_PG_MFG: NORMAL
MDC_IDC_PG_MODEL: NORMAL
MDC_IDC_PG_SERIAL: NORMAL
MDC_IDC_PG_TYPE: NORMAL
MDC_IDC_SESS_CLINIC_NAME: NORMAL
MDC_IDC_SESS_DTM: NORMAL
MDC_IDC_SESS_TYPE: NORMAL
MDC_IDC_SET_ZONE_TYPE: NORMAL
MDC_IDC_STAT_AT_BURDEN_PERCENT: 0 %
MDC_IDC_STAT_AT_DTM_END: NORMAL
MDC_IDC_STAT_AT_DTM_START: NORMAL
MDC_IDC_STAT_EPISODE_RECENT_COUNT: 0
MDC_IDC_STAT_EPISODE_RECENT_COUNT_DTM_END: NORMAL
MDC_IDC_STAT_EPISODE_RECENT_COUNT_DTM_START: NORMAL
MDC_IDC_STAT_EPISODE_TOTAL_COUNT: 0
MDC_IDC_STAT_EPISODE_TOTAL_COUNT: 1
MDC_IDC_STAT_EPISODE_TOTAL_COUNT: 39
MDC_IDC_STAT_EPISODE_TOTAL_COUNT: 6
MDC_IDC_STAT_EPISODE_TOTAL_COUNT_DTM_END: NORMAL
MDC_IDC_STAT_EPISODE_TOTAL_COUNT_DTM_START: NORMAL
MDC_IDC_STAT_EPISODE_TYPE: NORMAL

## 2023-01-19 PROCEDURE — G2066 INTER DEVC REMOTE 30D: HCPCS | Performed by: INTERNAL MEDICINE

## 2023-01-19 PROCEDURE — 93298 REM INTERROG DEV EVAL SCRMS: CPT | Performed by: INTERNAL MEDICINE

## 2023-01-19 NOTE — TELEPHONE ENCOUNTER
Kenisha Churchill [facility] Device Pool - Lhe  Type: alert remote loop recorder transmission for device at RRT.   Presenting rhythm: Ventricular sensing, appears sinus 75 bpm.   Battery status: Device reached RRT on 1/18/23.   Arrhythmias: since 11/20/22; None detected.   Comments: Normal loop recorder function. Routed to device BALBIR Churchill, Device Specialist     1/19/23:

## 2023-01-19 NOTE — PROGRESS NOTES
Patient's daughter called back and was informed that patient's ILR is at [facility]. Discussed that device can be left in her chest and does not require removal. Patient's device was implanted for cryptogenic CVA, she has since received a watchman device. Daughter states that she did not think it was necessary to remove the device as well. Daughter asked if it would be ok to discontinue remote monitoring at this time, agreed that we could discontinue remote monitoring of patient's ILR. Informed patient's daughter that if they change their minds or have further questions, they can discuss with patient's primary cardiologist, Dr. Kothari. Daughter verbalized understanding and will unplug Care[employer] monitor. Patient was made inactive in [employer].     Delroy Coronado, RN   Device Nurse

## 2023-01-24 ENCOUNTER — DOCUMENTATION ONLY (OUTPATIENT)
Dept: NEUROLOGY | Facility: CLINIC | Age: 88
End: 2023-01-24
Payer: COMMERCIAL

## 2023-01-24 ENCOUNTER — MEDICAL CORRESPONDENCE (OUTPATIENT)
Dept: HEALTH INFORMATION MANAGEMENT | Facility: CLINIC | Age: 88
End: 2023-01-24
Payer: COMMERCIAL

## 2023-01-24 NOTE — PROGRESS NOTES
Received order for [facility], order was signed by provider and fax to [phone], and sent to be scanned into patient chart

## 2023-01-25 ENCOUNTER — VIRTUAL VISIT (OUTPATIENT)
Dept: NEUROLOGY | Facility: CLINIC | Age: 88
End: 2023-01-25
Payer: COMMERCIAL

## 2023-01-25 ENCOUNTER — DOCUMENTATION ONLY (OUTPATIENT)
Dept: NEUROLOGY | Facility: CLINIC | Age: 88
End: 2023-01-25

## 2023-01-25 DIAGNOSIS — G20.A1 PARKINSON'S DISEASE (H): Primary | ICD-10-CM

## 2023-01-25 DIAGNOSIS — M54.42 CHRONIC LEFT-SIDED LOW BACK PAIN WITH LEFT-SIDED SCIATICA: ICD-10-CM

## 2023-01-25 DIAGNOSIS — G89.29 CHRONIC LEFT-SIDED LOW BACK PAIN WITH LEFT-SIDED SCIATICA: ICD-10-CM

## 2023-01-25 DIAGNOSIS — F41.9 ANXIETY: ICD-10-CM

## 2023-01-25 PROCEDURE — 99215 OFFICE O/P EST HI 40 MIN: CPT | Mod: 95 | Performed by: NURSE PRACTITIONER

## 2023-01-25 NOTE — PROGRESS NOTES
Received medical request from [facility], last office, medical letter was fax to [phone]

## 2023-01-25 NOTE — LETTER
1/25/2023       RE: ALEXA Luu  [address] [facility] [address]     Dear Colleague,    Thank you for referring your patient, ALEXA Luu, to the [facility]UROLOG[facility] [place] at [facility]. Please see a copy of my visit note below.    Cramen is a 89 year old who is being evaluated via a billable video visit.      How would you like to obtain your AVS? MyChart  If the video visit is dropped, the invitation should be resent by: Send to e-mail at: [email or web address]  Will anyone else be joining your video visit? No        Video-Visit Details    Type of service:  Video Visit   Video Start Time: 12:06 pm  Video End Time:12:37 pm  31 minutes    Originating Location (pt. Location): Home    Distant Location (provider location):  On-site  Platform used for Video Visit: [employer]    Chief Complaint   Patient presents with     PAUL Callejas    --------------------------------------------------------------------------------------------------------------------------------------------------------------------------------------------------------      ASSESSMENT:    1)  Parkinson's Disease: Mobility impacted by chronic sciatica pain.     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 Pt has opted not to have surgery.        3)  Anxiety: Ongoing issue.      PLAN:    The following patient education provided: -     __  I would encourage you to talk to your Primary Care Provider - to be referred to the Pain Clinic.     __  They might consider a medication like Cymbalta that can help with mood and pain.    __  Continue taking the same antiparkinsonian medications.     __  Stay on the same dose of Zoloft for now.  Depending on how your visit with pain clinic is going to go, we can increase the dose (instead of switching.)    __  I will sign and send the order for a new power W/C foot to  Eli Reddy, OTR/L.    __ Virtual visit in 3 months. You may return sooner as needed.     __  Schedulers will call you to schedule your next visit.         MOVEMENT DISORDERS CLINIC     PATIENT: ALEXA Luu    : 1932    DATE: 2023    REASON FOR VISIT: Parkinson's disease (PD) and anxiety follow up.    HPI: Ms. ALEXA Luu is a 89 year old who is seen via video visit for a follow up visit.      During today's video visit pt and daughter were present.     Anxiety: She still has anxiety. Some days she is better and other days, she feels very anxious. Since her Zoloft was increased from 25 mg to 50 mg, she is not sure if she has noticed any change. Some of the residents in her assisted living told her to change her antianxiety medication. Her daughter reports that patient's anxiety gets worse when she didn't sleep well (due to pain/anxiety) or when pain is not well controlled. She gets counseling from people.     She is asking pain medication for the sciatica pain. She has chronic left hip/buttock/low back pain that radiates to her left leg.  Pain is present on all the time and worse with prolonged sitting or walking. She was taking Gabapentin 1200 mg 3 x a day and her PCP reduced the dose to 600 mg TID. Pt hasn't noticed a big change. She has continued PT and OT twice a week at the [facility]. In the past, she was seen by the pain clinic.  But not lately.  One of the residents has mentioned Tramadol and how it helped similar pain. Pt/daughter asked my opinion on Tramadol. At some point, she was on oxycodone. She is taking Ibuprofen 200 mg 2 tabs and 1 acetaminophen twice daily with food.     Pt hasn't been on Cymbalta.     She uses her power w/c all the time for mobility.  She can only walk for 15 min with two assist. She needs a longer footplate for her power w/c to prevent foot contraction and injury.     MEDICATIONS:   Outpatient Medications Marked as  Taking for the 1/25/23 encounter (Virtual Visit) with Bossman Bolaños APRN CNP   Medication Sig     acetaminophen (TYLENOL) 500 MG tablet Take 500 mg by mouth every 6 hours as needed (Occasionally)     Ascorbic Acid (VITAMIN C) 100 MG CHEW Take 1 tablet by mouth daily     aspirin (ASA) 81 MG EC tablet 81mg tab by mouth daily @8am     calcium carbonate 500 MG CHEW 1 tums by mouth nightly @ 10/11pm     calcium carbonate 600 mg-vitamin D 400 units (CALTRATE) 600-400 MG-UNIT per tablet 1 tab by mouth daily at 8am     carbidopa-levodopa (SINEMET CR)  MG CR tablet Take 1 tablet by mouth At Bedtime     carbidopa-levodopa (SINEMET)  MG tablet Take 2 tabs by mouth at 7 - 8 AM, 12 -12:30 PM, AND 1.5 tabs at 5-5:30 PM = 5.5 Tabs     fluticasone furoate 27.5 MCG/SPRAY nasal spray Spray 2 sprays in nostril     gabapentin (NEURONTIN) 600 MG tablet Take 1-2 tablets (600-1,200 mg) by mouth 3 times daily     ibuprofen (ADVIL/MOTRIN) 200 MG tablet 2 x 200mg tab by mouth nightly at 9/10pm     metoprolol succinate ER (TOPROL XL) 25 MG 24 hr tablet TAKE 1 TABLET EVERY DAY AT 8AM     pravastatin (PRAVACHOL) 20 MG tablet TAKE 1 TABLET AT BEDTIME     psyllium (METAMUCIL/KONSYL) Packet By mouth in liquid daily as needed for constipation     sertraline (ZOLOFT) 25 MG tablet Take 2 tablets (50 mg) by mouth daily     vitamin D3 (CHOLECALCIFEROL) 50 mcg (2000 units) tablet Vitamin d3 by mouth daily at 8am       I spent 40 minutes caring for the patient today including video time, reviewing records, answering questions, and documentation.    CRYSTAL Lang,  CNP  [facility]         Again, thank you for allowing me to participate in the care of your patient.      Sincerely,    CRYSTAL Andrews CNP

## 2023-01-25 NOTE — PROGRESS NOTES
Carmen is a 89 year old who is being evaluated via a billable video visit.      How would you like to obtain your AVS? MyChart  If the video visit is dropped, the invitation should be resent by: Send to e-mail at: [email or web address]  Will anyone else be joining your video visit? No        Video-Visit Details    Type of service:  Video Visit   Video Start Time: 12:06 pm  Video End Time:12:37 pm  31 minutes    Originating Location (pt. Location): Home    Distant Location (provider location):  On-site  Platform used for Video Visit: [facility]    Chief Complaint   Patient presents with     PAUL Callejas    --------------------------------------------------------------------------------------------------------------------------------------------------------------------------------------------------------      ASSESSMENT:    1)  Parkinson's Disease: Mobility impacted by chronic sciatica pain.     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 Pt has opted not to have surgery.        3)  Anxiety: Ongoing issue.      PLAN:    The following patient education provided: -     __  I would encourage you to talk to your Primary Care Provider - to be referred to the Pain Clinic.     __  They might consider a medication like Cymbalta that can help with mood and pain.    __  Continue taking the same antiparkinsonian medications.     __  Stay on the same dose of Zoloft for now.  Depending on how your visit with pain clinic is going to go, we can increase the dose (instead of switching.)    __  I will sign and send the order for a new power W/C foot to Eli Reddy OTR/L.    __ Virtual visit in 3 months. You may return sooner as needed.     __  Schedulers will call you to schedule your next visit.         MOVEMENT DISORDERS CLINIC     PATIENT: ALEXA Luis [facility]    : 1932    DATE: 2023    REASON FOR VISIT: Parkinson's disease (PD) and anxiety follow  up.    HPI: Ms. ALEXA Luu is a 89 year old who is seen via video visit for a follow up visit.      During today's video visit pt and daughter were present.     Anxiety: She still has anxiety. Some days she is better and other days, she feels very anxious. Since her Zoloft was increased from 25 mg to 50 mg, she is not sure if she has noticed any change. Some of the residents in her assisted living told her to change her antianxiety medication. Her daughter reports that patient's anxiety gets worse when she didn't sleep well (due to pain/anxiety) or when pain is not well controlled. She gets counseling from people.     She is asking pain medication for the sciatica pain. She has chronic left hip/buttock/low back pain that radiates to her left leg.  Pain is present on all the time and worse with prolonged sitting or walking. She was taking Gabapentin 1200 mg 3 x a day and her PCP reduced the dose to 600 mg TID. Pt hasn't noticed a big change. She has continued PT and OT twice a week at the [facility]. In the past, she was seen by the pain clinic.  But not lately.  One of the residents has mentioned Tramadol and how it helped similar pain. Pt/daughter asked my opinion on Tramadol. At some point, she was on oxycodone. She is taking Ibuprofen 200 mg 2 tabs and 1 acetaminophen twice daily with food.     Pt hasn't been on Cymbalta.     She uses her power w/c all the time for mobility.  She can only walk for 15 min with two assist. She needs a longer footplate for her power w/c to prevent foot contraction and injury.     MEDICATIONS:   Outpatient Medications Marked as Taking for the 1/25/23 encounter (Virtual Visit) with Bossman Bolaños APRN CNP   Medication Sig     acetaminophen (TYLENOL) 500 MG tablet Take 500 mg by mouth every 6 hours as needed (Occasionally)     Ascorbic Acid (VITAMIN C) 100 MG CHEW Take 1 tablet by mouth daily     aspirin (ASA) 81 MG EC tablet 81mg tab by mouth daily  @8am     calcium carbonate 500 MG CHEW 1 tums by mouth nightly @ 10/11pm     calcium carbonate 600 mg-vitamin D 400 units (CALTRATE) 600-400 MG-UNIT per tablet 1 tab by mouth daily at 8am     carbidopa-levodopa (SINEMET CR)  MG CR tablet Take 1 tablet by mouth At Bedtime     carbidopa-levodopa (SINEMET)  MG tablet Take 2 tabs by mouth at 7 - 8 AM, 12 -12:30 PM, AND 1.5 tabs at 5-5:30 PM = 5.5 Tabs     fluticasone furoate 27.5 MCG/SPRAY nasal spray Spray 2 sprays in nostril     gabapentin (NEURONTIN) 600 MG tablet Take 1-2 tablets (600-1,200 mg) by mouth 3 times daily     ibuprofen (ADVIL/MOTRIN) 200 MG tablet 2 x 200mg tab by mouth nightly at 9/10pm     metoprolol succinate ER (TOPROL XL) 25 MG 24 hr tablet TAKE 1 TABLET EVERY DAY AT 8AM     pravastatin (PRAVACHOL) 20 MG tablet TAKE 1 TABLET AT BEDTIME     psyllium (METAMUCIL/KONSYL) Packet By mouth in liquid daily as needed for constipation     sertraline (ZOLOFT) 25 MG tablet Take 2 tablets (50 mg) by mouth daily     vitamin D3 (CHOLECALCIFEROL) 50 mcg (2000 units) tablet Vitamin d3 by mouth daily at 8am       I spent 40 minutes caring for the patient today including video time, reviewing records, answering questions, and documentation.    Analisa Bolaños, APRN,  CNP  [facility]

## 2023-01-25 NOTE — PATIENT INSTRUCTIONS
Dear Ms. ALEXA Luu,    Thank you for coming today.  During your visit, we have discussed the following:     __  I would encourage you to talk to your Primary Care Provider - to be referred to the Pain Clinic.     __  They might consider a medication like Cymbalta that can help with mood and pain.    __  Continue taking the same antiparkinsonian medications.     __  Stay on the same dose of Zoloft for now.  Depending on how your visit with pain clinic is going to go, we can increase the dose (instead of switching.)    __  I will sign and send the order for a new power W/C foot to SUSY Nelson/L.    __ Virtual visit in 3 months. You may return sooner as needed.     __  Schedulers will call you to schedule your next visit.       For questions, you may send us a [employer] message or call [phone]    Fax number: [phone]    CRYSTAL Lang, CNP  [facility]

## 2023-01-25 NOTE — LETTER
Date:January 25, 2023      Provider requested that no letter be sent. Do not send.       [facility]

## 2023-02-06 ENCOUNTER — DOCUMENTATION ONLY (OUTPATIENT)
Dept: NEUROLOGY | Facility: CLINIC | Age: 88
End: 2023-02-06
Payer: COMMERCIAL

## 2023-02-06 NOTE — PROGRESS NOTES
Received PT order from [facility], form was signed by provider and fax to [phone], sent to be scanned into patient chart

## 2023-02-14 PROCEDURE — 87086 URINE CULTURE/COLONY COUNT: CPT | Mod: ORL | Performed by: NURSE PRACTITIONER

## 2023-02-14 PROCEDURE — 81001 URINALYSIS AUTO W/SCOPE: CPT | Mod: ORL | Performed by: NURSE PRACTITIONER

## 2023-02-15 ENCOUNTER — LAB REQUISITION (OUTPATIENT)
Dept: LAB | Facility: CLINIC | Age: 88
End: 2023-02-15
Payer: COMMERCIAL

## 2023-02-15 LAB
ALBUMIN UR-MCNC: 10 MG/DL
APPEARANCE UR: ABNORMAL
BACTERIA #/AREA URNS HPF: ABNORMAL /HPF
BILIRUB UR QL STRIP: NEGATIVE
CAOX CRY #/AREA URNS HPF: ABNORMAL /HPF
COLOR UR AUTO: YELLOW
GLUCOSE UR STRIP-MCNC: NEGATIVE MG/DL
HGB UR QL STRIP: NEGATIVE
HYALINE CASTS: 4 /LPF
KETONES UR STRIP-MCNC: ABNORMAL MG/DL
LEUKOCYTE ESTERASE UR QL STRIP: ABNORMAL
MUCOUS THREADS #/AREA URNS LPF: PRESENT /LPF
NITRATE UR QL: POSITIVE
PH UR STRIP: 5.5 [PH] (ref 5–7)
RBC URINE: 4 /HPF
SP GR UR STRIP: 1.02 (ref 1–1.03)
SQUAMOUS EPITHELIAL: 2 /HPF
TRANSITIONAL EPI: 1 /HPF
UROBILINOGEN UR STRIP-MCNC: NORMAL MG/DL
WBC URINE: 28 /HPF

## 2023-02-16 LAB — BACTERIA UR CULT: ABNORMAL

## 2023-02-21 ENCOUNTER — DOCUMENTATION ONLY (OUTPATIENT)
Dept: NEUROLOGY | Facility: CLINIC | Age: 88
End: 2023-02-21
Payer: COMMERCIAL

## 2023-02-21 ENCOUNTER — MEDICAL CORRESPONDENCE (OUTPATIENT)
Dept: HEALTH INFORMATION MANAGEMENT | Facility: CLINIC | Age: 88
End: 2023-02-21
Payer: COMMERCIAL

## 2023-02-21 NOTE — PROGRESS NOTES
Received order from [facility], order was signed by provider, and fax to [phone], sent to be scanned into chart

## 2023-02-28 PROCEDURE — 81001 URINALYSIS AUTO W/SCOPE: CPT | Mod: ORL | Performed by: NURSE PRACTITIONER

## 2023-02-28 PROCEDURE — 87086 URINE CULTURE/COLONY COUNT: CPT | Mod: ORL | Performed by: NURSE PRACTITIONER

## 2023-04-08 ENCOUNTER — MYC MEDICAL ADVICE (OUTPATIENT)
Dept: CARDIOLOGY | Facility: CLINIC | Age: 88
End: 2023-04-08
Payer: COMMERCIAL

## 2023-04-08 ENCOUNTER — HEALTH MAINTENANCE LETTER (OUTPATIENT)
Age: 88
End: 2023-04-08

## 2023-04-11 NOTE — TELEPHONE ENCOUNTER
Spoke with Anisha regarding [employer] message received. Confirmed last device check was 01/23. Daughter will follow up with billing, no future or continuous device checks. Anisha expressed understanding.-CAROLINE

## 2023-04-11 NOTE — TELEPHONE ENCOUNTER
From: Delroy Coronado RN  Sent: 4/11/2023   9:00 AM CDT  To: Karen Barboza  Subject: FW: Loop Recorder                                Hi Karen,     She's been inactive in [employer]. It looks like the last time she would have been billed for a check was in January. She shouldn't be getting billed for this any longer. Let me know if I can help with anything else. Thanks!    Delroy   ----- Message -----  From: Karen Barboza  Sent: 4/10/2023   4:27 PM CDT  To: Delroy Coronado RN  Subject: FW: Loop Recorder                                Oneal Potts,    Wondering if you know anything about this or just wait for billing to respond to Pt?    Karen Leung RN

## 2023-08-09 ENCOUNTER — TRANSFERRED RECORDS (OUTPATIENT)
Dept: HEALTH INFORMATION MANAGEMENT | Facility: CLINIC | Age: 88
End: 2023-08-09
Payer: COMMERCIAL

## 2023-08-14 ENCOUNTER — DOCUMENTATION ONLY (OUTPATIENT)
Dept: NEUROLOGY | Facility: CLINIC | Age: 88
End: 2023-08-14
Payer: COMMERCIAL

## 2023-08-14 NOTE — PROGRESS NOTES
Received OT order and plan of care, order was signed by provider and fax to [phone], copy was sent to be scanned into patient chart

## 2023-10-19 ENCOUNTER — LAB REQUISITION (OUTPATIENT)
Dept: LAB | Facility: CLINIC | Age: 88
End: 2023-10-19
Payer: COMMERCIAL

## 2023-10-19 DIAGNOSIS — F29 UNSPECIFIED PSYCHOSIS NOT DUE TO A SUBSTANCE OR KNOWN PHYSIOLOGICAL CONDITION (H): ICD-10-CM

## 2023-10-19 DIAGNOSIS — R32 UNSPECIFIED URINARY INCONTINENCE: ICD-10-CM

## 2023-10-19 LAB
ALBUMIN UR-MCNC: NEGATIVE MG/DL
APPEARANCE UR: ABNORMAL
BACTERIA #/AREA URNS HPF: ABNORMAL /HPF
BILIRUB UR QL STRIP: NEGATIVE
COLOR UR AUTO: ABNORMAL
GLUCOSE UR STRIP-MCNC: NEGATIVE MG/DL
HGB UR QL STRIP: NEGATIVE
KETONES UR STRIP-MCNC: NEGATIVE MG/DL
LEUKOCYTE ESTERASE UR QL STRIP: ABNORMAL
MUCOUS THREADS #/AREA URNS LPF: PRESENT /LPF
NITRATE UR QL: POSITIVE
PH UR STRIP: 7 [PH] (ref 5–7)
RBC URINE: 1 /HPF
SP GR UR STRIP: 1.02 (ref 1–1.03)
SQUAMOUS EPITHELIAL: 7 /HPF
TRANSITIONAL EPI: 1 /HPF
UROBILINOGEN UR STRIP-MCNC: NORMAL MG/DL
WBC URINE: 44 /HPF

## 2023-10-19 PROCEDURE — 81001 URINALYSIS AUTO W/SCOPE: CPT | Mod: ORL | Performed by: NURSE PRACTITIONER

## 2023-10-19 PROCEDURE — 87088 URINE BACTERIA CULTURE: CPT | Mod: ORL | Performed by: NURSE PRACTITIONER

## 2023-10-21 LAB — BACTERIA UR CULT: ABNORMAL

## 2023-10-30 ENCOUNTER — TRANSFERRED RECORDS (OUTPATIENT)
Dept: HEALTH INFORMATION MANAGEMENT | Facility: CLINIC | Age: 88
End: 2023-10-30
Payer: COMMERCIAL

## 2023-10-30 ENCOUNTER — MEDICAL CORRESPONDENCE (OUTPATIENT)
Dept: HEALTH INFORMATION MANAGEMENT | Facility: CLINIC | Age: 88
End: 2023-10-30
Payer: COMMERCIAL

## 2023-10-30 ENCOUNTER — DOCUMENTATION ONLY (OUTPATIENT)
Dept: NEUROLOGY | Facility: CLINIC | Age: 88
End: 2023-10-30
Payer: COMMERCIAL

## 2023-10-30 NOTE — PROGRESS NOTES
Received Order from [facility], order was signed by provider and fax to [phone], copy was sent to be scanned into patient chart

## 2023-11-09 ENCOUNTER — HOSPITAL ENCOUNTER (OUTPATIENT)
Dept: MAMMOGRAPHY | Facility: CLINIC | Age: 88
Discharge: HOME | End: 2023-11-09
Attending: FAMILY MEDICINE | Admitting: FAMILY MEDICINE
Payer: COMMERCIAL

## 2023-11-09 DIAGNOSIS — Z12.31 VISIT FOR SCREENING MAMMOGRAM: ICD-10-CM

## 2023-11-09 PROCEDURE — 77067 SCR MAMMO BI INCL CAD: CPT

## 2023-12-11 ENCOUNTER — DOCUMENTATION ONLY (OUTPATIENT)
Dept: NEUROLOGY | Facility: CLINIC | Age: 88
End: 2023-12-11
Payer: COMMERCIAL

## 2023-12-11 NOTE — PROGRESS NOTES
Received order from [facility], order was signed by provider and fax to [phone], copy was sent to be scanned into patient chart

## 2023-12-19 ENCOUNTER — LAB REQUISITION (OUTPATIENT)
Dept: LAB | Facility: CLINIC | Age: 88
End: 2023-12-19
Payer: COMMERCIAL

## 2023-12-19 DIAGNOSIS — R39.81 FUNCTIONAL URINARY INCONTINENCE: ICD-10-CM

## 2023-12-19 LAB
ALBUMIN UR-MCNC: ABNORMAL MG/DL
APPEARANCE UR: ABNORMAL
BACTERIA #/AREA URNS HPF: ABNORMAL /HPF
BILIRUB UR QL STRIP: NEGATIVE
COLOR UR AUTO: YELLOW
GLUCOSE UR STRIP-MCNC: NEGATIVE MG/DL
HGB UR QL STRIP: NEGATIVE
KETONES UR STRIP-MCNC: NEGATIVE MG/DL
LEUKOCYTE ESTERASE UR QL STRIP: ABNORMAL
MUCOUS THREADS #/AREA URNS LPF: PRESENT /LPF
NITRATE UR QL: POSITIVE
PH UR STRIP: 6 [PH] (ref 5–7)
RBC URINE: 0 /HPF
SP GR UR STRIP: 1.01 (ref 1–1.03)
SQUAMOUS EPITHELIAL: 6 /HPF
UROBILINOGEN UR STRIP-MCNC: NORMAL MG/DL
WBC CLUMPS #/AREA URNS HPF: PRESENT /HPF
WBC URINE: 72 /HPF

## 2023-12-19 PROCEDURE — 87086 URINE CULTURE/COLONY COUNT: CPT | Mod: ORL | Performed by: NURSE PRACTITIONER

## 2023-12-19 PROCEDURE — 87186 SC STD MICRODIL/AGAR DIL: CPT | Mod: ORL | Performed by: NURSE PRACTITIONER

## 2023-12-19 PROCEDURE — 81001 URINALYSIS AUTO W/SCOPE: CPT | Mod: ORL | Performed by: NURSE PRACTITIONER

## 2023-12-21 LAB — BACTERIA UR CULT: ABNORMAL

## 2024-01-26 ENCOUNTER — LAB REQUISITION (OUTPATIENT)
Dept: LAB | Facility: CLINIC | Age: 89
End: 2024-01-26
Payer: COMMERCIAL

## 2024-01-26 LAB
ALBUMIN UR-MCNC: 300 MG/DL
APPEARANCE UR: ABNORMAL
BILIRUB UR QL STRIP: NEGATIVE
COLOR UR AUTO: YELLOW
GLUCOSE UR STRIP-MCNC: NEGATIVE MG/DL
HGB UR QL STRIP: NEGATIVE
KETONES UR STRIP-MCNC: NEGATIVE MG/DL
LEUKOCYTE ESTERASE UR QL STRIP: ABNORMAL
MUCOUS THREADS #/AREA URNS LPF: PRESENT /LPF
NITRATE UR QL: NEGATIVE
PH UR STRIP: 6 [PH] (ref 5–7)
RBC URINE: 8 /HPF
SP GR UR STRIP: 1.02 (ref 1–1.03)
SQUAMOUS EPITHELIAL: 4 /HPF
UROBILINOGEN UR STRIP-MCNC: NORMAL MG/DL
WBC URINE: >182 /HPF

## 2024-01-26 PROCEDURE — 81001 URINALYSIS AUTO W/SCOPE: CPT | Mod: ORL | Performed by: NURSE PRACTITIONER

## 2024-01-26 PROCEDURE — 87086 URINE CULTURE/COLONY COUNT: CPT | Mod: ORL | Performed by: NURSE PRACTITIONER

## 2024-01-27 LAB — BACTERIA UR CULT: NORMAL

## 2024-02-13 ENCOUNTER — DOCUMENTATION ONLY (OUTPATIENT)
Dept: NEUROLOGY | Facility: CLINIC | Age: 89
End: 2024-02-13
Payer: COMMERCIAL

## 2024-02-13 NOTE — PROGRESS NOTES
Received Cert and return from [facility], order was signed by provider and fax to [phone], copy was sent to be scanned into patient chart

## 2024-03-28 ENCOUNTER — LAB REQUISITION (OUTPATIENT)
Dept: LAB | Facility: CLINIC | Age: 89
End: 2024-03-28
Payer: COMMERCIAL

## 2024-03-28 DIAGNOSIS — R35.0 FREQUENCY OF MICTURITION: ICD-10-CM

## 2024-03-28 LAB
ALBUMIN UR-MCNC: 50 MG/DL
APPEARANCE UR: ABNORMAL
BILIRUB UR QL STRIP: NEGATIVE
COLOR UR AUTO: YELLOW
GLUCOSE UR STRIP-MCNC: NEGATIVE MG/DL
HGB UR QL STRIP: ABNORMAL
KETONES UR STRIP-MCNC: NEGATIVE MG/DL
LEUKOCYTE ESTERASE UR QL STRIP: ABNORMAL
MUCOUS THREADS #/AREA URNS LPF: PRESENT /LPF
NITRATE UR QL: NEGATIVE
PH UR STRIP: 6 [PH] (ref 5–7)
RBC URINE: 26 /HPF
SP GR UR STRIP: 1.02 (ref 1–1.03)
SQUAMOUS EPITHELIAL: 4 /HPF
UROBILINOGEN UR STRIP-MCNC: NORMAL MG/DL
WBC URINE: >182 /HPF

## 2024-03-28 PROCEDURE — 87086 URINE CULTURE/COLONY COUNT: CPT | Mod: ORL | Performed by: NURSE PRACTITIONER

## 2024-03-28 PROCEDURE — 81001 URINALYSIS AUTO W/SCOPE: CPT | Mod: ORL | Performed by: NURSE PRACTITIONER

## 2024-03-30 LAB — BACTERIA UR CULT: NORMAL

## 2024-04-02 ENCOUNTER — LAB REQUISITION (OUTPATIENT)
Dept: LAB | Facility: CLINIC | Age: 89
End: 2024-04-02
Payer: COMMERCIAL

## 2024-04-02 DIAGNOSIS — I10 ESSENTIAL (PRIMARY) HYPERTENSION: ICD-10-CM

## 2024-04-02 DIAGNOSIS — E03.9 HYPOTHYROIDISM, UNSPECIFIED: ICD-10-CM

## 2024-04-02 DIAGNOSIS — D64.9 ANEMIA, UNSPECIFIED: ICD-10-CM

## 2024-04-03 LAB
ANION GAP SERPL CALCULATED.3IONS-SCNC: 11 MMOL/L (ref 7–15)
BUN SERPL-MCNC: 22.3 MG/DL (ref 8–23)
CALCIUM SERPL-MCNC: 9.2 MG/DL (ref 8.2–9.6)
CHLORIDE SERPL-SCNC: 102 MMOL/L (ref 98–107)
CREAT SERPL-MCNC: 0.61 MG/DL (ref 0.51–0.95)
DEPRECATED HCO3 PLAS-SCNC: 26 MMOL/L (ref 22–29)
EGFRCR SERPLBLD CKD-EPI 2021: 84 ML/MIN/1.73M2
ERYTHROCYTE [DISTWIDTH] IN BLOOD BY AUTOMATED COUNT: 14.3 % (ref 10–15)
GLUCOSE SERPL-MCNC: 93 MG/DL (ref 70–99)
HCT VFR BLD AUTO: 41.4 % (ref 35–47)
HGB BLD-MCNC: 13.2 G/DL (ref 11.7–15.7)
MCH RBC QN AUTO: 30.7 PG (ref 26.5–33)
MCHC RBC AUTO-ENTMCNC: 31.9 G/DL (ref 31.5–36.5)
MCV RBC AUTO: 96 FL (ref 78–100)
PLATELET # BLD AUTO: 351 10E3/UL (ref 150–450)
POTASSIUM SERPL-SCNC: 4.3 MMOL/L (ref 3.4–5.3)
RBC # BLD AUTO: 4.3 10E6/UL (ref 3.8–5.2)
SODIUM SERPL-SCNC: 139 MMOL/L (ref 135–145)
TSH SERPL DL<=0.005 MIU/L-ACNC: 1.05 UIU/ML (ref 0.3–4.2)
WBC # BLD AUTO: 9 10E3/UL (ref 4–11)

## 2024-04-03 PROCEDURE — 80048 BASIC METABOLIC PNL TOTAL CA: CPT | Mod: ORL | Performed by: NURSE PRACTITIONER

## 2024-04-03 PROCEDURE — P9603 ONE-WAY ALLOW PRORATED MILES: HCPCS | Mod: ORL | Performed by: NURSE PRACTITIONER

## 2024-04-03 PROCEDURE — 84443 ASSAY THYROID STIM HORMONE: CPT | Mod: ORL | Performed by: NURSE PRACTITIONER

## 2024-04-03 PROCEDURE — 85027 COMPLETE CBC AUTOMATED: CPT | Mod: ORL | Performed by: NURSE PRACTITIONER

## 2024-04-03 PROCEDURE — 36415 COLL VENOUS BLD VENIPUNCTURE: CPT | Mod: ORL | Performed by: NURSE PRACTITIONER

## 2024-04-23 ENCOUNTER — VIRTUAL VISIT (OUTPATIENT)
Dept: NEUROLOGY | Facility: CLINIC | Age: 89
End: 2024-04-23
Payer: COMMERCIAL

## 2024-04-23 DIAGNOSIS — R49.9 VOICE DISORDER: ICD-10-CM

## 2024-04-23 DIAGNOSIS — G20.A1 PARKINSON'S DISEASE, UNSPECIFIED WHETHER DYSKINESIA PRESENT, UNSPECIFIED WHETHER MANIFESTATIONS FLUCTUATE (H): ICD-10-CM

## 2024-04-23 DIAGNOSIS — R13.19 OTHER DYSPHAGIA: ICD-10-CM

## 2024-04-23 DIAGNOSIS — G20.C ATYPICAL PARKINSONISM (H): Primary | ICD-10-CM

## 2024-04-23 DIAGNOSIS — I63.441 CEREBROVASCULAR ACCIDENT (CVA) DUE TO EMBOLISM OF RIGHT CEREBELLAR ARTERY (H): ICD-10-CM

## 2024-04-23 PROBLEM — M16.9 OSTEOARTHRITIS OF HIP: Status: ACTIVE | Noted: 2022-07-13

## 2024-04-23 PROBLEM — H51.0 GAZE PALSY: Status: ACTIVE | Noted: 2022-08-11

## 2024-04-23 PROBLEM — H50.011 ESOTROPIA OF RIGHT EYE: Status: ACTIVE | Noted: 2022-08-11

## 2024-04-23 PROBLEM — H53.2 DIPLOPIA: Status: ACTIVE | Noted: 2022-08-11

## 2024-04-23 PROCEDURE — 99417 PROLNG OP E/M EACH 15 MIN: CPT | Performed by: PSYCHIATRY & NEUROLOGY

## 2024-04-23 PROCEDURE — 99215 OFFICE O/P EST HI 40 MIN: CPT | Mod: 95 | Performed by: PSYCHIATRY & NEUROLOGY

## 2024-04-23 RX ORDER — CARBIDOPA AND LEVODOPA 50; 200 MG/1; MG/1
TABLET, EXTENDED RELEASE ORAL
Qty: 90 TABLET | Refills: 3 | COMMUNITY
Start: 2024-04-23 | End: 2024-04-23

## 2024-04-23 RX ORDER — IBUPROFEN 200 MG
TABLET ORAL
COMMUNITY
Start: 2024-04-23

## 2024-04-23 RX ORDER — DULOXETIN HYDROCHLORIDE 30 MG/1
CAPSULE, DELAYED RELEASE ORAL
COMMUNITY
Start: 2024-04-23

## 2024-04-23 RX ORDER — ACETAMINOPHEN 500 MG
TABLET ORAL
COMMUNITY
Start: 2024-04-23

## 2024-04-23 RX ORDER — FLUTICASONE PROPIONATE 50 MCG
SPRAY, SUSPENSION (ML) NASAL
COMMUNITY
Start: 2024-03-12 | End: 2024-04-23

## 2024-04-23 RX ORDER — FLUTICASONE PROPIONATE 50 MCG
1 SPRAY, SUSPENSION (ML) NASAL 2 TIMES DAILY PRN
COMMUNITY
Start: 2024-04-23

## 2024-04-23 RX ORDER — ASPIRIN 81 MG/1
TABLET ORAL
COMMUNITY
Start: 2024-04-23

## 2024-04-23 RX ORDER — IBUPROFEN 200 MG
TABLET ORAL
COMMUNITY
Start: 2024-04-23 | End: 2024-04-23

## 2024-04-23 RX ORDER — ESTRADIOL 0.1 MG/G
CREAM VAGINAL
COMMUNITY
Start: 2022-07-15 | End: 2024-04-23

## 2024-04-23 RX ORDER — ACETAMINOPHEN 500 MG
TABLET ORAL
COMMUNITY
Start: 2024-04-23 | End: 2024-04-23

## 2024-04-23 RX ORDER — CARBIDOPA AND LEVODOPA 25; 100 MG/1; MG/1
TABLET ORAL
Qty: 495 TABLET | Refills: 3 | COMMUNITY
Start: 2024-04-23 | End: 2024-04-23

## 2024-04-23 RX ORDER — CARBIDOPA AND LEVODOPA 25; 100 MG/1; MG/1
TABLET ORAL
Qty: 580 TABLET | Refills: 3 | Status: SHIPPED | OUTPATIENT
Start: 2024-04-23

## 2024-04-23 RX ORDER — GABAPENTIN 100 MG/1
CAPSULE ORAL
COMMUNITY
Start: 2024-04-15 | End: 2024-04-23

## 2024-04-23 RX ORDER — ESTRADIOL 0.1 MG/G
CREAM VAGINAL
COMMUNITY
Start: 2024-03-12 | End: 2024-04-23

## 2024-04-23 RX ORDER — CARBIDOPA AND LEVODOPA 25; 100 MG/1; MG/1
TABLET ORAL
Qty: 580 TABLET | Refills: 3 | COMMUNITY
Start: 2024-04-23 | End: 2024-04-23

## 2024-04-23 RX ORDER — TRAMADOL HYDROCHLORIDE 50 MG/1
1 TABLET ORAL 2 TIMES DAILY
COMMUNITY
Start: 2023-02-16

## 2024-04-23 RX ORDER — METHENAMINE HIPPURATE 1000 MG/1
TABLET ORAL
COMMUNITY
Start: 2024-01-21 | End: 2024-04-23

## 2024-04-23 RX ORDER — GABAPENTIN 100 MG/1
CAPSULE ORAL
COMMUNITY
Start: 2024-04-23

## 2024-04-23 RX ORDER — VIT A/VIT C/VIT E/ZINC/COPPER 4296-226
CAPSULE ORAL
COMMUNITY
Start: 2024-03-06 | End: 2024-04-23

## 2024-04-23 RX ORDER — ANTACID TABLETS 500 MG/1
TABLET, CHEWABLE ORAL
COMMUNITY
Start: 2024-04-23

## 2024-04-23 RX ORDER — DULOXETIN HYDROCHLORIDE 30 MG/1
1 CAPSULE, DELAYED RELEASE ORAL DAILY
COMMUNITY
Start: 2023-01-26 | End: 2024-04-23

## 2024-04-23 RX ORDER — METHENAMINE HIPPURATE 1000 MG/1
TABLET ORAL
COMMUNITY
Start: 2024-04-23

## 2024-04-23 RX ORDER — CARBIDOPA AND LEVODOPA 50; 200 MG/1; MG/1
TABLET, EXTENDED RELEASE ORAL
Qty: 90 TABLET | Refills: 3 | Status: SHIPPED | OUTPATIENT
Start: 2024-04-23

## 2024-04-23 RX ORDER — VIT A/VIT C/VIT E/ZINC/COPPER 4296-226
CAPSULE ORAL
COMMUNITY
Start: 2024-04-23

## 2024-04-23 ASSESSMENT — PAIN SCALES - GENERAL: PAINLEVEL: NO PAIN (0)

## 2024-04-23 NOTE — PROGRESS NOTES
VIDEO VISIT    Date of Visit: April 23, 2024  Name: ALEXA Luu  Date of Birth 6/19/1932  Address   [address] [address] Phone   [phone] (Home)   [phone] (Mobile) *Preferred* E-mail Address   [email or web address]      [phone]     Daughter Anisha lives across the [facility]  Daughter Mariel lives 15 minutes away in [place]  Son Ramo in [place]  Son Josias is in [facility]     Assessment:  Atypical parkinsonism  Gait disorder/fall risk  Prior STroke  89 years old     Carbidopa/levodopa Sinemet 25/100  1.5 - 1.5 - 1      Review of diagnosis    Parkinsonism - p ossible vascular vs degenerative vs both     Avoidance of dopamine blockers   Not taking     Motor complication review      Review of Impulse control disorders      Review of surgical or medication options      Gait/Balance/Falls   Not falling every day with her monitoring     Uses scooter  Cannot walk like she  Use to   Walk with walker and needs gait belt     Was living alone  Daughter 5 minutes  Had walked with belt  Recumbent bike in the past     Exercise/Therapy performed/offered      Cognitive/Driving      Mood   Had episodes of anxiety  She had used someone else's clonazepam on an occasion - ? Reaction -  Like a drunken     Hallucinations/delusions   Denies      Sleep   Sleep disordered breathing; stop bang was 4/8  RLS symptoms - rare  Sleep behaviors - absent   Chronic insomnia - has problems staying asleep  Has a number of good hours and then wakes up at 3am or so  Trazodone 50mg x 2   She has a motorized scooter/wheelchair  Has a commode by her bed     Bladder      GI/Constipation/GERD   hemorrhoids     ENDO   Lipid disorder  Pravastatin pravachol 20mg  Calcium vitamin d daily  Vitamin D3 cholecalciferol 50mcg 2000 units     Cardio/heart   Non ischemic cardiomyopathy  Left atrial appendage closure device  Implantable loop recorder  Venous insufficiency  Metoprolol succinate  ER Toprol XL 25mg 24 hr tablet     Vision   Wears glasses     Heme   Aspirin 81mg daily     Other:     Had covid     Hearing loss - needs aides  Will see audiologist and ent     Cervical fracture     Left hip joint pain that is not surgical  Steroids don't hurt     Acetaminophen 500mg at night   Ibuprofen advil/motrin 200mg x 2  night  Gabapentin neurontin 300mg 3/day      Zinc 50mg in the morning     Abnormal brain mri  Lacunar stroke  HEAD MRI from 10/5/2019  1.  Small acute cortical infarcts in the knob of the right precentral gyrus.  2.  Small acute lacunar infarct in the right cerebellar hemisphere.  3.  No mass effect or hemorrhagic transformation.  4.  Chronic cortical infarcts in the right middle frontal gyrus.  5.  Moderate burden of chronic infarcts in the cerebellar hemispheres, left greater than right.  6.  Chronic type II odontoid fracture with surrounding pannus. This fracture was acute on 06/25/2018.    For hip pain may consider over the counter pain medication like aspercreme/patch vs diclofenac/voltaren 1% gel     She may not be officially home bound per her report     Needs new orders for therapy to [facility]     Return to see Analisa Bolaños in 3-6 months    1)  Parkinson's Disease: Mobility impacted by chronic sciatica pain.      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 Pt has opted not to have surgery.         3)  Anxiety: Ongoing issue.        PLAN:     The following patient education provided: -      __  I would encourage you to talk to your Primary Care Provider - to be referred to the Pain Clinic.      __  They might consider a medication like Cymbalta that can help with mood and pain.     __  Continue taking the same antiparkinsonian medications.      __  Stay on the same dose of Zoloft for now.  Depending on how your visit with pain clinic is going to go, we can increase the dose (instead of switching.)     __  I will sign and  send the order for a new power W/C foot to [facility].     __ Virtual visit in 3 months. You may return sooner as needed.      __  Schedulers will call you to schedule your next visit.     Paris Lancaster lives across [facility]  Paris Floyd lives 15 minutes away in [place]  Son Ramo in [place]  Naveen Ferrara is in [place]          Medications     730a 1 630p 8pm 10p 130a   Acetaminophen tylenol 500mg   1   1     1   Aspirin 81mg  1            Calcium carbonate tums 500mg     prn    CArbidopa/levodopa Sinemet 50/200 CR     1    Carbidopa/levodopa Sinemet 25/100 2 2 1.5        Diclofenac voltaren 1% gel prn            Duloxetine cymbalta 30mg  2       Fluticasone flonase nasal spray  prn        Gabapentin 100mg 1 1 1      Ibuprofen advil/motrin 200mg     1    1   Magnesium hydroxide dulcolax  prn        Methenamine hippurate  Hiprex 1gram 1  1      Metoprolol hydrochlorothiazide dutoprol 25 12.5 TB24 1        MVI preservision areds  1    1    Pravastatin pravachol 20mg          1     Tramadol ultram 50mg 1    1                                                                Seen by joe in 2032  Seen by me 2021 or thereabouts     Plan:    Visit today with daughter Anisha  She was transferred to [facility] - more enhanced assisted living  There is more care available.   She was in a motorized wheelchair and this was taken away from her as she was crashing ran into things   She has been dependent on aides for transfer   She is using a manual wheelchair to get to the bathroom, recliner, into bed and to meals     Paris Lancaster lives across the [facility]  Pairs Floyd lives 15 minutes away in [place]  Son Ramo in [place]  Naveen Ferrara is in [place]      She has had bladder issues -she has urinary frequency/urgency   She is on methenamine/hiprex  She had tried prophylactic antibiotic  She had tried estradiol  She may consider a trial of mirabegron myrbetriq 25mg which  "can increase blood pressure  Would recommend a discussion of this medication with your primary and make sure the cost is reasonable.   Can talk with pharmacy Pharmacy ([facility]) consultation and medication management  Please call the scheduling number @ [phone] to set up an appointment with pharmacists Jailene Ansari or Madelyn Montenegro.     She pressed her button 2 or 3 times during the night  Not clear how her mobility during the night.     She has had voice therapy evaluation  She has not had an xray or swallow study.   Discuss barium swallow, esophagram, esophageal motility study and sometimes people need to be scoped.   She is living in [place] and her daughter is in [place].   She may benefit from a consultation with a speech language pathologist and get a swallow study  She is having lots of secretions  She has been taking medications with something  She has not yet seen an ENT to look at her vocal cords    She has pain in the morning and may consider taking 500mg of acetaminophen @730, 630p and 130a  She is taking ibuprofen twice daily at @630p and 130a    She wakes for her parkinson medications which they deliver around 630/7a  She wakes up a bit before her pills arrive  She is not using anything for her legs yet - she has voltaren  She has icy hot for her legs and could try.     Parkinson medications were reviewed  Sinemet CR 50/200 at 10pm  Sinemet 25/100 2@730, 2@1p and 1.5@ 630p   Will add a 130a dose of sinemet 25/100 to see if can help the pain she has when she wakes up at 7am    \"bad breath\" - in the morning - may be related to acid reflux.   Using tums as needed    Anisha has mychart access granted.     Plan  Virtual appointment with pharmacy     Last visit with Eliana was 1/25/2023  Return visit Eliana in 6 months.             Medical Decision Making:  #  Chronic progressive medical conditions addressed  Yes above  Review and/or interpretation of unique test or documentation from a provider " "outside of neurology yes   Independent historian provided additional details  Yes daughter   Prescription drug management and review of potential side effects and/or monitoring for side effects  yes   Health impacted by social determinants of health  Yes facility     I have reviewed the note as documented above.  This accurately captures the substance of my conversation with the patient and total time spent preparing for visit, executing visit and completing visit on the day of the visit:  60  minutes.     The longitudinal plan of care for ALEXA Luu was addressed during this visit. Due to the added complexity in care, I will continue to support ALEXA Luu in the subsequent management of this condition(s) and with the ongoing continuity of care of this condition(s).    Ed Mendoza MD      ------------------------------------------------------------------------------------------------------------------------------------------------------------------------    Video-Visit Details    The patient has been notified of following:     \"After a review of the patient s situation, this visit was changed from an in-person visit to a video visit to reduce the risk of COVID-19 exposure.   The patient is being evaluated via a billable video visit.\"    \"This video visit will be conducted via a call between you and your physician/provider. We have found that certain health care needs can be provided without the need for an in-person physical exam.  This service lets us provide the care you need with a video conversation.  If a prescription is necessary we can send it directly to your pharmacy.  If lab work is needed we can place an order for that and you can then stop by our lab to have the test done at a later time.    If during the course of the call the physician/provider feels a video visit is not appropriate, you will not be charged for this service.\"     Patient has given verbal consent for Video visit? " Yes    Patient would like the video invitation sent by:     Type of service:  Video Visit    Video Start Time:     Video End Time (time video stopped):     Duration:  minutes - see above    Originating Location (pt. Location):     Distant Location (provider location):  [facility] [place]     Mode of Communication:  Video Conference via MED[employer] (and if not possible then doximity)      Ed Mendoza MD      --------------------------------------------------------------------------------------------------------------    ALEXA Carmen Luu is a 89 year old female who is being evaluated via a billable video visit.      Charts reviewed  Consult from  Images reviewed        I have reviewed and updated the patient's Past Medical History, Social History, Family History and Medication List.    ALLERGIES  Meperidine, Acetaminophen-codeine, Codeine, Morphine, Penicillin g, Penicillins, and Vicodin [hydrocodone-acetaminophen]    Lasts visit details if there was a last visit:       14 Review of systems  are negative except for   Patient Active Problem List   Diagnosis     Atypical parkinsonism (H)     Left ventricular hypertrophy     Hemorrhoids     Mixed hyperlipidemia     Posterior capsular opacification     Prolapse of vaginal wall     Venous insufficiency     Stroke (H)     Acute lacunar stroke (H)     Atrial fibrillation, unspecified type (H)     Non-ischemic cardiomyopathy (H)     Cryptogenic stroke (H)     Falls frequently     Balance problem     Chronic left hip pain     Gait instability     Presence of left atrial appendage closure device composed of nickel-titanium alloy with polyethylene terephthalate membrane     Status post placement of implantable loop recorder     Keratoconjunctivitis sicca of both eyes (H24)     Osteopenia     S/P VH (vaginal hysterectomy)     Hepatic cyst     Vaginal atrophy     Abnormal urine     Diplopia     Esotropia of right eye     Gaze palsy     Midline  "cystocele     Osteoarthritis of hip     Urethral caruncle     Atrophic vaginitis     Prolapse of female genital organs        Allergies   Allergen Reactions     Meperidine Anaphylaxis     Unknown cause     Acetaminophen-Codeine Other (See Comments) and Unknown     Codeine Other (See Comments)     Morphine Other (See Comments)     Made me feel really wierd     Penicillin G      Other reaction(s): Not available     Penicillins Other (See Comments)     Tongue and throat tingling  Other reaction(s): Paresthesias  Tongue and throat tingling  Tingling on lips  Other reaction(s): Paresthesias  Tongue and throat tingling  Tingling on lips  Tongue and throat \"tingling\" when in 20s       Vicodin [Hydrocodone-Acetaminophen] Other (See Comments)     Weird feeling     Past Surgical History:   Procedure Laterality Date     ANKLE SURGERY Left     fracture     APPENDECTOMY       BREAST CYST ASPIRATION       EP THANIA CLOSURE N/A 2/20/2020    Procedure: EP THANIA Closure;  Surgeon: Javier Smith MD;  Location: [facility] Cath Lab;  Service: Cardiology     EP LOOP RECORDER IMPLANT N/A 10/8/2019    Procedure: EP Loop Recorder Insertion;  Surgeon: Angel Hurd MD;  Location: [facility] Cath Lab;  Service: Cardiology     EYE SURGERY Bilateral     cataract surgery 2000     HYSTERECTOMY      tahbso     HYSTERECTOMY  1982     KNEE SURGERY Right     staph infection     TONSILLECTOMY       ZZC TOTAL ABDOM HYSTERECTOMY      Description: Hysterectomy;  Proc Date: 01/01/1988;  Comments: couldn't find her ovaries     Past Medical History:   Diagnosis Date     Abnormal brain MRI 3/11/2019    MR HEAD BRAIN WO3/8/2019 [facility] relocality [facility] Other Result Information This result has an attachment that is not available. Result Narrative [facility] RADIOLOGY  EXAM: MR HEAD BRAIN WO LOCATION: [facility] DATE/TIME: 3/7/2019 5:18 PM  INDICATION: Atypical Parkinsonism (hc) COMPARISON: CT 06/25/2018 TECHNIQUE: Routine " multiplanar multisequence head MRI without intravenou     Atrial fibrillation (H)     per H&P     Atypical parkinsonism (H) 2017     Breast cyst      CVA (cerebral vascular accident) (H)     per H&P     Finger fracture, left 2019    ring finger      Multiple rib fractures 2019     Parkinson disease (H) 2016     Social History     Socioeconomic History     Marital status:      Spouse name: Not on file     Number of children: Not on file     Years of education: Not on file     Highest education level: Not on file   Occupational History     Not on file   Tobacco Use     Smoking status: Never     Smokeless tobacco: Never   Substance and Sexual Activity     Alcohol use: No     Drug use: Never     Sexual activity: Not on file   Other Topics Concern     Not on file   Social History Narrative    . lives in [place].     Anisha Little daughter    Retired nurse.     Various hospital    Gyn, intensive care,     School system    [place], [place]    Moved here in      x 2    First    = was 43 yrs old and had melanoma    Second    had a stroke and  Was 87 yrs old.      Social Determinants of Health     Financial Resource Strain: Not on file   Food Insecurity: Not on file   Transportation Needs: Not on file   Physical Activity: Not on file   Stress: Not on file   Social Connections: Not on file   Interpersonal Safety: Not on file   Housing Stability: Not on file     Family History   Problem Relation Age of Onset     Cerebrovascular Disease Mother      Heart Disease Mother      Other - See Comments Mother         Danish Guatemalan     Dementia Father         10 yrs.     Other - See Comments Father         Guatemalan     Paranoid behavior Father      Other - See Comments Sister         bhavesh anne     Other - See Comments Paris armendariz     Other - See Comments Daughter         [place]     Other - See Comments Naveen Casillas -  [place]     Other - See Comments Son         diana Pearl     Dementia Paternal Aunt      Dementia Paternal Aunt      Dementia Paternal Uncle      Breast Cancer Maternal Aunt      Current Outpatient Medications   Medication Sig Dispense Refill     DULoxetine (CYMBALTA) 30 MG capsule Take 1 capsule by mouth daily       estradiol (ESTRACE VAGINAL) 0.1 MG/GM vaginal cream insert fingerful amount (0.5 g) vaginally 2-3 nights per week       traMADol (ULTRAM) 50 MG tablet Take 1 tablet by mouth 2 times daily       acetaminophen (TYLENOL) 500 MG tablet Take 500 mg by mouth every 6 hours as needed (Occasionally)       Ascorbic Acid (VITAMIN C) 100 MG CHEW Take 1 tablet by mouth daily       aspirin (ASA) 81 MG EC tablet 81mg tab by mouth daily @8am 30 tablet 0     ASPIRIN 81 PO Take 1 tablet by mouth daily       calcium carbonate 500 MG CHEW 1 tums by mouth nightly @ 10/11pm       calcium carbonate 600 mg-vitamin D 400 units (CALTRATE) 600-400 MG-UNIT per tablet 1 tab by mouth daily at 8am       carbidopa-levodopa (SINEMET CR)  MG CR tablet Take 1 tablet by mouth At Bedtime 90 tablet 3     carbidopa-levodopa (SINEMET)  MG tablet Take 2 tabs by mouth at 7 - 8 AM, 12 -12:30 PM, AND 1.5 tabs at 5-5:30 PM = 5.5 Tabs 495 tablet 3     diclofenac (VOLTAREN) 1 % topical gel        estradiol (ESTRACE) 0.1 MG/GM vaginal cream        fluticasone (FLONASE) 50 MCG/ACT nasal spray        fluticasone furoate 27.5 MCG/SPRAY nasal spray Spray 2 sprays in nostril       gabapentin (NEURONTIN) 100 MG capsule        gabapentin (NEURONTIN) 600 MG tablet Take 1-2 tablets (600-1,200 mg) by mouth 3 times daily 270 tablet 3     ibuprofen (ADVIL/MOTRIN) 200 MG tablet 2 x 200mg tab by mouth nightly at 9/10pm       methenamine hippurate (HIPREX) 1 g tablet        metoprolol succinate ER (TOPROL XL) 25 MG 24 hr tablet TAKE 1 TABLET EVERY DAY AT 8AM 60 tablet 3     metoprolol-hydrochlorothiazide (DUTOPROL) 25-12.5 MG TB24 per tablet  25mg 1/day       pravastatin (PRAVACHOL) 20 MG tablet TAKE 1 TABLET AT BEDTIME 90 tablet 1     psyllium (METAMUCIL/KONSYL) Packet By mouth in liquid daily as needed for constipation       sertraline (ZOLOFT) 25 MG tablet Take 2 tablets (50 mg) by mouth daily 180 tablet 1     vitamin D3 (CHOLECALCIFEROL) 50 mcg (2000 units) tablet Vitamin d3 by mouth daily at 8am       zinc gluconate 50 MG tablet 50mg tab by mouth every morning at 8am (Patient not taking: Reported on 10/25/2022)

## 2024-04-23 NOTE — PATIENT INSTRUCTIONS
Medications     730a 1 630p 8pm 10p 130a   Acetaminophen tylenol 500mg   1   1     1   Aspirin 81mg  1            Calcium carbonate tums 500mg     prn    CArbidopa/levodopa Sinemet 50/200 CR     1    Carbidopa/levodopa Sinemet 25/100 2 2 1.5        Diclofenac voltaren 1% gel prn            Duloxetine cymbalta 30mg  2       Fluticasone flonase nasal spray  prn        Gabapentin 100mg 1 1 1      Ibuprofen advil/motrin 200mg     1    1   Magnesium hydroxide dulcolax  prn        Methenamine hippurate  Hiprex 1gram 1  1      Metoprolol hydrochlorothiazide dutoprol 25 12.5 TB24 1        MVI preservision areds  1    1    Pravastatin pravachol 20mg          1     Tramadol ultram 50mg 1    1                                                                Seen by joe in 2032  Seen by me 2021 or thereabouts     Plan:    Visit today with daughter Anisha  She was transferred to [facility] - more enhanced assisted living  There is more care available.   She was in a motorized wheelchair and this was taken away from her as she was crashing ran into things   She has been dependent on aides for transfer   She is using a manual wheelchair to get to the bathroom, recliner, into bed and to meals     Daughter Anisha lives across the [facility]  Daughter Mariel lives 15 minutes away in [place]  Son Ramo in [place]  Son Josias is in [place]      She has had bladder issues -she has urinary frequency/urgency   She is on methenamine/hiprex  She had tried prophylactic antibiotic  She had tried estradiol  She may consider a trial of mirabegron myrbetriq 25mg which can increase blood pressure  Would recommend a discussion of this medication with your primary and make sure the cost is reasonable.   Can talk with pharmacy Pharmacy ([facility]) consultation and medication management  Please call the scheduling number @ [phone] to set up an appointment with pharmacists Jailene Ansari or Madelyn Montenegro.     She pressed  "her button 2 or 3 times during the night  Not clear how her mobility during the night.     She has had voice therapy evaluation  She has not had an xray or swallow study.   Discuss barium swallow, esophagram, esophageal motility study and sometimes people need to be scoped.   She is living in [place] and her daughter is in [place].   She may benefit from a consultation with a speech language pathologist and get a swallow study  She is having lots of secretions  She has been taking medications with something  She has not yet seen an ENT to look at her vocal cords    She has pain in the morning and may consider taking 500mg of acetaminophen @730, 630p and 130a  She is taking ibuprofen twice daily at @630p and 130a    She wakes for her parkinson medications which they deliver around 630/7a  She wakes up a bit before her pills arrive  She is not using anything for her legs yet - she has voltaren  She has icy hot for her legs and could try.     Parkinson medications were reviewed  Sinemet CR 50/200 at 10pm  Sinemet 25/100 2@730, 2@1p and 1.5@ 630p   Will add a 130a dose of sinemet 25/100 to see if can help the pain she has when she wakes up at 7am    \"bad breath\" - in the morning - may be related to acid reflux.   Using tums as needed    Anisha has [employer] access granted.   "

## 2024-04-23 NOTE — LETTER
4/23/2024       RE: ALEXA Luu  [address]  [address]  [facility] [address]     Dear Colleague,    Thank you for referring your patient, ALEXA Luu, to the [facility] NEUROLOGY CLINIC [place] [facility]. Please see a copy of my visit note below.        VIDEO VISIT    Date of Visit: April 23, 2024  Name: ALEXA Luu  Date of Birth 6/19/1932  Address   [address] [address] Phone   [phone] (Home)   [phone] (Mobile) *Preferred* E-mail Address   [email or web address]      [phone]     Daughter Anisha lives across the [facility]  Daughter Mariel lives 15 minutes away in [place]  Son Ramo in [place]  Son Josias is in [facility]     Assessment:  Atypical parkinsonism  Gait disorder/fall risk  Prior STroke  89 years old     Carbidopa/levodopa Sinemet 25/100  1.5 - 1.5 - 1      Review of diagnosis    Parkinsonism - p ossible vascular vs degenerative vs both     Avoidance of dopamine blockers   Not taking     Motor complication review      Review of Impulse control disorders      Review of surgical or medication options      Gait/Balance/Falls   Not falling every day with her monitoring     Uses scooter  Cannot walk like she  Use to   Walk with walker and needs gait belt     Was living alone  Daughter 5 minutes  Had walked with belt  Recumbent bike in the past     Exercise/Therapy performed/offered      Cognitive/Driving      Mood   Had episodes of anxiety  She had used someone else's clonazepam on an occasion - ? Reaction -  Like a drunken     Hallucinations/delusions   Denies      Sleep   Sleep disordered breathing; stop bang was 4/8  RLS symptoms - rare  Sleep behaviors - absent   Chronic insomnia - has problems staying asleep  Has a number of good hours and then wakes up at 3am or so  Trazodone 50mg x 2   She has a motorized scooter/wheelchair  Has a commode by her  bed     Bladder      GI/Constipation/GERD   hemorrhoids     ENDO   Lipid disorder  Pravastatin pravachol 20mg  Calcium vitamin d daily  Vitamin D3 cholecalciferol 50mcg 2000 units     Cardio/heart   Non ischemic cardiomyopathy  Left atrial appendage closure device  Implantable loop recorder  Venous insufficiency  Metoprolol succinate ER Toprol XL 25mg 24 hr tablet     Vision   Wears glasses     Heme   Aspirin 81mg daily     Other:     Had covid     Hearing loss - needs aides  Will see audiologist and ent     Cervical fracture     Left hip joint pain that is not surgical  Steroids don't hurt     Acetaminophen 500mg at night   Ibuprofen advil/motrin 200mg x 2  night  Gabapentin neurontin 300mg 3/day      Zinc 50mg in the morning     Abnormal brain mri  Lacunar stroke  HEAD MRI from 10/5/2019  1.  Small acute cortical infarcts in the knob of the right precentral gyrus.  2.  Small acute lacunar infarct in the right cerebellar hemisphere.  3.  No mass effect or hemorrhagic transformation.  4.  Chronic cortical infarcts in the right middle frontal gyrus.  5.  Moderate burden of chronic infarcts in the cerebellar hemispheres, left greater than right.  6.  Chronic type II odontoid fracture with surrounding pannus. This fracture was acute on 06/25/2018.    For hip pain may consider over the counter pain medication like aspercreme/patch vs diclofenac/voltaren 1% gel     She may not be officially home bound per her report     Needs new orders for therapy to mary ellen tan     Return to see Analisa Bolaños in 3-6 months    1)  Parkinson's Disease: Mobility impacted by chronic sciatica pain.      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 Pt has opted not to have surgery.         3)  Anxiety: Ongoing issue.        PLAN:     The following patient education provided: -      __  I would encourage you to talk to your Primary Care Provider - to be referred to the Pain Clinic.       __  They might consider a medication like Cymbalta that can help with mood and pain.     __  Continue taking the same antiparkinsonian medications.      __  Stay on the same dose of Zoloft for now.  Depending on how your visit with pain clinic is going to go, we can increase the dose (instead of switching.)     __  I will sign and send the order for a new power W/C foot to Eli Reddy OTR/L.     __ Virtual visit in 3 months. You may return sooner as needed.      __  Schedulers will call you to schedule your next visit.     Renee Lancaster lives across [facility]  Renee Floyd lives 15 minutes away in [place]  Son Ramo in [place]  Son Josias is in [place]          Medications     730a 1 630p 8pm 10p 130a   Acetaminophen tylenol 500mg   1   1     1   Aspirin 81mg  1            Calcium carbonate tums 500mg     prn    CArbidopa/levodopa Sinemet 50/200 CR     1    Carbidopa/levodopa Sinemet 25/100 2 2 1.5        Diclofenac voltaren 1% gel prn            Duloxetine cymbalta 30mg  2       Fluticasone flonase nasal spray  prn        Gabapentin 100mg 1 1 1      Ibuprofen advil/motrin 200mg     1    1   Magnesium hydroxide dulcolax  prn        Methenamine hippurate  Hiprex 1gram 1  1      Metoprolol hydrochlorothiazide dutoprol 25 12.5 TB24 1        MVI preservision areds  1    1    Pravastatin pravachol 20mg          1     Tramadol ultram 50mg 1    1                                                                Seen by joe in 2032  Seen by me 2021 or thereabouts     Plan:    Visit today with renee Lancaster  She was transferred to [facility] - more enhanced assisted living  There is more care available.   She was in a motorized wheelchair and this was taken away from her as she was crashing ran into things   She has been dependent on aides for transfer   She is using a manual wheelchair to get to the bathroom, recliner, into bed and to meals     Renee Lancaster lives across [facility]  "alondra armendariz  Daughter Mariel lives 15 minutes away in [place]  Son Ramo in [place]  Son Josias is in [place]      She has had bladder issues -she has urinary frequency/urgency   She is on methenamine/hiprex  She had tried prophylactic antibiotic  She had tried estradiol  She may consider a trial of mirabegron myrbetriq 25mg which can increase blood pressure  Would recommend a discussion of this medication with your primary and make sure the cost is reasonable.   Can talk with pharmacy Pharmacy ([facility]) consultation and medication management  Please call the scheduling number @ [phone] to set up an appointment with pharmacists Jailene Ansari or Madelyn Montenegro.     She pressed her button 2 or 3 times during the night  Not clear how her mobility during the night.     She has had voice therapy evaluation  She has not had an xray or swallow study.   Discuss barium swallow, esophagram, esophageal motility study and sometimes people need to be scoped.   She is living in [place] and her daughter is in [place].   She may benefit from a consultation with a speech language pathologist and get a swallow study  She is having lots of secretions  She has been taking medications with something  She has not yet seen an ENT to look at her vocal cords    She has pain in the morning and may consider taking 500mg of acetaminophen @730, 630p and 130a  She is taking ibuprofen twice daily at @630p and 130a    She wakes for her parkinson medications which they deliver around 630/7a  She wakes up a bit before her pills arrive  She is not using anything for her legs yet - she has voltaren  She has icy hot for her legs and could try.     Parkinson medications were reviewed  Sinemet CR 50/200 at 10pm  Sinemet 25/100 2@730, 2@1p and 1.5@ 630p   Will add a 130a dose of sinemet 25/100 to see if can help the pain she has when she wakes up at 7am    \"bad breath\" - in the morning - may be related to acid reflux.   Using tums as " "mimi Lancaster has [employer] access granted.     Plan  Virtual appointment with pharmacy     Last visit with Eliana was 1/25/2023  Return visit Eliana in 6 months.             Medical Decision Making:  #  Chronic progressive medical conditions addressed  Yes above  Review and/or interpretation of unique test or documentation from a provider outside of neurology yes   Independent historian provided additional details  Yes daughter   Prescription drug management and review of potential side effects and/or monitoring for side effects  yes   Health impacted by social determinants of health  Yes facility     I have reviewed the note as documented above.  This accurately captures the substance of my conversation with the patient and total time spent preparing for visit, executing visit and completing visit on the day of the visit:  60  minutes.     The longitudinal plan of care for ALEXA Luu was addressed during this visit. Due to the added complexity in care, I will continue to support ALEXA Luu in the subsequent management of this condition(s) and with the ongoing continuity of care of this condition(s).    Ed Mendoza MD      ------------------------------------------------------------------------------------------------------------------------------------------------------------------------    Video-Visit Details    The patient has been notified of following:     \"After a review of the patient s situation, this visit was changed from an in-person visit to a video visit to reduce the risk of COVID-19 exposure.   The patient is being evaluated via a billable video visit.\"    \"This video visit will be conducted via a call between you and your physician/provider. We have found that certain health care needs can be provided without the need for an in-person physical exam.  This service lets us provide the care you need with a video conversation.  If a prescription is necessary we can send it directly " "to your pharmacy.  If lab work is needed we can place an order for that and you can then stop by our lab to have the test done at a later time.    If during the course of the call the physician/provider feels a video visit is not appropriate, you will not be charged for this service.\"     Patient has given verbal consent for Video visit? Yes    Patient would like the video invitation sent by:     Type of service:  Video Visit    Video Start Time:     Video End Time (time video stopped):     Duration:  minutes - see above    Originating Location (pt. Location):     Distant Location (provider location):  [facility] NEUROLOGY [facility]IN[facility] [place]     Mode of Communication:  Video Conference via [employer] (and if not possible then doximity)      Ed Mendoza MD      --------------------------------------------------------------------------------------------------------------    ALEXA Carmen Luu is a 89 year old female who is being evaluated via a billable video visit.      Charts reviewed  Consult from  Images reviewed        I have reviewed and updated the patient's Past Medical History, Social History, Family History and Medication List.    ALLERGIES  Meperidine, Acetaminophen-codeine, Codeine, Morphine, Penicillin g, Penicillins, and Vicodin [hydrocodone-acetaminophen]    Lasts visit details if there was a last visit:       14 Review of systems  are negative except for   Patient Active Problem List   Diagnosis     Atypical parkinsonism (H)     Left ventricular hypertrophy     Hemorrhoids     Mixed hyperlipidemia     Posterior capsular opacification     Prolapse of vaginal wall     Venous insufficiency     Stroke (H)     Acute lacunar stroke (H)     Atrial fibrillation, unspecified type (H)     Non-ischemic cardiomyopathy (H)     Cryptogenic stroke (H)     Falls frequently     Balance problem     Chronic left hip pain     Gait instability     Presence of left atrial appendage closure device composed of " "nickel-titanium alloy with polyethylene terephthalate membrane     Status post placement of implantable loop recorder     Keratoconjunctivitis sicca of both eyes (H24)     Osteopenia     S/P VH (vaginal hysterectomy)     Hepatic cyst     Vaginal atrophy     Abnormal urine     Diplopia     Esotropia of right eye     Gaze palsy     Midline cystocele     Osteoarthritis of hip     Urethral caruncle     Atrophic vaginitis     Prolapse of female genital organs        Allergies   Allergen Reactions     Meperidine Anaphylaxis     Unknown cause     Acetaminophen-Codeine Other (See Comments) and Unknown     Codeine Other (See Comments)     Morphine Other (See Comments)     Made me feel really wierd     Penicillin G      Other reaction(s): Not available     Penicillins Other (See Comments)     Tongue and throat tingling  Other reaction(s): Paresthesias  Tongue and throat tingling  Tingling on lips  Other reaction(s): Paresthesias  Tongue and throat tingling  Tingling on lips  Tongue and throat \"tingling\" when in 20s       Vicodin [Hydrocodone-Acetaminophen] Other (See Comments)     Weird feeling     Past Surgical History:   Procedure Laterality Date     ANKLE SURGERY Left     fracture     APPENDECTOMY       BREAST CYST ASPIRATION       EP THANIA CLOSURE N/A 2/20/2020    Procedure: EP THANIA Closure;  Surgeon: Javier Smith MD;  Location: [facility];  Service: Cardiology     EP LOOP RECORDER IMPLANT N/A 10/8/2019    Procedure: EP Loop Recorder Insertion;  Surgeon: Angel Hurd MD;  Location: [facility];  Service: Cardiology     EYE SURGERY Bilateral     cataract surgery 2000     HYSTERECTOMY      tahbso     HYSTERECTOMY  1982     KNEE SURGERY Right     staph infection     TONSILLECTOMY       ZZC TOTAL ABDOM HYSTERECTOMY      Description: Hysterectomy;  Proc Date: 01/01/1988;  Comments: couldn't find her ovaries     Past Medical History:   Diagnosis Date     Abnormal brain MRI 3/11/2019    MR HEAD BRAIN " WO3/2019 [facility] Other Result Information This result has an attachment that is not available. Result Narrative [facility] RADIOLOGY  EXAM: MR HEAD BRAIN WO LOCATION: [facility] DATE/TIME: 3/7/2019 5:18 PM  INDICATION: Atypical Parkinsonism (hc) COMPARISON: CT 2018 TECHNIQUE: Routine multiplanar multisequence head MRI without intravenou     Atrial fibrillation (H)     per H&P     Atypical parkinsonism (H) 2017     Breast cyst      CVA (cerebral vascular accident) (H)     per H&P     Finger fracture, left 2019    ring finger      Multiple rib fractures 2019     Parkinson disease (H) 2016     Social History     Socioeconomic History     Marital status:      Spouse name: Not on file     Number of children: Not on file     Years of education: Not on file     Highest education level: Not on file   Occupational History     Not on file   Tobacco Use     Smoking status: Never     Smokeless tobacco: Never   Substance and Sexual Activity     Alcohol use: No     Drug use: Never     Sexual activity: Not on file   Other Topics Concern     Not on file   Social History Narrative    . lives in [place].     Anisha Little daughter    Retired nurse.     Various hospital    Gyn, intensive care,     School system    [place], [place]    Moved here in      x 2    First    = was 43 yrs old and had melanoma    Second   2013 had a stroke and  Was 87 yrs old.      Social Determinants of Health     Financial Resource Strain: Not on file   Food Insecurity: Not on file   Transportation Needs: Not on file   Physical Activity: Not on file   Stress: Not on file   Social Connections: Not on file   Interpersonal Safety: Not on file   Housing Stability: Not on file     Family History   Problem Relation Age of Onset     Cerebrovascular Disease Mother      Heart Disease Mother      Other - See Comments Mother         richie quezada      Dementia Father         10 yrs.     Other - See Comments Father         pilar     Paranoid behavior Father      Other - See Comments Sister sean     Other - See Comments Daughter         kala     Other - See Comments Daughter         leonides lew     Other - See Comments Son         [place]     Other - See Comments Naveen Pearl, [place]     Dementia Paternal Aunt      Dementia Paternal Aunt      Dementia Paternal Uncle      Breast Cancer Maternal Aunt      Current Outpatient Medications   Medication Sig Dispense Refill     DULoxetine (CYMBALTA) 30 MG capsule Take 1 capsule by mouth daily       estradiol (ESTRACE VAGINAL) 0.1 MG/GM vaginal cream insert fingerful amount (0.5 g) vaginally 2-3 nights per week       traMADol (ULTRAM) 50 MG tablet Take 1 tablet by mouth 2 times daily       acetaminophen (TYLENOL) 500 MG tablet Take 500 mg by mouth every 6 hours as needed (Occasionally)       Ascorbic Acid (VITAMIN C) 100 MG CHEW Take 1 tablet by mouth daily       aspirin (ASA) 81 MG EC tablet 81mg tab by mouth daily @8am 30 tablet 0     ASPIRIN 81 PO Take 1 tablet by mouth daily       calcium carbonate 500 MG CHEW 1 tums by mouth nightly @ 10/11pm       calcium carbonate 600 mg-vitamin D 400 units (CALTRATE) 600-400 MG-UNIT per tablet 1 tab by mouth daily at 8am       carbidopa-levodopa (SINEMET CR)  MG CR tablet Take 1 tablet by mouth At Bedtime 90 tablet 3     carbidopa-levodopa (SINEMET)  MG tablet Take 2 tabs by mouth at 7 - 8 AM, 12 -12:30 PM, AND 1.5 tabs at 5-5:30 PM = 5.5 Tabs 495 tablet 3     diclofenac (VOLTAREN) 1 % topical gel        estradiol (ESTRACE) 0.1 MG/GM vaginal cream        fluticasone (FLONASE) 50 MCG/ACT nasal spray        fluticasone furoate 27.5 MCG/SPRAY nasal spray Spray 2 sprays in nostril       gabapentin (NEURONTIN) 100 MG capsule        gabapentin (NEURONTIN) 600 MG tablet Take 1-2 tablets (600-1,200 mg) by mouth 3 times  daily 270 tablet 3     ibuprofen (ADVIL/MOTRIN) 200 MG tablet 2 x 200mg tab by mouth nightly at 9/10pm       methenamine hippurate (HIPREX) 1 g tablet        metoprolol succinate ER (TOPROL XL) 25 MG 24 hr tablet TAKE 1 TABLET EVERY DAY AT 8AM 60 tablet 3     metoprolol-hydrochlorothiazide (DUTOPROL) 25-12.5 MG TB24 per tablet 25mg 1/day       pravastatin (PRAVACHOL) 20 MG tablet TAKE 1 TABLET AT BEDTIME 90 tablet 1     psyllium (METAMUCIL/KONSYL) Packet By mouth in liquid daily as needed for constipation       sertraline (ZOLOFT) 25 MG tablet Take 2 tablets (50 mg) by mouth daily 180 tablet 1     vitamin D3 (CHOLECALCIFEROL) 50 mcg (2000 units) tablet Vitamin d3 by mouth daily at 8am       zinc gluconate 50 MG tablet 50mg tab by mouth every morning at 8am (Patient not taking: Reported on 10/25/2022)           Again, thank you for allowing me to participate in the care of your patient.      Sincerely,    Ed Mendoza MD

## 2024-04-23 NOTE — NURSING NOTE
Is the patient currently in the state of [place]? YES    Visit mode:VIDEO    If the visit is dropped, the patient can be reconnected by: VIDEO VISIT: Text to cell phone:   Telephone Information:   Mobile [phone]   Mobile [phone]       Will anyone else be joining the visit? NO  (If patient encounters technical issues they should call [phone]    How would you like to obtain your AVS? MyChart    Are changes needed to the allergy or medication list? No    Are refills needed on medications prescribed by this physician? NO    Reason for visit: IDANIAECK    Julia TINOCO

## 2024-04-24 ENCOUNTER — LAB REQUISITION (OUTPATIENT)
Dept: LAB | Facility: CLINIC | Age: 89
End: 2024-04-24
Payer: COMMERCIAL

## 2024-04-24 ENCOUNTER — DOCUMENTATION ONLY (OUTPATIENT)
Dept: NEUROLOGY | Facility: CLINIC | Age: 89
End: 2024-04-24
Payer: COMMERCIAL

## 2024-04-24 DIAGNOSIS — R05.9 COUGH, UNSPECIFIED: ICD-10-CM

## 2024-04-24 LAB
FLUAV RNA SPEC QL NAA+PROBE: POSITIVE
FLUBV RNA RESP QL NAA+PROBE: NEGATIVE
RSV RNA SPEC NAA+PROBE: NEGATIVE
SARS-COV-2 RNA RESP QL NAA+PROBE: NEGATIVE

## 2024-04-24 PROCEDURE — 87637 SARSCOV2&INF A&B&RSV AMP PRB: CPT | Mod: ORL

## 2024-05-07 ENCOUNTER — LAB REQUISITION (OUTPATIENT)
Dept: LAB | Facility: CLINIC | Age: 89
End: 2024-05-07
Payer: COMMERCIAL

## 2024-05-07 DIAGNOSIS — D64.9 ANEMIA, UNSPECIFIED: ICD-10-CM

## 2024-05-07 DIAGNOSIS — I10 ESSENTIAL (PRIMARY) HYPERTENSION: ICD-10-CM

## 2024-05-08 LAB
ANION GAP SERPL CALCULATED.3IONS-SCNC: 9 MMOL/L (ref 7–15)
BASOPHILS # BLD AUTO: 0.1 10E3/UL (ref 0–0.2)
BASOPHILS NFR BLD AUTO: 1 %
BUN SERPL-MCNC: 24.9 MG/DL (ref 8–23)
CALCIUM SERPL-MCNC: 10.5 MG/DL (ref 8.2–9.6)
CHLORIDE SERPL-SCNC: 103 MMOL/L (ref 98–107)
CREAT SERPL-MCNC: 0.55 MG/DL (ref 0.51–0.95)
DEPRECATED HCO3 PLAS-SCNC: 26 MMOL/L (ref 22–29)
EGFRCR SERPLBLD CKD-EPI 2021: 86 ML/MIN/1.73M2
EOSINOPHIL # BLD AUTO: 0.2 10E3/UL (ref 0–0.7)
EOSINOPHIL NFR BLD AUTO: 3 %
ERYTHROCYTE [DISTWIDTH] IN BLOOD BY AUTOMATED COUNT: 14.1 % (ref 10–15)
GLUCOSE SERPL-MCNC: 90 MG/DL (ref 70–99)
HCT VFR BLD AUTO: 38.5 % (ref 35–47)
HGB BLD-MCNC: 12.3 G/DL (ref 11.7–15.7)
IMM GRANULOCYTES # BLD: 0.1 10E3/UL
IMM GRANULOCYTES NFR BLD: 1 %
LYMPHOCYTES # BLD AUTO: 2.3 10E3/UL (ref 0.8–5.3)
LYMPHOCYTES NFR BLD AUTO: 25 %
MCH RBC QN AUTO: 30.4 PG (ref 26.5–33)
MCHC RBC AUTO-ENTMCNC: 31.9 G/DL (ref 31.5–36.5)
MCV RBC AUTO: 95 FL (ref 78–100)
MONOCYTES # BLD AUTO: 0.7 10E3/UL (ref 0–1.3)
MONOCYTES NFR BLD AUTO: 8 %
NEUTROPHILS # BLD AUTO: 5.9 10E3/UL (ref 1.6–8.3)
NEUTROPHILS NFR BLD AUTO: 62 %
NRBC # BLD AUTO: 0 10E3/UL
NRBC BLD AUTO-RTO: 0 /100
PLATELET # BLD AUTO: 363 10E3/UL (ref 150–450)
POTASSIUM SERPL-SCNC: 4.1 MMOL/L (ref 3.4–5.3)
RBC # BLD AUTO: 4.05 10E6/UL (ref 3.8–5.2)
SODIUM SERPL-SCNC: 138 MMOL/L (ref 135–145)
WBC # BLD AUTO: 9.2 10E3/UL (ref 4–11)

## 2024-05-08 PROCEDURE — 85025 COMPLETE CBC W/AUTO DIFF WBC: CPT | Mod: ORL | Performed by: NURSE PRACTITIONER

## 2024-05-08 PROCEDURE — 36415 COLL VENOUS BLD VENIPUNCTURE: CPT | Mod: ORL | Performed by: NURSE PRACTITIONER

## 2024-05-08 PROCEDURE — P9604 ONE-WAY ALLOW PRORATED TRIP: HCPCS | Mod: ORL | Performed by: NURSE PRACTITIONER

## 2024-05-08 PROCEDURE — 80048 BASIC METABOLIC PNL TOTAL CA: CPT | Mod: ORL | Performed by: NURSE PRACTITIONER

## 2024-05-09 ENCOUNTER — HOSPITAL ENCOUNTER (OUTPATIENT)
Dept: RADIOLOGY | Facility: CLINIC | Age: 89
Discharge: HOME | End: 2024-05-09
Attending: PSYCHIATRY & NEUROLOGY
Payer: COMMERCIAL

## 2024-05-09 ENCOUNTER — HOSPITAL ENCOUNTER (OUTPATIENT)
Dept: SPEECH THERAPY | Facility: CLINIC | Age: 89
Discharge: HOME | End: 2024-05-09
Attending: PSYCHIATRY & NEUROLOGY
Payer: COMMERCIAL

## 2024-05-09 ENCOUNTER — TELEPHONE (OUTPATIENT)
Dept: NEUROLOGY | Facility: CLINIC | Age: 89
End: 2024-05-09
Payer: COMMERCIAL

## 2024-05-09 DIAGNOSIS — R49.9 VOICE DISORDER: ICD-10-CM

## 2024-05-09 DIAGNOSIS — R13.19 OTHER DYSPHAGIA: ICD-10-CM

## 2024-05-09 PROBLEM — R93.3 ABNORMAL BARIUM SWALLOW: Status: ACTIVE | Noted: 2024-05-09

## 2024-05-09 PROCEDURE — 92610 EVALUATE SWALLOWING FUNCTION: CPT | Mod: GN

## 2024-05-09 PROCEDURE — 74230 X-RAY XM SWLNG FUNCJ C+: CPT

## 2024-05-09 PROCEDURE — 92611 MOTION FLUOROSCOPY/SWALLOW: CPT | Mod: GN

## 2024-05-09 RX ORDER — BARIUM SULFATE 400 MG/ML
SUSPENSION ORAL ONCE
Status: COMPLETED | OUTPATIENT
Start: 2024-05-09 | End: 2024-05-09

## 2024-05-09 RX ADMIN — BARIUM SULFATE: 400 SUSPENSION ORAL at 10:07

## 2024-05-09 NOTE — TELEPHONE ENCOUNTER
Left Voicemail (1st Attempt) and Sent Mychart (1st Attempt) for the patient to call back and schedule the following:    Appointment type: 6 month follow up  Provider: Analisa Bolaños  Return date: Oct. 2024  Specialty phone number: [phone]  Additional appointment(s) needed: ENT  Additonal Notes: MARLEE saucedo

## 2024-05-10 NOTE — PROGRESS NOTES
"SPEECH LANGUAGE PATHOLOGY EVALUATION    See electronic medical record for Abuse and Falls Screening details.    Subjective      Presenting condition or subjective complaint: Patient denies swallowing difficulty apart from occasionally \"choking\" on pills.  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 She was accompanied to this study by her daughter, Anisha, who observed study and was present afterwards to receive results and recommendations.    Pain assessment:  No subjective evidence of pain     Objective     SWALLOW EVALUTION  Dysphagia history: As mentioned above, patient denies swallowing difficulty apart from occasionally \"choking\" with pills. She estimates that the last time she had trouble swallowing a pill was about a month ago. She has since started taking her pills mixed in yogurt, and has had no further difficulty.  Current Diet/Method of Nutritional Intake:  Regular food textures and thin liquids        CLINICAL SWALLOW EVALUATION  Clinical swallow evaluation completed prior to videofluoroscopic swallow study (VFSS) via review of previous records, clinical interview, and oral motor examination.    Oral Motor Function: generally intact  Dentition: natural dentition, adequate dentition. Extensive dental work noted.  Secretion management: WFL  Mucosal quality: dry. Patient reports xerostomia as a side effect of several of her medications.  Mandibular function: intact  Oral labial function: WFL  Lingual function: WFL  Buccal function: intact  Laryngeal function: voicing WFL     ADDITIONAL EVAL COMPLETED TODAY : yes; " rationale: VFSS completed per MD order.    VIDEOFLUOROSCOPIC SWALLOW STUDY  Radiologist: Fahrner, Lester, MD  Views Taken: Left lateral  Physical location of procedure: [facility]  Patient was sitting upright in swallow study chair.    VFSS textures trialed:   VFSS Eval: Thin Liquids  Mode of Presentation: cup, straw, self-fed   Order of Presentation: Trials 1, 6, 7, 8, 9, 10  Preparatory Phase: WFL. Poor bolus control noted intermittently, resulting in variable premature spillage to the valleculae. On one occasion, there was also premature spillage directly into the laryngeal vestibule. This lead to aspiration.  Oral Phase: premature pharyngeal entry  Bolus Location When Swallow Initiated: pyriforms  Pharyngeal Phase: impaired epiglottic movement  Rosenbeck's Penetration Aspiration Scale: 8 - contrast passes glottis, visible subglottic residue remains, absent patient response (aspiration)  Response to Aspiration: absent response, unproductive reflexive cough  Strategies and Compensations: chin tuck, reduce bolus size  Diagnostic Statement: Consistent deep laryngeal penetration was noted with trials of thin liquid. There were also two episodes of yvonne, silent aspiration. The strategies of reduced bolus size and chin tuck posture did not significantly reduce risk of entry into airway.     VFSS Eval: Mildly Thick Liquids  Mode of Presentation: cup, self-fed   Order of Presentation: Trials 5, 11, 12, 13, 14, 15, 16  Preparatory Phase: WFL, poor bolus control. Poor bolus control noted with initial sip only, resulting in premature spillage to the valleculae.  Oral Phase: premature pharyngeal entry  Bolus Location When Swallow Initiated: valleculae, pyriforms  Pharyngeal Phase: impaired epiglottic movement  Rosenbeck's Penetration Aspiration Scale: 3 - contrast remains above the vocal cords, visible residue remains (penetration)  Strategies and Compensations: chin tuck, reduce bolus  "size  Diagnostic Statement: There was relatively consistent laryngeal penetration with mildly thick liquid. This was generally transient in nature, though trace residual barium contrast noted in the laryngeal vestibule at times. Aspiration was not observed with mildly thick liquid. Chin tuck strategy did not significantly reduce risk of entry into airway.    VFSS Eval: Purees  Mode of Presentation: spoon, fed by clinician   Order of Presentation: Trials 2, 3, 4  Preparatory Phase: WFL  Oral Phase: WFL  Bolus Location When Swallow Initiated: valleculae  Pharyngeal Phase: impaired epiglottic movement  Rosenbeck s Penetration Aspiration Scale: 1 - no aspiration, contrast does not enter airway  Diagnostic Statement: No aspiration or laryngeal penetration observed with puree consistency. Mild vallecular stasis noted after swallows. This did not accumulate with subsequent boluses.    ESOPHAGEAL PHASE OF SWALLOW  No observed or reported concerns related to esophageal function    SWALLOW ASSESSMENT CLINICAL IMPRESSIONS AND RATIONALE  Diet Consistency Recommendations: Regular food textures and mildly thick liquids (formerly referred to as \"nectar-thick\")  Recommended Safe or Compensatory Swallowing Strategies: small bolus size, no straws, slow rate of intake, alternate food and liquid intake, maintain upright sitting position for eating, minimize distractions during oral intake   Medication Administration Recommendations: Continue to give pills mixed in puree (e.g., yogurt, applesauce, pudding).  Instrumental Assessment Recommendations: Further instrumental evaluation is not indicated at this time, however, if patient develops a respiratory illness or experiences a decline in pulmonary status, threshold for obtaining a repeat VFSS should be considered low.    Assessment & Plan   CLINICAL IMPRESSIONS   Medical Diagnosis: Parkinsonism (possible vascular vs degenerative vs both)    Treatment Diagnosis: Oropharyngeal dysphagia "   Impression/Assessment: Videofloroscopic swallow study completed per provider order. Silent aspiration occurred with thin liquid. There was fairly consistent laryngeal penetration with mildly thick liquid. This was deep at times, but largely transient in nature. Oral phase was characterized by effective and timely bolus prep and A-P bolus transit. Bolus control was reduced at times with thin and mildly thick liquid. This resulted in premature bolus spillage to the valleculae. On one occasion, there was also premature spillage of thin liquid directly into the laryngeal vestibule. This lead to aspiration. Tongue base retraction was WNL. Pharyngeal phase was characterized by a delayed swallow response with both thin and mildly thick liquid. With these consistencies, the swallow reflex triggered at the pyriform sinuses. Epiglottic inversion was impaired across all consistencies. The epiglottis moved in an inferior pattern. The tip did not invert. Hyolaryngeal excursion was WNL. Hyolaryngeal elevation was reduced, but WFL (within functional limits) for patient's age group. Pharyngeal constriction was WNL. Patient was noted to have a prominent cricopharyngeus, but this did not impede bolus transit through the PES and cervical esophagus.      Patient presents with mild oropharyngeal dysphagia, however, she is at high risk for aspiration with thin liquids due primarily to delayed timing of the swallow response and impaired epiglottic inversion. Aspiration risk is reduced, though not entirely eliminated, when patient takes small, single sips of mildly thick liquid by cup. At this time, recommend downgrade liquids to mildly thick consistency. Continue regular food textures as tolerated. To minimize aspiration risk, patient should consistently implement the safe swallowing strategies listed above.     PLAN OF CARE  Recommended Referrals to Other Professionals: Recommend that patient receive skilled Speech Therapy services for  dysphagia management. Notably, she drinks a lot of water throughout the day to compensate for her xerostomia. Upon hearing evaluating SLP's recommendation for mildly thick liquids, patient mentioned several times that she doesn't think that she will like thickened beverages. Anticipate that she would be a good candidate for the free water protocol, though this should be determined by the SLP at her care facility.  Education Assessment: Patient and daughter were shown images from her VFSS and results were interpreted. Both verbalized understanding. Daughter asked appropriate questions, which were answered to her verbalized understanding and satisfaction. Patient had few, if any, questions following study.  Learner/Method: Patient;Family;Pictures/Video;Listening    Risks and benefits of evaluation/treatment have been explained.   Patient/Family/caregiver agrees with Plan of Care.     Evaluation Time:    SLP Eval: oral/pharyngeal swallow function, clinical minutes (15886): 10  SLP Eval: VideoFluoroscopic Swallow function Minutes (49740): 20      Signing Clinician: Ivelisse Whalen SLP

## 2024-05-13 ENCOUNTER — TELEPHONE (OUTPATIENT)
Dept: NEUROLOGY | Facility: CLINIC | Age: 89
End: 2024-05-13
Payer: COMMERCIAL

## 2024-05-21 ENCOUNTER — LAB REQUISITION (OUTPATIENT)
Dept: LAB | Facility: CLINIC | Age: 89
End: 2024-05-21
Payer: COMMERCIAL

## 2024-05-21 DIAGNOSIS — I10 ESSENTIAL (PRIMARY) HYPERTENSION: ICD-10-CM

## 2024-05-22 PROCEDURE — 36415 COLL VENOUS BLD VENIPUNCTURE: CPT | Mod: ORL | Performed by: NURSE PRACTITIONER

## 2024-05-22 PROCEDURE — 80048 BASIC METABOLIC PNL TOTAL CA: CPT | Mod: ORL | Performed by: NURSE PRACTITIONER

## 2024-05-22 PROCEDURE — P9603 ONE-WAY ALLOW PRORATED MILES: HCPCS | Mod: ORL | Performed by: NURSE PRACTITIONER

## 2024-05-23 ENCOUNTER — VIRTUAL VISIT (OUTPATIENT)
Dept: NEUROLOGY | Facility: CLINIC | Age: 89
End: 2024-05-23
Attending: PSYCHIATRY & NEUROLOGY
Payer: COMMERCIAL

## 2024-05-23 DIAGNOSIS — Z29.89 NEED FOR PROPHYLAXIS AGAINST URINARY TRACT INFECTION: ICD-10-CM

## 2024-05-23 DIAGNOSIS — G20.C ATYPICAL PARKINSONISM (H): Primary | ICD-10-CM

## 2024-05-23 DIAGNOSIS — E78.2 MIXED HYPERLIPIDEMIA: ICD-10-CM

## 2024-05-23 DIAGNOSIS — Z87.09 HISTORY OF INFLUENZA: ICD-10-CM

## 2024-05-23 DIAGNOSIS — N32.81 OVERACTIVE BLADDER: ICD-10-CM

## 2024-05-23 DIAGNOSIS — Z78.9 TAKES DIETARY SUPPLEMENTS: ICD-10-CM

## 2024-05-23 DIAGNOSIS — G89.29 CHRONIC LEFT HIP PAIN: ICD-10-CM

## 2024-05-23 DIAGNOSIS — K21.9 GASTROESOPHAGEAL REFLUX DISEASE, UNSPECIFIED WHETHER ESOPHAGITIS PRESENT: ICD-10-CM

## 2024-05-23 DIAGNOSIS — M25.552 CHRONIC LEFT HIP PAIN: ICD-10-CM

## 2024-05-23 DIAGNOSIS — K59.00 CONSTIPATION, UNSPECIFIED CONSTIPATION TYPE: ICD-10-CM

## 2024-05-23 DIAGNOSIS — I10 BENIGN ESSENTIAL HYPERTENSION: ICD-10-CM

## 2024-05-23 DIAGNOSIS — J30.2 SEASONAL ALLERGIC RHINITIS: ICD-10-CM

## 2024-05-23 LAB
ANION GAP SERPL CALCULATED.3IONS-SCNC: 13 MMOL/L (ref 7–15)
BUN SERPL-MCNC: 25.1 MG/DL (ref 8–23)
CALCIUM SERPL-MCNC: 9.1 MG/DL (ref 8.2–9.6)
CHLORIDE SERPL-SCNC: 104 MMOL/L (ref 98–107)
CREAT SERPL-MCNC: 0.58 MG/DL (ref 0.51–0.95)
DEPRECATED HCO3 PLAS-SCNC: 21 MMOL/L (ref 22–29)
EGFRCR SERPLBLD CKD-EPI 2021: 85 ML/MIN/1.73M2
GLUCOSE SERPL-MCNC: 132 MG/DL (ref 70–99)
POTASSIUM SERPL-SCNC: 4.2 MMOL/L (ref 3.4–5.3)
SODIUM SERPL-SCNC: 138 MMOL/L (ref 135–145)

## 2024-05-23 PROCEDURE — 99207 PR NO BILLABLE SERVICE THIS VISIT: CPT | Mod: 95 | Performed by: PHARMACIST

## 2024-05-23 RX ORDER — MIRABEGRON 25 MG/1
25 TABLET, FILM COATED, EXTENDED RELEASE ORAL DAILY
COMMUNITY
Start: 2024-05-15

## 2024-05-23 NOTE — PATIENT INSTRUCTIONS
"Recommendations from today's MTM visit:                                                    Today we reviewed what your medicines are for, how to know if they are working, that your medicines are safe and how to make your medicine regimen as easy as possible.      Continue with current carbidopa/levodopa dosing regimen:     1:30 AM 7:30 AM 1:00 PM 6:30 PM 10:00 PM   Carbidopa/  Levodopa  1 tablet 2 tablets 2 tablets 1.5 tablets    Carbidopa/  Levodopa CR     1 tablet     Have your blood pressure checked every 1-2 weeks. If the readings are continuously above 140/90, please contact your primary care provider.   To help with your pain during the day, you could try taking a dose of the ibuprofen in the in the midmorning. Sometimes alternating between acetaminophen and ibuprofen every 2-3 hours can help decrease pain.  Try to not use the Robitussin cough syrup for too long. The active ingredient, dextromethorphan, can sometimes cause people to feel tired and dizzy.    Follow-up: 6 months or sooner if needed     It was great speaking with you today.  I value your experience and would be very thankful for your time in providing feedback in our clinic survey. In the next few days, you may receive an email or text message from [employer] with a link to a survey related to your  clinical pharmacist.\"     To schedule another MTM appointment, please call the clinic directly or you may call the MTM scheduling line at [phone] or toll-free at [phone].     My Clinical Pharmacist's contact information:                                                      Please feel free to contact me with any questions or concerns you have.      Jailene Ansari, Pharm.D.  Medication Therapy Management Pharmacist  [facility]   "

## 2024-05-23 NOTE — Clinical Note
"5/23/2024       RE: ALEXA Luu  [address]  [address]  [facility] [address]     Dear Colleague,    Thank you for referring your patient, ALEXA Luu, to the [facility] [place] at [facility]. Please see a copy of my visit note below.    Medication Therapy Management (MTM) Encounter    ASSESSMENT:                            Medication Adherence/Access: {adherencechoices:486619}    ***:   ***    PLAN:                            ***    Follow-up: {followuptest2:031626}    SUBJECTIVE/OBJECTIVE:                          Carmen Luu is a 89 year old female contacted via secure video for {mtmvisit:358918}     Reason for visit: ***.    Allergies/ADRs: Reviewed in chart  Past Medical History: Reviewed in chart  Tobacco: She reports that she has never smoked. She has never used smokeless tobacco.  Alcohol: {ALCOHOL CONSUMPTION HX:067615}  {Social and Goals:313600}  Medication Adherence/Access: {fumedadherence:583539}    {MTM SUBJECTIVE (Optional):461533}        ***:   ***    Today's Vitals: There were no vitals taken for this visit.  ----------------  {DA?:291014}    I spent {mtm total time 3:828738} with this patient today{MTMpartdbillingquestion:002264}. { :020430}. A copy of the visit note was provided to the patient's provider(s).    A summary of these recommendations {GIVEN/NOT GIVEN:191638}.    ***    Telemedicine Visit Details  Type of service:  {telemedvisitmtm:006907::\"Telephone visit\"}  Start Time: {video/phone visit start time:152948}  End Time: {video/phone visit end time:152948}     Medication Therapy Recommendations  No medication therapy recommendations to display     Medication Therapy Management (MTM) Encounter    ASSESSMENT:                            Medication Adherence/Access: {adherencechoices:982397}    ***:   ***    PLAN:                            ***    Follow-up: " "{followuptest2:596058}    SUBJECTIVE/OBJECTIVE:                          Carmen Luu is a 89 year old female contacted via secure video for {mtmvisit:029035}     Reason for visit: ***.    Allergies/ADRs: Reviewed in chart  Past Medical History: Reviewed in chart  Tobacco: She reports that she has never smoked. She has never used smokeless tobacco.  Alcohol: {ALCOHOL CONSUMPTION HX:412363}  {Social and Goals:241040}  Medication Adherence/Access: {fumedadherence:437029}    {MTM SUBJECTIVE (Optional):048256}    Parkinsons disease  Is not sure id the added carbidopa/levadopa dose is helping  Added myrbetriq and now going to the bathroom less. Going about every 3-4 hours. Prior to medication going to the bathroom about every 1.5 hours and had such a sense of urgency   Her blood pressure is being monitored but not sure how often. Once a month blood pressure monitoring   Hands can be really stiff in the morning when she wakes up. Not sure if food would be affecting this. Wakes up at about 7am.   Has been more confused lately. Getting nights and mornings confused, sometimes confused about where she is living due to living in many different homes. Can be better in the morning but can be confused after a long nap.  Feels lethargic and weak.       allergies: flonase as needed for allergies morning and evening   ***    Constipation  Dulcolax - as need, every 2-3 days, seems to help her stay regular     Pain:  Tramadol - wanting morning, not sure when she has most of the pain  Acetaminophen  Ibuprofen    Today's Vitals: There were no vitals taken for this visit.  ----------------  {DA?:427060}    I spent {mtm total time 3:875832} with this patient today{MTMpartdbillingquestion:984679}. { :659160}. A copy of the visit note was provided to the patient's provider(s).    A summary of these recommendations {GIVEN/NOT GIVEN:956924}.    ***    Telemedicine Visit Details  Type of service:  {telemedvisitmtm:808825::\"Telephone " "visit\"}  Start Time: 10:02 AM  End Time: {video/phone visit end time:890657}     Medication Therapy Recommendations  No medication therapy recommendations to display       Again, thank you for allowing me to participate in the care of your patient.      Sincerely,    Jailene Ansari [facility]    "

## 2024-05-23 NOTE — PROGRESS NOTES
Medication Therapy Management (MTM) Encounter    ASSESSMENT:                            Medication Adherence/Access: No issues identified    Parkinsons disease  Overall, symptoms seem to be well managed on current carbidopa/levodopa regimen. No changes made today. Reviewed medications that could be contributing to fatigue and confusion throughout the day including tramadol, gabapentin, and Robitussin cough syrup. She does seem to be sleeping for longer periods and having less urinary frequency at night with addition of Myrbetriq. Recommend recording or letting daughter know times she is feeling very fatigued or confused to look for any patterns. Next follow-up with neurologist is scheduled for October 7th. Encouraged patient and daughter to reach out if any concerns arise prior to this visit.    Overactive Bladder  The urinary frequency and urinary urgency has decreased with the initiation of Myrbetriq. She is needing to use the bathroom less at night and noticing less pain throughout the night because of this. Recommend patient continue Myrbetriq. Educated patient on the possibility of elevated blood pressure due to mechanism. She will request her blood pressure to be taken every one to two weeks.    Hyperlipidemia   Stable     Supplements   Stable     Allergies:   Stable    Constipation  Continue taking as needed Dulcolax for constipation. If this continues to be a complaint could try Miralax as needed.    Pain:  Discussed the risks of opioids including respiratory depression, sedation, and increased risk of falls. Recommend not increasing tramadol to more then twice daily. Recommend patient consider increasing the amount of times taking ibuprofen by adding a dose in the mid-morning. Discussed alternating acetaminophen and ibuprofen throughout the day for pain management.    Influenza  Recommend patient take the Robitussin cough syrup only as needed now that she is recovering from influenza. This could be causing  her to feel tired throughout the day.    Gerd:  Stable    UTI prophylaxis   Stable    Hypertension:   Recommend nursing staff at home check blood pressure every 1-2 weeks with the addition of Myrbetriq to ensure blood pressure has not become elevated. Continue to follow with primary care provider.    PLAN:                            Continue with current carbidopa/levodopa dosing regimen:     1:30 AM 7:30 AM 1:00 PM 6:30 PM 10:00 PM   Carbidopa/  Levodopa  1 tablet 2 tablets 2 tablets 1.5 tablets    Carbidopa/  Levodopa CR     1 tablet     Have your blood pressure checked every 1-2 weeks. If the readings are continuously above 140/90, please contact your primary care provider.   To help with your pain during the day, you could try taking a dose of the ibuprofen in the in the midmorning. Sometimes alternating between acetaminophen and ibuprofen every 2-3 hours can help decrease pain.  Try to not use the Robitussin cough syrup for too long. The active ingredient, dextromethorphan, can sometimes cause people to feel tired and dizzy.    Follow-up: 6 months or sooner if needed     SUBJECTIVE/OBJECTIVE:                          Carmen Luu is a 89 year old female contacted via secure video for an initial visit. She was referred to me from Dr. Mendoza. Patient was accompanied by her good [place].     Reason for visit: Review medications.    Allergies/ADRs: Reviewed in chart  Past Medical History: Reviewed in chart  Tobacco: She reports that she has never smoked. She has never used smokeless tobacco.  Alcohol: none    Medication Adherence/Access: no issues reported. The home she lives at administers her medication    Parkinsons disease  - Carbidopa/levodopa CR 50/200mg once nightly @10PM  - Carbidopa/levodopa 25/100mg - 2 tabs @7:30am and 1pm, 1.5tabs @6:30pm and 1 tab @ 1:30am  Patient confirms she added a dose of carbidopa/levodopa in the middle of the night but is not sure if she notices a change. She reports her  hands can be really stiff in the morning when she wakes up. Not sure if food would be affecting this and her medication. Wakes up at about 7am and takes medications about 30 minutes later. She eats breakfast after this. She is concerned that she has been more confused lately. She reports sometimes she is getting her nights and mornings confused and is sometimes confused about where she's living. Reports the confusion can be better in the morning but is worse after waking up for a nap and at night. Confirms she has been having very vivid dreams but denies thrashing or falling out of bed.    Overactive Bladder  - Myrbetriq 25mg once daily  Added Myrbetriq about 1-2 weeks ago and now going to the bathroom less per daughter. Going about every 3-4 hours. Prior to medication going to the bathroom about every 1.5 hours and had such a sense of urgency. She is waking up less during the night to go to the bathroom.    Hyperlipidemia   -Pravastatin 20mg daily  Patient reports no significant myalgias or other side effects.  -Aspirin 81mg once daily     Supplements   -Preservision Areds caps once daily  No reported issues at this time.      Allergies:   - Fluticasone 50mcg/act - as needed for allergies twice daily. morning and evening. She has a new box and will be starting to use this again.    Constipation  -Dulcolax - as needed, every 2-3 days, seems to help her stay regular     Pain:  -Tramadol 50mg once daily - Wondering if she should take more of this medication for pain  -Diclofenac 1% topical gel - as needed and seldomly uses   -Acetaminophen 500mg three times daily   -Ibuprofen 200mg twice daily (6:30PM and 10PM)  -Gabapentin 100mg three times daily - was on a high dose many years ago but has weaned down. She did go off the medication but was in so much pain that she was crying so this was restarted at a low dose.   Daughter reports she use to cry a lot due to pain. She no longer complains of this intense pain or  cries.    Influenza  She had influenza at the end of April and was isolated for 10 days. She is still trying to recover.  - Robistussin - 4 times a day for the past week to help with the symptoms she is still experiencing from influenza    Gerd:  - Calcium carbonate 500mg night as needed.   No issues to report today    UTI prophylaxis   -Methenamine 1G tablet twice daily.    Hypertension:   - Metoprolol ER 25mg once daily.   Blood pressure is being checked once monthly by nursing staff    Today's Vitals: There were no vitals taken for this visit.  ----------------    I spent 33 minutes with this patient today. All changes were made via collaborative practice agreement with Dr. Mendoza. A copy of the visit note was provided to the patient's provider(s).    A summary of these recommendations was sent via [employer].    Lisa Sorensen, PharmD, BCACP  Medication Therapy Management Pharmacist   [facility]    Jailene Ansari, Pharm.D.  Medication Therapy Management Pharmacist  [facility]     Telemedicine Visit Details  Type of service:  Video Conference via [employer]  Start Time: 10:02 AM  End Time: 10:35 AM     Medication Therapy Recommendations  No medication therapy recommendations to display

## 2024-06-09 ENCOUNTER — HEALTH MAINTENANCE LETTER (OUTPATIENT)
Age: 89
End: 2024-06-09

## 2024-06-13 PROCEDURE — 81001 URINALYSIS AUTO W/SCOPE: CPT | Mod: ORL | Performed by: FAMILY MEDICINE

## 2024-06-13 PROCEDURE — 87186 SC STD MICRODIL/AGAR DIL: CPT | Mod: ORL | Performed by: FAMILY MEDICINE

## 2024-06-13 PROCEDURE — 87086 URINE CULTURE/COLONY COUNT: CPT | Mod: ORL | Performed by: FAMILY MEDICINE

## 2024-06-15 ENCOUNTER — LAB REQUISITION (OUTPATIENT)
Dept: LAB | Facility: CLINIC | Age: 89
End: 2024-06-15
Payer: COMMERCIAL

## 2024-06-15 DIAGNOSIS — R39.15 URGENCY OF URINATION: ICD-10-CM

## 2024-06-15 LAB
ALBUMIN UR-MCNC: 70 MG/DL
APPEARANCE UR: ABNORMAL
BACTERIA #/AREA URNS HPF: ABNORMAL /HPF
BILIRUB UR QL STRIP: NEGATIVE
CAOX CRY #/AREA URNS HPF: ABNORMAL /HPF
COLOR UR AUTO: YELLOW
GLUCOSE UR STRIP-MCNC: NEGATIVE MG/DL
HGB UR QL STRIP: ABNORMAL
KETONES UR STRIP-MCNC: ABNORMAL MG/DL
LEUKOCYTE ESTERASE UR QL STRIP: ABNORMAL
MUCOUS THREADS #/AREA URNS LPF: PRESENT /LPF
NITRATE UR QL: NEGATIVE
PH UR STRIP: 6 [PH] (ref 5–7)
RBC URINE: 12 /HPF
SP GR UR STRIP: 1.02 (ref 1–1.03)
SQUAMOUS EPITHELIAL: 1 /HPF
UROBILINOGEN UR STRIP-MCNC: NORMAL MG/DL
WBC CLUMPS #/AREA URNS HPF: PRESENT /HPF
WBC URINE: >182 /HPF
YEAST #/AREA URNS HPF: ABNORMAL /HPF

## 2024-06-16 LAB — BACTERIA UR CULT: ABNORMAL

## 2024-07-22 PROCEDURE — 87186 SC STD MICRODIL/AGAR DIL: CPT | Mod: ORL | Performed by: NURSE PRACTITIONER

## 2024-07-22 PROCEDURE — 81001 URINALYSIS AUTO W/SCOPE: CPT | Mod: ORL | Performed by: NURSE PRACTITIONER

## 2024-07-22 PROCEDURE — 87086 URINE CULTURE/COLONY COUNT: CPT | Mod: ORL | Performed by: NURSE PRACTITIONER

## 2024-08-08 NOTE — ADDENDUM NOTE
Addended by: NIK CHADWICK on: 3/4/2022 12:49 PM     Modules accepted: Orders     EKG done and results are uploaded into EPIC

## 2024-09-02 ENCOUNTER — LAB REQUISITION (OUTPATIENT)
Dept: LAB | Facility: CLINIC | Age: 89
End: 2024-09-02
Payer: COMMERCIAL

## 2024-09-02 DIAGNOSIS — E55.9 VITAMIN D DEFICIENCY, UNSPECIFIED: ICD-10-CM

## 2024-09-02 DIAGNOSIS — D64.9 ANEMIA, UNSPECIFIED: ICD-10-CM

## 2024-09-02 DIAGNOSIS — N18.9 CHRONIC KIDNEY DISEASE, UNSPECIFIED: ICD-10-CM

## 2024-09-04 LAB
ERYTHROCYTE [DISTWIDTH] IN BLOOD BY AUTOMATED COUNT: 14.4 % (ref 10–15)
HCT VFR BLD AUTO: 38.7 % (ref 35–47)
HGB BLD-MCNC: 11.9 G/DL (ref 11.7–15.7)
MCH RBC QN AUTO: 30.2 PG (ref 26.5–33)
MCHC RBC AUTO-ENTMCNC: 30.7 G/DL (ref 31.5–36.5)
MCV RBC AUTO: 98 FL (ref 78–100)
PLATELET # BLD AUTO: 306 10E3/UL (ref 150–450)
RBC # BLD AUTO: 3.94 10E6/UL (ref 3.8–5.2)
WBC # BLD AUTO: 8.7 10E3/UL (ref 4–11)

## 2024-09-04 PROCEDURE — 80048 BASIC METABOLIC PNL TOTAL CA: CPT

## 2024-09-04 PROCEDURE — 80048 BASIC METABOLIC PNL TOTAL CA: CPT | Mod: ORL

## 2024-09-04 PROCEDURE — 82306 VITAMIN D 25 HYDROXY: CPT | Mod: ORL

## 2024-09-04 PROCEDURE — 36415 COLL VENOUS BLD VENIPUNCTURE: CPT | Mod: ORL

## 2024-09-04 PROCEDURE — P9603 ONE-WAY ALLOW PRORATED MILES: HCPCS | Mod: ORL

## 2024-09-04 PROCEDURE — 85027 COMPLETE CBC AUTOMATED: CPT | Mod: ORL

## 2024-09-05 LAB
ANION GAP SERPL CALCULATED.3IONS-SCNC: 13 MMOL/L (ref 7–15)
BUN SERPL-MCNC: 24.3 MG/DL (ref 8–23)
CALCIUM SERPL-MCNC: 8.9 MG/DL (ref 8.8–10.4)
CHLORIDE SERPL-SCNC: 105 MMOL/L (ref 98–107)
CREAT SERPL-MCNC: 0.66 MG/DL (ref 0.51–0.95)
EGFRCR SERPLBLD CKD-EPI 2021: 82 ML/MIN/1.73M2
GLUCOSE SERPL-MCNC: 112 MG/DL (ref 70–99)
HCO3 SERPL-SCNC: 23 MMOL/L (ref 22–29)
POTASSIUM SERPL-SCNC: 3.7 MMOL/L (ref 3.4–5.3)
SODIUM SERPL-SCNC: 141 MMOL/L (ref 135–145)
VIT D+METAB SERPL-MCNC: 14 NG/ML (ref 20–50)

## 2024-09-16 ENCOUNTER — LAB REQUISITION (OUTPATIENT)
Dept: LAB | Facility: CLINIC | Age: 89
End: 2024-09-16
Payer: COMMERCIAL

## 2024-09-16 DIAGNOSIS — R35.0 FREQUENCY OF MICTURITION: ICD-10-CM

## 2024-09-16 LAB
ALBUMIN UR-MCNC: 30 MG/DL
APPEARANCE UR: ABNORMAL
BACTERIA #/AREA URNS HPF: ABNORMAL /HPF
BILIRUB UR QL STRIP: NEGATIVE
COLOR UR AUTO: YELLOW
GLUCOSE UR STRIP-MCNC: NEGATIVE MG/DL
HGB UR QL STRIP: ABNORMAL
KETONES UR STRIP-MCNC: NEGATIVE MG/DL
LEUKOCYTE ESTERASE UR QL STRIP: ABNORMAL
MUCOUS THREADS #/AREA URNS LPF: PRESENT /LPF
NITRATE UR QL: NEGATIVE
PH UR STRIP: 6 [PH] (ref 5–7)
RBC URINE: 11 /HPF
SP GR UR STRIP: 1.02 (ref 1–1.03)
SQUAMOUS EPITHELIAL: 1 /HPF
UROBILINOGEN UR STRIP-MCNC: NORMAL MG/DL
WBC CLUMPS #/AREA URNS HPF: PRESENT /HPF
WBC URINE: >182 /HPF

## 2024-09-16 PROCEDURE — 87186 SC STD MICRODIL/AGAR DIL: CPT | Mod: ORL | Performed by: INTERNAL MEDICINE

## 2024-09-16 PROCEDURE — 81001 URINALYSIS AUTO W/SCOPE: CPT | Mod: ORL | Performed by: INTERNAL MEDICINE

## 2024-09-18 LAB — BACTERIA UR CULT: ABNORMAL

## 2024-10-02 ENCOUNTER — LAB REQUISITION (OUTPATIENT)
Dept: LAB | Facility: CLINIC | Age: 89
End: 2024-10-02
Payer: COMMERCIAL

## 2024-10-02 LAB
ALBUMIN UR-MCNC: 100 MG/DL
APPEARANCE UR: ABNORMAL
BACTERIA #/AREA URNS HPF: ABNORMAL /HPF
BILIRUB UR QL STRIP: NEGATIVE
COLOR UR AUTO: YELLOW
GLUCOSE UR STRIP-MCNC: NEGATIVE MG/DL
HGB UR QL STRIP: ABNORMAL
KETONES UR STRIP-MCNC: ABNORMAL MG/DL
LEUKOCYTE ESTERASE UR QL STRIP: ABNORMAL
MUCOUS THREADS #/AREA URNS LPF: PRESENT /LPF
NITRATE UR QL: POSITIVE
PH UR STRIP: 6 [PH] (ref 5–7)
RBC URINE: 17 /HPF
SP GR UR STRIP: 1.02 (ref 1–1.03)
SQUAMOUS EPITHELIAL: 3 /HPF
TRANSITIONAL EPI: 1 /HPF
UROBILINOGEN UR STRIP-MCNC: NORMAL MG/DL
WBC CLUMPS #/AREA URNS HPF: PRESENT /HPF
WBC URINE: >182 /HPF

## 2024-10-02 PROCEDURE — 81001 URINALYSIS AUTO W/SCOPE: CPT | Mod: ORL

## 2024-10-02 PROCEDURE — 87086 URINE CULTURE/COLONY COUNT: CPT | Mod: ORL

## 2024-10-03 LAB — BACTERIA UR CULT: NORMAL

## 2024-10-07 ENCOUNTER — VIRTUAL VISIT (OUTPATIENT)
Dept: NEUROLOGY | Facility: CLINIC | Age: 89
End: 2024-10-07
Payer: COMMERCIAL

## 2024-10-07 DIAGNOSIS — N32.81 OVERACTIVE BLADDER: ICD-10-CM

## 2024-10-07 DIAGNOSIS — G20.A1 PARKINSON'S DISEASE WITHOUT DYSKINESIA OR FLUCTUATING MANIFESTATIONS (H): Primary | ICD-10-CM

## 2024-10-07 DIAGNOSIS — F41.9 ANXIETY: ICD-10-CM

## 2024-10-07 PROCEDURE — G2211 COMPLEX E/M VISIT ADD ON: HCPCS | Mod: 95 | Performed by: NURSE PRACTITIONER

## 2024-10-07 PROCEDURE — 99215 OFFICE O/P EST HI 40 MIN: CPT | Mod: 95 | Performed by: NURSE PRACTITIONER

## 2024-10-07 RX ORDER — QUETIAPINE FUMARATE 25 MG/1
25 TABLET, FILM COATED ORAL DAILY
COMMUNITY
Start: 2024-10-03

## 2024-10-07 RX ORDER — ESTRADIOL 0.1 MG/G
CREAM VAGINAL
COMMUNITY
Start: 2024-09-13

## 2024-10-07 ASSESSMENT — MOVEMENT DISORDERS SOCIETY - UNIFIED PARKINSONS DISEASE RATING SCALE (MDS-UPDRS)
SPEECH: (2) MILD: MY SPEECH CAUSES PEOPLE TO ASK ME TO OCCASIONALLY REPEAT MYSELF, BUT NOT EVERYDAY.
WALKING_AND_BALANCE: (4) SEVERE: I USUALLY USE THE SUPPORT OF ANOTHER PERSON TO WALK SAFELY WITHOUT FALLING.
EATING_TASKS: (2) MILD: I AM SLOW WITH MY EATING AND HAVE OCCASIONAL FOOD SPILLS.  I MAY NEED HELP WITH A FEW TASKS SUCH AS CUTTING MEAT.
HOBBIES_AND_OTHER_ACTIVITIES: (4) SEVERE: I AM UNABLE TO DO MOST OR ALL OF THESE ACTIVITIES.
TURNING_IN_BED: (1) SLIGHT: I HAVE A BIT OF TROUBLE TURNING, BUT I DO NOT NEED ANY HELP.
DRESSING: (4) SEVERE: I NEED HELP FOR MOST OR ALL DRESSING TASKS.
CHEWING_AND_SWALLOWING: (2) MILD: I NEED TO HAVE MY PILLS CUT OR MY FOOD SPECIALLY PREPARED BECAUSE OF CHEWING OR SWALLOWING PROBLEMS, BUT I HAVE NOT CHOKED OVER THE PAST WEEK.
GETTING_OUT_OF_BED_CAR_DEEP_CHAIR: (4) SEVERE: I NEED HELP MOST OR ALL OF THE TIME.
FREEZING: (4) SEVERE: BECAUSE OF FREEZING, MOST OR ALL OF THE TIME, I NEED TO USE A WALKING AID OR SOMEONE'S HELP.
SALIVA_AND_DROOLING: (0) NORMAL: NOT AT ALL (NO PROBLEMS).
TOTAL_SCORE: 35
TREMOR: (0) NORMAL: NOT AT ALL (NO PROBLEMS).
HANDWRITING: (4) SEVERE: MOST OR ALL WORDS CANNOT BE READ.
HYGIENE: (4) SEVERE: I NEED HELP FOR MOST OR ALL OF MY HYGIENE TASKS.

## 2024-10-07 NOTE — LETTER
"10/7/2024       RE: ALEXA Luu  [address]  [address]  [facility] [address]     Dear Colleague,    Thank you for referring your patient, ALEXA Luu, to the [facility] [place] [facility]. Please see a copy of my visit note below.    Virtual Visit Details    Type of service:  Video Visit   Video Start Time: 1:00 PM  Video End Time:1:36 PM    36 minutes    Originating Location (pt. Location): Assisted Living    Distant Location (provider location):  Off-site  Platform used for Video Visit: Codi        -----------------------------------------------------------------------------------------------------------------------------------------------------------------------------------------------------------------------------------------------------------------------------------------------------------------------------------------------------------      ASSESSMENT:    1)  Parkinson's Disease:      2)  Overactive Bladder: The majority of today's time was spent on discussing about overactive bladder, medication treatment, and anxiety. Pt is very concerned and anxious about not going to the bathroom frequently. Whether she has the urge to go to the bathroom or not, she wants the nursing staff to take her to the bathroom \"just in case.\" This was confirmed by the nursing staff who stopped by during today's visit.     3)  Anxiety:          PLAN:-    The above assessment discussed, questions answered, and the following patient instructions provided:-    __  Stay on the same Sinemet dose.    PD Medications 7:30 am 1 pm 6:30 pm 10 pm   Sinemet 25/100 mg  2 2 1.5    Sinemet 50/200 mg CR    1        __  We had a long discussion about your overactive bladder and how you go to the bathroom frequently.  Myrbetriq might help with the sense of urgency and frequency and there is room to increase your dose.  However, since you still want " to go to the bathroom just in case you need to urinate and you get anxious about not going to the bathroom frequently, it is best to leave your dose as is.    __  Return early 2025 to see Dr. Mendoza in clinic or virtually. You may return or contact us sooner as needed.     __  Schedulers will call you to schedule your next visit. If you don't hear back from the schedulers in 2 weeks, please call [phone] to schedule the appointment.          MOVEMENT DISORDERS CLINIC           PATIENT: ALEXA Luu    : 1932    DATE: 2024    REASON FOR VISIT: Parkinson's Disease (PD), Anxiety, Overactive Bladder follow up.    HPI: Ms. ALEXA Luu is a 89 year old who is seen via video visit for a follow up visit.      During today's video visit pt and her daughter were present.     Today, pt reports having bothersome cough for a couple of months after she had a chocking episode. Per daughter, pt had a swallow study and thickened liquid was recommended: However, patient signed a waiver that she would take the risk and wanted to continue with thin liquids.     Lives in [facility].  Her medications are managed by the nursing staff.  They also assist with going to the bathrooms.    Chronic pain in groin and upper gluteal muscle has continued.  She is using walker and gait belt when walking. She has a call button around her neck to call staff as needed.  She is falling weekly as sometimes she forgets to call for help. She is not doing PT or OT.    She is paying a fitness person and exercise twice a week.    PD Medications 7:30 am 1 pm 6:30 pm 10 pm   Sinemet 25/100 mg  2 2 1.5    Sinemet 50/200 mg CR    1       She has urinary incontinence and urgency. She reports that the benefit from the Myrbetriq has worn off.  Staff has a scheduled time to wake her up 3 times for pills and bathroom at night. Although overall anxiety is better, she feel anxious about being incontinent and  "having accidents. So, she calls staff frequently to go to the bathroom.  Patient's daughter went and got one of the nursing staff and per the nursing staff patient calls to go to the bathroom and most every 1-2 hours during the day.  Patient reports that at times she urinates but at times it is \"just in case.\"    BP was being checked weekly after she was started on Myrbetriq. This has been stable.     MEDICATIONS:   Current Outpatient Medications   Medication Sig Dispense Refill     acetaminophen (TYLENOL) 500 MG tablet 1 tab 3/day @730a,  @  630p and 1@130a       aspirin 81 MG EC tablet 81mg tab by mouth daily @730a       calcium carbonate 500 MG CHEW 1 tums by mouth nightly as needed       carbidopa-levodopa (SINEMET CR)  MG CR tablet 1 tab at 10pm 90 tablet 3     carbidopa-levodopa (SINEMET)  MG tablet 2@730a, 2@1p, 1.5 @630p and 1 tab @ 130a =   6.5 Tabs 580 tablet 3     diclofenac (VOLTAREN) 1 % topical gel As needed for pain       DULoxetine (CYMBALTA) 30 MG capsule 2 x 30mg at 1pm       estradiol (ESTRACE) 0.1 MG/GM vaginal cream Place vaginally three times a week.       fluticasone (FLONASE) 50 MCG/ACT nasal spray Spray 1 spray into both nostrils 2 times daily as needed for allergies As needed       gabapentin (NEURONTIN) 100 MG capsule 100mg three times daily at 730a, 1p and 630p       ibuprofen (ADVIL/MOTRIN) 200 MG tablet 200mg at 630p and 200mg @130a       Magnesium Hydroxide (DULCOLAX SOFT CHEWS PO) 1 soft chew once daily as needed every 2-3 days       methenamine hippurate (HIPREX) 1 g tablet 1 @730a and 1@630p       metoprolol succinate ER (TOPROL XL) 25 MG 24 hr tablet TAKE 1 TABLET EVERY DAY AT 8AM (Patient taking differently: TAKE 1 TABLET EVERY DAY AT 730a) 60 tablet 3     Multiple Vitamins-Minerals (PRESERVISION AREDS) CAPS 1@730a and 1@10p       MYRBETRIQ 25 MG 24 hr tablet Take 25 mg by mouth daily       pravastatin (PRAVACHOL) 20 MG tablet TAKE 1 TABLET AT BEDTIME (Patient taking " differently: @10pm) 90 tablet 1     QUEtiapine (SEROQUEL) 25 MG tablet Take 25 mg by mouth daily.       traMADol (ULTRAM) 50 MG tablet Take 1 tablet by mouth 2 times daily @730a and 10pm       dextromethorphan HBr 15 MG/5ML LIQD Take by mouth 4 times daily (Patient not taking: Reported on 8/30/2024)       No current facility-administered medications for this visit.         EXAM:     No tremors or dyskinesias noted.    Pt appeared anxious and perseverating about bladder/urinary issues.       I spent 40 minutes caring for the patient today including video time, reviewing records, answering questions, examining, and documentation.    Analisa Bolaños DNP, APRN  [facility]    The longitudinal plan of care for the diagnosis(es)/condition(s) as documented were addressed during this visit. Due to the added complexity in care, I will continue to support Carmen in the subsequent management and with ongoing continuity of care.      Again, thank you for allowing me to participate in the care of your patient.      Sincerely,    CRYSTAL Andrews CNP

## 2024-10-07 NOTE — NURSING NOTE
Current patient location: [address] [place] [address]    [facility] [address]    Is the patient currently in the state of [place]? YES    Visit mode:VIDEO    If the visit is dropped, the patient can be reconnected by: TELEPHONE VISIT: Phone number:   Telephone Information:   Mobile [phone]   Mobile [phone]       Will anyone else be joining the visit? NO  (If patient encounters technical issues they should call [phone])    Are changes needed to the allergy or medication list? Pt stated no med changes    Are refills needed on medications prescribed by this physician? NO    Rooming Documentation:  Not applicable    Reason for visit: PAUL ESPINOF

## 2024-10-07 NOTE — PROGRESS NOTES
"Virtual Visit Details    Type of service:  Video Visit   Video Start Time: 1:00 PM  Video End Time:1:36 PM    36 minutes    Originating Location (pt. Location): Assisted Living    Distant Location (provider location):  Off-site  Platform used for Video Visit: Codi        -----------------------------------------------------------------------------------------------------------------------------------------------------------------------------------------------------------------------------------------------------------------------------------------------------------------------------------------------------------      ASSESSMENT:    1)  Parkinson's Disease:      2)  Overactive Bladder: The majority of today's time was spent on discussing about overactive bladder, medication treatment, and anxiety. Pt is very concerned and anxious about not going to the bathroom frequently. Whether she has the urge to go to the bathroom or not, she wants the nursing staff to take her to the bathroom \"just in case.\" This was confirmed by the nursing staff who stopped by during today's visit.     3)  Anxiety:          PLAN:-    The above assessment discussed, questions answered, and the following patient instructions provided:-    __  Stay on the same Sinemet dose.    PD Medications 7:30 am 1 pm 6:30 pm 10 pm   Sinemet 25/100 mg  2 2 1.5    Sinemet 50/200 mg CR    1        __  We had a long discussion about your overactive bladder and how you go to the bathroom frequently.  Myrbetriq might help with the sense of urgency and frequency and there is room to increase your dose.  However, since you still want to go to the bathroom just in case you need to urinate and you get anxious about not going to the bathroom frequently, it is best to leave your dose as is.    __  Return early April 2025 to see Dr. Mendoza in clinic or virtually. You may return or contact us sooner as needed.     __  Schedulers will call you to schedule your next visit. " "If you don't hear back from the schedulers in 2 weeks, please call [phone] to schedule the appointment.          MOVEMENT DISORDERS CLINIC           PATIENT: ALEXA Luu    : 1932    DATE: 2024    REASON FOR VISIT: Parkinson's Disease (PD), Anxiety, Overactive Bladder follow up.    HPI: Ms. ALEXA Luu is a 89 year old who is seen via video visit for a follow up visit.      During today's video visit pt and her daughter were present.     Today, pt reports having bothersome cough for a couple of months after she had a chocking episode. Per daughter, pt had a swallow study and thickened liquid was recommended: However, patient signed a waiver that she would take the risk and wanted to continue with thin liquids.     Lives in enhanced [facility].  Her medications are managed by the nursing staff.  They also assist with going to the bathrooms.    Chronic pain in groin and upper gluteal muscle has continued.  She is using walker and gait belt when walking. She has a call button around her neck to call staff as needed.  She is falling weekly as sometimes she forgets to call for help. She is not doing PT or OT.    She is paying a fitness person and exercise twice a week.    PD Medications 7:30 am 1 pm 6:30 pm 10 pm   Sinemet 25/100 mg  2 2 1.5    Sinemet 50/200 mg CR    1       She has urinary incontinence and urgency. She reports that the benefit from the Myrbetriq has worn off.  Staff has a scheduled time to wake her up 3 times for pills and bathroom at night. Although overall anxiety is better, she feel anxious about being incontinent and having accidents. So, she calls staff frequently to go to the bathroom.  Patient's daughter went and got one of the nursing staff and per the nursing staff patient calls to go to the bathroom and most every 1-2 hours during the day.  Patient reports that at times she urinates but at times it is \"just in case.\"    BP was being checked " weekly after she was started on Myrbetriq. This has been stable.     MEDICATIONS:   Current Outpatient Medications   Medication Sig Dispense Refill    acetaminophen (TYLENOL) 500 MG tablet 1 tab 3/day @730a,  @  630p and 1@130a      aspirin 81 MG EC tablet 81mg tab by mouth daily @730a      calcium carbonate 500 MG CHEW 1 tums by mouth nightly as needed      carbidopa-levodopa (SINEMET CR)  MG CR tablet 1 tab at 10pm 90 tablet 3    carbidopa-levodopa (SINEMET)  MG tablet 2@730a, 2@1p, 1.5 @630p and 1 tab @ 130a =   6.5 Tabs 580 tablet 3    diclofenac (VOLTAREN) 1 % topical gel As needed for pain      DULoxetine (CYMBALTA) 30 MG capsule 2 x 30mg at 1pm      estradiol (ESTRACE) 0.1 MG/GM vaginal cream Place vaginally three times a week.      fluticasone (FLONASE) 50 MCG/ACT nasal spray Spray 1 spray into both nostrils 2 times daily as needed for allergies As needed      gabapentin (NEURONTIN) 100 MG capsule 100mg three times daily at 730a, 1p and 630p      ibuprofen (ADVIL/MOTRIN) 200 MG tablet 200mg at 630p and 200mg @130a      Magnesium Hydroxide (DULCOLAX SOFT CHEWS PO) 1 soft chew once daily as needed every 2-3 days      methenamine hippurate (HIPREX) 1 g tablet 1 @730a and 1@630p      metoprolol succinate ER (TOPROL XL) 25 MG 24 hr tablet TAKE 1 TABLET EVERY DAY AT 8AM (Patient taking differently: TAKE 1 TABLET EVERY DAY AT 730a) 60 tablet 3    Multiple Vitamins-Minerals (PRESERVISION AREDS) CAPS 1@730a and 1@10p      MYRBETRIQ 25 MG 24 hr tablet Take 25 mg by mouth daily      pravastatin (PRAVACHOL) 20 MG tablet TAKE 1 TABLET AT BEDTIME (Patient taking differently: @10pm) 90 tablet 1    QUEtiapine (SEROQUEL) 25 MG tablet Take 25 mg by mouth daily.      traMADol (ULTRAM) 50 MG tablet Take 1 tablet by mouth 2 times daily @730a and 10pm      dextromethorphan HBr 15 MG/5ML LIQD Take by mouth 4 times daily (Patient not taking: Reported on 8/30/2024)       No current facility-administered medications for  this visit.         EXAM:     No tremors or dyskinesias noted.    Pt appeared anxious and perseverating about bladder/urinary issues.       I spent 40 minutes caring for the patient today including video time, reviewing records, answering questions, examining, and documentation.    Analisa Bolaños DNP, APRN  [facility]    The longitudinal plan of care for the diagnosis(es)/condition(s) as documented were addressed during this visit. Due to the added complexity in care, I will continue to support Carmen in the subsequent management and with ongoing continuity of care.

## 2024-10-07 NOTE — PATIENT INSTRUCTIONS
Dear Ms. LIU Carmen [facility],    Virtual Visit Summary: -     __  Stay on the same Sinemet dose.    PD Medications 7:30 am 1 pm 6:30 pm 10 pm   Sinemet 25/100 mg  2 2 1.5    Sinemet 50/200 mg CR    1        __  We had a long discussion about your overactive bladder and how you go to the bathroom frequently. Since you still want to go to the bathroom just in case you need to urinate and you get anxious about not going to the bathroom frequently, increasing the Myrbetriq dose would not make a difference. The medication might help with the sense of urgency and frequency.  But, if you still want to go to the bathroom frequently because of anxiety, it is best to leave your dose as is.      __  Return early April 2025 to see Dr. Mendoza in clinic or virtually. You may return or contact us sooner as needed.     __  Schedulers will call you to schedule your next visit. If you don't hear back from the schedulers in 2 weeks, please call [phone] to schedule the appointment.        For questions, you may send us a [facility] message or call [phone]    Fax number: [phone]    To make an appointment with one of our pharmacists, (Dr. Ansari, Pharm.D., Dr. Montenegro, PharmD, or Dr. Watson, Pharm.D.), call [phone].    Analisa Bolaños, RADHA, APRN  [facility]

## 2024-12-15 ENCOUNTER — APPOINTMENT (OUTPATIENT)
Dept: CT IMAGING | Facility: CLINIC | Age: 89
DRG: 690 | End: 2024-12-15
Payer: COMMERCIAL

## 2024-12-15 ENCOUNTER — HOSPITAL ENCOUNTER (INPATIENT)
Facility: CLINIC | Age: 89
LOS: 3 days | Discharge: HOME | DRG: 690 | End: 2024-12-18
Attending: EMERGENCY MEDICINE | Admitting: STUDENT IN AN ORGANIZED HEALTH CARE EDUCATION/TRAINING PROGRAM
Payer: COMMERCIAL

## 2024-12-15 DIAGNOSIS — N39.0 URINARY TRACT INFECTION WITH HEMATURIA, SITE UNSPECIFIED: Primary | ICD-10-CM

## 2024-12-15 DIAGNOSIS — K62.5 RECTAL BLEEDING: ICD-10-CM

## 2024-12-15 DIAGNOSIS — R31.9 URINARY TRACT INFECTION WITH HEMATURIA, SITE UNSPECIFIED: Primary | ICD-10-CM

## 2024-12-15 DIAGNOSIS — S37.22XA HEMATOMA OF BLADDER WALL, INITIAL ENCOUNTER: ICD-10-CM

## 2024-12-15 LAB
ALBUMIN SERPL BCG-MCNC: 3.7 G/DL (ref 3.5–5.2)
ALBUMIN UR-MCNC: 300 MG/DL
ALP SERPL-CCNC: 89 U/L (ref 40–150)
ALT SERPL W P-5'-P-CCNC: <5 U/L (ref 0–50)
ANION GAP SERPL CALCULATED.3IONS-SCNC: 9 MMOL/L (ref 7–15)
APPEARANCE UR: ABNORMAL
APTT PPP: 28 SECONDS (ref 22–38)
AST SERPL W P-5'-P-CCNC: 7 U/L (ref 0–45)
BASOPHILS # BLD AUTO: 0 10E3/UL (ref 0–0.2)
BASOPHILS NFR BLD AUTO: 0 %
BILIRUB DIRECT SERPL-MCNC: <0.2 MG/DL (ref 0–0.3)
BILIRUB SERPL-MCNC: 0.3 MG/DL
BILIRUB UR QL STRIP: NEGATIVE
BUN SERPL-MCNC: 25.8 MG/DL (ref 8–23)
CALCIUM SERPL-MCNC: 9.1 MG/DL (ref 8.8–10.4)
CHLORIDE SERPL-SCNC: 104 MMOL/L (ref 98–107)
COLOR UR AUTO: ABNORMAL
CREAT SERPL-MCNC: 0.63 MG/DL (ref 0.51–0.95)
EGFRCR SERPLBLD CKD-EPI 2021: 83 ML/MIN/1.73M2
EOSINOPHIL # BLD AUTO: 0.2 10E3/UL (ref 0–0.7)
EOSINOPHIL NFR BLD AUTO: 1 %
ERYTHROCYTE [DISTWIDTH] IN BLOOD BY AUTOMATED COUNT: 14.4 % (ref 10–15)
GLUCOSE SERPL-MCNC: 110 MG/DL (ref 70–99)
GLUCOSE UR STRIP-MCNC: NEGATIVE MG/DL
HCO3 SERPL-SCNC: 26 MMOL/L (ref 22–29)
HCT VFR BLD AUTO: 37.3 % (ref 35–47)
HEMOCCULT STL QL: POSITIVE
HGB BLD-MCNC: 11.8 G/DL (ref 11.7–15.7)
HGB UR QL STRIP: ABNORMAL
HOLD SPECIMEN: NORMAL
IMM GRANULOCYTES # BLD: 0.1 10E3/UL
IMM GRANULOCYTES NFR BLD: 0 %
INR PPP: 1.11 (ref 0.85–1.15)
KETONES UR STRIP-MCNC: 10 MG/DL
LEUKOCYTE ESTERASE UR QL STRIP: NEGATIVE
LIPASE SERPL-CCNC: 21 U/L (ref 13–60)
LYMPHOCYTES # BLD AUTO: 3.3 10E3/UL (ref 0.8–5.3)
LYMPHOCYTES NFR BLD AUTO: 29 %
MCH RBC QN AUTO: 30 PG (ref 26.5–33)
MCHC RBC AUTO-ENTMCNC: 31.6 G/DL (ref 31.5–36.5)
MCV RBC AUTO: 95 FL (ref 78–100)
MONOCYTES # BLD AUTO: 0.9 10E3/UL (ref 0–1.3)
MONOCYTES NFR BLD AUTO: 8 %
NEUTROPHILS # BLD AUTO: 7.1 10E3/UL (ref 1.6–8.3)
NEUTROPHILS NFR BLD AUTO: 61 %
NITRATE UR QL: NEGATIVE
NRBC # BLD AUTO: 0 10E3/UL
NRBC BLD AUTO-RTO: 0 /100
PH UR STRIP: 7.5 [PH] (ref 5–7)
PLATELET # BLD AUTO: 299 10E3/UL (ref 150–450)
POTASSIUM SERPL-SCNC: 4.3 MMOL/L (ref 3.4–5.3)
PROT SERPL-MCNC: 6.2 G/DL (ref 6.4–8.3)
RBC # BLD AUTO: 3.93 10E6/UL (ref 3.8–5.2)
RBC URINE: >182 /HPF
SODIUM SERPL-SCNC: 139 MMOL/L (ref 135–145)
SP GR UR STRIP: 1.02 (ref 1–1.03)
UROBILINOGEN UR STRIP-MCNC: 2 MG/DL
WBC # BLD AUTO: 11.6 10E3/UL (ref 4–11)
WBC URINE: >182 /HPF

## 2024-12-15 PROCEDURE — 82272 OCCULT BLD FECES 1-3 TESTS: CPT

## 2024-12-15 PROCEDURE — 82565 ASSAY OF CREATININE: CPT

## 2024-12-15 PROCEDURE — 96374 THER/PROPH/DIAG INJ IV PUSH: CPT

## 2024-12-15 PROCEDURE — 85025 COMPLETE CBC W/AUTO DIFF WBC: CPT

## 2024-12-15 PROCEDURE — 74174 CTA ABD&PLVS W/CONTRAST: CPT

## 2024-12-15 PROCEDURE — 82248 BILIRUBIN DIRECT: CPT

## 2024-12-15 PROCEDURE — 51702 INSERT TEMP BLADDER CATH: CPT

## 2024-12-15 PROCEDURE — 258N000001 HC RX 258

## 2024-12-15 PROCEDURE — 87186 SC STD MICRODIL/AGAR DIL: CPT

## 2024-12-15 PROCEDURE — 99222 1ST HOSP IP/OBS MODERATE 55: CPT | Mod: AI

## 2024-12-15 PROCEDURE — 0TCB8ZZ EXTIRPATION OF MATTER FROM BLADDER, VIA NATURAL OR ARTIFICIAL OPENING ENDOSCOPIC: ICD-10-PCS

## 2024-12-15 PROCEDURE — 36415 COLL VENOUS BLD VENIPUNCTURE: CPT

## 2024-12-15 PROCEDURE — 250N000013 HC RX MED GY IP 250 OP 250 PS 637

## 2024-12-15 PROCEDURE — 85041 AUTOMATED RBC COUNT: CPT

## 2024-12-15 PROCEDURE — 85610 PROTHROMBIN TIME: CPT

## 2024-12-15 PROCEDURE — G0378 HOSPITAL OBSERVATION PER HR: HCPCS

## 2024-12-15 PROCEDURE — 82374 ASSAY BLOOD CARBON DIOXIDE: CPT

## 2024-12-15 PROCEDURE — 83690 ASSAY OF LIPASE: CPT

## 2024-12-15 PROCEDURE — 250N000011 HC RX IP 250 OP 636

## 2024-12-15 PROCEDURE — 80053 COMPREHEN METABOLIC PANEL: CPT

## 2024-12-15 PROCEDURE — 85730 THROMBOPLASTIN TIME PARTIAL: CPT

## 2024-12-15 PROCEDURE — 81003 URINALYSIS AUTO W/O SCOPE: CPT

## 2024-12-15 PROCEDURE — 82947 ASSAY GLUCOSE BLOOD QUANT: CPT

## 2024-12-15 PROCEDURE — 87086 URINE CULTURE/COLONY COUNT: CPT

## 2024-12-15 PROCEDURE — 99285 EMERGENCY DEPT VISIT HI MDM: CPT | Mod: 25

## 2024-12-15 PROCEDURE — 120N000001 HC R&B MED SURG/OB

## 2024-12-15 RX ORDER — PROCHLORPERAZINE MALEATE 5 MG/1
5 TABLET ORAL EVERY 6 HOURS PRN
Status: DISCONTINUED | OUTPATIENT
Start: 2024-12-15 | End: 2024-12-18 | Stop reason: HOSPADM

## 2024-12-15 RX ORDER — ASPIRIN 81 MG/1
81 TABLET ORAL DAILY
Status: DISCONTINUED | OUTPATIENT
Start: 2024-12-16 | End: 2024-12-18 | Stop reason: HOSPADM

## 2024-12-15 RX ORDER — CARBIDOPA AND LEVODOPA 50; 200 MG/1; MG/1
1 TABLET, EXTENDED RELEASE ORAL EVERY EVENING
Status: DISCONTINUED | OUTPATIENT
Start: 2024-12-15 | End: 2024-12-18 | Stop reason: HOSPADM

## 2024-12-15 RX ORDER — ACETAMINOPHEN 325 MG/1
650 TABLET ORAL 4 TIMES DAILY
Status: DISCONTINUED | OUTPATIENT
Start: 2024-12-15 | End: 2024-12-18 | Stop reason: HOSPADM

## 2024-12-15 RX ORDER — GABAPENTIN 100 MG/1
100 CAPSULE ORAL 3 TIMES DAILY
Status: DISCONTINUED | OUTPATIENT
Start: 2024-12-15 | End: 2024-12-18 | Stop reason: HOSPADM

## 2024-12-15 RX ORDER — METHENAMINE HIPPURATE 1000 MG/1
1 TABLET ORAL 2 TIMES DAILY
Status: DISCONTINUED | OUTPATIENT
Start: 2024-12-15 | End: 2024-12-18 | Stop reason: HOSPADM

## 2024-12-15 RX ORDER — AMOXICILLIN 250 MG
1 CAPSULE ORAL 2 TIMES DAILY PRN
Status: DISCONTINUED | OUTPATIENT
Start: 2024-12-15 | End: 2024-12-18 | Stop reason: HOSPADM

## 2024-12-15 RX ORDER — CARBIDOPA AND LEVODOPA 25; 100 MG/1; MG/1
2 TABLET ORAL 2 TIMES DAILY
COMMUNITY

## 2024-12-15 RX ORDER — CARBOXYMETHYLCELLULOSE SODIUM 5 MG/ML
1 SOLUTION/ DROPS OPHTHALMIC 2 TIMES DAILY
Status: DISCONTINUED | OUTPATIENT
Start: 2024-12-15 | End: 2024-12-18 | Stop reason: HOSPADM

## 2024-12-15 RX ORDER — LORATADINE 10 MG/1
10 TABLET ORAL DAILY
Status: DISCONTINUED | OUTPATIENT
Start: 2024-12-16 | End: 2024-12-18 | Stop reason: HOSPADM

## 2024-12-15 RX ORDER — FLUTICASONE PROPIONATE 50 MCG
1 SPRAY, SUSPENSION (ML) NASAL DAILY PRN
Status: DISCONTINUED | OUTPATIENT
Start: 2024-12-15 | End: 2024-12-18 | Stop reason: HOSPADM

## 2024-12-15 RX ORDER — DULOXETIN HYDROCHLORIDE 60 MG/1
60 CAPSULE, DELAYED RELEASE ORAL DAILY
Status: DISCONTINUED | OUTPATIENT
Start: 2024-12-16 | End: 2024-12-18 | Stop reason: HOSPADM

## 2024-12-15 RX ORDER — CALCIUM CARBONATE 500 MG/1
1000 TABLET, CHEWABLE ORAL 4 TIMES DAILY PRN
Status: DISCONTINUED | OUTPATIENT
Start: 2024-12-15 | End: 2024-12-18 | Stop reason: HOSPADM

## 2024-12-15 RX ORDER — LIDOCAINE 40 MG/G
CREAM TOPICAL
Status: DISCONTINUED | OUTPATIENT
Start: 2024-12-15 | End: 2024-12-18 | Stop reason: HOSPADM

## 2024-12-15 RX ORDER — ONDANSETRON 4 MG/1
4 TABLET, ORALLY DISINTEGRATING ORAL EVERY 6 HOURS PRN
Status: DISCONTINUED | OUTPATIENT
Start: 2024-12-15 | End: 2024-12-18 | Stop reason: HOSPADM

## 2024-12-15 RX ORDER — LORATADINE 10 MG/1
10 TABLET ORAL DAILY
COMMUNITY

## 2024-12-15 RX ORDER — HYDROXYZINE HYDROCHLORIDE 25 MG/1
25 TABLET, FILM COATED ORAL AT BEDTIME
Status: DISCONTINUED | OUTPATIENT
Start: 2024-12-15 | End: 2024-12-18 | Stop reason: HOSPADM

## 2024-12-15 RX ORDER — PRAVASTATIN SODIUM 20 MG
20 TABLET ORAL AT BEDTIME
Status: DISCONTINUED | OUTPATIENT
Start: 2024-12-15 | End: 2024-12-18 | Stop reason: HOSPADM

## 2024-12-15 RX ORDER — CARBIDOPA AND LEVODOPA 25; 100 MG/1; MG/1
1.5 TABLET ORAL DAILY
COMMUNITY

## 2024-12-15 RX ORDER — ONDANSETRON 2 MG/ML
4 INJECTION INTRAMUSCULAR; INTRAVENOUS EVERY 6 HOURS PRN
Status: DISCONTINUED | OUTPATIENT
Start: 2024-12-15 | End: 2024-12-18 | Stop reason: HOSPADM

## 2024-12-15 RX ORDER — ACETAMINOPHEN 650 MG/1
650 SUPPOSITORY RECTAL EVERY 4 HOURS PRN
Status: DISCONTINUED | OUTPATIENT
Start: 2024-12-15 | End: 2024-12-15

## 2024-12-15 RX ORDER — CARBOXYMETHYLCELLULOSE SODIUM 5 MG/ML
1 SOLUTION/ DROPS OPHTHALMIC 2 TIMES DAILY
COMMUNITY

## 2024-12-15 RX ORDER — ACETAMINOPHEN 325 MG/1
650 TABLET ORAL EVERY 4 HOURS PRN
Status: DISCONTINUED | OUTPATIENT
Start: 2024-12-15 | End: 2024-12-15

## 2024-12-15 RX ORDER — MAGNESIUM OXIDE 400 MG/1
1200 TABLET ORAL DAILY
Status: DISCONTINUED | OUTPATIENT
Start: 2024-12-16 | End: 2024-12-18 | Stop reason: HOSPADM

## 2024-12-15 RX ORDER — IBUPROFEN 200 MG
200 TABLET ORAL DAILY
Status: DISCONTINUED | OUTPATIENT
Start: 2024-12-15 | End: 2024-12-18 | Stop reason: HOSPADM

## 2024-12-15 RX ORDER — VITAMIN B COMPLEX
25 TABLET ORAL DAILY
COMMUNITY

## 2024-12-15 RX ORDER — CARBIDOPA AND LEVODOPA 25; 100 MG/1; MG/1
2 TABLET ORAL 2 TIMES DAILY
Status: DISCONTINUED | OUTPATIENT
Start: 2024-12-16 | End: 2024-12-18 | Stop reason: HOSPADM

## 2024-12-15 RX ORDER — ACETAMINOPHEN 325 MG/1
650 TABLET ORAL 4 TIMES DAILY
COMMUNITY

## 2024-12-15 RX ORDER — IOPAMIDOL 755 MG/ML
90 INJECTION, SOLUTION INTRAVASCULAR ONCE
Status: COMPLETED | OUTPATIENT
Start: 2024-12-15 | End: 2024-12-15

## 2024-12-15 RX ORDER — METOPROLOL SUCCINATE 25 MG/1
25 TABLET, EXTENDED RELEASE ORAL DAILY
Status: DISCONTINUED | OUTPATIENT
Start: 2024-12-16 | End: 2024-12-18 | Stop reason: HOSPADM

## 2024-12-15 RX ORDER — TRAMADOL HYDROCHLORIDE 50 MG/1
50 TABLET ORAL 2 TIMES DAILY
Status: DISCONTINUED | OUTPATIENT
Start: 2024-12-15 | End: 2024-12-18 | Stop reason: HOSPADM

## 2024-12-15 RX ORDER — MAGNESIUM HYDROXIDE 1200 MG
1200 TABLET,CHEWABLE ORAL DAILY
COMMUNITY

## 2024-12-15 RX ORDER — HYDROXYZINE HYDROCHLORIDE 25 MG/1
50 TABLET, FILM COATED ORAL AT BEDTIME
Status: DISCONTINUED | OUTPATIENT
Start: 2024-12-15 | End: 2024-12-18 | Stop reason: HOSPADM

## 2024-12-15 RX ORDER — QUETIAPINE FUMARATE 25 MG/1
25 TABLET, FILM COATED ORAL DAILY
Status: DISCONTINUED | OUTPATIENT
Start: 2024-12-15 | End: 2024-12-18 | Stop reason: HOSPADM

## 2024-12-15 RX ORDER — CARBIDOPA AND LEVODOPA 25; 100 MG/1; MG/1
2 TABLET ORAL DAILY
Status: DISCONTINUED | OUTPATIENT
Start: 2024-12-15 | End: 2024-12-15

## 2024-12-15 RX ORDER — AMOXICILLIN 250 MG
2 CAPSULE ORAL 2 TIMES DAILY PRN
Status: DISCONTINUED | OUTPATIENT
Start: 2024-12-15 | End: 2024-12-18 | Stop reason: HOSPADM

## 2024-12-15 RX ADMIN — SODIUM CHLORIDE 3000 ML: 900 IRRIGANT IRRIGATION at 23:03

## 2024-12-15 RX ADMIN — Medication 1 DROP: at 21:29

## 2024-12-15 RX ADMIN — GABAPENTIN 100 MG: 100 CAPSULE ORAL at 21:20

## 2024-12-15 RX ADMIN — FAMOTIDINE 40 MG: 10 INJECTION, SOLUTION INTRAVENOUS at 12:59

## 2024-12-15 RX ADMIN — PRAVASTATIN SODIUM 20 MG: 20 TABLET ORAL at 21:20

## 2024-12-15 RX ADMIN — SODIUM CHLORIDE 3000 ML: 900 IRRIGANT IRRIGATION at 23:27

## 2024-12-15 RX ADMIN — ACETAMINOPHEN 650 MG: 325 TABLET ORAL at 21:19

## 2024-12-15 RX ADMIN — SODIUM CHLORIDE 3000 ML: 900 IRRIGANT IRRIGATION at 21:19

## 2024-12-15 RX ADMIN — SODIUM CHLORIDE 3000 ML: 900 IRRIGANT IRRIGATION at 20:06

## 2024-12-15 RX ADMIN — QUETIAPINE FUMARATE 25 MG: 25 TABLET ORAL at 21:20

## 2024-12-15 RX ADMIN — IOPAMIDOL 90 ML: 755 INJECTION, SOLUTION INTRAVENOUS at 13:38

## 2024-12-15 RX ADMIN — CARBIDOPA AND LEVODOPA 1 TABLET: 50; 200 TABLET, EXTENDED RELEASE ORAL at 21:20

## 2024-12-15 RX ADMIN — IBUPROFEN 200 MG: 200 TABLET, FILM COATED ORAL at 21:19

## 2024-12-15 RX ADMIN — SODIUM CHLORIDE 3000 ML: 900 IRRIGANT IRRIGATION at 20:07

## 2024-12-15 RX ADMIN — METHENAMINE HIPPURATE 1 G: 1 TABLET ORAL at 21:20

## 2024-12-15 RX ADMIN — TRAMADOL HYDROCHLORIDE 50 MG: 50 TABLET, COATED ORAL at 21:19

## 2024-12-15 RX ADMIN — HYDROXYZINE HYDROCHLORIDE 25 MG: 25 TABLET ORAL at 23:18

## 2024-12-15 ASSESSMENT — ACTIVITIES OF DAILY LIVING (ADL)
ADLS_ACUITY_SCORE: 48
ADLS_ACUITY_SCORE: 52

## 2024-12-15 ASSESSMENT — COLUMBIA-SUICIDE SEVERITY RATING SCALE - C-SSRS
2. HAVE YOU ACTUALLY HAD ANY THOUGHTS OF KILLING YOURSELF IN THE PAST MONTH?: NO
1. IN THE PAST MONTH, HAVE YOU WISHED YOU WERE DEAD OR WISHED YOU COULD GO TO SLEEP AND NOT WAKE UP?: NO
6. HAVE YOU EVER DONE ANYTHING, STARTED TO DO ANYTHING, OR PREPARED TO DO ANYTHING TO END YOUR LIFE?: NO

## 2024-12-15 NOTE — ED PROVIDER NOTES
"Emergency Department Staff Physician Note     I had a face to face encounter with this patient seen by the Advanced Practice Provider (ALEE).  I have seen, examined, and discussed the patient with the ALEE and agree with their assessment and plan of management.    Relevant HPI:     ALEXA Luu is a 89 year old female who presents to the Emergency Department for evaluation of rectal bleeding x 3 days, painless. Rectal showed BRBPR, no melena. No hemorrhoids actively bleeding. Hemoglobin stable at 11.8. No blood thinners, asa 81 mg daily. Pending CTA.      ED Course:  1:14 PM  I received the patient report from the ALEE, Ruby Schmitz. I agree with their assessment and plan of management, and I will see the patient. I met with the patient to introduce myself, gather additional history, perform my initial exam, and discuss the plan.     Brief Physical Exam:  VITAL SIGNS: /65   Pulse 79   Temp 97.6  F (36.4  C) (Temporal)   Resp 16   Ht 1.626 m (5' 4\")   Wt 55.8 kg (123 lb)   SpO2 94%   BMI 21.11 kg/m    Generalized: does not appear toxic, interactive and in no apparent distress.   HEENT: No eye discharge or redness, no nasal drainage or bleeding.   Resp: Equal work of breathing with bilateral chest rise present.  CV: Warm extremities, regular rate.  Neuro: Interactive, no aphasia or dysarthria, no facial droop.  MSK: Moving extremities spontaneously. No focal deformity or trauma noted to extremities.  Psych: Cooperative, does not appear to be hallucinating or responding to internal stimuli.     Impression / ED Plan:  I had a face to face encounter with this patient seen by the Advanced Practice Provider (ALEE).  I have seen, examined, and discussed the patient with the ALEE and agree with their assessment and plan of management. I personally saw the patient and performed a substantive portion of the visit including all aspects of the medical decision making.    1. Rectal bleeding.  Likely " hemorrhoidal, hx of grade 3 hemorrhoids which were banded in 9/2022 with Hannah Flores NP at  [facility] clinic.  Hemoglobin stable.  No thinners except asa on board.  CTA shows possible bladder hematoma. No active bleeding.  Pending UA then discharge home.      1. Rectal bleeding        Darlene Leigh MD  Staff Physician  [facility] Emergency Department      Darlene Leigh MD  12/15/24 0113       Darlene Leigh MD  12/16/24 [id number]

## 2024-12-15 NOTE — H&P
[facility]    History and Physical - Hospitalist Service       Date of Admission:  12/15/2024    Assessment & Plan      ALEXA Painter is a 89 year old female admitted on 12/15/2024, for painless rectal bleeding x3 vs bladder hematoma with hematuria. PMHx of HLD, CVA, atrial fibrillation, osteoarthritis, recurrent UTI's (on prophylaxis with methenamine hippurate) and Parkinson's.    ED Course: Presented to ED for evaluation of rectal bleeding for the past 3 days with a h/o hemorrhoids. Pt initially attributed bleeding to hemorrhoids. Asymptomatic on presentation. Lab work-up largely normal with a mild leukocytosis at 11.6, occult blood positive, and UA negative for nitrite and leukocyte esterase and positive for >182 RBCs. Urine culture pending. CT abdomen showed no acute GI bleeding and 5.5 cm probably bladder hematoma. ED consult with [facility]; bleeding most likely d/t hemorrhoids. Subsequent consult with urology recommended admission of pt for continued evaluation and placement of 3-way catheter for initial hand irrigation followed by CBI.         Rectal bleeding  - Continue to monitor   - AM CBC     Bladder hematoma  Hematuria   - Consult urology, appreciate expertise  - CBI initiated   - Held PTA myrbetriq as Castro catheter in place   - Continued PTA methenamine hippurate 1 g at 07:30 and 18:30  - AM CBC and CMP     Chronic conditions:  Parkinson's   - PTA Sinemet  mg, 1 tablet at 22:00  - PTA Sinemet  mg, 1.5 tablets at 18:30 - selected 2 tablets as 1.5 tablets not an option  - PTA Sinemet  mg, 2 tablets at 0730 and 1300  - PTA Seroquel 25 mg daily    Chronic pain  H/o sciatic pain, chronic L hip/buttock/low back pain radiating to L leg   - PTA Gabapentin 100 mg TID at 0730, 1300, 1830  - PTA acetaminophen 650 mg QID 0730, 1300, 1800, 0130  - PTA ibuprofen 200 mg daily at 1300  - PTA tramadol 50 mg BID at 0730 and 2200    HLD   - PTA pravastatin 20 mg at  bedtime      Proxysmal atrial fibrillation  Chronic anticoagulation stopped in 2022 due to elevated fall risk 2/2 Parkinson's disease. Watchman device in place.   - PTA metoprolol succinate   - PTA ASA 81 mg daily               Diet: Combination Diet Regular Diet Adult  DVT Prophylaxis: Pneumatic Compression Devices  Castro Catheter: PRESENT, indication: Gross hematuria  Fluids: PO  Lines: None     Cardiac Monitoring: None  Code Status: No CPR- Do NOT Intubate    Clinically Significant Risk Factors Present on Admission                 # Drug Induced Platelet Defect: home medication list includes an antiplatelet medication                        Disposition Plan      Expected Discharge Date: 12/17/2024                The patient's care was discussed with the  overnight attending Dr. Vaughn Velasquez and will be evaluated by daytime attending Dr. Dione Cooper on 12/16/2024 .      SHLOMO SABILLON MD JD, PGY-2  Hospitalist Service  [facility]  Securely message with [employer] (more info)  Text page via [employer] Paging/Directory   ______________________________________________________________________    Chief Complaint   Rectal bleeding    History is obtained from the patient, pt's daughter at bedside, and EMR review    History of Present Illness   ALEXA Luu is a 89 year old female who has a history of HLD, CVA, atrial fibrillation, osteoarthritis, Parkinson's, and is admitted for painless rectal bleeding x3 days and found to have bladder hematoma.     3 days of rectal bleeding with a h/o hemorrhoids and banding procedure several years ago. No yvonne blood noted but positive FOBT. Not on blood thinners. Pt denied pain, dizziness, lightheadedness, fever, chills, abdominal pain, N/V, flank pain, chest pain, and physical injury.     Past Medical History    Past Medical History:   Diagnosis Date    Abnormal barium swallow 05/09/2024    1.  Aspiration with thin consistency barium.  Penetration with nectar consistency barium.  2.  Aspiration is silent.  3.  See the speech pathology report for details.      Abnormal brain MRI 2019    [facility] HEAD [facility]2019 [email or web address] [facility] Other Result Information This result has an attachment that is not available. Result Narrative [facility]  EXAM: MR HEAD BRAIN WO LOCATION: [facility] DATE/TIME: 3/7/2019 5:18 PM  INDICATION: Atypical Parkinsonism (hc) COMPARISON: CT 2018 TECHNIQUE: Routine multiplanar multisequence head MRI without intravenou    Atrial fibrillation (H)     per H&P    Atypical parkinsonism (H) 2017    Breast cyst     CVA (cerebral vascular accident) (H)     per H&P    Finger fracture, left 2019    ring finger     Multiple rib fractures 2019    Parkinson disease (H) 2016    Parkinson's disease, unspecified whether dyskinesia present, unspecified whether manifestations fluctuate (H) 2024       Past Surgical History   Past Surgical History:   Procedure Laterality Date    ANKLE SURGERY Left     fracture    APPENDECTOMY      BREAST CYST ASPIRATION      EP THANIA CLOSURE N/A 2020    Procedure: EP THANIA Closure;  Surgeon: Javier Smith MD;  Location: [facility] Cath Lab;  Service: Cardiology    EP LOOP RECORDER IMPLANT N/A 10/8/2019    Procedure: EP Loop Recorder Insertion;  Surgeon: Angel Hurd MD;  Location: [facility] Cath Lab;  Service: Cardiology    EYE SURGERY Bilateral     cataract surgery 2000    HYSTERECTOMY      tahbso    HYSTERECTOMY  1982    KNEE SURGERY Right     staph infection    TONSILLECTOMY      ZZC TOTAL ABDOM HYSTERECTOMY      Description: Hysterectomy;  Proc Date: 1988;  Comments: couldn't find her ovaries       Prior to Admission Medications   Prior to Admission Medications   Prescriptions Last Dose Informant Patient Reported? Taking?   DULoxetine (CYMBALTA) 30 MG capsule 2024 Noon  Yes Yes   Si x 30mg at 1pm    MYRBETRIQ 25 MG 24 hr tablet 12/15/2024 Morning  Yes Yes   Sig: Take 50 mg by mouth daily.   Magnesium Hydroxide (DULCOLAX SOFT CHEWS) 1200 MG CHEW   Yes Yes   Sig: Take 1,200 mg by mouth daily. At 13:00   Multiple Vitamins-Minerals (PRESERVISION AREDS) CAPS 12/15/2024 Morning  Yes Yes   Si@730a and 1@10p   QUEtiapine (SEROQUEL) 25 MG tablet 2024 at  4:00 PM  Yes Yes   Sig: Take 25 mg by mouth daily.   UNABLE TO FIND 12/15/2024 Morning  Yes Yes   Sig: Take 400 mg by mouth 2 times daily. MEDICATION NAME:  Chest congestion tab . 400mg . 07:30 and 18:30   Vitamin D3 (CHOLECALCIFEROL) 25 mcg (1000 units) tablet 12/15/2024 Morning  Yes Yes   Sig: Take 25 mcg by mouth daily.   acetaminophen (TYLENOL) 325 MG tablet 12/15/2024 Morning  Yes Yes   Sig: Take 650 mg by mouth 4 times daily. Takes at 01:30 , 07:30 , 13:00 and 18:00   aspirin 81 MG EC tablet 12/15/2024 Morning  Yes Yes   Simg tab by mouth daily @730a   carbidopa-levodopa (SINEMET CR)  MG CR tablet 2024 at  9:30 PM  No Yes   Si tab at 10pm   carbidopa-levodopa (SINEMET)  MG tablet 2024 Evening  Yes Yes   Sig: Take 1.5 tablets by mouth daily. Give at 18:30   carbidopa-levodopa (SINEMET)  MG tablet 12/15/2024 Morning  Yes Yes   Sig: Take 2 tablets by mouth 2 times daily. Give at 07:30 and 13:00   carboxymethylcellulose PF (REFRESH PLUS) 0.5 % ophthalmic solution 12/15/2024 Morning  Yes Yes   Sig: Place 1 drop into both eyes 2 times daily.   diclofenac (VOLTAREN) 1 % topical gel Unknown  Yes Yes   Sig: Apply 4 g topically 4 times daily as needed for moderate pain. As needed for pain   estradiol (ESTRACE) 0.1 MG/GM vaginal cream 2024 Bedtime  Yes Yes   Sig: Place vaginally three times a week.   fluticasone (FLONASE) 50 MCG/ACT nasal spray 12/15/2024 Morning  Yes Yes   Sig: Spray 1 spray into both nostrils daily. As needed   gabapentin (NEURONTIN) 100 MG capsule 12/15/2024 Morning  Yes Yes   Simg three times  daily at 730a, 1p and 630p   ibuprofen (ADVIL/MOTRIN) 200 MG tablet 2024 Noon  Yes Yes   Sig: Take 200 mg by mouth daily. 13:00   loratadine (CLARITIN) 10 MG tablet 12/15/2024 Morning  Yes Yes   Sig: Take 10 mg by mouth daily.   melatonin 3 MG tablet 2024 Bedtime  Yes Yes   Sig: Take 3 mg by mouth at bedtime.   methenamine hippurate (HIPREX) 1 g tablet 12/15/2024 Morning  Yes Yes   Si @730a and 1@630p   metoprolol succinate ER (TOPROL XL) 25 MG 24 hr tablet 12/15/2024 Morning  No Yes   Sig: TAKE 1 TABLET EVERY DAY AT 8AM   Patient taking differently: TAKE 1 TABLET EVERY DAY AT 730a   pravastatin (PRAVACHOL) 20 MG tablet 2024 Bedtime  No Yes   Sig: TAKE 1 TABLET AT BEDTIME   Patient taking differently: @10pm   traMADol (ULTRAM) 50 MG tablet 12/15/2024 Morning  Yes Yes   Sig: Take 1 tablet by mouth 2 times daily @730a and 10pm      Facility-Administered Medications: None        Review of Systems    The 5 point Review of Systems is negative other than noted in the HPI or here.      Physical Exam   Vital Signs: Temp: 98.1  F (36.7  C) Temp src: Oral BP: (!) 148/68 Pulse: 82   Resp: 18 SpO2: 95 % O2 Device: None (Room air)    Weight: 123 lbs 0 oz    General Appearance: Resting comfortably in bed, daughter at bedside, alert and oriented x3, participated in interview  Respiratory: Lungs clear bilateral to auscultation, good aeration, no increased work of breathing, no wheezing, no crackles  Cardiovascular: Irregularly irregular rhythm, normal rate, no cyanosis, no peripheral edema  GI: Non-distended, non-tender to palpation  Back: No CVA tenderness  Skin: Normal appearance and turgor for age      Medical Decision Making       Please see A&P for additional details of medical decision making.      Data     I have personally reviewed the following data over the past 24 hrs:    11.6 (H)  \   11.8   / 299     139 104 25.8 (H) /  110 (H)   4.3 26 0.63 \     ALT: <5 AST: 7 AP: 89 TBILI: 0.3   ALB: 3.7 TOT  PROTEIN: 6.2 (L) LIPASE: 21     INR:  1.11 PTT:  28   D-dimer:  N/A Fibrinogen:  N/A       Imaging results reviewed over the past 24 hrs:   Recent Results (from the past 24 hours)   CTA Abdomen Pelvis with Contrast    Narrative    EXAM: CTA ABDOMEN AND PELVIS WITH CONTRAST  LOCATION: [facility]  DATE: 12/15/2024    INDICATION: Rectal bleeding.  COMPARISON: 03/13/2013.  TECHNIQUE: CT angiogram abdomen pelvis during arterial phase of injection of IV contrast. 2D and 3D MIP reconstructions were performed by the CT technologist. Dose reduction techniques were used.  CONTRAST: Isovue 370, 90 mL.    FINDINGS:  ANGIOGRAM ABDOMEN/PELVIS: There are moderate atherosclerotic changes of the visualized aorta and its branches. There is no evidence of aortic dissection or aneurysm. No contrast extravasation in the GI tract demonstrated to suggest active   gastrointestinal bleeding.    LOWER CHEST: Minimal dependent probable atelectasis. No effusions.    HEPATOBILIARY: No significant mass or bile duct dilatation. No calcified gallstones. Low-attenuation subcentimeter liver lesion(s) compatible with benign cysts or other benign lesions. No routine follow-up is recommended in a low-risk patient.    PANCREAS: No significant mass, duct dilatation, or inflammatory change.    SPLEEN: Normal size.    ADRENAL GLANDS: No significant nodules.    KIDNEYS/BLADDER: No significant mass, or hydronephrosis. 2-3 mm nonobstructing stone on the right. Low-attenuation subcentimeter renal lesion(s) compatible with benign cysts or other benign lesions. No routine follow-up is recommended in a low-risk   patient. Probable bladder hematoma given its lack of enhancement. Ultrasound follow-up in 3-4 days recommended to evaluate for resolution.    BOWEL: No obstruction or inflammatory change.    LYMPH NODES: No lymphadenopathy.    PELVIC ORGANS: No pelvic masses.    MUSCULOSKELETAL: No frankly destructive bony lesions.       Impression    IMPRESSION:  1.  No active gastrointestinal bleeding demonstrated currently.  2.  5.5 cm probable hematoma in the bladder. Ultrasound follow-up in 3-4 days recommended to evaluate for resolution.

## 2024-12-15 NOTE — ED PROVIDER NOTES
EMERGENCY DEPARTMENT ENCOUNTER      NAME: ALEXA Luis [facility]  AGE: 89 year old female  YOB: 1932  MRN: [id number]  EVALUATION DATE & TIME: 12/15/2024 12:29 PM    PCP: Get Larkin    ED PROVIDER: Ruby Schmitz PA-C      Chief Complaint   Patient presents with    Rectal Bleeding     FINAL IMPRESSION:  1. Rectal bleeding    2. Hematoma of bladder wall, initial encounter      ED COURSE & MEDICAL DECISION MAKING:    Pertinent Labs & Imaging studies reviewed. (See chart for details)  89 year old female presents to the Emergency Department for evaluation of rectal bleeding.  For the past 3 days patient reports bright red rectal bleeding.  Patient has a history of hemorrhoids and initially thought that it was due to her hemorrhoid bleeding.  Denies dizziness, lightheadedness, abdominal pain and all other symptoms.  No fevers or chills.  Vital signs reviewed and unremarkable.  Afebrile.  On exam patient is alert and answering questions appropriately.  Moves all 4 extremities without difficulty.  No abdominal pain.  Normal rate.  Lungs are clear to auscultation bilaterally.  No rash.  On rectal exam shows no obvious bright red blood on rectal exam.  External hemorrhoids present on exam.     White blood cell count is 11.6.  Hemoglobin is 11.8.  Basic shows a sodium and potassium within normal limits.  Anion gap is 9.  Creatinine is 0.63.  Lipase is within normal limits.  Protein is slightly decreased at 6.2.  INR and APTT are within normal limits.  Occult blood stool is positive.  UA shows dark in color with a large amount of blood.  No nitrates or leukocytes.  Urine culture is pending.  CT of the abdomen shows no acute gastrointestinal bleeding demonstrated currently.  5.5 cm probable hematoma in the bladder.  Patient is resting comfortably.  I spoke with [facility] who think the bleeding is most likely due to hemorrhoids but is concerned about the hematoma in the bladder.  I spoke with urology who  requested a three-way catheter be placed with hand irrigation.  Urology would like the patient admitted for continued evaluation.  Patient was educated on results and agrees with plan.  All questions answered.  I spoke with the hospitalist who agrees to meet the patient.    ED COURSE:   12:43 PM  I saw the patient. Staffed with Dr. Leigh.   4:58 PM Spoke with Urology and GI.  Urology recommended three-way catheter with hand irrigation and admission.  4:59 PM patient was educated on results and is resting comfortably.  Patient family agree with plan.  All questions answered.  5:22 PM I spoke with the hospitalist who agrees to admit the patient.       At the conclusion of the encounter I discussed the results of all of the tests and the disposition. The questions were answered. The patient or family acknowledged understanding and was agreeable with the care plan.     0 minutes of critical care time       Medical Decision Making  Obtained supplemental history:Supplemental history obtained?: No  Reviewed external records: External records reviewed?: No  Care impacted by chronic illness:Documented in Chart  Care significantly affected by social determinants of health:N/A  Did you consider but not order tests?: Work up considered but not performed and documented in chart, if applicable  Did you interpret images independently?: Independent interpretation of ECG and images noted in documentation, when applicable.  Consultation discussion with other provider:Did you involve another provider (consultant, MH, pharmacy, etc.)?: I discussed the care with another health care provider, see documentation for details.  Admit.    Not Applicable    MEDICATIONS GIVEN IN THE EMERGENCY:  Medications   famotidine (PEPCID) injection 40 mg (40 mg Intravenous $Given 12/15/24 1259)   iopamidol (ISOVUE-370) solution 90 mL (90 mLs Intravenous $Given 12/15/24 1338)       NEW PRESCRIPTIONS STARTED AT TODAY'S ER VISIT  New Prescriptions    No  medications on file          =================================================================    [facility]    Patient information was obtained from: patient    Use of : N/A     M Carmen Luu is a 89 year old female with a pertinent history of hemorrhoids, prolapse of vaginal vault, stroke, A-fib, cardiomyopathy, who presents to this ED for evaluation of rectal bleed.     For the past 3 days patient has had rectal bleeding.  Patient reports that the blood is bright red and has been passing clots.  Patient has a history of hemorrhoids and initially thought it was due to her hemorrhoids.  No pain.  Patient reports that anytime she sits on the toilet she has large amounts of blood in the toilet afterwards.  No dizziness, lightheadedness, fevers, chills, abdominal pain, nausea, vomiting, chest pain and all other symptoms.  Patient denies any blood thinners.      REVIEW OF SYSTEMS   As per HPI    PAST MEDICAL HISTORY:  Past Medical History:   Diagnosis Date    Abnormal barium swallow 05/09/2024    1.  Aspiration with thin consistency barium. Penetration with nectar consistency barium.  2.  Aspiration is silent.  3.  See the speech pathology report for details.      Abnormal brain MRI 03/11/2019    MR HEAD [facility]8/2019 [facility] Other Result Information This result has an attachment that is not available. 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    Breast cyst     CVA (cerebral vascular accident) (H)     per H&P    Finger fracture, left 02/27/2019    ring finger     Multiple rib fractures 02/27/2019    Parkinson disease (H) 01/20/2016    Parkinson's disease, unspecified whether  dyskinesia present, unspecified whether manifestations fluctuate (H) 04/23/2024       PAST SURGICAL HISTORY:  Past Surgical History:   Procedure Laterality Date    ANKLE SURGERY Left     fracture    APPENDECTOMY      BREAST CYST ASPIRATION      EP THANIA CLOSURE N/A 2/20/2020    Procedure: EP THANIA Closure;  Surgeon: Javier Smith MD;  Location: [facility];  Service: Cardiology    EP LOOP RECORDER IMPLANT N/A 10/8/2019    Procedure: EP Loop Recorder Insertion;  Surgeon: Angel Hurd MD;  Location: [facility];  Service: Cardiology    EYE SURGERY Bilateral     cataract surgery 2000    HYSTERECTOMY      tahbso    HYSTERECTOMY  1982    KNEE SURGERY Right     staph infection    TONSILLECTOMY      ZZC TOTAL ABDOM HYSTERECTOMY      Description: Hysterectomy;  Proc Date: 01/01/1988;  Comments: couldn't find her ovaries           CURRENT MEDICATIONS:    acetaminophen (TYLENOL) 325 MG tablet  aspirin 81 MG EC tablet  carbidopa-levodopa (SINEMET CR)  MG CR tablet  carbidopa-levodopa (SINEMET)  MG tablet  carbidopa-levodopa (SINEMET)  MG tablet  carboxymethylcellulose PF (REFRESH PLUS) 0.5 % ophthalmic solution  diclofenac (VOLTAREN) 1 % topical gel  DULoxetine (CYMBALTA) 30 MG capsule  estradiol (ESTRACE) 0.1 MG/GM vaginal cream  fluticasone (FLONASE) 50 MCG/ACT nasal spray  gabapentin (NEURONTIN) 100 MG capsule  ibuprofen (ADVIL/MOTRIN) 200 MG tablet  loratadine (CLARITIN) 10 MG tablet  Magnesium Hydroxide (DULCOLAX SOFT CHEWS) 1200 MG CHEW  melatonin 3 MG tablet  methenamine hippurate (HIPREX) 1 g tablet  metoprolol succinate ER (TOPROL XL) 25 MG 24 hr tablet  Multiple Vitamins-Minerals (PRESERVISION AREDS) CAPS  MYRBETRIQ 25 MG 24 hr tablet  pravastatin (PRAVACHOL) 20 MG tablet  QUEtiapine (SEROQUEL) 25 MG tablet  traMADol (ULTRAM) 50 MG tablet  UNABLE TO FIND  Vitamin D3 (CHOLECALCIFEROL) 25 mcg (1000 units) tablet        ALLERGIES:  Allergies   Allergen Reactions    Meperidine  "Anaphylaxis     Unknown cause    Acetaminophen-Codeine Other (See Comments) and Unknown    Codeine Other (See Comments)    Morphine Other (See Comments)     Made me feel really wierd    Penicillins Other (See Comments)     Tongue and throat tingling  Other reaction(s): Paresthesias  Tongue and throat tingling  Tingling on lips          Vicodin [Hydrocodone-Acetaminophen] Other (See Comments)     Weird feeling       FAMILY HISTORY:  Family History   Problem Relation Age of Onset    Cerebrovascular Disease Mother     Heart Disease Mother     Other - See Comments Mother         [place] Citizen of [place]    Dementia Father         10 yrs.    Other - See Comments Father         Citizen of [place]    Paranoid behavior Father     Other - See Comments Sister         bhavesh anne    Other - See Comments Daughter         [place]    Other - See Comments Daughter         coyle vipin    Other - See Comments Son         [place]    Other - See Comments Son         [place], [place]    Breast Cancer Maternal Aunt     Dementia Paternal Aunt     Dementia Paternal Aunt     Dementia Paternal Uncle        SOCIAL HISTORY:   Social History     Socioeconomic History    Marital status:    Tobacco Use    Smoking status: Never    Smokeless tobacco: Never   Substance and Sexual Activity    Alcohol use: No    Drug use: Never   Social History Narrative    . lives in [place].     Anisha Little daughter    Retired nurse.     Various hospital    Gyn, intensive care,     School system    [place], [place]    Moved here in      x 2    First    = was 43 yrs old and had melanoma    Second    had a stroke and  Was 87 yrs old.        VITALS:  /65 (BP Location: Left arm)   Pulse 76   Temp 98  F (36.7  C) (Oral)   Resp 18   Ht 1.626 m (5' 4\")   Wt 55.8 kg (123 lb)   SpO2 95%   BMI 21.11 kg/m      PHYSICAL EXAM    Physical Exam  Vitals and nursing note reviewed. Exam conducted with a " chaperone present.   Constitutional:       Appearance: Normal appearance.   HENT:      Head: Atraumatic.      Right Ear: External ear normal.      Left Ear: External ear normal.      Nose: Nose normal.      Mouth/Throat:      Mouth: Mucous membranes are moist.   Eyes:      Conjunctiva/sclera: Conjunctivae normal.      Pupils: Pupils are equal, round, and reactive to light.   Cardiovascular:      Rate and Rhythm: Normal rate and regular rhythm.      Pulses: Normal pulses.      Heart sounds: Normal heart sounds. No murmur heard.     No friction rub. No gallop.   Pulmonary:      Effort: Pulmonary effort is normal.      Breath sounds: Normal breath sounds. No wheezing or rales.   Abdominal:      Tenderness: There is no abdominal tenderness. There is no guarding or rebound.   Genitourinary:     Rectum: Guaiac result positive (positive). External hemorrhoid present. No tenderness.      Comments: No yvonne blood.   Musculoskeletal:      Cervical back: Normal range of motion.   Skin:     General: Skin is dry.   Neurological:      Mental Status: She is alert. Mental status is at baseline.   Psychiatric:         Mood and Affect: Mood normal.         Thought Content: Thought content normal.          LAB:  All pertinent labs reviewed and interpreted.  Labs Ordered and Resulted from Time of ED Arrival to Time of ED Departure   BASIC METABOLIC PANEL - Abnormal       Result Value    Sodium 139      Potassium 4.3      Chloride 104      Carbon Dioxide (CO2) 26      Anion Gap 9      Urea Nitrogen 25.8 (*)     Creatinine 0.63      GFR Estimate 83      Calcium 9.1      Glucose 110 (*)    HEPATIC FUNCTION PANEL - Abnormal    Protein Total 6.2 (*)     Albumin 3.7      Bilirubin Total 0.3      Alkaline Phosphatase 89      AST 7      ALT <5      Bilirubin Direct <0.20     ROUTINE UA WITH MICROSCOPIC REFLEX TO CULTURE - Abnormal    Color Urine Dark Red (*)     Appearance Urine Cloudy (*)     Glucose Urine Negative      Bilirubin Urine  Negative      Ketones Urine 10 (*)     Specific Gravity Urine 1.020      Blood Urine 1.0 mg/dL (*)     pH Urine 7.5 (*)     Protein Albumin Urine 300 (*)     Urobilinogen Urine 2.0 (*)     Nitrite Urine Negative      Leukocyte Esterase Urine Negative      RBC Urine >182 (*)     WBC Urine >182 (*)    CBC WITH PLATELETS AND DIFFERENTIAL - Abnormal    WBC Count 11.6 (*)     RBC Count 3.93      Hemoglobin 11.8      Hematocrit 37.3      MCV 95      MCH 30.0      MCHC 31.6      RDW 14.4      Platelet Count 299      % Neutrophils 61      % Lymphocytes 29      % Monocytes 8      % Eosinophils 1      % Basophils 0      % Immature Granulocytes 0      NRBCs per 100 WBC 0      Absolute Neutrophils 7.1      Absolute Lymphocytes 3.3      Absolute Monocytes 0.9      Absolute Eosinophils 0.2      Absolute Basophils 0.0      Absolute Immature Granulocytes 0.1      Absolute NRBCs 0.0     OCCULT BLOOD STOOL - Abnormal    Occult Blood Positive (*)    LIPASE - Normal    Lipase 21     INR - Normal    INR 1.11     PARTIAL THROMBOPLASTIN TIME - Normal    aPTT 28     URINE CULTURE        RADIOLOGY:  Reviewed all pertinent imaging. Please see official radiology report.  CTA Abdomen Pelvis with Contrast   Final Result   IMPRESSION:   1.  No active gastrointestinal bleeding demonstrated currently.   2.  5.5 cm probable hematoma in the bladder. Ultrasound follow-up in 3-4 days recommended to evaluate for resolution.             Ruby Schmitz PA-C  [facility] EMERGENCY ROOM  [address] [address] [phone]     Ruby Schmitz PA-C  12/15/24 1784

## 2024-12-15 NOTE — ED TRIAGE NOTES
Pt with hx of hemorrhoids with rectal bleeding for past 3 days worse today. Solid BM with blood scattering around it. Lives in enhanced assisted living at [facility] and RN recommended getting checked for hemoglobin levels. Pt denies any SOB, dizziness, abdominal pain, or other pain. Pt alert.      Triage Assessment (Adult)       Row Name 12/15/24 1203          Triage Assessment    Airway WDL WDL        Respiratory WDL    Respiratory WDL WDL        Skin Circulation/Temperature WDL    Skin Circulation/Temperature WDL WDL        Cardiac WDL    Cardiac WDL WDL        Peripheral/Neurovascular WDL    Peripheral Neurovascular WDL WDL        Cognitive/Neuro/Behavioral WDL    Cognitive/Neuro/Behavioral WDL WDL

## 2024-12-15 NOTE — ED NOTES
Assisted to BR. Attempted to collect UA. 100mL of bright red blood in hat, unable to collect UA. +clots when wiping. Denies lightheadedness and dizziness.

## 2024-12-15 NOTE — PHARMACY-ADMISSION MEDICATION HISTORY
Pharmacist Admission Medication History    Admission medication history is complete. The information provided in this note is only as accurate as the sources available at the time of the update.    Information Source(s): Facility ([facility] medication list/MAR via N/A    Pertinent Information: None    Changes made to PTA medication list:  Added: loratidine, lubricant eye drops, melatonin , vitamin D  Deleted: None  Changed: flonase is daily, myrbetriq is 50mg daily, tylenol is 650mg QID     Allergies reviewed with patient and updates made in EHR: unable to assess    Medication History Completed By: Muna Doran [facility] 12/15/2024 5:43 PM    PTA Med List   Medication Sig Note Last Dose/Taking    acetaminophen (TYLENOL) 325 MG tablet Take 650 mg by mouth 4 times daily. Takes at 01:30 , 07:30 , 13:00 and 18:00  12/15/2024 Morning    aspirin 81 MG EC tablet 81mg tab by mouth daily @730a  12/15/2024 Morning    carbidopa-levodopa (SINEMET CR)  MG CR tablet 1 tab at 10pm  12/14/2024 at  9:30 PM    carbidopa-levodopa (SINEMET)  MG tablet Take 1.5 tablets by mouth daily. Give at 18:30  12/14/2024 Evening    carbidopa-levodopa (SINEMET)  MG tablet Take 2 tablets by mouth 2 times daily. Give at 07:30 and 13:00  12/15/2024 Morning    carboxymethylcellulose PF (REFRESH PLUS) 0.5 % ophthalmic solution Place 1 drop into both eyes 2 times daily.  12/15/2024 Morning    diclofenac (VOLTAREN) 1 % topical gel Apply 4 g topically 4 times daily as needed for moderate pain. As needed for pain  Unknown    DULoxetine (CYMBALTA) 30 MG capsule 2 x 30mg at 1pm  12/14/2024 Noon    estradiol (ESTRACE) 0.1 MG/GM vaginal cream Place vaginally three times a week. 12/15/2024: MWF 12/13/2024 Bedtime    fluticasone (FLONASE) 50 MCG/ACT nasal spray Spray 1 spray into both nostrils daily. As needed  12/15/2024 Morning    gabapentin (NEURONTIN) 100 MG capsule 100mg three times daily at 730a, 1p and 630p  12/15/2024 Morning     ibuprofen (ADVIL/MOTRIN) 200 MG tablet Take 200 mg by mouth daily. 13:00  12/14/2024 Noon    loratadine (CLARITIN) 10 MG tablet Take 10 mg by mouth daily.  12/15/2024 Morning    Magnesium Hydroxide (DULCOLAX SOFT CHEWS) 1200 MG CHEW Take 1,200 mg by mouth daily. At 13:00  Taking    melatonin 3 MG tablet Take 3 mg by mouth at bedtime.  12/14/2024 Bedtime    methenamine hippurate (HIPREX) 1 g tablet 1 @730a and 1@630p  12/15/2024 Morning    metoprolol succinate ER (TOPROL XL) 25 MG 24 hr tablet TAKE 1 TABLET EVERY DAY AT 8AM (Patient taking differently: TAKE 1 TABLET EVERY DAY AT 730a)  12/15/2024 Morning    Multiple Vitamins-Minerals (PRESERVISION AREDS) CAPS 1@730a and 1@10p  12/15/2024 Morning    MYRBETRIQ 25 MG 24 hr tablet Take 50 mg by mouth daily.  12/15/2024 Morning    pravastatin (PRAVACHOL) 20 MG tablet TAKE 1 TABLET AT BEDTIME (Patient taking differently: @10pm)  12/14/2024 Bedtime    QUEtiapine (SEROQUEL) 25 MG tablet Take 25 mg by mouth daily.  12/14/2024 at  4:00 PM    traMADol (ULTRAM) 50 MG tablet Take 1 tablet by mouth 2 times daily @730a and 10pm  12/15/2024 Morning    UNABLE TO FIND Take 400 mg by mouth 2 times daily. MEDICATION NAME:  Chest congestion tab . 400mg . 07:30 and 18:30  12/15/2024 Morning    Vitamin D3 (CHOLECALCIFEROL) 25 mcg (1000 units) tablet Take 25 mcg by mouth daily.  12/15/2024 Morning

## 2024-12-16 LAB
ALBUMIN SERPL BCG-MCNC: 3.1 G/DL (ref 3.5–5.2)
ALP SERPL-CCNC: 73 U/L (ref 40–150)
ALT SERPL W P-5'-P-CCNC: <5 U/L (ref 0–50)
ANION GAP SERPL CALCULATED.3IONS-SCNC: 12 MMOL/L (ref 7–15)
AST SERPL W P-5'-P-CCNC: 13 U/L (ref 0–45)
BILIRUB SERPL-MCNC: 0.3 MG/DL
BUN SERPL-MCNC: 20.3 MG/DL (ref 8–23)
CALCIUM SERPL-MCNC: 8.4 MG/DL (ref 8.8–10.4)
CHLORIDE SERPL-SCNC: 108 MMOL/L (ref 98–107)
CREAT SERPL-MCNC: 0.55 MG/DL (ref 0.51–0.95)
EGFRCR SERPLBLD CKD-EPI 2021: 86 ML/MIN/1.73M2
ERYTHROCYTE [DISTWIDTH] IN BLOOD BY AUTOMATED COUNT: 14.3 % (ref 10–15)
GLUCOSE SERPL-MCNC: 98 MG/DL (ref 70–99)
HCO3 SERPL-SCNC: 18 MMOL/L (ref 22–29)
HCT VFR BLD AUTO: 32 % (ref 35–47)
HGB BLD-MCNC: 10.4 G/DL (ref 11.7–15.7)
MCH RBC QN AUTO: 30.7 PG (ref 26.5–33)
MCHC RBC AUTO-ENTMCNC: 32.5 G/DL (ref 31.5–36.5)
MCV RBC AUTO: 94 FL (ref 78–100)
PLATELET # BLD AUTO: 249 10E3/UL (ref 150–450)
POTASSIUM SERPL-SCNC: 4 MMOL/L (ref 3.4–5.3)
PROT SERPL-MCNC: 5.4 G/DL (ref 6.4–8.3)
RBC # BLD AUTO: 3.39 10E6/UL (ref 3.8–5.2)
SODIUM SERPL-SCNC: 138 MMOL/L (ref 135–145)
WBC # BLD AUTO: 8.7 10E3/UL (ref 4–11)

## 2024-12-16 PROCEDURE — 250N000013 HC RX MED GY IP 250 OP 250 PS 637: Performed by: STUDENT IN AN ORGANIZED HEALTH CARE EDUCATION/TRAINING PROGRAM

## 2024-12-16 PROCEDURE — 120N000001 HC R&B MED SURG/OB

## 2024-12-16 PROCEDURE — 99232 SBSQ HOSP IP/OBS MODERATE 35: CPT | Mod: GC

## 2024-12-16 PROCEDURE — 250N000011 HC RX IP 250 OP 636

## 2024-12-16 PROCEDURE — 80053 COMPREHEN METABOLIC PANEL: CPT

## 2024-12-16 PROCEDURE — 96375 TX/PRO/DX INJ NEW DRUG ADDON: CPT

## 2024-12-16 PROCEDURE — 85014 HEMATOCRIT: CPT

## 2024-12-16 PROCEDURE — G0378 HOSPITAL OBSERVATION PER HR: HCPCS

## 2024-12-16 PROCEDURE — 82435 ASSAY OF BLOOD CHLORIDE: CPT

## 2024-12-16 PROCEDURE — 36415 COLL VENOUS BLD VENIPUNCTURE: CPT

## 2024-12-16 PROCEDURE — 85041 AUTOMATED RBC COUNT: CPT

## 2024-12-16 PROCEDURE — 82247 BILIRUBIN TOTAL: CPT

## 2024-12-16 PROCEDURE — 258N000001 HC RX 258

## 2024-12-16 PROCEDURE — 250N000013 HC RX MED GY IP 250 OP 250 PS 637

## 2024-12-16 RX ORDER — CEFTRIAXONE 1 G/1
1 INJECTION, POWDER, FOR SOLUTION INTRAMUSCULAR; INTRAVENOUS EVERY 24 HOURS
Status: DISCONTINUED | OUTPATIENT
Start: 2024-12-16 | End: 2024-12-18 | Stop reason: HOSPADM

## 2024-12-16 RX ADMIN — SODIUM CHLORIDE 3000 ML: 900 IRRIGANT IRRIGATION at 00:11

## 2024-12-16 RX ADMIN — SODIUM CHLORIDE 3000 ML: 900 IRRIGANT IRRIGATION at 14:49

## 2024-12-16 RX ADMIN — DULOXETINE 60 MG: 60 CAPSULE, DELAYED RELEASE ORAL at 12:55

## 2024-12-16 RX ADMIN — GABAPENTIN 100 MG: 100 CAPSULE ORAL at 18:12

## 2024-12-16 RX ADMIN — Medication 1200 MG: at 09:10

## 2024-12-16 RX ADMIN — ASPIRIN 81 MG: 81 TABLET, COATED ORAL at 09:10

## 2024-12-16 RX ADMIN — ACETAMINOPHEN 650 MG: 325 TABLET ORAL at 16:25

## 2024-12-16 RX ADMIN — TRAMADOL HYDROCHLORIDE 50 MG: 50 TABLET, COATED ORAL at 22:23

## 2024-12-16 RX ADMIN — SODIUM CHLORIDE 3000 ML: 900 IRRIGANT IRRIGATION at 02:08

## 2024-12-16 RX ADMIN — ACETAMINOPHEN 650 MG: 325 TABLET ORAL at 12:55

## 2024-12-16 RX ADMIN — PRAVASTATIN SODIUM 20 MG: 20 TABLET ORAL at 22:26

## 2024-12-16 RX ADMIN — Medication 1 DROP: at 20:25

## 2024-12-16 RX ADMIN — Medication 1 DROP: at 09:15

## 2024-12-16 RX ADMIN — CARBIDOPA AND LEVODOPA 2 TABLET: 25; 100 TABLET ORAL at 12:55

## 2024-12-16 RX ADMIN — CEFTRIAXONE 1 G: 1 INJECTION, POWDER, FOR SOLUTION INTRAMUSCULAR; INTRAVENOUS at 18:12

## 2024-12-16 RX ADMIN — ACETAMINOPHEN 650 MG: 325 TABLET ORAL at 20:25

## 2024-12-16 RX ADMIN — TRAMADOL HYDROCHLORIDE 50 MG: 50 TABLET, COATED ORAL at 09:07

## 2024-12-16 RX ADMIN — IBUPROFEN 200 MG: 200 TABLET, FILM COATED ORAL at 12:55

## 2024-12-16 RX ADMIN — CARBIDOPA AND LEVODOPA 3 HALF-TAB: 25; 100 TABLET ORAL at 18:12

## 2024-12-16 RX ADMIN — HYDROXYZINE HYDROCHLORIDE 25 MG: 25 TABLET ORAL at 22:25

## 2024-12-16 RX ADMIN — GABAPENTIN 100 MG: 100 CAPSULE ORAL at 12:55

## 2024-12-16 RX ADMIN — CARBIDOPA AND LEVODOPA 1 TABLET: 50; 200 TABLET, EXTENDED RELEASE ORAL at 20:25

## 2024-12-16 RX ADMIN — QUETIAPINE FUMARATE 25 MG: 25 TABLET ORAL at 09:10

## 2024-12-16 RX ADMIN — GABAPENTIN 100 MG: 100 CAPSULE ORAL at 09:10

## 2024-12-16 RX ADMIN — ACETAMINOPHEN 650 MG: 325 TABLET ORAL at 09:07

## 2024-12-16 RX ADMIN — SODIUM CHLORIDE 3000 ML: 900 IRRIGANT IRRIGATION at 00:48

## 2024-12-16 RX ADMIN — METOPROLOL SUCCINATE 25 MG: 25 TABLET, EXTENDED RELEASE ORAL at 09:07

## 2024-12-16 RX ADMIN — METHENAMINE HIPPURATE 1 G: 1 TABLET ORAL at 09:10

## 2024-12-16 RX ADMIN — LORATADINE 10 MG: 10 TABLET ORAL at 09:11

## 2024-12-16 RX ADMIN — CARBIDOPA AND LEVODOPA 2 TABLET: 25; 100 TABLET ORAL at 09:10

## 2024-12-16 RX ADMIN — SODIUM CHLORIDE 3000 ML: 900 IRRIGANT IRRIGATION at 04:30

## 2024-12-16 ASSESSMENT — ACTIVITIES OF DAILY LIVING (ADL)
ADLS_ACUITY_SCORE: 57
ADLS_ACUITY_SCORE: 57
ADLS_ACUITY_SCORE: 59
ADLS_ACUITY_SCORE: 57
ADLS_ACUITY_SCORE: 57
ADLS_ACUITY_SCORE: 59
ADLS_ACUITY_SCORE: 57
ADLS_ACUITY_SCORE: 59
ADLS_ACUITY_SCORE: 58
ADLS_ACUITY_SCORE: 59
ADLS_ACUITY_SCORE: 57
ADLS_ACUITY_SCORE: 59
ADLS_ACUITY_SCORE: 57
ADLS_ACUITY_SCORE: 59

## 2024-12-16 NOTE — PROGRESS NOTES
[facility]    Progress Note - Hospitalist Service       Date of Admission:  12/15/2024    Assessment & Plan   ALEXA Luis [facility] is a 89 year old female admitted on 12/15/2024, for painless rectal bleeding x3 vs bladder hematoma with hematuria. PMHx of HLD, CVA, atrial fibrillation, osteoarthritis, recurrent UTI's (on prophylaxis with methenamine hippurate) and Parkinson's.     Bladder hematoma  Hematuria with clots  CT abdomen showed no acute GI bleeding and 5.5 cm probably bladder hematoma. Hemoglobin relatively stable at 10.4 but will trend.   - Consulted urology, appreciate expertise  - CBI  - Held PTA myrbetriq as Castro catheter in place   - Continued PTA methenamine hippurate 1 g at 07:30 and 18:30  - AM CBC and BMP      Rectal bleeding  For 3 days prior to admission. No hemorrhoids. Occult blood positive. No yvonne blood appreciated.   - Continue to monitor   - AM CBC         Chronic conditions:  Parkinson's   - PTA Sinemet  mg, 1 tablet at 22:00  - PTA Sinemet  mg, 1.5 tablets at 18:30 - selected 2 tablets as 1.5 tablets not an option  - PTA Sinemet  mg, 2 tablets at 0730 and 1300  - PTA Seroquel 25 mg daily     Chronic pain  H/o sciatic pain, chronic L hip/buttock/low back pain radiating to L leg   - PTA Gabapentin 100 mg TID at 0730, 1300, 1830  - PTA acetaminophen 650 mg QID 0730, 1300, 1800, 0130  - PTA ibuprofen 200 mg daily at 1300  - PTA tramadol 50 mg BID at 0730 and 2200     HLD   - PTA pravastatin 20 mg at bedtime       Proxysmal atrial fibrillation  Chronic anticoagulation stopped in 2022 due to elevated fall risk 2/2 Parkinson's disease. Watchman device in place.   - PTA metoprolol succinate   - PTA ASA 81 mg daily         Diet: Combination Diet Regular Diet Adult    DVT Prophylaxis: Pneumatic Compression Devices  Castro Catheter: PRESENT, indication: Gross hematuria  Fluids: PO  Lines: None     Cardiac Monitoring: None  Code Status: No CPR- Do NOT  Intubate      Clinically Significant Risk Factors Present on Admission          # Hyperchloremia: Highest Cl = 108 mmol/L in last 2 days, will monitor as appropriate          # Hypoalbuminemia: Lowest albumin = 3.1 g/dL at 12/16/2024  6:18 AM, will monitor as appropriate   # Drug Induced Platelet Defect: home medication list includes an antiplatelet medication        # Anemia: based on hgb <11  # Anemia: based on hgb <11                  Social Drivers of Health          Disposition Plan     Medically Ready for Discharge: Anticipated Tomorrow         The patient's care was discussed with the Attending Physician, Dr. Cooper .    Dian Hurtado, DO  Hospitalist Service  [facility]  Securely message with [employer] (more info)  Text page via [employer] Paging/Directory   ______________________________________________________________________    Interval History   Unable to answer any questions, very sleepy. Has clots in hickey. Per RN urine is less red tinged than earlier. Pleasant.     Physical Exam   Vital Signs: Temp: 98.1  F (36.7  C) Temp src: Oral BP: (!) 141/87 Pulse: 87   Resp: 18 SpO2: 93 % O2 Device: None (Room air)    Weight: 123 lbs 0 oz    General Appearance:  Resting comfortably in bed, alert and oriented x1, unable to participate in interview  Respiratory: Lungs clear bilateral to auscultation, good aeration, no increased work of breathing, no wheezing, no crackles  Cardiovascular: Irregularly irregular rhythm, normal rate, no cyanosis, no peripheral edema  GI: Non-distended, non-tender to palpation  Genitourinary: No CVA tenderness, hickey  in place with pink tinged urine and clots (biggest were 3-4 cm in diameter.)  Skin: Normal appearance and turgor for age

## 2024-12-16 NOTE — ED NOTES
Placed 24 [address] 3 way catheter. Instilled 30 ml sterile water to balloon. Bright bloody urine with clots returned. Manual irrigated with 250 ml sterile water irrigation. CBI initiated. Castro patent, but leakage noted at times.

## 2024-12-16 NOTE — PLAN OF CARE
Goal Outcome Evaluation:    Patient started the shift disoriented to situation; by about 2200, she was only oriented to self. She began pulling at her IV and her hickey. She required frequent redirection to refrain from doing so. Received PRN hydroxyzine for anxiety, which was effective. VSS on RA. Pain is controlled on oral analgesics. Bladder manually irrigated. CBI output turned yellow-clear; CBI paused per residents. PIV in right AC is saline locked and somewhat positional. After she had been pulling at it, I was able to get blood return, but it is difficult to flush. Patient is resting in bed, safe and call light is within reach. Zaynab Farias RN           Problem: Adult Inpatient Plan of Care  Goal: Optimal Comfort and Wellbeing  Outcome: Progressing     Problem: Fall Injury Risk  Goal: Absence of Fall and Fall-Related Injury  Outcome: Progressing  Intervention: Promote Injury-Free Environment  Recent Flowsheet Documentation  Taken 12/15/2024 2318 by Zaynab Farias, RN  Safety Promotion/Fall Prevention:   safety round/check completed   room near nurse's station   room door open   lighting adjusted   clutter free environment maintained   bedside attendant   assistive device/personal items within reach  Taken 12/15/2024 1925 by Zaynab Farias, RN  Safety Promotion/Fall Prevention:   safety round/check completed   room near nurse's station   room door open   lighting adjusted   clutter free environment maintained   assistive device/personal items within reach

## 2024-12-16 NOTE — PROGRESS NOTES
Pt alert to self only. Tolerating PO. Taking pills whole in applesauce. Updated daughter Anisha. Updated pt AL facility, who said pt is able to ambulate A1 with walker or self propel with wheelchair at baseline. They also stated that pt is confused at baseline and sometimes has hallucinations likely related to her parkinson's. 1:1 at bedside as pt pulls at tubing/trying to get out of bed when awake.     Urology saw pt this afternoon. Irrigated bladder. Castro CBI to gravity now. Plan to Irrigate as needed prior to restarting CBI.     Pt transferring up to room 228. Report given to VIC Powell.

## 2024-12-16 NOTE — CONSULTS
[facility] CONSULT     Type of Consult: inpatient   Place of Service:  [facility]  Reason for Consultation: Bladder hematoma with hematuria  Consult Requested by: Arun Hernandez MD     History of Present Illness:    ALEXA Luu is a 89 year old female with hx of CVA, Parkinson disease, Afib, recurrent UTI;  Admitted for hematuria, bladder hematoma, rectal bleeding. Urology has been consulted due to blood in the urine. History obtained through patient, daughter and chart review.     On admission, patient reported BRBPR x 3 days. CTA Abd/Pelvis 12/15 showed no evidence of active GI bleeding, a 5.5 cm probable hematoma in the bladder.     24 Fr 3 way catheter was placed in the ED, bladder manually irrigated with report of large clot aspirated, CBI initiated. RN reports urine appeared yellow this AM, CBI was turned off, hematuria recurred and CBI was resumed.     UA concerning for infection. UC prelim: E.coli.     Labs 12/15: Hgb 11.8,  WBC 11.6, creatinine 0.63.     Past Medical history  Past Medical History:   Diagnosis Date    Abnormal barium swallow 05/09/2024    1.  Aspiration with thin consistency barium. Penetration with nectar consistency barium.  2.  Aspiration is silent.  3.  See the speech pathology report for details.      Abnormal brain MRI 03/11/2019    MR HEAD BRAIN WO3/8/2019 [facility] Other Result Information This result has an attachment that is not available. 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    Breast cyst     CVA (cerebral vascular accident) (H)     per H&P    Finger fracture, left 02/27/2019    ring finger     Multiple rib  fractures 02/27/2019    Parkinson disease (H) 01/20/2016    Parkinson's disease, unspecified whether dyskinesia present, unspecified whether manifestations fluctuate (H) 04/23/2024       Past Surgical history  Past Surgical History:   Procedure Laterality Date    ANKLE SURGERY Left     fracture    APPENDECTOMY      BREAST CYST ASPIRATION      EP THANIA CLOSURE N/A 2/20/2020    Procedure: EP THANIA Closure;  Surgeon: Javier Smith MD;  Location: [facility] Cath [facility];  Service: Cardiology    EP LOOP RECORDER IMPLANT N/A 10/8/2019    Procedure: EP Loop Recorder Insertion;  Surgeon: Angel Hurd MD;  Location: [facility];  Service: Cardiology    EYE SURGERY Bilateral     cataract surgery 2000    HYSTERECTOMY      tahbso    HYSTERECTOMY  1982    KNEE SURGERY Right     staph infection    TONSILLECTOMY      ZZC TOTAL ABDOM HYSTERECTOMY      Description: Hysterectomy;  Proc Date: 01/01/1988;  Comments: couldn't find her ovaries       Social History  Social History     Tobacco Use    Smoking status: Never    Smokeless tobacco: Never   Substance Use Topics    Alcohol use: No    Drug use: Never       Medications  Current Facility-Administered Medications   Medication Dose Route Frequency Provider Last Rate Last Admin    acetaminophen (TYLENOL) tablet 650 mg  650 mg Oral 4x Daily Arun Hernandez MD   650 mg at 12/16/24 1255    aspirin EC tablet 81 mg  81 mg Oral Daily Arun Hernandez MD   81 mg at 12/16/24 0910    calcium carbonate (TUMS) chewable tablet 1,000 mg  1,000 mg Oral 4x Daily PRN Arun Hernandez MD        carbidopa-levodopa (SINEMET CR)  MG per CR tablet 1 tablet  1 tablet Oral QPM Arun Hernandez MD   1 tablet at 12/15/24 2120    carbidopa-levodopa (SINEMET)  MG per tablet 2 tablet  2 tablet Oral BID Arun Hernandez MD   2 tablet at 12/16/24 1255    carbidopa-levodopa half-tab 12.5-50 mg  3 half-tab Oral Daily Kenneth  Dione COHEN MD        carboxymethylcellulose PF (REFRESH PLUS) 0.5 % ophthalmic solution 1 drop  1 drop Both Eyes BID Arun Hernandez MD   1 drop at 12/16/24 0915    diclofenac (VOLTAREN) 1 % topical gel 4 g  4 g Topical 4x Daily PRN Arun Hernandez MD        DULoxetine (CYMBALTA) DR capsule 60 mg  60 mg Oral Daily Arun Hernandez MD   60 mg at 12/16/24 1255    fluticasone (FLONASE) 50 MCG/ACT spray 1 spray  1 spray Both Nostrils Daily PRN Arun Hernandez MD        gabapentin (NEURONTIN) capsule 100 mg  100 mg Oral TID Arun Hernandez MD   100 mg at 12/16/24 1255    hydrOXYzine HCl (ATARAX) tablet 25 mg  25 mg Oral At Bedtime Arun Hernandez MD   25 mg at 12/15/24 2318    Or    hydrOXYzine HCl (ATARAX) tablet 50 mg  50 mg Oral At Bedtime Arun Hernandez MD        ibuprofen (ADVIL/MOTRIN) tablet 200 mg  200 mg Oral Daily Arun Hernandez MD   200 mg at 12/16/24 1255    lidocaine (LMX4) cream   Topical Q1H PRN Arun Hernandez MD        lidocaine 1 % 0.1-1 mL  0.1-1 mL Other Q1H PRN Arun Hernandez MD        loratadine (CLARITIN) tablet 10 mg  10 mg Oral Daily Arun Hernandez MD   10 mg at 12/16/24 0911    magnesium oxide (MAG-OX) tablet 1,200 mg  1,200 mg Oral Daily Dione Cooper MD   1,200 mg at 12/16/24 0910    methenamine hippurate (HIPREX) tablet 1 g  1 g Oral BID Arun Hernandez MD   1 g at 12/16/24 0910    metoprolol succinate ER (TOPROL XL) 24 hr tablet 25 mg  25 mg Oral Daily Arun Hernandez MD   25 mg at 12/16/24 0907    ondansetron (ZOFRAN ODT) ODT tab 4 mg  4 mg Oral Q6H PRN Arun Hernandez MD        Or    ondansetron (ZOFRAN) injection 4 mg  4 mg Intravenous Q6H PRN Arun Hernandez MD        pravastatin (PRAVACHOL) tablet 20 mg  20 mg Oral At Bedtime Arun Hernandez MD   20 mg at  12/15/24 2120    prochlorperazine (COMPAZINE) injection 5 mg  5 mg Intravenous Q6H PRN Arun Hernandez MD        Or    prochlorperazine (COMPAZINE) tablet 5 mg  5 mg Oral Q6H PRN Arun Hernandez MD        QUEtiapine (SEROquel) tablet 25 mg  25 mg Oral Daily Arun Hernandez MD   25 mg at 12/16/24 0910    senna-docusate (SENOKOT-S/PERICOLACE) 8.6-50 MG per tablet 1 tablet  1 tablet Oral BID PRN Arun Hernandez MD        Or    senna-docusate (SENOKOT-S/PERICOLACE) 8.6-50 MG per tablet 2 tablet  2 tablet Oral BID PRN Arun Hernandez MD        sodium chloride (PF) 0.9% PF flush 3 mL  3 mL Intracatheter Q8H Arun Hernandez MD   3 mL at 12/15/24 2122    sodium chloride (PF) 0.9% PF flush 3 mL  3 mL Intracatheter q1 min prn Arun Hernandez MD        sodium chloride 0.9% irrigation (bag)   Irrigation Continuous Arun Hernandez MD   3,000 mL at 12/16/24 1449    traMADol (ULTRAM) tablet 50 mg  50 mg Oral BID Arun Hernandez MD   50 mg at 12/16/24 0907     Current Outpatient Medications   Medication Sig Dispense Refill    acetaminophen (TYLENOL) 325 MG tablet Take 650 mg by mouth 4 times daily. Takes at 01:30 , 07:30 , 13:00 and 18:00      aspirin 81 MG EC tablet 81mg tab by mouth daily @730a      carbidopa-levodopa (SINEMET CR)  MG CR tablet 1 tab at 10pm 90 tablet 3    carbidopa-levodopa (SINEMET)  MG tablet Take 1.5 tablets by mouth daily. Give at 18:30      carbidopa-levodopa (SINEMET)  MG tablet Take 2 tablets by mouth 2 times daily. Give at 07:30 and 13:00      carboxymethylcellulose PF (REFRESH PLUS) 0.5 % ophthalmic solution Place 1 drop into both eyes 2 times daily.      diclofenac (VOLTAREN) 1 % topical gel Apply 4 g topically 4 times daily as needed for moderate pain. As needed for pain      DULoxetine (CYMBALTA) 30 MG capsule 2 x 30mg at 1pm      estradiol (ESTRACE) 0.1  MG/GM vaginal cream Place vaginally three times a week.      fluticasone (FLONASE) 50 MCG/ACT nasal spray Spray 1 spray into both nostrils daily. As needed      gabapentin (NEURONTIN) 100 MG capsule 100mg three times daily at 730a, 1p and 630p      ibuprofen (ADVIL/MOTRIN) 200 MG tablet Take 200 mg by mouth daily. 13:00      loratadine (CLARITIN) 10 MG tablet Take 10 mg by mouth daily.      Magnesium Hydroxide (DULCOLAX SOFT CHEWS) 1200 MG CHEW Take 1,200 mg by mouth daily. At 13:00      melatonin 3 MG tablet Take 3 mg by mouth at bedtime.      methenamine hippurate (HIPREX) 1 g tablet 1 @730a and 1@630p      metoprolol succinate ER (TOPROL XL) 25 MG 24 hr tablet TAKE 1 TABLET EVERY DAY AT 8AM (Patient taking differently: TAKE 1 TABLET EVERY DAY AT 730a) 60 tablet 3    Multiple Vitamins-Minerals (PRESERVISION AREDS) CAPS 1@730a and 1@10p      MYRBETRIQ 25 MG 24 hr tablet Take 50 mg by mouth daily.      pravastatin (PRAVACHOL) 20 MG tablet TAKE 1 TABLET AT BEDTIME (Patient taking differently: @10pm) 90 tablet 1    QUEtiapine (SEROQUEL) 25 MG tablet Take 25 mg by mouth daily.      traMADol (ULTRAM) 50 MG tablet Take 1 tablet by mouth 2 times daily @730a and 10pm      UNABLE TO FIND Take 400 mg by mouth 2 times daily. MEDICATION NAME:  Chest congestion tab . 400mg . 07:30 and 18:30      Vitamin D3 (CHOLECALCIFEROL) 25 mcg (1000 units) tablet Take 25 mcg by mouth daily.         Allergies  Allergies   Allergen Reactions    Meperidine Anaphylaxis     Unknown cause    Acetaminophen-Codeine Other (See Comments) and Unknown    Codeine Other (See Comments)    Morphine Other (See Comments)     Made me feel really wierd    Penicillins Other (See Comments)     Tongue and throat tingling  Other reaction(s): Paresthesias  Tongue and throat tingling  Tingling on lips          Vicodin [Hydrocodone-Acetaminophen] Other (See Comments)     Weird feeling       ROS:   12 point review of systems was taken and is negative aside from what  "is noted above in the HPI.     Physical Exam:  BP 96/52 (BP Location: Right arm)   Pulse 70   Temp 97.8  F (36.6  C) (Oral)   Resp 18   Ht 1.626 m (5' 4\")   Wt 55.8 kg (123 lb)   SpO2 94%   BMI 21.11 kg/m    General: NAD, alert, cooperative  Head: normocephalic, without abnormality / atraumatic   Abdomen: soft, non tender,  minimally distended. no suprapubic fullness or tenderness. No bilateral CVA tenderness.   Geniturinary: 24 Fr 3 way hickey draining clear yellow urine with CBI at slow rate. CBI clamped. Manually (required balloon deflated to allow aspiration) irrigated with aspiration of a medium sized clot, a few small clots and then clear yellow. CBI off at 1615.   Extremities: no calf edema or tenderness  Skin: no rashes or lesions  Musculoskeletal: moves all extremities equally  Psychological: alert and oriented, answers questions appropriately      Labs:   WBC Count   Date Value Ref Range Status   12/16/2024 8.7 4.0 - 11.0 10e3/uL Final     Hemoglobin   Date Value Ref Range Status   12/16/2024 10.4 (L) 11.7 - 15.7 g/dL Final   ]  Creatinine   Date Value Ref Range Status   12/16/2024 0.55 0.51 - 0.95 mg/dL Final     INR   Date Value Ref Range Status   12/15/2024 1.11 0.85 - 1.15 Final      UA RESULTS:  Recent Labs   Lab Test 12/15/24  1539 06/23/22  1620 06/11/22  1451   COLOR Dark Red*   < > Yellow   APPEARANCE Cloudy*   < > Cloudy*   URINEGLC Negative   < > Negative   URINEBILI Negative   < > Negative   URINEKETONE 10*   < > Negative   SG 1.020   < > 1.020   UBLD 1.0 mg/dL*   < > Trace*   URINEPH 7.5*   < > 6.5   PROTEIN 300*   < > Negative   UROBILINOGEN  --   --  0.2   NITRITE Negative   < > Positive*   LEUKEST Negative   < > Large*   RBCU >182*   < > 0-2   WBCU >182*   < > 5-10*    < > = values in this interval not displayed.     Cultures:  Urine culture, prelim: >100k E.coli     Lab Results: personally reviewed     Imaging:  EXAM: CTA ABDOMEN AND PELVIS WITH CONTRAST  LOCATION: [facility]  DATE: 12/15/2024     INDICATION: Rectal bleeding.  COMPARISON: 03/13/2013.  TECHNIQUE: CT angiogram abdomen pelvis during arterial phase of injection of IV contrast. 2D and 3D MIP reconstructions were performed by the CT technologist. Dose reduction techniques were used.  CONTRAST: Isovue 370, 90 mL.     FINDINGS:  ANGIOGRAM ABDOMEN/PELVIS: There are moderate atherosclerotic changes of the visualized aorta and its branches. There is no evidence of aortic dissection or aneurysm. No contrast extravasation in the GI tract demonstrated to suggest active   gastrointestinal bleeding.     LOWER CHEST: Minimal dependent probable atelectasis. No effusions.     HEPATOBILIARY: No significant mass or bile duct dilatation. No calcified gallstones. Low-attenuation subcentimeter liver lesion(s) compatible with benign cysts or other benign lesions. No routine follow-up is recommended in a low-risk patient.     PANCREAS: No significant mass, duct dilatation, or inflammatory change.     SPLEEN: Normal size.     ADRENAL GLANDS: No significant nodules.     KIDNEYS/BLADDER: No significant mass, or hydronephrosis. 2-3 mm nonobstructing stone on the right. Low-attenuation subcentimeter renal lesion(s) compatible with benign cysts or other benign lesions. No routine follow-up is recommended in a low-risk   patient. Probable bladder hematoma given its lack of enhancement. Ultrasound follow-up in 3-4 days recommended to evaluate for resolution.     BOWEL: No obstruction or inflammatory change.     LYMPH NODES: No lymphadenopathy.     PELVIC ORGANS: No pelvic masses.     MUSCULOSKELETAL: No frankly destructive bony lesions.                                                                      IMPRESSION:  1.  No active gastrointestinal bleeding demonstrated currently.  2.  5.5 cm probable hematoma in the bladder. Ultrasound follow-up in 3-4 days recommended to evaluate for resolution.    I have personally reviewed the  imaging reports above.     Assessment/Plan: ALEXA Luu is being seen by [facility] for gross hematuria, bladder clot     - 89 yr old female with hx of Parkinson disease and recurrent UTIs, admitted with 3 day hx of BRBPR, gross hematuria and CTA finding of probable 5.5 cm hematoma in bladder.   - CBI off at 1620. Manually irrigate prn. Resume CBI only if Gr III, IV or V after manual irrigation.   - Hgb 10.4. Monitor.   - Prelim UC: >100k E.coli. WBC normalized today. Remains afebrile. Recommend broad spectrum antibiotic initiation, adjust as needed pending final culture/sensitivities.   - Recommend holding ASA until assured GH has cleared.   - Creatinine WNL  - Additional workup / testing ordered outpatient: Will need Urology follow up with surgeon, to include urine cytology and cystoscopy, to be completed 1-2 weeks following discharge.   - [facility]    The patient and I discussed some common etiologies of hematuria including infection/UTI, trauma/heavy lifting/straining, urinary kidney stones, renal disease and use of anticoagulants.     It was discussed with the patient that any degree of hematuria should not be ignored, as it may be the sign of serious renal or urologic disease including malignancy.      Thank you for consulting [facility] regarding this patient's care. Please contact us with questions or concerns.             Daniel Kendrick, APRN, CNP  [facility]  [phone]

## 2024-12-17 ENCOUNTER — APPOINTMENT (OUTPATIENT)
Dept: PHYSICAL THERAPY | Facility: CLINIC | Age: 89
DRG: 690 | End: 2024-12-17
Payer: COMMERCIAL

## 2024-12-17 LAB
ANION GAP SERPL CALCULATED.3IONS-SCNC: 11 MMOL/L (ref 7–15)
BACTERIA UR CULT: ABNORMAL
BUN SERPL-MCNC: 24.2 MG/DL (ref 8–23)
CALCIUM SERPL-MCNC: 8.9 MG/DL (ref 8.8–10.4)
CHLORIDE SERPL-SCNC: 107 MMOL/L (ref 98–107)
CREAT SERPL-MCNC: 0.42 MG/DL (ref 0.51–0.95)
EGFRCR SERPLBLD CKD-EPI 2021: >90 ML/MIN/1.73M2
ERYTHROCYTE [DISTWIDTH] IN BLOOD BY AUTOMATED COUNT: 14.4 % (ref 10–15)
GLUCOSE SERPL-MCNC: 122 MG/DL (ref 70–99)
HCO3 SERPL-SCNC: 21 MMOL/L (ref 22–29)
HCT VFR BLD AUTO: 32.6 % (ref 35–47)
HGB BLD-MCNC: 10.6 G/DL (ref 11.7–15.7)
MCH RBC QN AUTO: 30.2 PG (ref 26.5–33)
MCHC RBC AUTO-ENTMCNC: 32.5 G/DL (ref 31.5–36.5)
MCV RBC AUTO: 93 FL (ref 78–100)
PLATELET # BLD AUTO: 274 10E3/UL (ref 150–450)
POTASSIUM SERPL-SCNC: 4.2 MMOL/L (ref 3.4–5.3)
RBC # BLD AUTO: 3.51 10E6/UL (ref 3.8–5.2)
SODIUM SERPL-SCNC: 139 MMOL/L (ref 135–145)
WBC # BLD AUTO: 9.3 10E3/UL (ref 4–11)

## 2024-12-17 PROCEDURE — 36415 COLL VENOUS BLD VENIPUNCTURE: CPT

## 2024-12-17 PROCEDURE — G0378 HOSPITAL OBSERVATION PER HR: HCPCS

## 2024-12-17 PROCEDURE — 80048 BASIC METABOLIC PNL TOTAL CA: CPT

## 2024-12-17 PROCEDURE — 250N000013 HC RX MED GY IP 250 OP 250 PS 637: Performed by: STUDENT IN AN ORGANIZED HEALTH CARE EDUCATION/TRAINING PROGRAM

## 2024-12-17 PROCEDURE — 97530 THERAPEUTIC ACTIVITIES: CPT | Mod: GP

## 2024-12-17 PROCEDURE — 120N000001 HC R&B MED SURG/OB

## 2024-12-17 PROCEDURE — 96376 TX/PRO/DX INJ SAME DRUG ADON: CPT

## 2024-12-17 PROCEDURE — 85027 COMPLETE CBC AUTOMATED: CPT

## 2024-12-17 PROCEDURE — 97162 PT EVAL MOD COMPLEX 30 MIN: CPT | Mod: GP

## 2024-12-17 PROCEDURE — 250N000011 HC RX IP 250 OP 636

## 2024-12-17 PROCEDURE — 250N000013 HC RX MED GY IP 250 OP 250 PS 637

## 2024-12-17 PROCEDURE — 99232 SBSQ HOSP IP/OBS MODERATE 35: CPT | Mod: GC

## 2024-12-17 PROCEDURE — 82565 ASSAY OF CREATININE: CPT

## 2024-12-17 RX ORDER — IBUPROFEN 200 MG
200 TABLET ORAL ONCE
Status: COMPLETED | OUTPATIENT
Start: 2024-12-17 | End: 2024-12-17

## 2024-12-17 RX ADMIN — CEFTRIAXONE 1 G: 1 INJECTION, POWDER, FOR SOLUTION INTRAMUSCULAR; INTRAVENOUS at 17:27

## 2024-12-17 RX ADMIN — GABAPENTIN 100 MG: 100 CAPSULE ORAL at 12:42

## 2024-12-17 RX ADMIN — ACETAMINOPHEN 650 MG: 325 TABLET ORAL at 08:56

## 2024-12-17 RX ADMIN — CARBIDOPA AND LEVODOPA 2 TABLET: 25; 100 TABLET ORAL at 12:42

## 2024-12-17 RX ADMIN — METOPROLOL SUCCINATE 25 MG: 25 TABLET, EXTENDED RELEASE ORAL at 08:57

## 2024-12-17 RX ADMIN — TRAMADOL HYDROCHLORIDE 50 MG: 50 TABLET, COATED ORAL at 08:56

## 2024-12-17 RX ADMIN — CARBIDOPA AND LEVODOPA 2 TABLET: 25; 100 TABLET ORAL at 08:56

## 2024-12-17 RX ADMIN — IBUPROFEN 200 MG: 200 TABLET, FILM COATED ORAL at 12:42

## 2024-12-17 RX ADMIN — Medication 1200 MG: at 08:56

## 2024-12-17 RX ADMIN — DULOXETINE 60 MG: 60 CAPSULE, DELAYED RELEASE ORAL at 12:42

## 2024-12-17 RX ADMIN — ACETAMINOPHEN 650 MG: 325 TABLET ORAL at 17:27

## 2024-12-17 RX ADMIN — LORATADINE 10 MG: 10 TABLET ORAL at 08:57

## 2024-12-17 RX ADMIN — IBUPROFEN 200 MG: 200 TABLET, FILM COATED ORAL at 03:58

## 2024-12-17 RX ADMIN — Medication 1 DROP: at 08:58

## 2024-12-17 RX ADMIN — HYDROXYZINE HYDROCHLORIDE 25 MG: 25 TABLET ORAL at 21:47

## 2024-12-17 RX ADMIN — GABAPENTIN 100 MG: 100 CAPSULE ORAL at 08:57

## 2024-12-17 RX ADMIN — ACETAMINOPHEN 650 MG: 325 TABLET ORAL at 12:42

## 2024-12-17 RX ADMIN — TRAMADOL HYDROCHLORIDE 50 MG: 50 TABLET, COATED ORAL at 21:47

## 2024-12-17 RX ADMIN — CARBIDOPA AND LEVODOPA 3 HALF-TAB: 25; 100 TABLET ORAL at 17:35

## 2024-12-17 RX ADMIN — QUETIAPINE FUMARATE 25 MG: 25 TABLET ORAL at 08:57

## 2024-12-17 RX ADMIN — Medication 1 DROP: at 20:47

## 2024-12-17 RX ADMIN — ACETAMINOPHEN 650 MG: 325 TABLET ORAL at 20:46

## 2024-12-17 RX ADMIN — PRAVASTATIN SODIUM 20 MG: 20 TABLET ORAL at 21:47

## 2024-12-17 RX ADMIN — GABAPENTIN 100 MG: 100 CAPSULE ORAL at 17:30

## 2024-12-17 RX ADMIN — CARBIDOPA AND LEVODOPA 1 TABLET: 50; 200 TABLET, EXTENDED RELEASE ORAL at 20:46

## 2024-12-17 ASSESSMENT — ACTIVITIES OF DAILY LIVING (ADL)
ADLS_ACUITY_SCORE: 58
ADLS_ACUITY_SCORE: 58
ADLS_ACUITY_SCORE: 57
ADLS_ACUITY_SCORE: 59
ADLS_ACUITY_SCORE: 57
ADLS_ACUITY_SCORE: 57
ADLS_ACUITY_SCORE: 59
ADLS_ACUITY_SCORE: 57
ADLS_ACUITY_SCORE: 59
ADLS_ACUITY_SCORE: 57
ADLS_ACUITY_SCORE: 59
ADLS_ACUITY_SCORE: 57
ADLS_ACUITY_SCORE: 59
ADLS_ACUITY_SCORE: 57
ADLS_ACUITY_SCORE: 57
ADLS_ACUITY_SCORE: 59

## 2024-12-17 NOTE — PROGRESS NOTES
Place of Service:  [facility]     Reason for follow up: Gross hematuria, bladder clot, UTI    SUBJECTIVE:  Events: no acute events overnight. RN reports CBI off overnight, urine yellow today.     Patient denies abdominal and bilateral flank pain, fever, chills. Tolerating regular diet.     OBJECTIVE:  PHYSICAL EXAM:  Temp: 97.1  F (36.2  C) Temp src: Oral BP: (!) 140/72 Pulse: 97   Resp: 18 SpO2: 92 % O2 Device: None (Room air)    General: NAD, alert, cooperative  Head: normocephalic, without abnormality / atraumatic  Abdomen: soft, non tender, non distended. no suprapubic fullness, no suprapubic tenderness. No bilateral CVA tenderness.   Genitourinary: 24 Fr 3 way catheter draining cloudy yellow urine with small amount of tan debris. Manually irrigated bladder, slightly cloudy yellow urine aspirated.   Skin: No rashes or lesions  Musculoskeletal: moves all four extremities equally.   Psychological: alert and oriented, answers questions appropriately    LABS:  Creatinine   Date Value Ref Range Status   12/17/2024 0.42 (L) 0.51 - 0.95 mg/dL Final     WBC Count   Date Value Ref Range Status   12/17/2024 9.3 4.0 - 11.0 10e3/uL Final     Hemoglobin   Date Value Ref Range Status   12/17/2024 10.6 (L) 11.7 - 15.7 g/dL Final   ]  Platelet Count   Date Value Ref Range Status   12/17/2024 274 150 - 450 10e3/uL Final       UA:  UA RESULTS:  Recent Labs   Lab Test 12/15/24  1539 06/23/22  1620 06/11/22  1451   COLOR Dark Red*   < > Yellow   APPEARANCE Cloudy*   < > Cloudy*   URINEGLC Negative   < > Negative   URINEBILI Negative   < > Negative   URINEKETONE 10*   < > Negative   SG 1.020   < > 1.020   UBLD 1.0 mg/dL*   < > Trace*   URINEPH 7.5*   < > 6.5   PROTEIN 300*   < > Negative   UROBILINOGEN  --   --  0.2   NITRITE Negative   < > Positive*   LEUKEST Negative   < > Large*   RBCU >182*   < > 0-2   WBCU >182*   < > 5-10*    < > = values in this interval not displayed.     Cultures:  Urine 12/15: >100k E.coli,  pan-sensitive    Lab Results: personally reviewed.     ASSESSMENT/PLAN:  ALEXA Luu is being seen by [facility] for Gross hematuria, bladder clot, UTI    - 89 yr old female with hx of Parkinson disease and recurrent UTIs, admitted with 3 day hx of BRBPR, gross hematuria and CTA finding of probable 5.5 cm hematoma in bladder.   - Urine yellow overnight. Ordered TOV with PVR checks this afternoon.   - UC: E.coli, pan-sensitive. Remains afebrile. WBC WNL. Recommend completing 7 day antibiotic course.   - Hgb stable, 10.6.   - Creatinine WNL.   - Message sent to [facility] schedulers to arrange for outpatient follow up in 1-2 weeks, to include cystoscopy and possible urine cytology. [facility] contact info added to discharge orders.   - Attempt to call daughter, Sheila, no answer. Left brief VM instructing her to call back to RN for updated.   - Ok from Urology standpoint for discharge after TOV complete and if urine remains yellow.   - [facility] will sign off. Please re-consult with any new or worsening urologic concerns.    Daniel Kendrick, APRN, CNP  [facility]   [phone]

## 2024-12-17 NOTE — PROGRESS NOTES
[facility]    Progress Note - Hospitalist Service       Date of Admission:  12/15/2024    Assessment & Plan   ALEXA Luis [facility] is a 89 year old female admitted on 12/15/2024, for painless rectal bleeding x3 vs bladder hematoma with hematuria. PMHx of HLD, CVA, atrial fibrillation, osteoarthritis, recurrent UTI's (on prophylaxis with methenamine hippurate) and Parkinson's.     Plan for discharge on 12/18/24 pending OT/PT recommendation for disposition and urine remaining blood free after trial of void.     Bladder hematoma  Hematuria with clots  CT abdomen showed no acute GI bleeding and 5.5 cm probably bladder hematoma. Hemoglobin relatively stable at 10.4 but will trend. Remains afebrile.   - Urology following, appreciate expertise  - can stop CBI  -trial of void  -ok for discharge if urine continues to be free of blood  -outpatient follow up   - Held PTA myrbetriq as Castro catheter in place   - Hold PTA methenamine hippurate 1 g   - AM CBC and BMP     UTI  Urine culture showed E coli. Does not endorse increased frequency or urgency prior to arrival.   -Ceftriaxone 1 g/24 hours  -Can transition to Keflex upon discharge     Rectal bleeding  For 3 days prior to admission. No hemorrhoids. Occult blood positive. No yvonne blood appreciated.   - Continue to monitor   - AM CBC         Chronic conditions:  Parkinson's   - PTA Sinemet  mg, 1 tablet at 22:00  - PTA Sinemet  mg, 1.5 tablets at 18:30 - selected 2 tablets as 1.5 tablets not an option  - PTA Sinemet  mg, 2 tablets at 0730 and 1300  - PTA Seroquel 25 mg daily     Chronic pain  H/o sciatic pain, chronic L hip/buttock/low back pain radiating to L leg   - PTA Gabapentin 100 mg TID at 0730, 1300, 1830  - PTA acetaminophen 650 mg QID 0730, 1300, 1800, 0130  - PTA ibuprofen 200 mg daily at 1300  - PTA tramadol 50 mg BID at 0730 and 2200     HLD   - PTA pravastatin 20 mg at bedtime       Proxysmal atrial  fibrillation  Chronic anticoagulation stopped in 2022 due to elevated fall risk 2/2 Parkinson's disease. Watchman device in place.   - PTA metoprolol succinate   - PTA ASA 81 mg daily         Diet: Combination Diet Regular Diet Adult    DVT Prophylaxis: Pneumatic Compression Devices  Hickey Catheter: Not present  Fluids: PO  Lines: None     Cardiac Monitoring: None  Code Status: No CPR- Do NOT Intubate      Clinically Significant Risk Factors          # Hyperchloremia: Highest Cl = 108 mmol/L in last 2 days, will monitor as appropriate          # Hypoalbuminemia: Lowest albumin = 3.1 g/dL at 12/16/2024  6:18 AM, will monitor as appropriate                             Social Drivers of Health          Disposition Plan     Medically Ready for Discharge: Anticipated Tomorrow         The patient's care was discussed with the Attending Physician, Dr. Cooper .    Dian Hurtado DO  Hospitalist Service  [facility]  Securely message with [employer] (more info)  Text page via [employer] Paging/Directory   ______________________________________________________________________    Interval History   Hickey in place. Comfortable. Ok to talk to daughter Valentina. Urine is tea colored. Pleasant.     Physical Exam   Vital Signs: Temp: 97.3  F (36.3  C) Temp src: Oral BP: 118/56 Pulse: 74   Resp: 18 SpO2: 92 % O2 Device: None (Room air)    Weight: 120 lbs 12.99 oz    General Appearance:  Resting comfortably in bed, alert and oriented x1, unable to participate in interview  Respiratory: Lungs clear bilateral to auscultation, good aeration, no increased work of breathing, no wheezing, no crackles  Cardiovascular: Irregularly irregular rhythm, normal rate, no cyanosis, no peripheral edema  GI: Non-distended, non-tender to palpation  Genitourinary: No CVA tenderness, hickey in place with jonathan urine and no clots  Skin: Normal appearance and turgor for age

## 2024-12-17 NOTE — PLAN OF CARE
Problem: Pain Acute  Goal: Optimal Pain Control and Function  Outcome: Progressing  Intervention: Prevent or Manage Pain  Recent Flowsheet Documentation  Taken 12/17/2024 0856 by Oriana Mcfarland RN  Medication Review/Management: medications reviewed     Problem: UTI (Urinary Tract Infection)  Goal: Improved Infection Symptoms  Outcome: Progressing     Problem: Fall Injury Risk  Goal: Absence of Fall and Fall-Related Injury  Intervention: Promote Injury-Free Environment  Recent Flowsheet Documentation  Taken 12/17/2024 0856 by Oriana Mcfarland RN  Safety Promotion/Fall Prevention: safety round/check completed     Goal Outcome Evaluation:  Patient is alert to self, disoriented to place, time and situation. Denies pain this shift. C/o of restless legs, given scheduled meds. Takes whole with apple sauce. Castro was patent and draining yellow urine with small brown residue. Castro was discontinued later today and patient voided 125 ml with a PVR of 7 .Tolerating diet. 1:1 sitter at bedside for safety. Discharge pending.

## 2024-12-17 NOTE — PLAN OF CARE
Problem: UTI (Urinary Tract Infection)  Goal: Improved Infection Symptoms  Outcome: Progressing     Problem: Pain Acute  Goal: Optimal Pain Control and Function  Outcome: Progressing     Patient is alert and orientated to self only. Received one time dose of ibuprofen for leg pain. IV saline locked with old drainage present. Castro catheter in place and is patent.  1:1 sitter at bedside for patient safety.     [facility] Admission: 2- Bed Activities  [facility] [id number] 2- Bed Activities

## 2024-12-17 NOTE — PROVIDER NOTIFICATION
Provider notified that patient is complaining of leg pain that's making is difficult to sleep and she does not have any prn pain medication orders.     Update 0350: received new orders.

## 2024-12-18 ENCOUNTER — HOSPITAL ENCOUNTER (OUTPATIENT)
Dept: MAMMOGRAPHY | Facility: CLINIC | Age: 89
Discharge: HOME | End: 2024-12-18
Payer: COMMERCIAL

## 2024-12-18 ENCOUNTER — APPOINTMENT (OUTPATIENT)
Dept: PHYSICAL THERAPY | Facility: CLINIC | Age: 89
DRG: 690 | End: 2024-12-18
Payer: COMMERCIAL

## 2024-12-18 VITALS
HEART RATE: 73 BPM | BODY MASS INDEX: 20.63 KG/M2 | DIASTOLIC BLOOD PRESSURE: 53 MMHG | SYSTOLIC BLOOD PRESSURE: 107 MMHG | HEIGHT: 64 IN | OXYGEN SATURATION: 92 % | TEMPERATURE: 98.7 F | WEIGHT: 120.81 LBS | RESPIRATION RATE: 16 BRPM

## 2024-12-18 DIAGNOSIS — Z12.31 VISIT FOR SCREENING MAMMOGRAM: ICD-10-CM

## 2024-12-18 LAB
ANION GAP SERPL CALCULATED.3IONS-SCNC: 8 MMOL/L (ref 7–15)
BUN SERPL-MCNC: 20.9 MG/DL (ref 8–23)
CALCIUM SERPL-MCNC: 8.7 MG/DL (ref 8.8–10.4)
CHLORIDE SERPL-SCNC: 105 MMOL/L (ref 98–107)
CREAT SERPL-MCNC: 0.52 MG/DL (ref 0.51–0.95)
EGFRCR SERPLBLD CKD-EPI 2021: 87 ML/MIN/1.73M2
ERYTHROCYTE [DISTWIDTH] IN BLOOD BY AUTOMATED COUNT: 14.4 % (ref 10–15)
GLUCOSE SERPL-MCNC: 120 MG/DL (ref 70–99)
HCO3 SERPL-SCNC: 24 MMOL/L (ref 22–29)
HCT VFR BLD AUTO: 31.8 % (ref 35–47)
HGB BLD-MCNC: 10.4 G/DL (ref 11.7–15.7)
MCH RBC QN AUTO: 30.5 PG (ref 26.5–33)
MCHC RBC AUTO-ENTMCNC: 32.7 G/DL (ref 31.5–36.5)
MCV RBC AUTO: 93 FL (ref 78–100)
PLATELET # BLD AUTO: 281 10E3/UL (ref 150–450)
POTASSIUM SERPL-SCNC: 4.2 MMOL/L (ref 3.4–5.3)
RBC # BLD AUTO: 3.41 10E6/UL (ref 3.8–5.2)
SODIUM SERPL-SCNC: 137 MMOL/L (ref 135–145)
WBC # BLD AUTO: 7.9 10E3/UL (ref 4–11)

## 2024-12-18 PROCEDURE — G0378 HOSPITAL OBSERVATION PER HR: HCPCS

## 2024-12-18 PROCEDURE — 80048 BASIC METABOLIC PNL TOTAL CA: CPT

## 2024-12-18 PROCEDURE — 77067 SCR MAMMO BI INCL CAD: CPT

## 2024-12-18 PROCEDURE — 85048 AUTOMATED LEUKOCYTE COUNT: CPT

## 2024-12-18 PROCEDURE — 85018 HEMOGLOBIN: CPT

## 2024-12-18 PROCEDURE — 82565 ASSAY OF CREATININE: CPT

## 2024-12-18 PROCEDURE — 36415 COLL VENOUS BLD VENIPUNCTURE: CPT

## 2024-12-18 PROCEDURE — 250N000013 HC RX MED GY IP 250 OP 250 PS 637: Performed by: STUDENT IN AN ORGANIZED HEALTH CARE EDUCATION/TRAINING PROGRAM

## 2024-12-18 PROCEDURE — 99238 HOSP IP/OBS DSCHRG MGMT 30/<: CPT | Mod: GC

## 2024-12-18 PROCEDURE — 97530 THERAPEUTIC ACTIVITIES: CPT | Mod: GP

## 2024-12-18 PROCEDURE — 250N000013 HC RX MED GY IP 250 OP 250 PS 637

## 2024-12-18 RX ORDER — CEPHALEXIN 500 MG/1
500 CAPSULE ORAL 2 TIMES DAILY
Qty: 14 CAPSULE | Refills: 0 | Status: SHIPPED | OUTPATIENT
Start: 2024-12-18

## 2024-12-18 RX ADMIN — METOPROLOL SUCCINATE 25 MG: 25 TABLET, EXTENDED RELEASE ORAL at 08:52

## 2024-12-18 RX ADMIN — TRAMADOL HYDROCHLORIDE 50 MG: 50 TABLET, COATED ORAL at 06:40

## 2024-12-18 RX ADMIN — GABAPENTIN 100 MG: 100 CAPSULE ORAL at 12:00

## 2024-12-18 RX ADMIN — GABAPENTIN 100 MG: 100 CAPSULE ORAL at 06:40

## 2024-12-18 RX ADMIN — LORATADINE 10 MG: 10 TABLET ORAL at 08:51

## 2024-12-18 RX ADMIN — CARBIDOPA AND LEVODOPA 2 TABLET: 25; 100 TABLET ORAL at 06:40

## 2024-12-18 RX ADMIN — Medication 1200 MG: at 08:51

## 2024-12-18 RX ADMIN — DULOXETINE 60 MG: 60 CAPSULE, DELAYED RELEASE ORAL at 12:00

## 2024-12-18 RX ADMIN — CARBIDOPA AND LEVODOPA 2 TABLET: 25; 100 TABLET ORAL at 12:01

## 2024-12-18 RX ADMIN — Medication 1 DROP: at 08:52

## 2024-12-18 RX ADMIN — QUETIAPINE FUMARATE 25 MG: 25 TABLET ORAL at 08:51

## 2024-12-18 RX ADMIN — IBUPROFEN 200 MG: 200 TABLET, FILM COATED ORAL at 12:02

## 2024-12-18 RX ADMIN — ACETAMINOPHEN 650 MG: 325 TABLET ORAL at 12:01

## 2024-12-18 RX ADMIN — ACETAMINOPHEN 650 MG: 325 TABLET ORAL at 08:51

## 2024-12-18 ASSESSMENT — ACTIVITIES OF DAILY LIVING (ADL)
ADLS_ACUITY_SCORE: 62
ADLS_ACUITY_SCORE: 56
ADLS_ACUITY_SCORE: 63
DEPENDENT_IADLS:: CLEANING;LAUNDRY;COOKING;SHOPPING;MEAL PREPARATION;MEDICATION MANAGEMENT;MONEY MANAGEMENT;TRANSPORTATION
ADLS_ACUITY_SCORE: 63
ADLS_ACUITY_SCORE: 57
ADLS_ACUITY_SCORE: 63
ADLS_ACUITY_SCORE: 63
ADLS_ACUITY_SCORE: 56
ADLS_ACUITY_SCORE: 57
ADLS_ACUITY_SCORE: 62
ADLS_ACUITY_SCORE: 57
ADLS_ACUITY_SCORE: 57
ADLS_ACUITY_SCORE: 63

## 2024-12-18 NOTE — PLAN OF CARE
Problem: UTI (Urinary Tract Infection)  Goal: Improved Infection Symptoms  Outcome: Progressing   Goal Outcome Evaluation:       Sleeping most of this shift but arouses easily. Calm and cooperative. Oriented to self only. Voiding without problem. 1:1 bedside attendant for safety.

## 2024-12-18 NOTE — DISCHARGE SUMMARY
[facility]  Discharge Summary - Medicine & Pediatrics       Date of Admission:  12/15/2024  Date of Discharge:  12/18/2024  Discharging Provider: Dione Cooper MD  Discharge Service: Hospitalist Service    Discharge Diagnoses   UTI- E coli  Hematuria  Bladder hematoma  Painless rectal bleeding     Clinically Significant Risk Factors          Follow-ups Needed After Discharge   Additional important follow-up instructions/to-do's for PCP : ensure she follows up with [facility].    Discharge Disposition   Discharged to assisted living  Condition at discharge: Stable    Hospital Course   ALEXA Luu was admitted on 12/15/2024 for rectal bleeding and hematuria.  The following problems were addressed during her hospitalization:    UTI- E coli  Hematuria  Bladder hematoma  Painless rectal bleeding   Patient presented with rectal bleeding for the past 3 days.  Lab work-up largely normal with a mild leukocytosis at 11.6, occult blood positive, and UA negative for nitrite and leukocyte esterase and positive for >182 RBCs. Urine culture showed E coli . CT abdomen showed no acute GI bleeding and 5.5 cm probably bladder hematoma. ED consulted with [facility]; bleeding most likely d/t hemorrhoids. Subsequent consult with urology recommended admission of pt for continued evaluation and placement of 3-way catheter for initial hand irrigation followed by CBI due to gross hematuria with clots. Continuous bladder irrigation was stopped on day 2 when urine was jonathan/tea colored. Hematuria did not return on day 3, and was discharged with Keflex.       Consultations This Hospital Stay   UROLOGY IP CONSULT  PHYSICAL THERAPY ADULT IP CONSULT  OCCUPATIONAL THERAPY ADULT IP CONSULT  CARE MANAGEMENT / SOCIAL WORK IP CONSULT  PHYSICAL THERAPY ADULT IP CONSULT    Code Status   No CPR- Do NOT Intubate       The patient was discussed with DO BEATRICE No Team Service  [facility]  [address] [address]  Phone: [phone]  Fax: [phone]  ______________________________________________________________________    Physical Exam   Vital Signs: Temp: 98.7  F (37.1  C) Temp src: Oral BP: 107/53 Pulse: 73   Resp: 16 SpO2: 92 % O2 Device: None (Room air)    Weight: 120 lbs 12.99 oz  General Appearance:  Resting comfortably in bed, alert and oriented x3  Respiratory: Lungs clear bilateral to auscultation, good aeration, no increased work of breathing, no wheezing, no crackles  Cardiovascular: Irregularly irregular rhythm, normal rate, no cyanosis, no peripheral edema  GI: Non-distended, non-tender to palpation  Genitourinary: No CVA tenderness   Skin: Normal appearance and turgor for age       Primary Care Physician   Get Guadarramaoff    Discharge Orders   No discharge procedures on file.    Significant Results and Procedures   Most Recent 3 CBC's:  Recent Labs   Lab Test 12/18/24  0737 12/17/24  0951 12/16/24  0618   WBC 7.9 9.3 8.7   HGB 10.4* 10.6* 10.4*   MCV 93 93 94    274 249     Most Recent 3 Hemoglobins:  Recent Labs   Lab Test 12/18/24  0737 12/17/24  0951 12/16/24  0618   HGB 10.4* 10.6* 10.4*       Discharge Medications   Current Discharge Medication List        CONTINUE these medications which have NOT CHANGED    Details   acetaminophen (TYLENOL) 325 MG tablet Take 650 mg by mouth 4 times daily. Takes at 01:30 , 07:30 , 13:00 and 18:00      aspirin 81 MG EC tablet 81mg tab by mouth daily @730a    Associated Diagnoses: Cerebrovascular accident (CVA) due to embolism of right cerebellar artery (H)      carbidopa-levodopa (SINEMET CR)  MG CR tablet 1 tab at 10pm  Qty: 90 tablet, Refills: 3      !! carbidopa-levodopa (SINEMET)  MG tablet Take 1.5 tablets by mouth daily. Give at 18:30      !! carbidopa-levodopa (SINEMET)  MG tablet Take 2 tablets by mouth 2 times daily. Give at 07:30 and 13:00       carboxymethylcellulose PF (REFRESH PLUS) 0.5 % ophthalmic solution Place 1 drop into both eyes 2 times daily.      diclofenac (VOLTAREN) 1 % topical gel Apply 4 g topically 4 times daily as needed for moderate pain. As needed for pain      DULoxetine (CYMBALTA) 30 MG capsule 2 x 30mg at 1pm      estradiol (ESTRACE) 0.1 MG/GM vaginal cream Place vaginally three times a week.      fluticasone (FLONASE) 50 MCG/ACT nasal spray Spray 1 spray into both nostrils daily. As needed      gabapentin (NEURONTIN) 100 MG capsule 100mg three times daily at 730a, 1p and 630p      ibuprofen (ADVIL/MOTRIN) 200 MG tablet Take 200 mg by mouth daily. 13:00      loratadine (CLARITIN) 10 MG tablet Take 10 mg by mouth daily.      Magnesium Hydroxide (DULCOLAX SOFT CHEWS) 1200 MG CHEW Take 1,200 mg by mouth daily. At 13:00      melatonin 3 MG tablet Take 3 mg by mouth at bedtime.      methenamine hippurate (HIPREX) 1 g tablet 1 @730a and 1@630p      metoprolol succinate ER (TOPROL XL) 25 MG 24 hr tablet TAKE 1 TABLET EVERY DAY AT 8AM  Qty: 60 tablet, Refills: 3    Associated Diagnoses: Atrial fibrillation, unspecified type (H)      Multiple Vitamins-Minerals (PRESERVISION AREDS) CAPS 1@730a and 1@10p      MYRBETRIQ 25 MG 24 hr tablet Take 50 mg by mouth daily.      pravastatin (PRAVACHOL) 20 MG tablet TAKE 1 TABLET AT BEDTIME  Qty: 90 tablet, Refills: 1    Associated Diagnoses: Hyperlipidemia, unspecified hyperlipidemia type      QUEtiapine (SEROQUEL) 25 MG tablet Take 25 mg by mouth daily.      traMADol (ULTRAM) 50 MG tablet Take 1 tablet by mouth 2 times daily @730a and 10pm      UNABLE TO FIND Take 400 mg by mouth 2 times daily. MEDICATION NAME:  Chest congestion tab . 400mg . 07:30 and 18:30      Vitamin D3 (CHOLECALCIFEROL) 25 mcg (1000 units) tablet Take 25 mcg by mouth daily.       !! - Potential duplicate medications found. Please discuss with provider.        Allergies   Allergies   Allergen Reactions    Meperidine Anaphylaxis      Unknown cause    Acetaminophen-Codeine Other (See Comments) and Unknown    Codeine Other (See Comments)    Morphine Other (See Comments)     Made me feel really wierd    Penicillins Other (See Comments)     Tongue and throat tingling  Other reaction(s): Paresthesias  Tongue and throat tingling  Tingling on lips          Vicodin [Hydrocodone-Acetaminophen] Other (See Comments)     Weird feeling

## 2024-12-18 NOTE — CONSULTS
Care Management Initial Consult    General Information  Assessment completed with: Patient,         Primary Care Provider verified and updated as needed: Yes   Readmission within the last 30 days: no previous admission in last 30 days      Reason for Consult: discharge planning  Advance Care Planning: Advance Care Planning Reviewed: present on chart, verified with patient          Communication Assessment  Patient's communication style: spoken language (English or Bilingual)             Cognitive  Cognitive/Neuro/Behavioral: .WDL except, level of consciousness  Level of Consciousness: alert  Arousal Level: opens eyes spontaneously  Orientation: disoriented to, time (oriented to person, place, and situation, redirectable)  Mood/Behavior: calm, cooperative  Best Language: 0 - No aphasia  Speech: spontaneous    Living Environment:   People in home: alone     Current living Arrangements: assisted living  Name of Facility: [facility]   Able to return to prior arrangements: yes       Family/Social Support:  Care provided by: self, other (see comments) (correction)  Provides care for: no one, unable/limited ability to care for self  Marital Status:   Support system: Children          Description of Support System: Supportive, Involved         Current Resources:   Patient receiving home care services: No        Community Resources: None  Equipment currently used at home: wheelchair, manual, grab bar, toilet, grab bar, tub/shower, shower chair, walker, rolling  Supplies currently used at home: None    Employment/Financial:  Employment Status: retired        Financial Concerns: none   Referral to Financial Worker: No       Does the patient's insurance plan have a 3 day qualifying hospital stay waiver?  No    Lifestyle & Psychosocial Needs:  Social Drivers of Health     Food Insecurity: Not on file   Depression: Not at risk (1/25/2023)    PHQ-2     PHQ-2 Score: 2   Housing Stability: Not on file   Tobacco Use: Low Risk   (10/7/2024)    Patient History     Smoking Tobacco Use: Never     Smokeless Tobacco Use: Never     Passive Exposure: Not on file   Financial Resource Strain: Not on file   Alcohol Use: Not At Risk (6/19/2024)    Received from [facility]    Patient History     : Not on file   Transportation Needs: Not on file   Physical Activity: Not on file   Interpersonal Safety: Not on file   Stress: Not on file   Social Connections: Not on file   Health Literacy: Not on file       Functional Status:  Prior to admission patient needed assistance:   Dependent ADLs:: Dressing, Bathing, Wheelchair-independent  Dependent IADLs:: Cleaning, Laundry, Cooking, Shopping, Meal Preparation, Medication Management, Money Management, Transportation       Mental Health Status:  Mental Health Status: No Current Concerns       Chemical Dependency Status:  Chemical Dependency Status: No Current Concerns             Values/Beliefs:  Spiritual, Cultural Beliefs, Christianity Practices, Values that affect care: no               Discussed  Partnership in Safe Discharge Planning  document with patient/family: Yes: pt verbalized understanding    Additional Information:  ALEXA Luu is a(n) 89 year old year old female who presented with Rectal bleeding [K62.5]  Hematoma of bladder wall, initial encounter [S37.22XA].     CM spoke with pt at bedside. Introduced self and role. Child(rocío), Anisha, assisted with completing assessment. Patient lives in a(n) assisted living alone. Patient receives assistance with bathing, cooking, cleaning, dressing, laundry, medication management, money management, shopping, and transportation. Anticipated that patient will discharge to assisted living.    Pt has a PE  from the [facility] work with her 3 days a week and walks with her with her walker.    Pt denies any further needs at this time.    Daughter or son will transport pt back to [facility] when medically ready.     Contacts:  Extended Emergency Contact  Information  Primary Emergency Contact: Anisha Little  Address: [address] [place] [address] [place]  Mobile Phone: [phone]  Relation: Daughter  Secondary Emergency Contact: Yobani Luis  Address: [address] [place] [address] [facility]  Mobile Phone: [phone]  Relation: Son      Next Steps: medical readiness    Get Wynn RN

## 2024-12-18 NOTE — PROGRESS NOTES
Occupational Therapy: Orders received. Chart reviewed and discussed with care team.? Occupational Therapy not indicated due to P.T. deferring O.T. as Pt is from [facility].. and has assist with all of her ADLs.  No O.T. needs id. by P.T..? Defer discharge recommendations to P.T.? Will complete orders.

## 2024-12-18 NOTE — PLAN OF CARE
Problem: Adult Inpatient Plan of Care  Goal: Absence of Hospital-Acquired Illness or Injury  Intervention: Identify and Manage Fall Risk  Recent Flowsheet Documentation  Taken 12/18/2024 0848 by Elaine Lisa RN  Safety Promotion/Fall Prevention: safety round/check completed     Problem: Adult Inpatient Plan of Care  Goal: Absence of Hospital-Acquired Illness or Injury  Intervention: Prevent Infection  Recent Flowsheet Documentation  Taken 12/18/2024 0848 by Elaine Lisa RN  Infection Prevention:   cohorting utilized   single patient room provided     Problem: Adult Inpatient Plan of Care  Goal: Absence of Hospital-Acquired Illness or Injury  Intervention: Prevent Skin Injury  Recent Flowsheet Documentation  Taken 12/18/2024 0848 by Elaine Lisa RN  Body Position: weight shifting     Problem: Fall Injury Risk  Goal: Absence of Fall and Fall-Related Injury  Intervention: Promote Injury-Free Environment  Recent Flowsheet Documentation  Taken 12/18/2024 0848 by Elaine Lisa RN  Safety Promotion/Fall Prevention: safety round/check completed     Problem: Fall Injury Risk  Goal: Absence of Fall and Fall-Related Injury  Intervention: Identify and Manage Contributors  Recent Flowsheet Documentation  Taken 12/18/2024 0848 by Elaine Lisa RN  Medication Review/Management: medications reviewed     Problem: Pain Acute  Goal: Optimal Pain Control and Function  Intervention: Prevent or Manage Pain  Recent Flowsheet Documentation  Taken 12/18/2024 0848 by Elaine Lisa RN  Medication Review/Management: medications reviewed    Goal Outcome Evaluation: Alert, oriented to person, place, situation, disoriented to time, redirectable, denied pain, VSS afebrile on room air, up on the wheelchair assistive 2 with walker and gaitbelt, eating breakfast at this time, oral intake tolerated good. Will continue to monitor. -Elaine MUNOZ RN  [facility] Admission: 4- Move to Chair  [facility] Daily4- Move to Chair    Elaine MUNOZ  RN

## 2024-12-18 NOTE — PLAN OF CARE
"Patient discharged  Problem: Adult Inpatient Plan of Care  Goal: Plan of Care Review  Description: The Plan of Care Review/Shift note should be completed every shift.  The Outcome Evaluation is a brief statement about your assessment that the patient is improving, declining, or no change.  This information will be displayed automatically on your shift  note.  Outcome: Met  Goal: Patient-Specific Goal (Individualized)  Description: You can add care plan individualizations to a care plan. Examples of Individualization might be:  \"Parent requests to be called daily at 9am for status\", \"I have a hard time hearing out of my right ear\", or \"Do not touch me to wake me up as it startles  me\".  Outcome: Met  Goal: Absence of Hospital-Acquired Illness or Injury  Outcome: Met  Goal: Optimal Comfort and Wellbeing  Outcome: Met  Goal: Readiness for Transition of Care  Outcome: Met   Goal Outcome Evaluation:                        "

## 2024-12-18 NOTE — PROGRESS NOTES
Physical Therapy Discharge Summary    Reason for therapy discharge:    Discharged to home with home therapy.    Progress towards therapy goal(s). See goals on Care Plan in [facility] electronic health record for goal details.  Goals partially met.  Barriers to achieving goals:   discharge from facility.    Therapy recommendation(s):    Continued therapy is recommended.  Rationale/Recommendations:  Home PT.

## 2024-12-18 NOTE — PLAN OF CARE
Goal Outcome Evaluation:      Plan of Care Reviewed With: patient          Outcome Evaluation: Pt to d/c back to [facility] when medically ready

## 2024-12-18 NOTE — PLAN OF CARE
Problem: Adult Inpatient Plan of Care  Goal: Absence of Hospital-Acquired Illness or Injury  Intervention: Identify and Manage Fall Risk  Recent Flowsheet Documentation  Taken 12/18/2024 0120 by Maria Anton RN  Safety Promotion/Fall Prevention: safety round/check completed     Problem: Adult Inpatient Plan of Care  Goal: Optimal Comfort and Wellbeing  Outcome: Progressing     Problem: Fall Injury Risk  Goal: Absence of Fall and Fall-Related Injury  Outcome: Progressing  Intervention: Identify and Manage Contributors  Recent Flowsheet Documentation  Taken 12/18/2024 0120 by Maria Anton RN  Medication Review/Management: medications reviewed  Intervention: Promote Injury-Free Environment  Recent Flowsheet Documentation  Taken 12/18/2024 0120 by Maria Anton RN  Safety Promotion/Fall Prevention: safety round/check completed     Problem: UTI (Urinary Tract Infection)  Goal: Improved Infection Symptoms  Outcome: Progressing   Goal Outcome Evaluation:       Pt is alert but pleasantly confused. Denies pain. Assist of 1, gait belt and walker. Unsteady gait and unable to tolerate standing too long. W/C on baseline. Assisted by staff to a bedside commode. No blood in urine. VSS on RA. On a soft 1:1. Bed alarm on for safety. Pt has been settled all night.

## 2024-12-24 ENCOUNTER — TRANSFERRED RECORDS (OUTPATIENT)
Dept: HEALTH INFORMATION MANAGEMENT | Facility: CLINIC | Age: 89
End: 2024-12-24
Payer: COMMERCIAL

## 2024-12-26 ENCOUNTER — HOSPITAL ENCOUNTER (OUTPATIENT)
Dept: MAMMOGRAPHY | Facility: CLINIC | Age: 89
End: 2024-12-26
Attending: FAMILY MEDICINE
Payer: COMMERCIAL

## 2024-12-26 DIAGNOSIS — R92.8 ABNORMAL MAMMOGRAM: ICD-10-CM

## 2024-12-26 PROCEDURE — 77065 DX MAMMO INCL CAD UNI: CPT | Mod: RT

## 2025-01-02 ENCOUNTER — LAB REQUISITION (OUTPATIENT)
Dept: LAB | Facility: CLINIC | Age: OVER 89
End: 2025-01-02
Payer: COMMERCIAL

## 2025-01-02 DIAGNOSIS — K62.9 DISEASE OF ANUS AND RECTUM, UNSPECIFIED: ICD-10-CM

## 2025-01-02 DIAGNOSIS — S37.22XD: ICD-10-CM

## 2025-01-03 LAB
ERYTHROCYTE [DISTWIDTH] IN BLOOD BY AUTOMATED COUNT: 14.4 % (ref 10–15)
HCT VFR BLD AUTO: 33.5 % (ref 35–47)
HGB BLD-MCNC: 10 G/DL (ref 11.7–15.7)
MCH RBC QN AUTO: 29.6 PG (ref 26.5–33)
MCHC RBC AUTO-ENTMCNC: 29.9 G/DL (ref 31.5–36.5)
MCV RBC AUTO: 99 FL (ref 78–100)
PLATELET # BLD AUTO: 389 10E3/UL (ref 150–450)
RBC # BLD AUTO: 3.38 10E6/UL (ref 3.8–5.2)
WBC # BLD AUTO: 9.6 10E3/UL (ref 4–11)

## 2025-01-03 PROCEDURE — 85027 COMPLETE CBC AUTOMATED: CPT | Mod: ORL

## 2025-01-03 PROCEDURE — 36415 COLL VENOUS BLD VENIPUNCTURE: CPT | Mod: ORL

## 2025-01-03 PROCEDURE — P9604 ONE-WAY ALLOW PRORATED TRIP: HCPCS | Mod: ORL

## 2025-01-06 ENCOUNTER — LAB REQUISITION (OUTPATIENT)
Dept: LAB | Facility: CLINIC | Age: OVER 89
End: 2025-01-06
Payer: COMMERCIAL

## 2025-01-06 DIAGNOSIS — K62.9 DISEASE OF ANUS AND RECTUM, UNSPECIFIED: ICD-10-CM

## 2025-01-06 DIAGNOSIS — S37.22XD: ICD-10-CM

## 2025-01-14 ENCOUNTER — LAB REQUISITION (OUTPATIENT)
Dept: LAB | Facility: CLINIC | Age: OVER 89
End: 2025-01-14
Payer: COMMERCIAL

## 2025-01-14 DIAGNOSIS — D64.9 ANEMIA, UNSPECIFIED: ICD-10-CM

## 2025-01-15 LAB
ERYTHROCYTE [DISTWIDTH] IN BLOOD BY AUTOMATED COUNT: 13.5 % (ref 10–15)
HCT VFR BLD AUTO: 35.1 % (ref 35–47)
HGB BLD-MCNC: 10.3 G/DL (ref 11.7–15.7)
MCH RBC QN AUTO: 28.9 PG (ref 26.5–33)
MCHC RBC AUTO-ENTMCNC: 29.3 G/DL (ref 31.5–36.5)
MCV RBC AUTO: 99 FL (ref 78–100)
PLATELET # BLD AUTO: 393 10E3/UL (ref 150–450)
RBC # BLD AUTO: 3.56 10E6/UL (ref 3.8–5.2)
WBC # BLD AUTO: 10.5 10E3/UL (ref 4–11)

## 2025-01-15 PROCEDURE — 36415 COLL VENOUS BLD VENIPUNCTURE: CPT | Mod: ORL | Performed by: NURSE PRACTITIONER

## 2025-01-15 PROCEDURE — P9604 ONE-WAY ALLOW PRORATED TRIP: HCPCS | Mod: ORL | Performed by: NURSE PRACTITIONER

## 2025-01-15 PROCEDURE — 85027 COMPLETE CBC AUTOMATED: CPT | Mod: ORL | Performed by: NURSE PRACTITIONER

## 2025-01-16 ENCOUNTER — MEDICAL CORRESPONDENCE (OUTPATIENT)
Dept: HEALTH INFORMATION MANAGEMENT | Facility: CLINIC | Age: OVER 89
End: 2025-01-16
Payer: COMMERCIAL

## 2025-01-23 ENCOUNTER — HOSPITAL ENCOUNTER (OUTPATIENT)
Facility: CLINIC | Age: OVER 89
Discharge: HOME | End: 2025-01-23
Attending: UROLOGY | Admitting: UROLOGY
Payer: COMMERCIAL

## 2025-01-23 ENCOUNTER — ANESTHESIA EVENT (OUTPATIENT)
Dept: SURGERY | Facility: CLINIC | Age: OVER 89
End: 2025-01-23
Payer: COMMERCIAL

## 2025-01-23 ENCOUNTER — ANESTHESIA (OUTPATIENT)
Dept: SURGERY | Facility: CLINIC | Age: OVER 89
End: 2025-01-23
Payer: COMMERCIAL

## 2025-01-23 VITALS
DIASTOLIC BLOOD PRESSURE: 60 MMHG | SYSTOLIC BLOOD PRESSURE: 131 MMHG | OXYGEN SATURATION: 100 % | TEMPERATURE: 98.5 F | BODY MASS INDEX: 22.2 KG/M2 | HEART RATE: 79 BPM | RESPIRATION RATE: 12 BRPM | HEIGHT: 64 IN | WEIGHT: 130 LBS

## 2025-01-23 DIAGNOSIS — N32.89 ERYTHEMATOUS BLADDER MUCOSA: Primary | ICD-10-CM

## 2025-01-23 PROCEDURE — 87186 SC STD MICRODIL/AGAR DIL: CPT | Performed by: UROLOGY

## 2025-01-23 PROCEDURE — 88342 IMHCHEM/IMCYTCHM 1ST ANTB: CPT | Mod: TC | Performed by: UROLOGY

## 2025-01-23 PROCEDURE — 258N000001 HC RX 258: Performed by: UROLOGY

## 2025-01-23 PROCEDURE — 258N000003 HC RX IP 258 OP 636: Performed by: STUDENT IN AN ORGANIZED HEALTH CARE EDUCATION/TRAINING PROGRAM

## 2025-01-23 PROCEDURE — 250N000011 HC RX IP 250 OP 636: Performed by: STUDENT IN AN ORGANIZED HEALTH CARE EDUCATION/TRAINING PROGRAM

## 2025-01-23 PROCEDURE — 272N000001 HC OR GENERAL SUPPLY STERILE: Performed by: UROLOGY

## 2025-01-23 PROCEDURE — 250N000011 HC RX IP 250 OP 636: Performed by: NURSE ANESTHETIST, CERTIFIED REGISTERED

## 2025-01-23 PROCEDURE — 250N000013 HC RX MED GY IP 250 OP 250 PS 637: Performed by: STUDENT IN AN ORGANIZED HEALTH CARE EDUCATION/TRAINING PROGRAM

## 2025-01-23 PROCEDURE — 88305 TISSUE EXAM BY PATHOLOGIST: CPT | Mod: TC | Performed by: UROLOGY

## 2025-01-23 PROCEDURE — 710N000012 HC RECOVERY PHASE 2, PER MINUTE: Performed by: UROLOGY

## 2025-01-23 PROCEDURE — 360N000076 HC SURGERY LEVEL 3, PER MIN: Performed by: UROLOGY

## 2025-01-23 PROCEDURE — 999N000141 HC STATISTIC PRE-PROCEDURE NURSING ASSESSMENT: Performed by: UROLOGY

## 2025-01-23 PROCEDURE — 250N000009 HC RX 250: Performed by: STUDENT IN AN ORGANIZED HEALTH CARE EDUCATION/TRAINING PROGRAM

## 2025-01-23 PROCEDURE — 250N000009 HC RX 250: Performed by: NURSE ANESTHETIST, CERTIFIED REGISTERED

## 2025-01-23 PROCEDURE — 710N000010 HC RECOVERY PHASE 1, LEVEL 2, PER MIN: Performed by: UROLOGY

## 2025-01-23 PROCEDURE — 370N000017 HC ANESTHESIA TECHNICAL FEE, PER MIN: Performed by: UROLOGY

## 2025-01-23 PROCEDURE — 250N000011 HC RX IP 250 OP 636: Performed by: PHYSICIAN ASSISTANT

## 2025-01-23 PROCEDURE — 88341 IMHCHEM/IMCYTCHM EA ADD ANTB: CPT | Mod: TC | Performed by: UROLOGY

## 2025-01-23 PROCEDURE — 250N000025 HC SEVOFLURANE, PER MIN: Performed by: UROLOGY

## 2025-01-23 RX ORDER — EPHEDRINE SULFATE 50 MG/ML
INJECTION, SOLUTION INTRAMUSCULAR; INTRAVENOUS; SUBCUTANEOUS PRN
Status: DISCONTINUED | OUTPATIENT
Start: 2025-01-23 | End: 2025-01-23

## 2025-01-23 RX ORDER — CEPHALEXIN 500 MG/1
500 CAPSULE ORAL 2 TIMES DAILY
Qty: 10 CAPSULE | Refills: 0 | Status: SHIPPED | OUTPATIENT
Start: 2025-01-23

## 2025-01-23 RX ORDER — ONDANSETRON 2 MG/ML
INJECTION INTRAMUSCULAR; INTRAVENOUS PRN
Status: DISCONTINUED | OUTPATIENT
Start: 2025-01-23 | End: 2025-01-23

## 2025-01-23 RX ORDER — HYDROMORPHONE HCL IN WATER/PF 6 MG/30 ML
0.2 PATIENT CONTROLLED ANALGESIA SYRINGE INTRAVENOUS EVERY 5 MIN PRN
Status: DISCONTINUED | OUTPATIENT
Start: 2025-01-23 | End: 2025-01-23 | Stop reason: HOSPADM

## 2025-01-23 RX ORDER — SODIUM CHLORIDE, SODIUM LACTATE, POTASSIUM CHLORIDE, CALCIUM CHLORIDE 600; 310; 30; 20 MG/100ML; MG/100ML; MG/100ML; MG/100ML
INJECTION, SOLUTION INTRAVENOUS CONTINUOUS
Status: DISCONTINUED | OUTPATIENT
Start: 2025-01-23 | End: 2025-01-23 | Stop reason: HOSPADM

## 2025-01-23 RX ORDER — FENTANYL CITRATE 50 UG/ML
50 INJECTION, SOLUTION INTRAMUSCULAR; INTRAVENOUS EVERY 5 MIN PRN
Status: DISCONTINUED | OUTPATIENT
Start: 2025-01-23 | End: 2025-01-23 | Stop reason: HOSPADM

## 2025-01-23 RX ORDER — PROPOFOL 10 MG/ML
INJECTION, EMULSION INTRAVENOUS PRN
Status: DISCONTINUED | OUTPATIENT
Start: 2025-01-23 | End: 2025-01-23

## 2025-01-23 RX ORDER — ONDANSETRON 4 MG/1
4 TABLET, ORALLY DISINTEGRATING ORAL EVERY 30 MIN PRN
Status: DISCONTINUED | OUTPATIENT
Start: 2025-01-23 | End: 2025-01-23 | Stop reason: HOSPADM

## 2025-01-23 RX ORDER — CEFAZOLIN SODIUM/WATER 2 G/20 ML
2 SYRINGE (ML) INTRAVENOUS
Status: COMPLETED | OUTPATIENT
Start: 2025-01-23 | End: 2025-01-23

## 2025-01-23 RX ORDER — GLYCOPYRROLATE 0.2 MG/ML
INJECTION, SOLUTION INTRAMUSCULAR; INTRAVENOUS PRN
Status: DISCONTINUED | OUTPATIENT
Start: 2025-01-23 | End: 2025-01-23

## 2025-01-23 RX ORDER — DEXAMETHASONE SODIUM PHOSPHATE 10 MG/ML
4 INJECTION, SOLUTION INTRAMUSCULAR; INTRAVENOUS
Status: DISCONTINUED | OUTPATIENT
Start: 2025-01-23 | End: 2025-01-23 | Stop reason: HOSPADM

## 2025-01-23 RX ORDER — CEFAZOLIN SODIUM/WATER 2 G/20 ML
2 SYRINGE (ML) INTRAVENOUS SEE ADMIN INSTRUCTIONS
Status: DISCONTINUED | OUTPATIENT
Start: 2025-01-23 | End: 2025-01-23 | Stop reason: HOSPADM

## 2025-01-23 RX ORDER — LABETALOL HYDROCHLORIDE 5 MG/ML
10 INJECTION, SOLUTION INTRAVENOUS
Status: DISCONTINUED | OUTPATIENT
Start: 2025-01-23 | End: 2025-01-23 | Stop reason: HOSPADM

## 2025-01-23 RX ORDER — ACETAMINOPHEN 325 MG/1
975 TABLET ORAL ONCE
Status: COMPLETED | OUTPATIENT
Start: 2025-01-23 | End: 2025-01-23

## 2025-01-23 RX ORDER — HYDROMORPHONE HCL IN WATER/PF 6 MG/30 ML
0.4 PATIENT CONTROLLED ANALGESIA SYRINGE INTRAVENOUS EVERY 5 MIN PRN
Status: DISCONTINUED | OUTPATIENT
Start: 2025-01-23 | End: 2025-01-23 | Stop reason: HOSPADM

## 2025-01-23 RX ORDER — OXYCODONE HYDROCHLORIDE 5 MG/1
10 TABLET ORAL
Status: DISCONTINUED | OUTPATIENT
Start: 2025-01-23 | End: 2025-01-23 | Stop reason: HOSPADM

## 2025-01-23 RX ORDER — ONDANSETRON 2 MG/ML
4 INJECTION INTRAMUSCULAR; INTRAVENOUS EVERY 30 MIN PRN
Status: DISCONTINUED | OUTPATIENT
Start: 2025-01-23 | End: 2025-01-23 | Stop reason: HOSPADM

## 2025-01-23 RX ORDER — OXYCODONE HYDROCHLORIDE 5 MG/1
5 TABLET ORAL
Status: DISCONTINUED | OUTPATIENT
Start: 2025-01-23 | End: 2025-01-23 | Stop reason: HOSPADM

## 2025-01-23 RX ORDER — PROPOFOL 10 MG/ML
INJECTION, EMULSION INTRAVENOUS CONTINUOUS PRN
Status: DISCONTINUED | OUTPATIENT
Start: 2025-01-23 | End: 2025-01-23

## 2025-01-23 RX ORDER — POLYETHYLENE GLYCOL 3350 17 G/17G
8.5 POWDER, FOR SOLUTION ORAL DAILY PRN
COMMUNITY

## 2025-01-23 RX ORDER — FENTANYL CITRATE 50 UG/ML
25 INJECTION, SOLUTION INTRAMUSCULAR; INTRAVENOUS EVERY 5 MIN PRN
Status: DISCONTINUED | OUTPATIENT
Start: 2025-01-23 | End: 2025-01-23 | Stop reason: HOSPADM

## 2025-01-23 RX ORDER — QUETIAPINE FUMARATE 25 MG/1
12.5 TABLET, FILM COATED ORAL DAILY PRN
COMMUNITY

## 2025-01-23 RX ORDER — DEXAMETHASONE SODIUM PHOSPHATE 4 MG/ML
4 INJECTION, SOLUTION INTRA-ARTICULAR; INTRALESIONAL; INTRAMUSCULAR; INTRAVENOUS; SOFT TISSUE
Status: DISCONTINUED | OUTPATIENT
Start: 2025-01-23 | End: 2025-01-23 | Stop reason: HOSPADM

## 2025-01-23 RX ORDER — HYDRALAZINE HYDROCHLORIDE 20 MG/ML
2.5-5 INJECTION INTRAMUSCULAR; INTRAVENOUS EVERY 10 MIN PRN
Status: DISCONTINUED | OUTPATIENT
Start: 2025-01-23 | End: 2025-01-23 | Stop reason: HOSPADM

## 2025-01-23 RX ORDER — NALOXONE HYDROCHLORIDE 0.4 MG/ML
0.1 INJECTION, SOLUTION INTRAMUSCULAR; INTRAVENOUS; SUBCUTANEOUS
Status: DISCONTINUED | OUTPATIENT
Start: 2025-01-23 | End: 2025-01-23 | Stop reason: HOSPADM

## 2025-01-23 RX ORDER — LIDOCAINE HYDROCHLORIDE 10 MG/ML
INJECTION, SOLUTION INFILTRATION; PERINEURAL PRN
Status: DISCONTINUED | OUTPATIENT
Start: 2025-01-23 | End: 2025-01-23

## 2025-01-23 RX ORDER — LIDOCAINE 40 MG/G
CREAM TOPICAL
Status: DISCONTINUED | OUTPATIENT
Start: 2025-01-23 | End: 2025-01-23 | Stop reason: HOSPADM

## 2025-01-23 RX ORDER — DEXAMETHASONE SODIUM PHOSPHATE 10 MG/ML
INJECTION, SOLUTION INTRAMUSCULAR; INTRAVENOUS PRN
Status: DISCONTINUED | OUTPATIENT
Start: 2025-01-23 | End: 2025-01-23

## 2025-01-23 RX ORDER — FENTANYL CITRATE 50 UG/ML
INJECTION, SOLUTION INTRAMUSCULAR; INTRAVENOUS PRN
Status: DISCONTINUED | OUTPATIENT
Start: 2025-01-23 | End: 2025-01-23

## 2025-01-23 RX ORDER — ALBUTEROL SULFATE 0.83 MG/ML
2.5 SOLUTION RESPIRATORY (INHALATION) EVERY 6 HOURS PRN
COMMUNITY

## 2025-01-23 RX ORDER — OXYCODONE HYDROCHLORIDE 5 MG/1
5-10 TABLET ORAL EVERY 4 HOURS PRN
Qty: 6 TABLET | Refills: 0 | Status: SHIPPED | OUTPATIENT
Start: 2025-01-23

## 2025-01-23 RX ADMIN — Medication 5 MG: at 14:09

## 2025-01-23 RX ADMIN — ROCURONIUM BROMIDE 20 MG: 10 INJECTION, SOLUTION INTRAVENOUS at 13:14

## 2025-01-23 RX ADMIN — ACETAMINOPHEN 975 MG: 325 TABLET ORAL at 12:25

## 2025-01-23 RX ADMIN — SODIUM CHLORIDE, POTASSIUM CHLORIDE, SODIUM LACTATE AND CALCIUM CHLORIDE: 600; 310; 30; 20 INJECTION, SOLUTION INTRAVENOUS at 12:56

## 2025-01-23 RX ADMIN — Medication 5 MG: at 13:32

## 2025-01-23 RX ADMIN — ROCURONIUM BROMIDE 40 MG: 10 INJECTION, SOLUTION INTRAVENOUS at 13:07

## 2025-01-23 RX ADMIN — DEXAMETHASONE SODIUM PHOSPHATE 10 MG: 10 INJECTION, SOLUTION INTRAMUSCULAR; INTRAVENOUS at 13:07

## 2025-01-23 RX ADMIN — Medication 5 MG: at 14:26

## 2025-01-23 RX ADMIN — Medication 5 MG: at 13:37

## 2025-01-23 RX ADMIN — ONDANSETRON 4 MG: 2 INJECTION INTRAMUSCULAR; INTRAVENOUS at 14:18

## 2025-01-23 RX ADMIN — PHENYLEPHRINE HYDROCHLORIDE 100 MCG: 10 INJECTION INTRAVENOUS at 13:40

## 2025-01-23 RX ADMIN — Medication 200 MG: at 14:28

## 2025-01-23 RX ADMIN — PROPOFOL 30 MCG/KG/MIN: 10 INJECTION, EMULSION INTRAVENOUS at 13:12

## 2025-01-23 RX ADMIN — FENTANYL CITRATE 75 MCG: 50 INJECTION INTRAMUSCULAR; INTRAVENOUS at 13:07

## 2025-01-23 RX ADMIN — PHENYLEPHRINE HYDROCHLORIDE 0.3 MCG/KG/MIN: 10 INJECTION INTRAVENOUS at 13:26

## 2025-01-23 RX ADMIN — GLYCOPYRROLATE 0.2 MG: 0.2 INJECTION INTRAMUSCULAR; INTRAVENOUS at 13:40

## 2025-01-23 RX ADMIN — FENTANYL CITRATE 25 MCG: 50 INJECTION INTRAMUSCULAR; INTRAVENOUS at 13:37

## 2025-01-23 RX ADMIN — Medication 5 MG: at 13:53

## 2025-01-23 RX ADMIN — Medication 2 G: at 12:58

## 2025-01-23 RX ADMIN — ROCURONIUM BROMIDE 30 MG: 10 INJECTION, SOLUTION INTRAVENOUS at 13:20

## 2025-01-23 RX ADMIN — PROPOFOL 60 MG: 10 INJECTION, EMULSION INTRAVENOUS at 13:07

## 2025-01-23 RX ADMIN — PHENYLEPHRINE HYDROCHLORIDE 100 MCG: 10 INJECTION INTRAVENOUS at 13:16

## 2025-01-23 RX ADMIN — PHENYLEPHRINE HYDROCHLORIDE 100 MCG: 10 INJECTION INTRAVENOUS at 13:27

## 2025-01-23 RX ADMIN — LIDOCAINE HYDROCHLORIDE 3 ML: 10 INJECTION, SOLUTION INFILTRATION; PERINEURAL at 13:07

## 2025-01-23 RX ADMIN — PHENYLEPHRINE HYDROCHLORIDE 100 MCG: 10 INJECTION INTRAVENOUS at 14:01

## 2025-01-23 ASSESSMENT — ACTIVITIES OF DAILY LIVING (ADL)
ADLS_ACUITY_SCORE: 58
ADLS_ACUITY_SCORE: 54
ADLS_ACUITY_SCORE: 54
ADLS_ACUITY_SCORE: 58
ADLS_ACUITY_SCORE: 58

## 2025-01-23 ASSESSMENT — LIFESTYLE VARIABLES: TOBACCO_USE: 0

## 2025-01-23 ASSESSMENT — ENCOUNTER SYMPTOMS: DYSRHYTHMIAS: 1

## 2025-01-23 NOTE — ANESTHESIA POSTPROCEDURE EVALUATION
Patient: ALEXA Luis [facility]    Procedure: Procedure(s):  CYSTOSCOPY WITH BLADDER BIOPSY  FULGURATION  AND CAMACHO CATHETER PLACEMENT       Anesthesia Type:  General    Note:  Disposition: Outpatient   Postop Pain Control: Uneventful            Sign Out: Well controlled pain   PONV: No   Neuro/Psych: Uneventful            Sign Out: Acceptable/Baseline neuro status   Airway/Respiratory: Uneventful            Sign Out: Acceptable/Baseline resp. status   CV/Hemodynamics: Uneventful            Sign Out: Acceptable CV status; No obvious hypovolemia; No obvious fluid overload   Other NRE: NONE   DID A NON-ROUTINE EVENT OCCUR? No           Last vitals:  Vitals Value Taken Time   /70 01/23/25 1501   Temp 36.6  C (97.9  F) 01/23/25 1441   Pulse 79 01/23/25 1504   Resp 20 01/23/25 1504   SpO2 95 % 01/23/25 1504   Vitals shown include unfiled device data.    Electronically Signed By: Elisabeth Chaudhry MD  January 23, 2025  3:06 PM

## 2025-01-23 NOTE — PHARMACY-ADMISSION MEDICATION HISTORY
Pharmacist Admission Medication History    Admission medication history is complete. The information provided in this note is only as accurate as the sources available at the time of the update.    Information Source(s): Facility ([facility]/) medication list/MAR via N/A    Pertinent Information:     Allergies reviewed with patient and updates made in EHR: yes    Medication History Completed By: Albania Sexton RPH 1/23/2025 1:43 PM    PTA Med List   Medication Sig Last Dose/Taking    acetaminophen (TYLENOL) 325 MG tablet Take 650 mg by mouth 4 times daily. Takes at 01:30 , 07:30 , 13:00 and 18:00 1/23/2025 at  7:30 AM    albuterol (PROVENTIL) (2.5 MG/3ML) 0.083% neb solution Take 2.5 mg by nebulization every 6 hours as needed for shortness of breath, wheezing or cough. Unknown    aspirin 81 MG EC tablet 81mg tab by mouth daily @730a 1/15/2025 Morning    carbidopa-levodopa (SINEMET CR)  MG CR tablet 1 tab at 10pm 1/22/2025 at  9:30 PM    carbidopa-levodopa (SINEMET)  MG tablet Take 1.5 tablets by mouth daily. Give at 18:30 1/22/2025 at  6:30 PM    carbidopa-levodopa (SINEMET)  MG tablet Take 2 tablets by mouth 2 times daily. Give at 07:30 and 13:00 1/23/2025 at  7:30 AM    carboxymethylcellulose PF (REFRESH PLUS) 0.5 % ophthalmic solution Place 1 drop into both eyes 2 times daily. 1/23/2025 Morning    diclofenac (VOLTAREN) 1 % topical gel Apply 4 g topically 4 times daily as needed for moderate pain. As needed for pain 1/19/2025    DULoxetine (CYMBALTA) 30 MG capsule Take 60 mg by mouth daily. at 1pm 1/22/2025 at  1:00 PM    estradiol (ESTRACE) 0.1 MG/GM vaginal cream Place vaginally three times a week. (Monday, Wed, Fri) 1/22/2025    fluticasone (FLONASE) 50 MCG/ACT nasal spray Spray 2 sprays into both nostrils daily. 1/23/2025    gabapentin (NEURONTIN) 100 MG capsule Take 100 mg by mouth 3 times daily. at 730a, 1p and 630p 1/23/2025 Morning    ibuprofen (ADVIL/MOTRIN) 200 MG tablet Take 200 mg by  mouth daily. 13:00 1/15/2025 at  1:00 PM    loratadine (CLARITIN) 10 MG tablet Take 10 mg by mouth daily. 1/23/2025 Morning    Magnesium Hydroxide (DULCOLAX SOFT CHEWS) 1200 MG CHEW Take 1,200 mg by mouth daily. At 13:00 1/22/2025 at  1:00 PM    melatonin 3 MG tablet Take 3 mg by mouth at bedtime. 1/22/2025 at  9:30 PM    methenamine hippurate (HIPREX) 1 g tablet Take 1 g by mouth 2 times daily. At 730am and 630pm 1/23/2025 Morning    metoprolol succinate ER (TOPROL XL) 25 MG 24 hr tablet TAKE 1 TABLET EVERY DAY AT 8AM 1/23/2025 at  7:30 AM    Multiple Vitamins-Minerals (PRESERVISION AREDS) CAPS Take 1 capsule by mouth 2 times daily. 1@730a and 1@10p 1/23/2025 Morning    MYRBETRIQ 25 MG 24 hr tablet Take 50 mg by mouth every evening. 1/22/2025 Bedtime    polyethylene glycol (MIRALAX) 17 g packet Take 8.5 g by mouth daily as needed for constipation. Taking As Needed    pravastatin (PRAVACHOL) 20 MG tablet TAKE 1 TABLET AT BEDTIME 1/22/2025 Bedtime    QUEtiapine (SEROQUEL) 25 MG tablet Take 12.5 mg by mouth daily as needed. 1/19/2025    QUEtiapine (SEROQUEL) 25 MG tablet Take 25 mg by mouth daily at 4pm. 1/22/2025 at  4:00 PM    sodium chloride (OCEAN) 0.65 % nasal spray Spray 1 spray into both nostrils daily as needed for congestion. 1/17/2025    traMADol (ULTRAM) 50 MG tablet Take 50 mg by mouth 2 times daily. 1/23/2025 at  7:30 AM    UNABLE TO FIND Take 400 mg by mouth 2 times daily. MEDICATION NAME:  Chest congestion tab . 400mg . 07:30 and 18:30 1/23/2025 at  7:30 AM    Vitamin D3 (CHOLECALCIFEROL) 25 mcg (1000 units) tablet Take 25 mcg by mouth daily. 1/23/2025 Morning

## 2025-01-23 NOTE — ANESTHESIA PREPROCEDURE EVALUATION
Anesthesia Pre-Procedure Evaluation    Patient: ALEXA Luis [facility]   MRN: [id number] : 1932        Procedure : Procedure(s):  CYSTOSCOPY WITH BLADDER BIOPSY  POSSIBLE TRANSURETHRAL RESECTION BLADDER OF TUMOR, FULGURATION  AND CAMACHO CATHETER PLACEMENT          Past Medical History:   Diagnosis Date    Abnormal barium swallow 2024    1.  Aspiration with thin consistency barium. Penetration with nectar consistency barium.  2.  Aspiration is silent.  3.  See the speech pathology report for details.      Abnormal brain MRI 2019    MR HEAD BRAIN WO3/2019 [facility] Other Result Information This result has an attachment that is not available. Result Narrative [facility] RADIOLOGY  EXAM: MR HEAD BRAIN WO LOCATION: [facility] DATE/TIME: 3/7/2019 5:18 PM  INDICATION: Atypical Parkinsonism (hc) COMPARISON: CT 2018 TECHNIQUE: Routine multiplanar multisequence head MRI without intravenou    Atrial fibrillation (H)     per H&P    Atypical parkinsonism (H) 2017    Breast cyst     CVA (cerebral vascular accident) (H)     per H&P    Finger fracture, left 2019    ring finger     Multiple rib fractures 2019    Pacemaker     Parkinson disease (H) 2016    Parkinson's disease, unspecified whether dyskinesia present, unspecified whether manifestations fluctuate (H) 2024      Past Surgical History:   Procedure Laterality Date    ANKLE SURGERY Left     fracture    APPENDECTOMY      BREAST CYST ASPIRATION      EP THANIA CLOSURE N/A 2020    Procedure: EP THANIA Closure;  Surgeon: Javier Smith MD;  Location: [facility] Cath Lab;  Service: Cardiology    EP LOOP RECORDER IMPLANT N/A 10/8/2019    Procedure: EP Loop Recorder Insertion;  Surgeon: Angel Hurd MD;  Location: [facility] Cath Lab;  Service: Cardiology    EYE SURGERY Bilateral     cataract surgery     HYSTERECTOMY      tahbso    HYSTERECTOMY  1982    KNEE SURGERY Right      staph infection    TONSILLECTOMY      ZZC TOTAL ABDOM HYSTERECTOMY      Description: Hysterectomy;  Proc Date: 01/01/1988;  Comments: couldn't find her ovaries      Allergies   Allergen Reactions    Meperidine Anaphylaxis     Unknown cause    Acetaminophen-Codeine Other (See Comments) and Unknown    Codeine Other (See Comments)    Morphine Other (See Comments)     Made me feel really wierd    Penicillins Other (See Comments)     Tongue and throat tingling  Other reaction(s): Paresthesias  Tongue and throat tingling  Tingling on lips          Vicodin [Hydrocodone-Acetaminophen] Other (See Comments)     Weird feeling      Social History     Tobacco Use    Smoking status: Never    Smokeless tobacco: Never   Substance Use Topics    Alcohol use: No      Wt Readings from Last 1 Encounters:   12/17/24 54.8 kg (120 lb 13 oz)        Anesthesia Evaluation            ROS/MED HX  ENT/Pulmonary:    (-) tobacco use, asthma and sleep apnea   Neurologic: Comment: parkinsons    (+)          CVA (no residual),                      Cardiovascular: Comment: MARITA 2020:     Pre-Device:   1. Normal left ventricular size and systolic function.  LVEF:60%  2. No left atrial thrombus or spontaneous contrast.  No left atrial enlargement.    3. A PFO is present with intermittent right to left shunting by color flow doppler.  4. No pericardial effusion.      Post Device:   1. Well seated Watchman  Device in left atrial appendage by 2D and 3D imaging.  No color doppler evidence of flow around device at ostium insertion before or after device release.  No change in mitral valve function.  No thrombus noted on device, LA or   THANIA.     THANIA device measurements:   Device compression diameter:   Pre deployment.   0 : 25.0 mm  45 : 21.0 mm  90 :  21.0 mm  135  : 21.8 mm     Post deployment  0 : 22.4 mm  45 : 23.6 mm  90 :  23.4 mm  135  : 25.0 mm     2. Normal left ventricular size and systolic function.  LVEF:60%  3. Small ASD with unidirectional  "flow (left to right) by color flow doppler.   4. No post procedural pericardial effusion.          (+)  - -   -  - -                        dysrhythmias (s/p watchman), a-fib,          (-) murmur   METS/Exercise Tolerance: 1 - Eating, dressing    Hematologic:  - neg hematologic  ROS     Musculoskeletal: Comment: parkinsons      GI/Hepatic:    (-) liver disease   Renal/Genitourinary:    (-) renal disease   Endo:    (-) Type II DM   Psychiatric/Substance Use:       Infectious Disease:       Malignancy:       Other:            Physical Exam    Airway        Mallampati: II   TM distance: > 3 FB   Neck ROM: full   Mouth opening: > 3 cm    Respiratory Devices and Support         Dental     Comment: Veneers on uppers    (+) Completely normal teeth      Cardiovascular          Rhythm and rate: regular and normal (-) no murmur    Pulmonary           breath sounds clear to auscultation           OUTSIDE LABS:  CBC:   Lab Results   Component Value Date    WBC 10.5 01/15/2025    WBC 9.6 01/03/2025    HGB 10.3 (L) 01/15/2025    HGB 10.0 (L) 01/03/2025    HCT 35.1 01/15/2025    HCT 33.5 (L) 01/03/2025     01/15/2025     01/03/2025     BMP:   Lab Results   Component Value Date     12/18/2024     12/17/2024    POTASSIUM 4.2 12/18/2024    POTASSIUM 4.2 12/17/2024    CHLORIDE 105 12/18/2024    CHLORIDE 107 12/17/2024    CO2 24 12/18/2024    CO2 21 (L) 12/17/2024    BUN 20.9 12/18/2024    BUN 24.2 (H) 12/17/2024    CR 0.52 12/18/2024    CR 0.42 (L) 12/17/2024     (H) 12/18/2024     (H) 12/17/2024     COAGS:   Lab Results   Component Value Date    PTT 28 12/15/2024    INR 1.11 12/15/2024     POC: No results found for: \"BGM\", \"HCG\", \"HCGS\"  HEPATIC:   Lab Results   Component Value Date    ALBUMIN 3.1 (L) 12/16/2024    PROTTOTAL 5.4 (L) 12/16/2024    ALT <5 12/16/2024    AST 13 12/16/2024    ALKPHOS 73 12/16/2024    BILITOTAL 0.3 12/16/2024     OTHER:   Lab Results   Component Value Date    PH 7.45 " (H) 02/20/2020    A1C 5.8 10/06/2019    DARRELL 8.7 (L) 12/18/2024    MAG 2.0 03/17/2022    LIPASE 21 12/15/2024    TSH 1.05 04/03/2024    CRP 0.5 09/09/2019    SED 6 09/09/2019       Anesthesia Plan    ASA Status:  3       Anesthesia Type: General.     - Airway: ETT   Induction: Intravenous.   Maintenance: Inhalation.        Consents    Anesthesia Plan(s) and associated risks, benefits, and realistic alternatives discussed. Questions answered and patient/representative(s) expressed understanding.     - Discussed:     - Discussed with:  Patient            Postoperative Care    Pain management: IV analgesics, Oral pain medications.   PONV prophylaxis: Ondansetron (or other 5HT-3), Dexamethasone or Solumedrol     Comments:               Elisabeth Chaudhry MD    I have reviewed the pertinent notes and labs in the chart from the past 30 days and (re)examined the patient.  Any updates or changes from those notes are reflected in this note.    Clinically Significant Risk Factors Present on Admission                 # Drug Induced Platelet Defect: home medication list includes an antiplatelet medication                 # Financial/Environmental Concerns:

## 2025-01-23 NOTE — ANESTHESIA PROCEDURE NOTES
Airway       Patient location during procedure: OR       Procedure Start/Stop Times: 1/23/2025 1:10 PM  Staff -        Anesthesiologist:  Elisabeth Chaudhry MD       CRNA: Cherrie Ramirez APRN CRNA       Other Anesthesia Staff: Cherrie Rice       Performed By: SRNA  Consent for Airway        Urgency: elective  Indications and Patient Condition       Indications for airway management: romi-procedural       Induction type:intravenous       Mask difficulty assessment: 1 - vent by mask    Final Airway Details       Final airway type: endotracheal airway       Successful airway: ETT - single and Oral  Endotracheal Airway Details        ETT size (mm): 7.0       Cuffed: yes       Cuff volume (mL): 7       Successful intubation technique: video laryngoscopy       VL Blade Size: Glidescope 3       Grade View of Cords: 1       Adjucts: stylet       Position: Right       Measured from: gums/teeth       Secured at (cm): 22       Bite block used: None    Post intubation assessment        Placement verified by: capnometry, equal breath sounds and chest rise        Number of attempts at approach: 1       Number of other approaches attempted: 0       Secured with: tape       Ease of procedure: easy       Dentition: Intact and Unchanged    Medication(s) Administered   Medication Administration Time: 1/23/2025 1:10 PM

## 2025-01-23 NOTE — INTERVAL H&P NOTE
I have reviewed the surgical (or preoperative) H&P that is linked to this encounter, and examined the patient. There are no significant changes    Carlos Enrique ZECHARIAH Ríos MD'    Clinical Conditions Present on Arrival:  Clinically Significant Risk Factors Present on Admission                  # Drug Induced Platelet Defect: home medication list includes an antiplatelet medication

## 2025-01-23 NOTE — OP NOTE
Date of surgery: 1/23/2025  Location:[facility]      Surgeon: Carlos Enrique Ríos MD    Anesthesia: General    Preoperative diagnosis: erythema of bladder.    Postoperative diagnosis: Same    Procedure: Cystoscopy with bladder biopsy Hickey catheter placement and cauterization of bleeding vessels within bladder.    Operative indication: ALEXA Luu is a 89 year old female bladder erythema and history of hematuria.  Counseled for cystoscopy bladder biopsy for diagnostic purposes.    Operative findings: Multiple areas of bladder erythema with suspicious mucosa    Operative procedure: The patient was brought to the operating room.  Lithotomy.  Sterile conditions.  General anesthesia.  Cystoscopy shows the urethra is normal bladder is inspected there is significant bladder erythema and thickness of the bladder wall throughout the bladder more prominently in the posterior the dome the anterior the left and the right all of these areas are biopsied and sent to pathology for review and extensive electrocautery send performed of the suspicious areas throughout the bladder.  Once hemostasis is achieved a 20 [place] Hickey catheter is placed she is awoken from anesthesia and transferred recovery stable.    Drains: 20 [place] hickey     Specimens: bladder biopsy posterior, dome, anterior, left, right anterior, right posterior    Estimated blood loss: 20 ml     Complications: none      Carlos Enrique Ríos MD

## 2025-01-23 NOTE — ANESTHESIA CARE TRANSFER NOTE
Patient: ALEXA Luis [facility]    Procedure: Procedure(s):  CYSTOSCOPY WITH BLADDER BIOPSY  FULGURATION  AND CAMACHO CATHETER PLACEMENT       Diagnosis: Blood in urine [R31.9]  Diagnosis Additional Information: No value filed.    Anesthesia Type:   General     Note:    Oropharynx: oropharynx clear of all foreign objects  Level of Consciousness: drowsy  Oxygen Supplementation: face mask  Level of Supplemental Oxygen (L/min / FiO2): 10  Independent Airway: airway patency satisfactory and stable  Dentition: dentition unchanged  Vital Signs Stable: post-procedure vital signs reviewed and stable    Patient transferred to: PACU    Handoff Report: Identifed the Patient, Identified the Reponsible Provider, Reviewed the pertinent medical history, Discussed the surgical course, Reviewed Intra-OP anesthesia mangement and issues during anesthesia, Set expectations for post-procedure period and Allowed opportunity for questions and acknowledgement of understanding      Vitals:  Vitals Value Taken Time   /60 01/23/25 1442   Temp 36.6  C (97.9  F) 01/23/25 1441   Pulse 83 01/23/25 1442   Resp 21 01/23/25 1442   SpO2 100 % 01/23/25 1442   Vitals shown include unfiled device data.    Electronically Signed By: CRYSTAL Bolanos CRNA  January 23, 2025  2:44 PM

## 2025-01-26 LAB
BACTERIA UR CULT: ABNORMAL
BACTERIA UR CULT: ABNORMAL

## 2025-01-31 ENCOUNTER — TRANSFERRED RECORDS (OUTPATIENT)
Dept: HEALTH INFORMATION MANAGEMENT | Facility: CLINIC | Age: OVER 89
End: 2025-01-31

## 2025-01-31 LAB
PATH REPORT.COMMENTS IMP SPEC: NORMAL
PATH REPORT.FINAL DX SPEC: NORMAL
PATH REPORT.GROSS SPEC: NORMAL
PATH REPORT.MICROSCOPIC SPEC OTHER STN: NORMAL
PATH REPORT.RELEVANT HX SPEC: NORMAL
PHOTO IMAGE: NORMAL

## 2025-01-31 PROCEDURE — 88342 IMHCHEM/IMCYTCHM 1ST ANTB: CPT | Mod: 26 | Performed by: PATHOLOGY

## 2025-01-31 PROCEDURE — 88341 IMHCHEM/IMCYTCHM EA ADD ANTB: CPT | Mod: 26 | Performed by: PATHOLOGY

## 2025-01-31 PROCEDURE — 88305 TISSUE EXAM BY PATHOLOGIST: CPT | Mod: 26 | Performed by: PATHOLOGY

## 2025-02-03 ENCOUNTER — LAB REQUISITION (OUTPATIENT)
Dept: LAB | Facility: CLINIC | Age: OVER 89
End: 2025-02-03
Payer: COMMERCIAL

## 2025-02-03 DIAGNOSIS — R41.0 DISORIENTATION, UNSPECIFIED: ICD-10-CM

## 2025-02-03 LAB
ALBUMIN UR-MCNC: 300 MG/DL
APPEARANCE UR: ABNORMAL
BACTERIA #/AREA URNS HPF: ABNORMAL /HPF
BILIRUB UR QL STRIP: NEGATIVE
COLOR UR AUTO: ABNORMAL
GLUCOSE UR STRIP-MCNC: NEGATIVE MG/DL
HGB UR QL STRIP: ABNORMAL
KETONES UR STRIP-MCNC: NEGATIVE MG/DL
LEUKOCYTE ESTERASE UR QL STRIP: ABNORMAL
NITRATE UR QL: NEGATIVE
PH UR STRIP: 6 [PH] (ref 5–7)
RBC URINE: >182 /HPF
SP GR UR STRIP: 1.02 (ref 1–1.03)
UROBILINOGEN UR STRIP-MCNC: NORMAL MG/DL
WBC URINE: >182 /HPF
YEAST #/AREA URNS HPF: ABNORMAL /HPF

## 2025-02-03 PROCEDURE — 87086 URINE CULTURE/COLONY COUNT: CPT | Mod: ORL | Performed by: NURSE PRACTITIONER

## 2025-02-03 PROCEDURE — 81001 URINALYSIS AUTO W/SCOPE: CPT | Mod: ORL | Performed by: NURSE PRACTITIONER

## 2025-02-04 LAB — BACTERIA UR CULT: NO GROWTH

## 2025-03-26 PROCEDURE — 87186 SC STD MICRODIL/AGAR DIL: CPT | Mod: ORL | Performed by: NURSE PRACTITIONER

## 2025-03-26 PROCEDURE — 87088 URINE BACTERIA CULTURE: CPT | Mod: ORL | Performed by: NURSE PRACTITIONER

## 2025-03-26 PROCEDURE — 81001 URINALYSIS AUTO W/SCOPE: CPT | Mod: ORL | Performed by: NURSE PRACTITIONER

## 2025-03-27 ENCOUNTER — LAB REQUISITION (OUTPATIENT)
Dept: LAB | Facility: CLINIC | Age: OVER 89
End: 2025-03-27
Payer: COMMERCIAL

## 2025-03-27 DIAGNOSIS — R82.79 OTHER ABNORMAL FINDINGS ON MICROBIOLOGICAL EXAMINATION OF URINE: ICD-10-CM

## 2025-03-27 LAB
ALBUMIN UR-MCNC: 100 MG/DL
APPEARANCE UR: ABNORMAL
BILIRUB UR QL STRIP: NEGATIVE
COLOR UR AUTO: ABNORMAL
GLUCOSE UR STRIP-MCNC: NEGATIVE MG/DL
HGB UR QL STRIP: ABNORMAL
KETONES UR STRIP-MCNC: NEGATIVE MG/DL
LEUKOCYTE ESTERASE UR QL STRIP: ABNORMAL
NITRATE UR QL: NEGATIVE
PH UR STRIP: 6 [PH] (ref 5–7)
RBC URINE: >182 /HPF
SP GR UR STRIP: 1.02 (ref 1–1.03)
UROBILINOGEN UR STRIP-MCNC: NORMAL MG/DL
WBC URINE: >182 /HPF

## 2025-03-28 LAB — BACTERIA UR CULT: ABNORMAL

## 2025-04-01 ENCOUNTER — LAB REQUISITION (OUTPATIENT)
Dept: LAB | Facility: CLINIC | Age: OVER 89
End: 2025-04-01
Payer: COMMERCIAL

## 2025-04-01 DIAGNOSIS — D64.9 ANEMIA, UNSPECIFIED: ICD-10-CM

## 2025-04-02 LAB
ERYTHROCYTE [DISTWIDTH] IN BLOOD BY AUTOMATED COUNT: 14.9 % (ref 10–15)
HCT VFR BLD AUTO: 32.2 % (ref 35–47)
HGB BLD-MCNC: 9.7 G/DL (ref 11.7–15.7)
MCH RBC QN AUTO: 25.8 PG (ref 26.5–33)
MCHC RBC AUTO-ENTMCNC: 30.1 G/DL (ref 31.5–36.5)
MCV RBC AUTO: 86 FL (ref 78–100)
PLATELET # BLD AUTO: 369 10E3/UL (ref 150–450)
RBC # BLD AUTO: 3.76 10E6/UL (ref 3.8–5.2)
WBC # BLD AUTO: 7.1 10E3/UL (ref 4–11)

## 2025-04-02 PROCEDURE — P9604 ONE-WAY ALLOW PRORATED TRIP: HCPCS | Mod: ORL | Performed by: NURSE PRACTITIONER

## 2025-04-02 PROCEDURE — 36415 COLL VENOUS BLD VENIPUNCTURE: CPT | Mod: ORL | Performed by: NURSE PRACTITIONER

## 2025-04-02 PROCEDURE — 85027 COMPLETE CBC AUTOMATED: CPT | Mod: ORL | Performed by: NURSE PRACTITIONER

## (undated) DEVICE — TUBING SET THERMEDX UROLOGY SGL USE LL0006

## (undated) DEVICE — Device

## (undated) DEVICE — TUBING SUCTION MEDI-VAC 1/4"X20' N620A

## (undated) DEVICE — ADAPTER CATH 403250

## (undated) DEVICE — SOL NACL 0.9% IRRIG 1000ML BOTTLE 2F7124

## (undated) DEVICE — PREP DYNA-HEX 4% CHG SCRUB 4OZ BOTTLE MDS098710

## (undated) DEVICE — GLOVE BIOGEL PI INDICATOR 8.0 LF 41680

## (undated) DEVICE — JUMPSUIT CYSTO DISP SD-100

## (undated) DEVICE — DRSG TELFA 3X4" 1050

## (undated) DEVICE — ESU ELEC RESECTOSCOPIC PLASMALOOP 24FR STD 12-30DEG WA22706S

## (undated) DEVICE — SOL WATER IRRIG 1000ML BOTTLE 2F7114

## (undated) DEVICE — GLOVE BIOGEL PI ULTRATOUCH G SZ 8.0 42180

## (undated) DEVICE — SOL WATER IRRIG 3000ML BAG 2B7117

## (undated) DEVICE — SPONGE RAY-TEC 4X8" 7318

## (undated) DEVICE — CONTAINER URINE SPEC 4OZ STRL 1053

## (undated) DEVICE — SUCTION MANIFOLD NEPTUNE 2 SYS 1 PORT 702-025-000

## (undated) DEVICE — MAT FLOOR WATERPROOF BACKSHEET FMBP30

## (undated) DEVICE — CUSTOM PACK CYSTO PREFERRED SOT5BCYHEA

## (undated) RX ORDER — FENTANYL CITRATE-0.9 % NACL/PF 10 MCG/ML
PLASTIC BAG, INJECTION (ML) INTRAVENOUS
Status: DISPENSED
Start: 2025-01-23

## (undated) RX ORDER — FENTANYL CITRATE 50 UG/ML
INJECTION, SOLUTION INTRAMUSCULAR; INTRAVENOUS
Status: DISPENSED
Start: 2025-01-23

## (undated) RX ORDER — EPHEDRINE SULFATE 50 MG/ML
INJECTION, SOLUTION INTRAMUSCULAR; INTRAVENOUS; SUBCUTANEOUS
Status: DISPENSED
Start: 2025-01-23